# Patient Record
Sex: FEMALE | Race: BLACK OR AFRICAN AMERICAN | NOT HISPANIC OR LATINO | Employment: OTHER | URBAN - METROPOLITAN AREA
[De-identification: names, ages, dates, MRNs, and addresses within clinical notes are randomized per-mention and may not be internally consistent; named-entity substitution may affect disease eponyms.]

---

## 2023-01-01 ENCOUNTER — ANESTHESIA EVENT (OUTPATIENT)
Dept: SURGERY | Facility: OTHER | Age: 68
DRG: 239 | End: 2023-01-01
Payer: MEDICARE

## 2023-01-01 ENCOUNTER — HOSPITAL ENCOUNTER (INPATIENT)
Facility: OTHER | Age: 68
LOS: 32 days | DRG: 239 | End: 2023-10-19
Attending: EMERGENCY MEDICINE | Admitting: EMERGENCY MEDICINE
Payer: MEDICARE

## 2023-01-01 ENCOUNTER — ANESTHESIA (OUTPATIENT)
Dept: SURGERY | Facility: OTHER | Age: 68
DRG: 239 | End: 2023-01-01
Payer: MEDICARE

## 2023-01-01 ENCOUNTER — DOCUMENTATION ONLY (OUTPATIENT)
Dept: NEUROLOGY | Facility: CLINIC | Age: 68
End: 2023-01-01
Payer: MEDICARE

## 2023-01-01 VITALS
SYSTOLIC BLOOD PRESSURE: 74 MMHG | WEIGHT: 125.69 LBS | TEMPERATURE: 98 F | OXYGEN SATURATION: 98 % | HEART RATE: 64 BPM | BODY MASS INDEX: 20.94 KG/M2 | DIASTOLIC BLOOD PRESSURE: 45 MMHG | RESPIRATION RATE: 12 BRPM | HEIGHT: 65 IN

## 2023-01-01 DIAGNOSIS — I96 GANGRENE OF TOE: Primary | ICD-10-CM

## 2023-01-01 DIAGNOSIS — N18.6 ANEMIA DUE TO END STAGE RENAL DISEASE: Chronic | ICD-10-CM

## 2023-01-01 DIAGNOSIS — R10.9 ABDOMINAL PAIN: ICD-10-CM

## 2023-01-01 DIAGNOSIS — I70.262 ATHEROSCLEROSIS OF NATIVE ARTERY OF LEFT LOWER EXTREMITY WITH GANGRENE: ICD-10-CM

## 2023-01-01 DIAGNOSIS — L08.9 TOE INFECTION: ICD-10-CM

## 2023-01-01 DIAGNOSIS — Z99.2 ESRD (END STAGE RENAL DISEASE) ON DIALYSIS: ICD-10-CM

## 2023-01-01 DIAGNOSIS — H91.90 HEARING IMPAIRED PERSON, UNSPECIFIED LATERALITY: ICD-10-CM

## 2023-01-01 DIAGNOSIS — I96 GANGRENE OF TOE OF LEFT FOOT: ICD-10-CM

## 2023-01-01 DIAGNOSIS — R07.9 CHEST PAIN: ICD-10-CM

## 2023-01-01 DIAGNOSIS — D63.1 ANEMIA DUE TO END STAGE RENAL DISEASE: Chronic | ICD-10-CM

## 2023-01-01 DIAGNOSIS — N18.6 ESRD (END STAGE RENAL DISEASE) ON DIALYSIS: ICD-10-CM

## 2023-01-01 DIAGNOSIS — G93.40 ENCEPHALOPATHY: ICD-10-CM

## 2023-01-01 DIAGNOSIS — N18.6 ESRD (END STAGE RENAL DISEASE): Chronic | ICD-10-CM

## 2023-01-01 LAB
ABO + RH BLD: NORMAL
ABO + RH BLD: NORMAL
ABO GROUP BLD: NORMAL
ALBUMIN SERPL BCP-MCNC: 1.8 G/DL (ref 3.5–5.2)
ALBUMIN SERPL BCP-MCNC: 1.9 G/DL (ref 3.5–5.2)
ALBUMIN SERPL BCP-MCNC: 2 G/DL (ref 3.5–5.2)
ALBUMIN SERPL BCP-MCNC: 2.1 G/DL (ref 3.5–5.2)
ALBUMIN SERPL BCP-MCNC: 2.2 G/DL (ref 3.5–5.2)
ALBUMIN SERPL BCP-MCNC: 2.2 G/DL (ref 3.5–5.2)
ALBUMIN SERPL BCP-MCNC: 2.3 G/DL (ref 3.5–5.2)
ALBUMIN SERPL BCP-MCNC: 2.4 G/DL (ref 3.5–5.2)
ALBUMIN SERPL BCP-MCNC: 2.5 G/DL (ref 3.5–5.2)
ALBUMIN SERPL BCP-MCNC: 2.8 G/DL (ref 3.5–5.2)
ALBUMIN SERPL BCP-MCNC: 2.9 G/DL (ref 3.5–5.2)
ALBUMIN SERPL BCP-MCNC: 3.5 G/DL (ref 3.5–5.2)
ALP SERPL-CCNC: 84 U/L (ref 55–135)
ALT SERPL W/O P-5'-P-CCNC: 8 U/L (ref 10–44)
ANION GAP SERPL CALC-SCNC: 10 MMOL/L (ref 8–16)
ANION GAP SERPL CALC-SCNC: 11 MMOL/L (ref 8–16)
ANION GAP SERPL CALC-SCNC: 12 MMOL/L (ref 8–16)
ANION GAP SERPL CALC-SCNC: 12 MMOL/L (ref 8–16)
ANION GAP SERPL CALC-SCNC: 13 MMOL/L (ref 8–16)
ANION GAP SERPL CALC-SCNC: 13 MMOL/L (ref 8–16)
ANION GAP SERPL CALC-SCNC: 14 MMOL/L (ref 8–16)
ANION GAP SERPL CALC-SCNC: 15 MMOL/L (ref 8–16)
ANION GAP SERPL CALC-SCNC: 16 MMOL/L (ref 8–16)
ANION GAP SERPL CALC-SCNC: 17 MMOL/L (ref 8–16)
ANION GAP SERPL CALC-SCNC: 19 MMOL/L (ref 8–16)
ANION GAP SERPL CALC-SCNC: 20 MMOL/L (ref 8–16)
ANION GAP SERPL CALC-SCNC: 23 MMOL/L (ref 8–16)
ANION GAP SERPL CALC-SCNC: 31 MMOL/L (ref 8–16)
ANISOCYTOSIS BLD QL SMEAR: SLIGHT
AST SERPL-CCNC: 20 U/L (ref 10–40)
BACTERIA BLD CULT: NORMAL
BACTERIA BLD CULT: NORMAL
BASOPHILS # BLD AUTO: 0.06 K/UL (ref 0–0.2)
BASOPHILS # BLD AUTO: 0.06 K/UL (ref 0–0.2)
BASOPHILS # BLD AUTO: 0.07 K/UL (ref 0–0.2)
BASOPHILS # BLD AUTO: 0.08 K/UL (ref 0–0.2)
BASOPHILS # BLD AUTO: 0.09 K/UL (ref 0–0.2)
BASOPHILS # BLD AUTO: 0.1 K/UL (ref 0–0.2)
BASOPHILS # BLD AUTO: 0.11 K/UL (ref 0–0.2)
BASOPHILS # BLD AUTO: 0.12 K/UL (ref 0–0.2)
BASOPHILS # BLD AUTO: 0.12 K/UL (ref 0–0.2)
BASOPHILS # BLD AUTO: ABNORMAL K/UL (ref 0–0.2)
BASOPHILS NFR BLD: 0 % (ref 0–1.9)
BASOPHILS NFR BLD: 0 % (ref 0–1.9)
BASOPHILS NFR BLD: 0.3 % (ref 0–1.9)
BASOPHILS NFR BLD: 0.4 % (ref 0–1.9)
BASOPHILS NFR BLD: 0.5 % (ref 0–1.9)
BASOPHILS NFR BLD: 0.6 % (ref 0–1.9)
BASOPHILS NFR BLD: 0.7 % (ref 0–1.9)
BASOPHILS NFR BLD: 0.8 % (ref 0–1.9)
BASOPHILS NFR BLD: 0.9 % (ref 0–1.9)
BASOPHILS NFR BLD: 1 % (ref 0–1.9)
BASOPHILS NFR BLD: 1 % (ref 0–1.9)
BASOPHILS NFR BLD: 1.1 % (ref 0–1.9)
BASOPHILS NFR BLD: 2 % (ref 0–1.9)
BILIRUB SERPL-MCNC: 1 MG/DL (ref 0.1–1)
BLD GP AB SCN CELLS X3 SERPL QL: NORMAL
BLD GP AB SCN CELLS X3 SERPL QL: NORMAL
BLD PROD TYP BPU: NORMAL
BLOOD UNIT EXPIRATION DATE: NORMAL
BLOOD UNIT TYPE CODE: 6200
BLOOD UNIT TYPE: NORMAL
BUN SERPL-MCNC: 17 MG/DL (ref 8–23)
BUN SERPL-MCNC: 18 MG/DL (ref 8–23)
BUN SERPL-MCNC: 20 MG/DL (ref 8–23)
BUN SERPL-MCNC: 20 MG/DL (ref 8–23)
BUN SERPL-MCNC: 22 MG/DL (ref 8–23)
BUN SERPL-MCNC: 25 MG/DL (ref 8–23)
BUN SERPL-MCNC: 26 MG/DL (ref 8–23)
BUN SERPL-MCNC: 28 MG/DL (ref 8–23)
BUN SERPL-MCNC: 28 MG/DL (ref 8–23)
BUN SERPL-MCNC: 29 MG/DL (ref 8–23)
BUN SERPL-MCNC: 32 MG/DL (ref 8–23)
BUN SERPL-MCNC: 32 MG/DL (ref 8–23)
BUN SERPL-MCNC: 33 MG/DL (ref 8–23)
BUN SERPL-MCNC: 34 MG/DL (ref 8–23)
BUN SERPL-MCNC: 36 MG/DL (ref 8–23)
BUN SERPL-MCNC: 38 MG/DL (ref 8–23)
BUN SERPL-MCNC: 38 MG/DL (ref 8–23)
BUN SERPL-MCNC: 39 MG/DL (ref 8–23)
BUN SERPL-MCNC: 40 MG/DL (ref 8–23)
BUN SERPL-MCNC: 42 MG/DL (ref 8–23)
BUN SERPL-MCNC: 43 MG/DL (ref 8–23)
BUN SERPL-MCNC: 47 MG/DL (ref 8–23)
BUN SERPL-MCNC: 48 MG/DL (ref 8–23)
BUN SERPL-MCNC: 48 MG/DL (ref 8–23)
BUN SERPL-MCNC: 55 MG/DL (ref 8–23)
BUN SERPL-MCNC: 57 MG/DL (ref 8–23)
BUN SERPL-MCNC: 62 MG/DL (ref 8–23)
CALCIUM SERPL-MCNC: 10 MG/DL (ref 8.7–10.5)
CALCIUM SERPL-MCNC: 10 MG/DL (ref 8.7–10.5)
CALCIUM SERPL-MCNC: 10.3 MG/DL (ref 8.7–10.5)
CALCIUM SERPL-MCNC: 10.3 MG/DL (ref 8.7–10.5)
CALCIUM SERPL-MCNC: 10.4 MG/DL (ref 8.7–10.5)
CALCIUM SERPL-MCNC: 10.5 MG/DL (ref 8.7–10.5)
CALCIUM SERPL-MCNC: 10.7 MG/DL (ref 8.7–10.5)
CALCIUM SERPL-MCNC: 10.8 MG/DL (ref 8.7–10.5)
CALCIUM SERPL-MCNC: 10.9 MG/DL (ref 8.7–10.5)
CALCIUM SERPL-MCNC: 10.9 MG/DL (ref 8.7–10.5)
CALCIUM SERPL-MCNC: 11 MG/DL (ref 8.7–10.5)
CALCIUM SERPL-MCNC: 11.1 MG/DL (ref 8.7–10.5)
CALCIUM SERPL-MCNC: 11.1 MG/DL (ref 8.7–10.5)
CALCIUM SERPL-MCNC: 8 MG/DL (ref 8.7–10.5)
CALCIUM SERPL-MCNC: 8.2 MG/DL (ref 8.7–10.5)
CALCIUM SERPL-MCNC: 8.4 MG/DL (ref 8.7–10.5)
CALCIUM SERPL-MCNC: 8.5 MG/DL (ref 8.7–10.5)
CALCIUM SERPL-MCNC: 8.6 MG/DL (ref 8.7–10.5)
CALCIUM SERPL-MCNC: 8.7 MG/DL (ref 8.7–10.5)
CALCIUM SERPL-MCNC: 8.8 MG/DL (ref 8.7–10.5)
CALCIUM SERPL-MCNC: 9.1 MG/DL (ref 8.7–10.5)
CALCIUM SERPL-MCNC: 9.2 MG/DL (ref 8.7–10.5)
CALCIUM SERPL-MCNC: 9.3 MG/DL (ref 8.7–10.5)
CALCIUM SERPL-MCNC: 9.3 MG/DL (ref 8.7–10.5)
CALCIUM SERPL-MCNC: 9.4 MG/DL (ref 8.7–10.5)
CALCIUM SERPL-MCNC: 9.5 MG/DL (ref 8.7–10.5)
CALCIUM SERPL-MCNC: 9.6 MG/DL (ref 8.7–10.5)
CALCIUM SERPL-MCNC: 9.7 MG/DL (ref 8.7–10.5)
CALCIUM SERPL-MCNC: 9.8 MG/DL (ref 8.7–10.5)
CALCIUM SERPL-MCNC: 9.9 MG/DL (ref 8.7–10.5)
CHLORIDE SERPL-SCNC: 100 MMOL/L (ref 95–110)
CHLORIDE SERPL-SCNC: 100 MMOL/L (ref 95–110)
CHLORIDE SERPL-SCNC: 101 MMOL/L (ref 95–110)
CHLORIDE SERPL-SCNC: 102 MMOL/L (ref 95–110)
CHLORIDE SERPL-SCNC: 102 MMOL/L (ref 95–110)
CHLORIDE SERPL-SCNC: 107 MMOL/L (ref 95–110)
CHLORIDE SERPL-SCNC: 109 MMOL/L (ref 95–110)
CHLORIDE SERPL-SCNC: 90 MMOL/L (ref 95–110)
CHLORIDE SERPL-SCNC: 91 MMOL/L (ref 95–110)
CHLORIDE SERPL-SCNC: 91 MMOL/L (ref 95–110)
CHLORIDE SERPL-SCNC: 93 MMOL/L (ref 95–110)
CHLORIDE SERPL-SCNC: 94 MMOL/L (ref 95–110)
CHLORIDE SERPL-SCNC: 94 MMOL/L (ref 95–110)
CHLORIDE SERPL-SCNC: 95 MMOL/L (ref 95–110)
CHLORIDE SERPL-SCNC: 96 MMOL/L (ref 95–110)
CHLORIDE SERPL-SCNC: 97 MMOL/L (ref 95–110)
CHLORIDE SERPL-SCNC: 98 MMOL/L (ref 95–110)
CHLORIDE SERPL-SCNC: 98 MMOL/L (ref 95–110)
CO2 SERPL-SCNC: 16 MMOL/L (ref 23–29)
CO2 SERPL-SCNC: 21 MMOL/L (ref 23–29)
CO2 SERPL-SCNC: 21 MMOL/L (ref 23–29)
CO2 SERPL-SCNC: 22 MMOL/L (ref 23–29)
CO2 SERPL-SCNC: 23 MMOL/L (ref 23–29)
CO2 SERPL-SCNC: 24 MMOL/L (ref 23–29)
CO2 SERPL-SCNC: 25 MMOL/L (ref 23–29)
CO2 SERPL-SCNC: 26 MMOL/L (ref 23–29)
CO2 SERPL-SCNC: 27 MMOL/L (ref 23–29)
CO2 SERPL-SCNC: 27 MMOL/L (ref 23–29)
CO2 SERPL-SCNC: 28 MMOL/L (ref 23–29)
CO2 SERPL-SCNC: 28 MMOL/L (ref 23–29)
CO2 SERPL-SCNC: 30 MMOL/L (ref 23–29)
CO2 SERPL-SCNC: 30 MMOL/L (ref 23–29)
CODING SYSTEM: NORMAL
CORTIS SERPL-MCNC: 22 UG/DL
CORTIS SERPL-MCNC: 53 UG/DL
CREAT SERPL-MCNC: 10.9 MG/DL (ref 0.5–1.4)
CREAT SERPL-MCNC: 11.3 MG/DL (ref 0.5–1.4)
CREAT SERPL-MCNC: 3.2 MG/DL (ref 0.5–1.4)
CREAT SERPL-MCNC: 3.3 MG/DL (ref 0.5–1.4)
CREAT SERPL-MCNC: 3.3 MG/DL (ref 0.5–1.4)
CREAT SERPL-MCNC: 3.5 MG/DL (ref 0.5–1.4)
CREAT SERPL-MCNC: 3.5 MG/DL (ref 0.5–1.4)
CREAT SERPL-MCNC: 3.7 MG/DL (ref 0.5–1.4)
CREAT SERPL-MCNC: 4 MG/DL (ref 0.5–1.4)
CREAT SERPL-MCNC: 4.5 MG/DL (ref 0.5–1.4)
CREAT SERPL-MCNC: 4.6 MG/DL (ref 0.5–1.4)
CREAT SERPL-MCNC: 4.7 MG/DL (ref 0.5–1.4)
CREAT SERPL-MCNC: 5.1 MG/DL (ref 0.5–1.4)
CREAT SERPL-MCNC: 5.1 MG/DL (ref 0.5–1.4)
CREAT SERPL-MCNC: 5.2 MG/DL (ref 0.5–1.4)
CREAT SERPL-MCNC: 5.2 MG/DL (ref 0.5–1.4)
CREAT SERPL-MCNC: 5.4 MG/DL (ref 0.5–1.4)
CREAT SERPL-MCNC: 5.4 MG/DL (ref 0.5–1.4)
CREAT SERPL-MCNC: 5.5 MG/DL (ref 0.5–1.4)
CREAT SERPL-MCNC: 5.7 MG/DL (ref 0.5–1.4)
CREAT SERPL-MCNC: 5.8 MG/DL (ref 0.5–1.4)
CREAT SERPL-MCNC: 6.2 MG/DL (ref 0.5–1.4)
CREAT SERPL-MCNC: 6.2 MG/DL (ref 0.5–1.4)
CREAT SERPL-MCNC: 6.3 MG/DL (ref 0.5–1.4)
CREAT SERPL-MCNC: 6.3 MG/DL (ref 0.5–1.4)
CREAT SERPL-MCNC: 6.7 MG/DL (ref 0.5–1.4)
CREAT SERPL-MCNC: 6.8 MG/DL (ref 0.5–1.4)
CREAT SERPL-MCNC: 7 MG/DL (ref 0.5–1.4)
CREAT SERPL-MCNC: 7.2 MG/DL (ref 0.5–1.4)
CREAT SERPL-MCNC: 7.6 MG/DL (ref 0.5–1.4)
CREAT SERPL-MCNC: 8.1 MG/DL (ref 0.5–1.4)
CREAT SERPL-MCNC: 8.3 MG/DL (ref 0.5–1.4)
CREAT SERPL-MCNC: 8.6 MG/DL (ref 0.5–1.4)
CROSSMATCH INTERPRETATION: NORMAL
CRP SERPL-MCNC: 26.4 MG/L (ref 0–8.2)
DIFFERENTIAL METHOD: ABNORMAL
DISPENSE STATUS: NORMAL
EOSINOPHIL # BLD AUTO: 0 K/UL (ref 0–0.5)
EOSINOPHIL # BLD AUTO: 0.1 K/UL (ref 0–0.5)
EOSINOPHIL # BLD AUTO: 0.2 K/UL (ref 0–0.5)
EOSINOPHIL # BLD AUTO: 0.3 K/UL (ref 0–0.5)
EOSINOPHIL # BLD AUTO: 0.4 K/UL (ref 0–0.5)
EOSINOPHIL # BLD AUTO: 0.4 K/UL (ref 0–0.5)
EOSINOPHIL # BLD AUTO: 0.5 K/UL (ref 0–0.5)
EOSINOPHIL # BLD AUTO: ABNORMAL K/UL (ref 0–0.5)
EOSINOPHIL NFR BLD: 0 % (ref 0–8)
EOSINOPHIL NFR BLD: 0.1 % (ref 0–8)
EOSINOPHIL NFR BLD: 0.2 % (ref 0–8)
EOSINOPHIL NFR BLD: 0.4 % (ref 0–8)
EOSINOPHIL NFR BLD: 0.6 % (ref 0–8)
EOSINOPHIL NFR BLD: 0.7 % (ref 0–8)
EOSINOPHIL NFR BLD: 0.8 % (ref 0–8)
EOSINOPHIL NFR BLD: 0.9 % (ref 0–8)
EOSINOPHIL NFR BLD: 1 % (ref 0–8)
EOSINOPHIL NFR BLD: 1.1 % (ref 0–8)
EOSINOPHIL NFR BLD: 1.2 % (ref 0–8)
EOSINOPHIL NFR BLD: 1.3 % (ref 0–8)
EOSINOPHIL NFR BLD: 1.4 % (ref 0–8)
EOSINOPHIL NFR BLD: 1.4 % (ref 0–8)
EOSINOPHIL NFR BLD: 2 % (ref 0–8)
EOSINOPHIL NFR BLD: 2.5 % (ref 0–8)
EOSINOPHIL NFR BLD: 2.7 % (ref 0–8)
EOSINOPHIL NFR BLD: 3 % (ref 0–8)
EOSINOPHIL NFR BLD: 3.2 % (ref 0–8)
EOSINOPHIL NFR BLD: 3.5 % (ref 0–8)
EOSINOPHIL NFR BLD: 3.5 % (ref 0–8)
EOSINOPHIL NFR BLD: 4 % (ref 0–8)
EOSINOPHIL NFR BLD: 4.9 % (ref 0–8)
ERYTHROCYTE [DISTWIDTH] IN BLOOD BY AUTOMATED COUNT: 14.2 % (ref 11.5–14.5)
ERYTHROCYTE [DISTWIDTH] IN BLOOD BY AUTOMATED COUNT: 14.3 % (ref 11.5–14.5)
ERYTHROCYTE [DISTWIDTH] IN BLOOD BY AUTOMATED COUNT: 14.4 % (ref 11.5–14.5)
ERYTHROCYTE [DISTWIDTH] IN BLOOD BY AUTOMATED COUNT: 14.5 % (ref 11.5–14.5)
ERYTHROCYTE [DISTWIDTH] IN BLOOD BY AUTOMATED COUNT: 14.5 % (ref 11.5–14.5)
ERYTHROCYTE [DISTWIDTH] IN BLOOD BY AUTOMATED COUNT: 14.6 % (ref 11.5–14.5)
ERYTHROCYTE [DISTWIDTH] IN BLOOD BY AUTOMATED COUNT: 14.6 % (ref 11.5–14.5)
ERYTHROCYTE [DISTWIDTH] IN BLOOD BY AUTOMATED COUNT: 14.7 % (ref 11.5–14.5)
ERYTHROCYTE [DISTWIDTH] IN BLOOD BY AUTOMATED COUNT: 14.9 % (ref 11.5–14.5)
ERYTHROCYTE [DISTWIDTH] IN BLOOD BY AUTOMATED COUNT: 15.5 % (ref 11.5–14.5)
ERYTHROCYTE [DISTWIDTH] IN BLOOD BY AUTOMATED COUNT: 15.5 % (ref 11.5–14.5)
ERYTHROCYTE [DISTWIDTH] IN BLOOD BY AUTOMATED COUNT: 15.8 % (ref 11.5–14.5)
ERYTHROCYTE [DISTWIDTH] IN BLOOD BY AUTOMATED COUNT: 15.9 % (ref 11.5–14.5)
ERYTHROCYTE [DISTWIDTH] IN BLOOD BY AUTOMATED COUNT: 16 % (ref 11.5–14.5)
ERYTHROCYTE [DISTWIDTH] IN BLOOD BY AUTOMATED COUNT: 16 % (ref 11.5–14.5)
ERYTHROCYTE [DISTWIDTH] IN BLOOD BY AUTOMATED COUNT: 16.1 % (ref 11.5–14.5)
ERYTHROCYTE [DISTWIDTH] IN BLOOD BY AUTOMATED COUNT: 16.2 % (ref 11.5–14.5)
ERYTHROCYTE [DISTWIDTH] IN BLOOD BY AUTOMATED COUNT: 16.3 % (ref 11.5–14.5)
ERYTHROCYTE [DISTWIDTH] IN BLOOD BY AUTOMATED COUNT: 16.3 % (ref 11.5–14.5)
ERYTHROCYTE [DISTWIDTH] IN BLOOD BY AUTOMATED COUNT: 16.4 % (ref 11.5–14.5)
ERYTHROCYTE [DISTWIDTH] IN BLOOD BY AUTOMATED COUNT: 16.5 % (ref 11.5–14.5)
ERYTHROCYTE [DISTWIDTH] IN BLOOD BY AUTOMATED COUNT: 16.5 % (ref 11.5–14.5)
ERYTHROCYTE [DISTWIDTH] IN BLOOD BY AUTOMATED COUNT: 16.6 % (ref 11.5–14.5)
ERYTHROCYTE [DISTWIDTH] IN BLOOD BY AUTOMATED COUNT: 16.6 % (ref 11.5–14.5)
ERYTHROCYTE [DISTWIDTH] IN BLOOD BY AUTOMATED COUNT: 16.9 % (ref 11.5–14.5)
ERYTHROCYTE [SEDIMENTATION RATE] IN BLOOD: 60 MM/HR (ref 0–20)
EST. GFR  (NO RACE VARIABLE): 10 ML/MIN/1.73 M^2
EST. GFR  (NO RACE VARIABLE): 12 ML/MIN/1.73 M^2
EST. GFR  (NO RACE VARIABLE): 13 ML/MIN/1.73 M^2
EST. GFR  (NO RACE VARIABLE): 14 ML/MIN/1.73 M^2
EST. GFR  (NO RACE VARIABLE): 14 ML/MIN/1.73 M^2
EST. GFR  (NO RACE VARIABLE): 15 ML/MIN/1.73 M^2
EST. GFR  (NO RACE VARIABLE): 3 ML/MIN/1.73 M^2
EST. GFR  (NO RACE VARIABLE): 3 ML/MIN/1.73 M^2
EST. GFR  (NO RACE VARIABLE): 5 ML/MIN/1.73 M^2
EST. GFR  (NO RACE VARIABLE): 6 ML/MIN/1.73 M^2
EST. GFR  (NO RACE VARIABLE): 7 ML/MIN/1.73 M^2
EST. GFR  (NO RACE VARIABLE): 8 ML/MIN/1.73 M^2
EST. GFR  (NO RACE VARIABLE): 9 ML/MIN/1.73 M^2
EST. GFR  (NO RACE VARIABLE): 9 ML/MIN/1.73 M^2
ESTIMATED AVG GLUCOSE: 117 MG/DL (ref 68–131)
FERRITIN SERPL-MCNC: 2395 NG/ML (ref 20–300)
FINAL PATHOLOGIC DIAGNOSIS: NORMAL
GLUCOSE SERPL-MCNC: 101 MG/DL (ref 70–110)
GLUCOSE SERPL-MCNC: 102 MG/DL (ref 70–110)
GLUCOSE SERPL-MCNC: 104 MG/DL (ref 70–110)
GLUCOSE SERPL-MCNC: 106 MG/DL (ref 70–110)
GLUCOSE SERPL-MCNC: 109 MG/DL (ref 70–110)
GLUCOSE SERPL-MCNC: 113 MG/DL (ref 70–110)
GLUCOSE SERPL-MCNC: 115 MG/DL (ref 70–110)
GLUCOSE SERPL-MCNC: 116 MG/DL (ref 70–110)
GLUCOSE SERPL-MCNC: 116 MG/DL (ref 70–110)
GLUCOSE SERPL-MCNC: 122 MG/DL (ref 70–110)
GLUCOSE SERPL-MCNC: 123 MG/DL (ref 70–110)
GLUCOSE SERPL-MCNC: 125 MG/DL (ref 70–110)
GLUCOSE SERPL-MCNC: 129 MG/DL (ref 70–110)
GLUCOSE SERPL-MCNC: 135 MG/DL (ref 70–110)
GLUCOSE SERPL-MCNC: 158 MG/DL (ref 70–110)
GLUCOSE SERPL-MCNC: 203 MG/DL (ref 70–110)
GLUCOSE SERPL-MCNC: 72 MG/DL (ref 70–110)
GLUCOSE SERPL-MCNC: 73 MG/DL (ref 70–110)
GLUCOSE SERPL-MCNC: 73 MG/DL (ref 70–110)
GLUCOSE SERPL-MCNC: 76 MG/DL (ref 70–110)
GLUCOSE SERPL-MCNC: 77 MG/DL (ref 70–110)
GLUCOSE SERPL-MCNC: 78 MG/DL (ref 70–110)
GLUCOSE SERPL-MCNC: 86 MG/DL (ref 70–110)
GLUCOSE SERPL-MCNC: 86 MG/DL (ref 70–110)
GLUCOSE SERPL-MCNC: 87 MG/DL (ref 70–110)
GLUCOSE SERPL-MCNC: 89 MG/DL (ref 70–110)
GLUCOSE SERPL-MCNC: 91 MG/DL (ref 70–110)
GLUCOSE SERPL-MCNC: 91 MG/DL (ref 70–110)
GLUCOSE SERPL-MCNC: 92 MG/DL (ref 70–110)
GLUCOSE SERPL-MCNC: 92 MG/DL (ref 70–110)
GLUCOSE SERPL-MCNC: 93 MG/DL (ref 70–110)
GLUCOSE SERPL-MCNC: 95 MG/DL (ref 70–110)
GLUCOSE SERPL-MCNC: 95 MG/DL (ref 70–110)
GROSS: NORMAL
HBA1C MFR BLD: 5.7 % (ref 4–5.6)
HBV CORE AB SERPL QL IA: NORMAL
HBV SURFACE AB SER QL IA: NEGATIVE
HBV SURFACE AB SERPL IA-ACNC: <3 MIU/ML
HBV SURFACE AG SERPL QL IA: NORMAL
HBV SURFACE AG SERPL QL IA: NORMAL
HCT VFR BLD AUTO: 21.4 % (ref 37–48.5)
HCT VFR BLD AUTO: 24.1 % (ref 37–48.5)
HCT VFR BLD AUTO: 26.3 % (ref 37–48.5)
HCT VFR BLD AUTO: 26.4 % (ref 37–48.5)
HCT VFR BLD AUTO: 26.7 % (ref 37–48.5)
HCT VFR BLD AUTO: 27 % (ref 37–48.5)
HCT VFR BLD AUTO: 27.5 % (ref 37–48.5)
HCT VFR BLD AUTO: 27.5 % (ref 37–48.5)
HCT VFR BLD AUTO: 27.7 % (ref 37–48.5)
HCT VFR BLD AUTO: 28.1 % (ref 37–48.5)
HCT VFR BLD AUTO: 28.2 % (ref 37–48.5)
HCT VFR BLD AUTO: 28.4 % (ref 37–48.5)
HCT VFR BLD AUTO: 28.5 % (ref 37–48.5)
HCT VFR BLD AUTO: 28.9 % (ref 37–48.5)
HCT VFR BLD AUTO: 29.1 % (ref 37–48.5)
HCT VFR BLD AUTO: 29.3 % (ref 37–48.5)
HCT VFR BLD AUTO: 29.4 % (ref 37–48.5)
HCT VFR BLD AUTO: 29.4 % (ref 37–48.5)
HCT VFR BLD AUTO: 29.9 % (ref 37–48.5)
HCT VFR BLD AUTO: 30 % (ref 37–48.5)
HCT VFR BLD AUTO: 30.1 % (ref 37–48.5)
HCT VFR BLD AUTO: 31.6 % (ref 37–48.5)
HCT VFR BLD AUTO: 32 % (ref 37–48.5)
HCT VFR BLD AUTO: 32.4 % (ref 37–48.5)
HCT VFR BLD AUTO: 33.1 % (ref 37–48.5)
HCT VFR BLD AUTO: 34.5 % (ref 37–48.5)
HCT VFR BLD AUTO: 34.5 % (ref 37–48.5)
HCT VFR BLD AUTO: 36.5 % (ref 37–48.5)
HCT VFR BLD AUTO: 36.8 % (ref 37–48.5)
HCT VFR BLD AUTO: 38.9 % (ref 37–48.5)
HCT VFR BLD AUTO: 38.9 % (ref 37–48.5)
HCV AB SERPL QL IA: NEGATIVE
HGB BLD-MCNC: 10.1 G/DL (ref 12–16)
HGB BLD-MCNC: 10.1 G/DL (ref 12–16)
HGB BLD-MCNC: 10.2 G/DL (ref 12–16)
HGB BLD-MCNC: 10.7 G/DL (ref 12–16)
HGB BLD-MCNC: 11.3 G/DL (ref 12–16)
HGB BLD-MCNC: 11.4 G/DL (ref 12–16)
HGB BLD-MCNC: 11.7 G/DL (ref 12–16)
HGB BLD-MCNC: 12 G/DL (ref 12–16)
HGB BLD-MCNC: 6.7 G/DL (ref 12–16)
HGB BLD-MCNC: 7.6 G/DL (ref 12–16)
HGB BLD-MCNC: 8.1 G/DL (ref 12–16)
HGB BLD-MCNC: 8.2 G/DL (ref 12–16)
HGB BLD-MCNC: 8.2 G/DL (ref 12–16)
HGB BLD-MCNC: 8.3 G/DL (ref 12–16)
HGB BLD-MCNC: 8.5 G/DL (ref 12–16)
HGB BLD-MCNC: 8.6 G/DL (ref 12–16)
HGB BLD-MCNC: 8.6 G/DL (ref 12–16)
HGB BLD-MCNC: 8.7 G/DL (ref 12–16)
HGB BLD-MCNC: 8.7 G/DL (ref 12–16)
HGB BLD-MCNC: 8.8 G/DL (ref 12–16)
HGB BLD-MCNC: 8.9 G/DL (ref 12–16)
HGB BLD-MCNC: 9 G/DL (ref 12–16)
HGB BLD-MCNC: 9 G/DL (ref 12–16)
HGB BLD-MCNC: 9.1 G/DL (ref 12–16)
HGB BLD-MCNC: 9.1 G/DL (ref 12–16)
HGB BLD-MCNC: 9.2 G/DL (ref 12–16)
HGB BLD-MCNC: 9.3 G/DL (ref 12–16)
HGB BLD-MCNC: 9.4 G/DL (ref 12–16)
HGB BLD-MCNC: 9.4 G/DL (ref 12–16)
HGB BLD-MCNC: 9.5 G/DL (ref 12–16)
HGB BLD-MCNC: 9.6 G/DL (ref 12–16)
HGB BLD-MCNC: 9.7 G/DL (ref 12–16)
HGB BLD-MCNC: 9.8 G/DL (ref 12–16)
HIV 1+2 AB+HIV1 P24 AG SERPL QL IA: NEGATIVE
HYPOCHROMIA BLD QL SMEAR: ABNORMAL
IMM GRANULOCYTES # BLD AUTO: 0.03 K/UL (ref 0–0.04)
IMM GRANULOCYTES # BLD AUTO: 0.05 K/UL (ref 0–0.04)
IMM GRANULOCYTES # BLD AUTO: 0.05 K/UL (ref 0–0.04)
IMM GRANULOCYTES # BLD AUTO: 0.06 K/UL (ref 0–0.04)
IMM GRANULOCYTES # BLD AUTO: 0.06 K/UL (ref 0–0.04)
IMM GRANULOCYTES # BLD AUTO: 0.07 K/UL (ref 0–0.04)
IMM GRANULOCYTES # BLD AUTO: 0.12 K/UL (ref 0–0.04)
IMM GRANULOCYTES # BLD AUTO: 0.21 K/UL (ref 0–0.04)
IMM GRANULOCYTES # BLD AUTO: 0.23 K/UL (ref 0–0.04)
IMM GRANULOCYTES # BLD AUTO: 0.24 K/UL (ref 0–0.04)
IMM GRANULOCYTES # BLD AUTO: 0.28 K/UL (ref 0–0.04)
IMM GRANULOCYTES # BLD AUTO: 0.3 K/UL (ref 0–0.04)
IMM GRANULOCYTES # BLD AUTO: 0.36 K/UL (ref 0–0.04)
IMM GRANULOCYTES # BLD AUTO: 0.37 K/UL (ref 0–0.04)
IMM GRANULOCYTES # BLD AUTO: 0.38 K/UL (ref 0–0.04)
IMM GRANULOCYTES # BLD AUTO: 0.43 K/UL (ref 0–0.04)
IMM GRANULOCYTES # BLD AUTO: 0.45 K/UL (ref 0–0.04)
IMM GRANULOCYTES # BLD AUTO: 0.46 K/UL (ref 0–0.04)
IMM GRANULOCYTES # BLD AUTO: 0.48 K/UL (ref 0–0.04)
IMM GRANULOCYTES # BLD AUTO: 0.51 K/UL (ref 0–0.04)
IMM GRANULOCYTES # BLD AUTO: 0.53 K/UL (ref 0–0.04)
IMM GRANULOCYTES # BLD AUTO: 0.54 K/UL (ref 0–0.04)
IMM GRANULOCYTES # BLD AUTO: 0.59 K/UL (ref 0–0.04)
IMM GRANULOCYTES # BLD AUTO: 0.63 K/UL (ref 0–0.04)
IMM GRANULOCYTES # BLD AUTO: 0.64 K/UL (ref 0–0.04)
IMM GRANULOCYTES # BLD AUTO: 0.68 K/UL (ref 0–0.04)
IMM GRANULOCYTES # BLD AUTO: 0.99 K/UL (ref 0–0.04)
IMM GRANULOCYTES # BLD AUTO: 1.05 K/UL (ref 0–0.04)
IMM GRANULOCYTES # BLD AUTO: ABNORMAL K/UL (ref 0–0.04)
IMM GRANULOCYTES NFR BLD AUTO: 0.4 % (ref 0–0.5)
IMM GRANULOCYTES NFR BLD AUTO: 0.6 % (ref 0–0.5)
IMM GRANULOCYTES NFR BLD AUTO: 0.7 % (ref 0–0.5)
IMM GRANULOCYTES NFR BLD AUTO: 0.7 % (ref 0–0.5)
IMM GRANULOCYTES NFR BLD AUTO: 1 % (ref 0–0.5)
IMM GRANULOCYTES NFR BLD AUTO: 1.3 % (ref 0–0.5)
IMM GRANULOCYTES NFR BLD AUTO: 1.3 % (ref 0–0.5)
IMM GRANULOCYTES NFR BLD AUTO: 1.5 % (ref 0–0.5)
IMM GRANULOCYTES NFR BLD AUTO: 1.6 % (ref 0–0.5)
IMM GRANULOCYTES NFR BLD AUTO: 1.7 % (ref 0–0.5)
IMM GRANULOCYTES NFR BLD AUTO: 1.9 % (ref 0–0.5)
IMM GRANULOCYTES NFR BLD AUTO: 2.2 % (ref 0–0.5)
IMM GRANULOCYTES NFR BLD AUTO: 2.4 % (ref 0–0.5)
IMM GRANULOCYTES NFR BLD AUTO: 2.6 % (ref 0–0.5)
IMM GRANULOCYTES NFR BLD AUTO: 2.7 % (ref 0–0.5)
IMM GRANULOCYTES NFR BLD AUTO: 2.8 % (ref 0–0.5)
IMM GRANULOCYTES NFR BLD AUTO: 2.8 % (ref 0–0.5)
IMM GRANULOCYTES NFR BLD AUTO: 3.1 % (ref 0–0.5)
IMM GRANULOCYTES NFR BLD AUTO: 3.2 % (ref 0–0.5)
IMM GRANULOCYTES NFR BLD AUTO: 3.3 % (ref 0–0.5)
IMM GRANULOCYTES NFR BLD AUTO: 3.3 % (ref 0–0.5)
IMM GRANULOCYTES NFR BLD AUTO: 3.6 % (ref 0–0.5)
IMM GRANULOCYTES NFR BLD AUTO: 3.8 % (ref 0–0.5)
IMM GRANULOCYTES NFR BLD AUTO: 4.5 % (ref 0–0.5)
IMM GRANULOCYTES NFR BLD AUTO: ABNORMAL % (ref 0–0.5)
IRON SERPL-MCNC: 29 UG/DL (ref 30–160)
LACTATE SERPL-SCNC: 1.2 MMOL/L (ref 0.5–2.2)
LIPASE SERPL-CCNC: 22 U/L (ref 4–60)
LYMPHOCYTES # BLD AUTO: 0.9 K/UL (ref 1–4.8)
LYMPHOCYTES # BLD AUTO: 0.9 K/UL (ref 1–4.8)
LYMPHOCYTES # BLD AUTO: 1 K/UL (ref 1–4.8)
LYMPHOCYTES # BLD AUTO: 1.1 K/UL (ref 1–4.8)
LYMPHOCYTES # BLD AUTO: 1.1 K/UL (ref 1–4.8)
LYMPHOCYTES # BLD AUTO: 1.2 K/UL (ref 1–4.8)
LYMPHOCYTES # BLD AUTO: 1.3 K/UL (ref 1–4.8)
LYMPHOCYTES # BLD AUTO: 1.4 K/UL (ref 1–4.8)
LYMPHOCYTES # BLD AUTO: 1.5 K/UL (ref 1–4.8)
LYMPHOCYTES # BLD AUTO: 1.6 K/UL (ref 1–4.8)
LYMPHOCYTES # BLD AUTO: 1.6 K/UL (ref 1–4.8)
LYMPHOCYTES # BLD AUTO: 1.7 K/UL (ref 1–4.8)
LYMPHOCYTES # BLD AUTO: 1.7 K/UL (ref 1–4.8)
LYMPHOCYTES # BLD AUTO: 1.8 K/UL (ref 1–4.8)
LYMPHOCYTES # BLD AUTO: 1.9 K/UL (ref 1–4.8)
LYMPHOCYTES # BLD AUTO: 2 K/UL (ref 1–4.8)
LYMPHOCYTES # BLD AUTO: 2.3 K/UL (ref 1–4.8)
LYMPHOCYTES # BLD AUTO: ABNORMAL K/UL (ref 1–4.8)
LYMPHOCYTES NFR BLD: 10 % (ref 18–48)
LYMPHOCYTES NFR BLD: 10.2 % (ref 18–48)
LYMPHOCYTES NFR BLD: 10.3 % (ref 18–48)
LYMPHOCYTES NFR BLD: 10.7 % (ref 18–48)
LYMPHOCYTES NFR BLD: 11 % (ref 18–48)
LYMPHOCYTES NFR BLD: 11.1 % (ref 18–48)
LYMPHOCYTES NFR BLD: 12.2 % (ref 18–48)
LYMPHOCYTES NFR BLD: 12.6 % (ref 18–48)
LYMPHOCYTES NFR BLD: 12.9 % (ref 18–48)
LYMPHOCYTES NFR BLD: 13.9 % (ref 18–48)
LYMPHOCYTES NFR BLD: 14.1 % (ref 18–48)
LYMPHOCYTES NFR BLD: 14.4 % (ref 18–48)
LYMPHOCYTES NFR BLD: 6.2 % (ref 18–48)
LYMPHOCYTES NFR BLD: 6.7 % (ref 18–48)
LYMPHOCYTES NFR BLD: 6.8 % (ref 18–48)
LYMPHOCYTES NFR BLD: 6.9 % (ref 18–48)
LYMPHOCYTES NFR BLD: 7 % (ref 18–48)
LYMPHOCYTES NFR BLD: 7.3 % (ref 18–48)
LYMPHOCYTES NFR BLD: 7.3 % (ref 18–48)
LYMPHOCYTES NFR BLD: 7.4 % (ref 18–48)
LYMPHOCYTES NFR BLD: 7.5 % (ref 18–48)
LYMPHOCYTES NFR BLD: 7.6 % (ref 18–48)
LYMPHOCYTES NFR BLD: 7.8 % (ref 18–48)
LYMPHOCYTES NFR BLD: 8 % (ref 18–48)
LYMPHOCYTES NFR BLD: 8 % (ref 18–48)
LYMPHOCYTES NFR BLD: 8.2 % (ref 18–48)
LYMPHOCYTES NFR BLD: 8.4 % (ref 18–48)
LYMPHOCYTES NFR BLD: 8.5 % (ref 18–48)
LYMPHOCYTES NFR BLD: 8.8 % (ref 18–48)
LYMPHOCYTES NFR BLD: 9.4 % (ref 18–48)
LYMPHOCYTES NFR BLD: 9.4 % (ref 18–48)
Lab: NORMAL
MAGNESIUM SERPL-MCNC: 2 MG/DL (ref 1.6–2.6)
MAGNESIUM SERPL-MCNC: 2.1 MG/DL (ref 1.6–2.6)
MAGNESIUM SERPL-MCNC: 2.2 MG/DL (ref 1.6–2.6)
MAGNESIUM SERPL-MCNC: 2.3 MG/DL (ref 1.6–2.6)
MAGNESIUM SERPL-MCNC: 2.4 MG/DL (ref 1.6–2.6)
MAGNESIUM SERPL-MCNC: 2.5 MG/DL (ref 1.6–2.6)
MAGNESIUM SERPL-MCNC: 2.6 MG/DL (ref 1.6–2.6)
MAGNESIUM SERPL-MCNC: 2.7 MG/DL (ref 1.6–2.6)
MAGNESIUM SERPL-MCNC: 2.7 MG/DL (ref 1.6–2.6)
MCH RBC QN AUTO: 27.6 PG (ref 27–31)
MCH RBC QN AUTO: 27.7 PG (ref 27–31)
MCH RBC QN AUTO: 27.8 PG (ref 27–31)
MCH RBC QN AUTO: 27.8 PG (ref 27–31)
MCH RBC QN AUTO: 27.9 PG (ref 27–31)
MCH RBC QN AUTO: 28.1 PG (ref 27–31)
MCH RBC QN AUTO: 28.1 PG (ref 27–31)
MCH RBC QN AUTO: 28.2 PG (ref 27–31)
MCH RBC QN AUTO: 28.2 PG (ref 27–31)
MCH RBC QN AUTO: 28.3 PG (ref 27–31)
MCH RBC QN AUTO: 28.5 PG (ref 27–31)
MCH RBC QN AUTO: 28.6 PG (ref 27–31)
MCH RBC QN AUTO: 28.6 PG (ref 27–31)
MCH RBC QN AUTO: 28.7 PG (ref 27–31)
MCH RBC QN AUTO: 28.7 PG (ref 27–31)
MCH RBC QN AUTO: 28.8 PG (ref 27–31)
MCH RBC QN AUTO: 28.9 PG (ref 27–31)
MCH RBC QN AUTO: 28.9 PG (ref 27–31)
MCH RBC QN AUTO: 29 PG (ref 27–31)
MCH RBC QN AUTO: 29 PG (ref 27–31)
MCH RBC QN AUTO: 29.1 PG (ref 27–31)
MCH RBC QN AUTO: 29.2 PG (ref 27–31)
MCH RBC QN AUTO: 29.2 PG (ref 27–31)
MCHC RBC AUTO-ENTMCNC: 29.3 G/DL (ref 32–36)
MCHC RBC AUTO-ENTMCNC: 29.3 G/DL (ref 32–36)
MCHC RBC AUTO-ENTMCNC: 29.6 G/DL (ref 32–36)
MCHC RBC AUTO-ENTMCNC: 30 G/DL (ref 32–36)
MCHC RBC AUTO-ENTMCNC: 30.3 G/DL (ref 32–36)
MCHC RBC AUTO-ENTMCNC: 30.5 G/DL (ref 32–36)
MCHC RBC AUTO-ENTMCNC: 30.5 G/DL (ref 32–36)
MCHC RBC AUTO-ENTMCNC: 30.6 G/DL (ref 32–36)
MCHC RBC AUTO-ENTMCNC: 30.7 G/DL (ref 32–36)
MCHC RBC AUTO-ENTMCNC: 30.8 G/DL (ref 32–36)
MCHC RBC AUTO-ENTMCNC: 30.9 G/DL (ref 32–36)
MCHC RBC AUTO-ENTMCNC: 30.9 G/DL (ref 32–36)
MCHC RBC AUTO-ENTMCNC: 31 G/DL (ref 32–36)
MCHC RBC AUTO-ENTMCNC: 31.2 G/DL (ref 32–36)
MCHC RBC AUTO-ENTMCNC: 31.2 G/DL (ref 32–36)
MCHC RBC AUTO-ENTMCNC: 31.3 G/DL (ref 32–36)
MCHC RBC AUTO-ENTMCNC: 31.3 G/DL (ref 32–36)
MCHC RBC AUTO-ENTMCNC: 31.4 G/DL (ref 32–36)
MCHC RBC AUTO-ENTMCNC: 31.5 G/DL (ref 32–36)
MCHC RBC AUTO-ENTMCNC: 31.5 G/DL (ref 32–36)
MCHC RBC AUTO-ENTMCNC: 31.6 G/DL (ref 32–36)
MCHC RBC AUTO-ENTMCNC: 31.7 G/DL (ref 32–36)
MCHC RBC AUTO-ENTMCNC: 32 G/DL (ref 32–36)
MCHC RBC AUTO-ENTMCNC: 32 G/DL (ref 32–36)
MCHC RBC AUTO-ENTMCNC: 32.1 G/DL (ref 32–36)
MCHC RBC AUTO-ENTMCNC: 32.4 G/DL (ref 32–36)
MCV RBC AUTO: 88 FL (ref 82–98)
MCV RBC AUTO: 90 FL (ref 82–98)
MCV RBC AUTO: 91 FL (ref 82–98)
MCV RBC AUTO: 92 FL (ref 82–98)
MCV RBC AUTO: 93 FL (ref 82–98)
MCV RBC AUTO: 94 FL (ref 82–98)
MCV RBC AUTO: 95 FL (ref 82–98)
MCV RBC AUTO: 95 FL (ref 82–98)
METAMYELOCYTES NFR BLD MANUAL: 1 %
METAMYELOCYTES NFR BLD MANUAL: 2 %
MICROSCOPIC EXAM: NORMAL
MONOCYTES # BLD AUTO: 0.9 K/UL (ref 0.3–1)
MONOCYTES # BLD AUTO: 1 K/UL (ref 0.3–1)
MONOCYTES # BLD AUTO: 1.1 K/UL (ref 0.3–1)
MONOCYTES # BLD AUTO: 1.1 K/UL (ref 0.3–1)
MONOCYTES # BLD AUTO: 1.2 K/UL (ref 0.3–1)
MONOCYTES # BLD AUTO: 1.2 K/UL (ref 0.3–1)
MONOCYTES # BLD AUTO: 1.5 K/UL (ref 0.3–1)
MONOCYTES # BLD AUTO: 1.7 K/UL (ref 0.3–1)
MONOCYTES # BLD AUTO: 1.8 K/UL (ref 0.3–1)
MONOCYTES # BLD AUTO: 1.8 K/UL (ref 0.3–1)
MONOCYTES # BLD AUTO: 1.9 K/UL (ref 0.3–1)
MONOCYTES # BLD AUTO: 1.9 K/UL (ref 0.3–1)
MONOCYTES # BLD AUTO: 2 K/UL (ref 0.3–1)
MONOCYTES # BLD AUTO: 2.1 K/UL (ref 0.3–1)
MONOCYTES # BLD AUTO: 2.2 K/UL (ref 0.3–1)
MONOCYTES # BLD AUTO: 2.3 K/UL (ref 0.3–1)
MONOCYTES # BLD AUTO: 2.4 K/UL (ref 0.3–1)
MONOCYTES # BLD AUTO: 2.4 K/UL (ref 0.3–1)
MONOCYTES # BLD AUTO: 2.5 K/UL (ref 0.3–1)
MONOCYTES # BLD AUTO: 2.5 K/UL (ref 0.3–1)
MONOCYTES # BLD AUTO: 2.7 K/UL (ref 0.3–1)
MONOCYTES # BLD AUTO: ABNORMAL K/UL (ref 0.3–1)
MONOCYTES NFR BLD: 10 % (ref 4–15)
MONOCYTES NFR BLD: 10.2 % (ref 4–15)
MONOCYTES NFR BLD: 10.3 % (ref 4–15)
MONOCYTES NFR BLD: 10.6 % (ref 4–15)
MONOCYTES NFR BLD: 10.9 % (ref 4–15)
MONOCYTES NFR BLD: 11 % (ref 4–15)
MONOCYTES NFR BLD: 11 % (ref 4–15)
MONOCYTES NFR BLD: 11.1 % (ref 4–15)
MONOCYTES NFR BLD: 11.3 % (ref 4–15)
MONOCYTES NFR BLD: 11.6 % (ref 4–15)
MONOCYTES NFR BLD: 12 % (ref 4–15)
MONOCYTES NFR BLD: 12.1 % (ref 4–15)
MONOCYTES NFR BLD: 12.2 % (ref 4–15)
MONOCYTES NFR BLD: 12.3 % (ref 4–15)
MONOCYTES NFR BLD: 12.3 % (ref 4–15)
MONOCYTES NFR BLD: 12.5 % (ref 4–15)
MONOCYTES NFR BLD: 13 % (ref 4–15)
MONOCYTES NFR BLD: 13.2 % (ref 4–15)
MONOCYTES NFR BLD: 13.6 % (ref 4–15)
MONOCYTES NFR BLD: 14 % (ref 4–15)
MONOCYTES NFR BLD: 14.2 % (ref 4–15)
MONOCYTES NFR BLD: 14.8 % (ref 4–15)
MONOCYTES NFR BLD: 15 % (ref 4–15)
MONOCYTES NFR BLD: 15.3 % (ref 4–15)
MONOCYTES NFR BLD: 15.9 % (ref 4–15)
MONOCYTES NFR BLD: 16.1 % (ref 4–15)
MONOCYTES NFR BLD: 6.7 % (ref 4–15)
MONOCYTES NFR BLD: 8.8 % (ref 4–15)
MONOCYTES NFR BLD: 9 % (ref 4–15)
MONOCYTES NFR BLD: 9.3 % (ref 4–15)
MONOCYTES NFR BLD: 9.8 % (ref 4–15)
MYELOCYTES NFR BLD MANUAL: 1 %
NEUTROPHILS # BLD AUTO: 10.8 K/UL (ref 1.8–7.7)
NEUTROPHILS # BLD AUTO: 10.9 K/UL (ref 1.8–7.7)
NEUTROPHILS # BLD AUTO: 11.3 K/UL (ref 1.8–7.7)
NEUTROPHILS # BLD AUTO: 11.8 K/UL (ref 1.8–7.7)
NEUTROPHILS # BLD AUTO: 11.9 K/UL (ref 1.8–7.7)
NEUTROPHILS # BLD AUTO: 13.2 K/UL (ref 1.8–7.7)
NEUTROPHILS # BLD AUTO: 14.7 K/UL (ref 1.8–7.7)
NEUTROPHILS # BLD AUTO: 14.9 K/UL (ref 1.8–7.7)
NEUTROPHILS # BLD AUTO: 15 K/UL (ref 1.8–7.7)
NEUTROPHILS # BLD AUTO: 15.1 K/UL (ref 1.8–7.7)
NEUTROPHILS # BLD AUTO: 15.9 K/UL (ref 1.8–7.7)
NEUTROPHILS # BLD AUTO: 16.2 K/UL (ref 1.8–7.7)
NEUTROPHILS # BLD AUTO: 16.5 K/UL (ref 1.8–7.7)
NEUTROPHILS # BLD AUTO: 16.5 K/UL (ref 1.8–7.7)
NEUTROPHILS # BLD AUTO: 16.6 K/UL (ref 1.8–7.7)
NEUTROPHILS # BLD AUTO: 16.9 K/UL (ref 1.8–7.7)
NEUTROPHILS # BLD AUTO: 17.1 K/UL (ref 1.8–7.7)
NEUTROPHILS # BLD AUTO: 19 K/UL (ref 1.8–7.7)
NEUTROPHILS # BLD AUTO: 28.1 K/UL (ref 1.8–7.7)
NEUTROPHILS # BLD AUTO: 5.1 K/UL (ref 1.8–7.7)
NEUTROPHILS # BLD AUTO: 5.1 K/UL (ref 1.8–7.7)
NEUTROPHILS # BLD AUTO: 6.1 K/UL (ref 1.8–7.7)
NEUTROPHILS # BLD AUTO: 6.5 K/UL (ref 1.8–7.7)
NEUTROPHILS # BLD AUTO: 7 K/UL (ref 1.8–7.7)
NEUTROPHILS # BLD AUTO: 7.2 K/UL (ref 1.8–7.7)
NEUTROPHILS # BLD AUTO: 8 K/UL (ref 1.8–7.7)
NEUTROPHILS # BLD AUTO: 8.2 K/UL (ref 1.8–7.7)
NEUTROPHILS # BLD AUTO: 9.3 K/UL (ref 1.8–7.7)
NEUTROPHILS NFR BLD: 64.5 % (ref 38–73)
NEUTROPHILS NFR BLD: 67.2 % (ref 38–73)
NEUTROPHILS NFR BLD: 67.3 % (ref 38–73)
NEUTROPHILS NFR BLD: 68.5 % (ref 38–73)
NEUTROPHILS NFR BLD: 69.3 % (ref 38–73)
NEUTROPHILS NFR BLD: 69.5 % (ref 38–73)
NEUTROPHILS NFR BLD: 70 % (ref 38–73)
NEUTROPHILS NFR BLD: 70.3 % (ref 38–73)
NEUTROPHILS NFR BLD: 71.2 % (ref 38–73)
NEUTROPHILS NFR BLD: 72.4 % (ref 38–73)
NEUTROPHILS NFR BLD: 73 % (ref 38–73)
NEUTROPHILS NFR BLD: 74 % (ref 38–73)
NEUTROPHILS NFR BLD: 74 % (ref 38–73)
NEUTROPHILS NFR BLD: 74.4 % (ref 38–73)
NEUTROPHILS NFR BLD: 75 % (ref 38–73)
NEUTROPHILS NFR BLD: 75.1 % (ref 38–73)
NEUTROPHILS NFR BLD: 75.4 % (ref 38–73)
NEUTROPHILS NFR BLD: 75.6 % (ref 38–73)
NEUTROPHILS NFR BLD: 76.1 % (ref 38–73)
NEUTROPHILS NFR BLD: 76.8 % (ref 38–73)
NEUTROPHILS NFR BLD: 77 % (ref 38–73)
NEUTROPHILS NFR BLD: 77.1 % (ref 38–73)
NEUTROPHILS NFR BLD: 77.9 % (ref 38–73)
NEUTROPHILS NFR BLD: 78 % (ref 38–73)
NEUTROPHILS NFR BLD: 78.1 % (ref 38–73)
NEUTROPHILS NFR BLD: 79 % (ref 38–73)
NEUTROPHILS NFR BLD: 79.1 % (ref 38–73)
NEUTROPHILS NFR BLD: 79.3 % (ref 38–73)
NEUTROPHILS NFR BLD: 79.4 % (ref 38–73)
NEUTROPHILS NFR BLD: 79.4 % (ref 38–73)
NEUTROPHILS NFR BLD: 80 % (ref 38–73)
NEUTROPHILS NFR BLD: 81.5 % (ref 38–73)
NEUTROPHILS NFR BLD: 82.9 % (ref 38–73)
NEUTS BAND NFR BLD MANUAL: 1 %
NEUTS BAND NFR BLD MANUAL: 2 %
NRBC BLD-RTO: 0 /100 WBC
NRBC BLD-RTO: 1 /100 WBC
OVALOCYTES BLD QL SMEAR: ABNORMAL
PHOSPHATE SERPL-MCNC: 2.7 MG/DL (ref 2.7–4.5)
PHOSPHATE SERPL-MCNC: 3 MG/DL (ref 2.7–4.5)
PHOSPHATE SERPL-MCNC: 3.2 MG/DL (ref 2.7–4.5)
PHOSPHATE SERPL-MCNC: 3.3 MG/DL (ref 2.7–4.5)
PHOSPHATE SERPL-MCNC: 3.5 MG/DL (ref 2.7–4.5)
PHOSPHATE SERPL-MCNC: 3.5 MG/DL (ref 2.7–4.5)
PHOSPHATE SERPL-MCNC: 3.6 MG/DL (ref 2.7–4.5)
PHOSPHATE SERPL-MCNC: 3.8 MG/DL (ref 2.7–4.5)
PHOSPHATE SERPL-MCNC: 3.9 MG/DL (ref 2.7–4.5)
PHOSPHATE SERPL-MCNC: 4.1 MG/DL (ref 2.7–4.5)
PHOSPHATE SERPL-MCNC: 4.1 MG/DL (ref 2.7–4.5)
PHOSPHATE SERPL-MCNC: 4.2 MG/DL (ref 2.7–4.5)
PHOSPHATE SERPL-MCNC: 4.5 MG/DL (ref 2.7–4.5)
PHOSPHATE SERPL-MCNC: 4.5 MG/DL (ref 2.7–4.5)
PHOSPHATE SERPL-MCNC: 4.9 MG/DL (ref 2.7–4.5)
PHOSPHATE SERPL-MCNC: 5 MG/DL (ref 2.7–4.5)
PHOSPHATE SERPL-MCNC: 5.1 MG/DL (ref 2.7–4.5)
PHOSPHATE SERPL-MCNC: 5.1 MG/DL (ref 2.7–4.5)
PHOSPHATE SERPL-MCNC: 5.2 MG/DL (ref 2.7–4.5)
PHOSPHATE SERPL-MCNC: 5.3 MG/DL (ref 2.7–4.5)
PHOSPHATE SERPL-MCNC: 5.6 MG/DL (ref 2.7–4.5)
PHOSPHATE SERPL-MCNC: 5.6 MG/DL (ref 2.7–4.5)
PHOSPHATE SERPL-MCNC: 5.7 MG/DL (ref 2.7–4.5)
PHOSPHATE SERPL-MCNC: 5.7 MG/DL (ref 2.7–4.5)
PHOSPHATE SERPL-MCNC: 5.8 MG/DL (ref 2.7–4.5)
PHOSPHATE SERPL-MCNC: 6.6 MG/DL (ref 2.7–4.5)
PHOSPHATE SERPL-MCNC: 8.1 MG/DL (ref 2.7–4.5)
PLATELET # BLD AUTO: 200 K/UL (ref 150–450)
PLATELET # BLD AUTO: 210 K/UL (ref 150–450)
PLATELET # BLD AUTO: 213 K/UL (ref 150–450)
PLATELET # BLD AUTO: 214 K/UL (ref 150–450)
PLATELET # BLD AUTO: 214 K/UL (ref 150–450)
PLATELET # BLD AUTO: 222 K/UL (ref 150–450)
PLATELET # BLD AUTO: 230 K/UL (ref 150–450)
PLATELET # BLD AUTO: 235 K/UL (ref 150–450)
PLATELET # BLD AUTO: 235 K/UL (ref 150–450)
PLATELET # BLD AUTO: 236 K/UL (ref 150–450)
PLATELET # BLD AUTO: 251 K/UL (ref 150–450)
PLATELET # BLD AUTO: 252 K/UL (ref 150–450)
PLATELET # BLD AUTO: 253 K/UL (ref 150–450)
PLATELET # BLD AUTO: 262 K/UL (ref 150–450)
PLATELET # BLD AUTO: 264 K/UL (ref 150–450)
PLATELET # BLD AUTO: 276 K/UL (ref 150–450)
PLATELET # BLD AUTO: 278 K/UL (ref 150–450)
PLATELET # BLD AUTO: 282 K/UL (ref 150–450)
PLATELET # BLD AUTO: 291 K/UL (ref 150–450)
PLATELET # BLD AUTO: 311 K/UL (ref 150–450)
PLATELET # BLD AUTO: 317 K/UL (ref 150–450)
PLATELET # BLD AUTO: 362 K/UL (ref 150–450)
PLATELET # BLD AUTO: 366 K/UL (ref 150–450)
PLATELET # BLD AUTO: 372 K/UL (ref 150–450)
PLATELET # BLD AUTO: 374 K/UL (ref 150–450)
PLATELET # BLD AUTO: 376 K/UL (ref 150–450)
PLATELET # BLD AUTO: 387 K/UL (ref 150–450)
PLATELET # BLD AUTO: 388 K/UL (ref 150–450)
PLATELET # BLD AUTO: 392 K/UL (ref 150–450)
PLATELET # BLD AUTO: 413 K/UL (ref 150–450)
PLATELET # BLD AUTO: 455 K/UL (ref 150–450)
PLATELET # BLD AUTO: 466 K/UL (ref 150–450)
PLATELET # BLD AUTO: 494 K/UL (ref 150–450)
PLATELET BLD QL SMEAR: ABNORMAL
PMV BLD AUTO: 8.4 FL (ref 9.2–12.9)
PMV BLD AUTO: 8.6 FL (ref 9.2–12.9)
PMV BLD AUTO: 8.7 FL (ref 9.2–12.9)
PMV BLD AUTO: 8.8 FL (ref 9.2–12.9)
PMV BLD AUTO: 8.9 FL (ref 9.2–12.9)
PMV BLD AUTO: 9 FL (ref 9.2–12.9)
PMV BLD AUTO: 9.1 FL (ref 9.2–12.9)
PMV BLD AUTO: 9.2 FL (ref 9.2–12.9)
PMV BLD AUTO: 9.3 FL (ref 9.2–12.9)
PMV BLD AUTO: 9.3 FL (ref 9.2–12.9)
PMV BLD AUTO: 9.4 FL (ref 9.2–12.9)
PMV BLD AUTO: 9.4 FL (ref 9.2–12.9)
PMV BLD AUTO: 9.5 FL (ref 9.2–12.9)
POCT GLUCOSE: 100 MG/DL (ref 70–110)
POCT GLUCOSE: 101 MG/DL (ref 70–110)
POCT GLUCOSE: 102 MG/DL (ref 70–110)
POCT GLUCOSE: 102 MG/DL (ref 70–110)
POCT GLUCOSE: 103 MG/DL (ref 70–110)
POCT GLUCOSE: 104 MG/DL (ref 70–110)
POCT GLUCOSE: 107 MG/DL (ref 70–110)
POCT GLUCOSE: 108 MG/DL (ref 70–110)
POCT GLUCOSE: 108 MG/DL (ref 70–110)
POCT GLUCOSE: 109 MG/DL (ref 70–110)
POCT GLUCOSE: 110 MG/DL (ref 70–110)
POCT GLUCOSE: 111 MG/DL (ref 70–110)
POCT GLUCOSE: 112 MG/DL (ref 70–110)
POCT GLUCOSE: 113 MG/DL (ref 70–110)
POCT GLUCOSE: 114 MG/DL (ref 70–110)
POCT GLUCOSE: 115 MG/DL (ref 70–110)
POCT GLUCOSE: 116 MG/DL (ref 70–110)
POCT GLUCOSE: 120 MG/DL (ref 70–110)
POCT GLUCOSE: 120 MG/DL (ref 70–110)
POCT GLUCOSE: 121 MG/DL (ref 70–110)
POCT GLUCOSE: 122 MG/DL (ref 70–110)
POCT GLUCOSE: 123 MG/DL (ref 70–110)
POCT GLUCOSE: 124 MG/DL (ref 70–110)
POCT GLUCOSE: 124 MG/DL (ref 70–110)
POCT GLUCOSE: 125 MG/DL (ref 70–110)
POCT GLUCOSE: 126 MG/DL (ref 70–110)
POCT GLUCOSE: 126 MG/DL (ref 70–110)
POCT GLUCOSE: 129 MG/DL (ref 70–110)
POCT GLUCOSE: 129 MG/DL (ref 70–110)
POCT GLUCOSE: 130 MG/DL (ref 70–110)
POCT GLUCOSE: 134 MG/DL (ref 70–110)
POCT GLUCOSE: 136 MG/DL (ref 70–110)
POCT GLUCOSE: 137 MG/DL (ref 70–110)
POCT GLUCOSE: 139 MG/DL (ref 70–110)
POCT GLUCOSE: 140 MG/DL (ref 70–110)
POCT GLUCOSE: 142 MG/DL (ref 70–110)
POCT GLUCOSE: 146 MG/DL (ref 70–110)
POCT GLUCOSE: 149 MG/DL (ref 70–110)
POCT GLUCOSE: 152 MG/DL (ref 70–110)
POCT GLUCOSE: 157 MG/DL (ref 70–110)
POCT GLUCOSE: 157 MG/DL (ref 70–110)
POCT GLUCOSE: 164 MG/DL (ref 70–110)
POCT GLUCOSE: 168 MG/DL (ref 70–110)
POCT GLUCOSE: 169 MG/DL (ref 70–110)
POCT GLUCOSE: 171 MG/DL (ref 70–110)
POCT GLUCOSE: 186 MG/DL (ref 70–110)
POCT GLUCOSE: 213 MG/DL (ref 70–110)
POCT GLUCOSE: 227 MG/DL (ref 70–110)
POCT GLUCOSE: 240 MG/DL (ref 70–110)
POCT GLUCOSE: 48 MG/DL (ref 70–110)
POCT GLUCOSE: 57 MG/DL (ref 70–110)
POCT GLUCOSE: 67 MG/DL (ref 70–110)
POCT GLUCOSE: 71 MG/DL (ref 70–110)
POCT GLUCOSE: 71 MG/DL (ref 70–110)
POCT GLUCOSE: 72 MG/DL (ref 70–110)
POCT GLUCOSE: 73 MG/DL (ref 70–110)
POCT GLUCOSE: 74 MG/DL (ref 70–110)
POCT GLUCOSE: 74 MG/DL (ref 70–110)
POCT GLUCOSE: 75 MG/DL (ref 70–110)
POCT GLUCOSE: 76 MG/DL (ref 70–110)
POCT GLUCOSE: 76 MG/DL (ref 70–110)
POCT GLUCOSE: 78 MG/DL (ref 70–110)
POCT GLUCOSE: 79 MG/DL (ref 70–110)
POCT GLUCOSE: 79 MG/DL (ref 70–110)
POCT GLUCOSE: 80 MG/DL (ref 70–110)
POCT GLUCOSE: 81 MG/DL (ref 70–110)
POCT GLUCOSE: 82 MG/DL (ref 70–110)
POCT GLUCOSE: 83 MG/DL (ref 70–110)
POCT GLUCOSE: 84 MG/DL (ref 70–110)
POCT GLUCOSE: 85 MG/DL (ref 70–110)
POCT GLUCOSE: 85 MG/DL (ref 70–110)
POCT GLUCOSE: 86 MG/DL (ref 70–110)
POCT GLUCOSE: 86 MG/DL (ref 70–110)
POCT GLUCOSE: 89 MG/DL (ref 70–110)
POCT GLUCOSE: 91 MG/DL (ref 70–110)
POCT GLUCOSE: 91 MG/DL (ref 70–110)
POCT GLUCOSE: 92 MG/DL (ref 70–110)
POCT GLUCOSE: 93 MG/DL (ref 70–110)
POCT GLUCOSE: 94 MG/DL (ref 70–110)
POCT GLUCOSE: 97 MG/DL (ref 70–110)
POCT GLUCOSE: 98 MG/DL (ref 70–110)
POCT GLUCOSE: 99 MG/DL (ref 70–110)
POIKILOCYTOSIS BLD QL SMEAR: SLIGHT
POLYCHROMASIA BLD QL SMEAR: ABNORMAL
POTASSIUM SERPL-SCNC: 3.4 MMOL/L (ref 3.5–5.1)
POTASSIUM SERPL-SCNC: 3.4 MMOL/L (ref 3.5–5.1)
POTASSIUM SERPL-SCNC: 3.5 MMOL/L (ref 3.5–5.1)
POTASSIUM SERPL-SCNC: 3.6 MMOL/L (ref 3.5–5.1)
POTASSIUM SERPL-SCNC: 3.7 MMOL/L (ref 3.5–5.1)
POTASSIUM SERPL-SCNC: 3.7 MMOL/L (ref 3.5–5.1)
POTASSIUM SERPL-SCNC: 3.8 MMOL/L (ref 3.5–5.1)
POTASSIUM SERPL-SCNC: 3.9 MMOL/L (ref 3.5–5.1)
POTASSIUM SERPL-SCNC: 4 MMOL/L (ref 3.5–5.1)
POTASSIUM SERPL-SCNC: 4.1 MMOL/L (ref 3.5–5.1)
POTASSIUM SERPL-SCNC: 4.2 MMOL/L (ref 3.5–5.1)
POTASSIUM SERPL-SCNC: 4.2 MMOL/L (ref 3.5–5.1)
POTASSIUM SERPL-SCNC: 4.3 MMOL/L (ref 3.5–5.1)
POTASSIUM SERPL-SCNC: 4.3 MMOL/L (ref 3.5–5.1)
POTASSIUM SERPL-SCNC: 4.4 MMOL/L (ref 3.5–5.1)
POTASSIUM SERPL-SCNC: 4.5 MMOL/L (ref 3.5–5.1)
POTASSIUM SERPL-SCNC: 4.5 MMOL/L (ref 3.5–5.1)
POTASSIUM SERPL-SCNC: 4.6 MMOL/L (ref 3.5–5.1)
POTASSIUM SERPL-SCNC: 4.8 MMOL/L (ref 3.5–5.1)
POTASSIUM SERPL-SCNC: 4.9 MMOL/L (ref 3.5–5.1)
PROT SERPL-MCNC: 7.2 G/DL (ref 6–8.4)
PTH-INTACT SERPL-MCNC: 324.8 PG/ML (ref 9–77)
RBC # BLD AUTO: 2.42 M/UL (ref 4–5.4)
RBC # BLD AUTO: 2.74 M/UL (ref 4–5.4)
RBC # BLD AUTO: 2.83 M/UL (ref 4–5.4)
RBC # BLD AUTO: 2.85 M/UL (ref 4–5.4)
RBC # BLD AUTO: 2.86 M/UL (ref 4–5.4)
RBC # BLD AUTO: 2.89 M/UL (ref 4–5.4)
RBC # BLD AUTO: 2.95 M/UL (ref 4–5.4)
RBC # BLD AUTO: 2.98 M/UL (ref 4–5.4)
RBC # BLD AUTO: 3 M/UL (ref 4–5.4)
RBC # BLD AUTO: 3 M/UL (ref 4–5.4)
RBC # BLD AUTO: 3.07 M/UL (ref 4–5.4)
RBC # BLD AUTO: 3.09 M/UL (ref 4–5.4)
RBC # BLD AUTO: 3.09 M/UL (ref 4–5.4)
RBC # BLD AUTO: 3.13 M/UL (ref 4–5.4)
RBC # BLD AUTO: 3.19 M/UL (ref 4–5.4)
RBC # BLD AUTO: 3.2 M/UL (ref 4–5.4)
RBC # BLD AUTO: 3.22 M/UL (ref 4–5.4)
RBC # BLD AUTO: 3.23 M/UL (ref 4–5.4)
RBC # BLD AUTO: 3.26 M/UL (ref 4–5.4)
RBC # BLD AUTO: 3.26 M/UL (ref 4–5.4)
RBC # BLD AUTO: 3.27 M/UL (ref 4–5.4)
RBC # BLD AUTO: 3.29 M/UL (ref 4–5.4)
RBC # BLD AUTO: 3.33 M/UL (ref 4–5.4)
RBC # BLD AUTO: 3.46 M/UL (ref 4–5.4)
RBC # BLD AUTO: 3.51 M/UL (ref 4–5.4)
RBC # BLD AUTO: 3.58 M/UL (ref 4–5.4)
RBC # BLD AUTO: 3.59 M/UL (ref 4–5.4)
RBC # BLD AUTO: 3.68 M/UL (ref 4–5.4)
RBC # BLD AUTO: 3.85 M/UL (ref 4–5.4)
RBC # BLD AUTO: 4.09 M/UL (ref 4–5.4)
RBC # BLD AUTO: 4.13 M/UL (ref 4–5.4)
RBC # BLD AUTO: 4.13 M/UL (ref 4–5.4)
RBC # BLD AUTO: 4.34 M/UL (ref 4–5.4)
RH BLD: NORMAL
SATURATED IRON: 19 % (ref 20–50)
SODIUM SERPL-SCNC: 131 MMOL/L (ref 136–145)
SODIUM SERPL-SCNC: 132 MMOL/L (ref 136–145)
SODIUM SERPL-SCNC: 134 MMOL/L (ref 136–145)
SODIUM SERPL-SCNC: 135 MMOL/L (ref 136–145)
SODIUM SERPL-SCNC: 136 MMOL/L (ref 136–145)
SODIUM SERPL-SCNC: 137 MMOL/L (ref 136–145)
SODIUM SERPL-SCNC: 138 MMOL/L (ref 136–145)
SODIUM SERPL-SCNC: 140 MMOL/L (ref 136–145)
SODIUM SERPL-SCNC: 144 MMOL/L (ref 136–145)
SODIUM SERPL-SCNC: 144 MMOL/L (ref 136–145)
SODIUM SERPL-SCNC: 145 MMOL/L (ref 136–145)
SODIUM SERPL-SCNC: 145 MMOL/L (ref 136–145)
SODIUM SERPL-SCNC: 146 MMOL/L (ref 136–145)
SODIUM SERPL-SCNC: 147 MMOL/L (ref 136–145)
SODIUM SERPL-SCNC: 148 MMOL/L (ref 136–145)
SPECIMEN OUTDATE: NORMAL
SPECIMEN OUTDATE: NORMAL
TOTAL IRON BINDING CAPACITY: 152 UG/DL (ref 250–450)
TRANS ERYTHROCYTES VOL PATIENT: NORMAL ML
TRANSFERRIN SERPL-MCNC: 103 MG/DL (ref 200–375)
VANCOMYCIN SERPL-MCNC: 20.6 UG/ML
VANCOMYCIN SERPL-MCNC: 23.2 UG/ML
VANCOMYCIN SERPL-MCNC: 23.3 UG/ML
VANCOMYCIN SERPL-MCNC: 23.7 UG/ML
VANCOMYCIN TROUGH SERPL-MCNC: 21.7 UG/ML (ref 10–22)
WBC # BLD AUTO: 10.3 K/UL (ref 3.9–12.7)
WBC # BLD AUTO: 10.9 K/UL (ref 3.9–12.7)
WBC # BLD AUTO: 11.43 K/UL (ref 3.9–12.7)
WBC # BLD AUTO: 13.81 K/UL (ref 3.9–12.7)
WBC # BLD AUTO: 14.55 K/UL (ref 3.9–12.7)
WBC # BLD AUTO: 14.6 K/UL (ref 3.9–12.7)
WBC # BLD AUTO: 15.51 K/UL (ref 3.9–12.7)
WBC # BLD AUTO: 15.55 K/UL (ref 3.9–12.7)
WBC # BLD AUTO: 15.83 K/UL (ref 3.9–12.7)
WBC # BLD AUTO: 16.63 K/UL (ref 3.9–12.7)
WBC # BLD AUTO: 17.79 K/UL (ref 3.9–12.7)
WBC # BLD AUTO: 18.59 K/UL (ref 3.9–12.7)
WBC # BLD AUTO: 18.9 K/UL (ref 3.9–12.7)
WBC # BLD AUTO: 19.16 K/UL (ref 3.9–12.7)
WBC # BLD AUTO: 19.5 K/UL (ref 3.9–12.7)
WBC # BLD AUTO: 19.54 K/UL (ref 3.9–12.7)
WBC # BLD AUTO: 19.79 K/UL (ref 3.9–12.7)
WBC # BLD AUTO: 20.88 K/UL (ref 3.9–12.7)
WBC # BLD AUTO: 21.3 K/UL (ref 3.9–12.7)
WBC # BLD AUTO: 21.48 K/UL (ref 3.9–12.7)
WBC # BLD AUTO: 21.97 K/UL (ref 3.9–12.7)
WBC # BLD AUTO: 22.17 K/UL (ref 3.9–12.7)
WBC # BLD AUTO: 22.24 K/UL (ref 3.9–12.7)
WBC # BLD AUTO: 23.11 K/UL (ref 3.9–12.7)
WBC # BLD AUTO: 23.62 K/UL (ref 3.9–12.7)
WBC # BLD AUTO: 23.93 K/UL (ref 3.9–12.7)
WBC # BLD AUTO: 23.94 K/UL (ref 3.9–12.7)
WBC # BLD AUTO: 33.88 K/UL (ref 3.9–12.7)
WBC # BLD AUTO: 7.39 K/UL (ref 3.9–12.7)
WBC # BLD AUTO: 7.51 K/UL (ref 3.9–12.7)
WBC # BLD AUTO: 8.85 K/UL (ref 3.9–12.7)
WBC # BLD AUTO: 9.35 K/UL (ref 3.9–12.7)
WBC # BLD AUTO: 9.49 K/UL (ref 3.9–12.7)

## 2023-01-01 PROCEDURE — 25000003 PHARM REV CODE 250: Performed by: SPECIALIST

## 2023-01-01 PROCEDURE — 99152 PR MOD CONSCIOUS SEDATION, SAME PHYS, 5+ YRS, FIRST 15 MIN: ICD-10-PCS | Mod: ,,, | Performed by: INTERNAL MEDICINE

## 2023-01-01 PROCEDURE — 63600175 PHARM REV CODE 636 W HCPCS: Performed by: HOSPITALIST

## 2023-01-01 PROCEDURE — C1768 GRAFT, VASCULAR: HCPCS | Performed by: SPECIALIST

## 2023-01-01 PROCEDURE — 99233 SBSQ HOSP IP/OBS HIGH 50: CPT | Mod: ,,, | Performed by: INTERNAL MEDICINE

## 2023-01-01 PROCEDURE — 27201423 OPTIME MED/SURG SUP & DEVICES STERILE SUPPLY: Performed by: SPECIALIST

## 2023-01-01 PROCEDURE — 25000003 PHARM REV CODE 250: Performed by: INTERNAL MEDICINE

## 2023-01-01 PROCEDURE — 36415 COLL VENOUS BLD VENIPUNCTURE: CPT | Performed by: SPECIALIST

## 2023-01-01 PROCEDURE — 99233 SBSQ HOSP IP/OBS HIGH 50: CPT | Mod: ,,, | Performed by: PODIATRIST

## 2023-01-01 PROCEDURE — 99233 SBSQ HOSP IP/OBS HIGH 50: CPT | Mod: ,,, | Performed by: HOSPITALIST

## 2023-01-01 PROCEDURE — 36000709 HC OR TIME LEV III EA ADD 15 MIN: Performed by: SPECIALIST

## 2023-01-01 PROCEDURE — 85025 COMPLETE CBC W/AUTO DIFF WBC: CPT | Performed by: SPECIALIST

## 2023-01-01 PROCEDURE — 25000003 PHARM REV CODE 250: Performed by: NURSE PRACTITIONER

## 2023-01-01 PROCEDURE — 63600175 PHARM REV CODE 636 W HCPCS: Performed by: SPECIALIST

## 2023-01-01 PROCEDURE — 85007 BL SMEAR W/DIFF WBC COUNT: CPT | Performed by: SPECIALIST

## 2023-01-01 PROCEDURE — 94761 N-INVAS EAR/PLS OXIMETRY MLT: CPT

## 2023-01-01 PROCEDURE — D9220A PRA ANESTHESIA: Mod: ANES,,, | Performed by: ANESTHESIOLOGY

## 2023-01-01 PROCEDURE — 88307 PR  SURG PATH,LEVEL V: ICD-10-PCS | Mod: 26,,, | Performed by: STUDENT IN AN ORGANIZED HEALTH CARE EDUCATION/TRAINING PROGRAM

## 2023-01-01 PROCEDURE — 83735 ASSAY OF MAGNESIUM: CPT | Performed by: SPECIALIST

## 2023-01-01 PROCEDURE — 99233 PR SUBSEQUENT HOSPITAL CARE,LEVL III: ICD-10-PCS | Mod: ,,, | Performed by: HOSPITALIST

## 2023-01-01 PROCEDURE — 86580 TB INTRADERMAL TEST: CPT | Performed by: INTERNAL MEDICINE

## 2023-01-01 PROCEDURE — 80100016 HC MAINTENANCE HEMODIALYSIS

## 2023-01-01 PROCEDURE — 97112 NEUROMUSCULAR REEDUCATION: CPT

## 2023-01-01 PROCEDURE — 36415 COLL VENOUS BLD VENIPUNCTURE: CPT | Performed by: EMERGENCY MEDICINE

## 2023-01-01 PROCEDURE — 63600175 PHARM REV CODE 636 W HCPCS: Performed by: EMERGENCY MEDICINE

## 2023-01-01 PROCEDURE — 35556 ART BYP GRFT FEM-POPLITEAL: CPT | Mod: LT,,, | Performed by: SPECIALIST

## 2023-01-01 PROCEDURE — 36000710: Performed by: SPECIALIST

## 2023-01-01 PROCEDURE — 63600175 PHARM REV CODE 636 W HCPCS: Performed by: INTERNAL MEDICINE

## 2023-01-01 PROCEDURE — 25000003 PHARM REV CODE 250: Performed by: NURSE ANESTHETIST, CERTIFIED REGISTERED

## 2023-01-01 PROCEDURE — 37000008 HC ANESTHESIA 1ST 15 MINUTES: Performed by: SPECIALIST

## 2023-01-01 PROCEDURE — 27880 AMPUTATION OF LOWER LEG: CPT | Mod: LT,,, | Performed by: SPECIALIST

## 2023-01-01 PROCEDURE — C1887 CATHETER, GUIDING: HCPCS | Performed by: INTERNAL MEDICINE

## 2023-01-01 PROCEDURE — 63600175 PHARM REV CODE 636 W HCPCS: Performed by: NURSE PRACTITIONER

## 2023-01-01 PROCEDURE — 99222 PR INITIAL HOSPITAL CARE,LEVL II: ICD-10-PCS | Mod: ,,, | Performed by: PSYCHIATRY & NEUROLOGY

## 2023-01-01 PROCEDURE — 80069 RENAL FUNCTION PANEL: CPT | Performed by: SPECIALIST

## 2023-01-01 PROCEDURE — 11000001 HC ACUTE MED/SURG PRIVATE ROOM

## 2023-01-01 PROCEDURE — 35556 PR VEIN BYPASS GRAFT,FEM-POP: ICD-10-PCS | Mod: LT,,, | Performed by: SPECIALIST

## 2023-01-01 PROCEDURE — 99233 SBSQ HOSP IP/OBS HIGH 50: CPT | Mod: 25,,, | Performed by: INTERNAL MEDICINE

## 2023-01-01 PROCEDURE — 25000003 PHARM REV CODE 250: Performed by: HOSPITALIST

## 2023-01-01 PROCEDURE — C1894 INTRO/SHEATH, NON-LASER: HCPCS | Performed by: INTERNAL MEDICINE

## 2023-01-01 PROCEDURE — 63600175 PHARM REV CODE 636 W HCPCS: Performed by: ANESTHESIOLOGY

## 2023-01-01 PROCEDURE — 97530 THERAPEUTIC ACTIVITIES: CPT | Mod: CQ

## 2023-01-01 PROCEDURE — 99233 PR SUBSEQUENT HOSPITAL CARE,LEVL III: ICD-10-PCS | Mod: 25,,, | Performed by: INTERNAL MEDICINE

## 2023-01-01 PROCEDURE — 80069 RENAL FUNCTION PANEL: CPT | Performed by: INTERNAL MEDICINE

## 2023-01-01 PROCEDURE — 93010 EKG 12-LEAD: ICD-10-PCS | Mod: 76,,, | Performed by: INTERNAL MEDICINE

## 2023-01-01 PROCEDURE — D9220A PRA ANESTHESIA: ICD-10-PCS | Mod: ANES,,, | Performed by: ANESTHESIOLOGY

## 2023-01-01 PROCEDURE — 36415 COLL VENOUS BLD VENIPUNCTURE: CPT | Performed by: NURSE PRACTITIONER

## 2023-01-01 PROCEDURE — 75716 ARTERY X-RAYS ARMS/LEGS: CPT | Mod: 26,,, | Performed by: INTERNAL MEDICINE

## 2023-01-01 PROCEDURE — 36415 COLL VENOUS BLD VENIPUNCTURE: CPT | Performed by: HOSPITALIST

## 2023-01-01 PROCEDURE — 99233 PR SUBSEQUENT HOSPITAL CARE,LEVL III: ICD-10-PCS | Mod: ,,, | Performed by: INTERNAL MEDICINE

## 2023-01-01 PROCEDURE — 87040 BLOOD CULTURE FOR BACTERIA: CPT | Performed by: EMERGENCY MEDICINE

## 2023-01-01 PROCEDURE — 36415 COLL VENOUS BLD VENIPUNCTURE: CPT | Performed by: INTERNAL MEDICINE

## 2023-01-01 PROCEDURE — 95822 EEG COMA OR SLEEP ONLY: CPT

## 2023-01-01 PROCEDURE — 85651 RBC SED RATE NONAUTOMATED: CPT | Performed by: EMERGENCY MEDICINE

## 2023-01-01 PROCEDURE — 75625 PR  ANGIO AORTOGRAM ABD SERIAL: ICD-10-PCS | Mod: 26,,, | Performed by: INTERNAL MEDICINE

## 2023-01-01 PROCEDURE — 99900035 HC TECH TIME PER 15 MIN (STAT)

## 2023-01-01 PROCEDURE — 97535 SELF CARE MNGMENT TRAINING: CPT | Mod: CO

## 2023-01-01 PROCEDURE — 86140 C-REACTIVE PROTEIN: CPT | Performed by: EMERGENCY MEDICINE

## 2023-01-01 PROCEDURE — 36000708 HC OR TIME LEV III 1ST 15 MIN: Performed by: SPECIALIST

## 2023-01-01 PROCEDURE — 36415 COLL VENOUS BLD VENIPUNCTURE: CPT | Performed by: ANESTHESIOLOGY

## 2023-01-01 PROCEDURE — 99232 SBSQ HOSP IP/OBS MODERATE 35: CPT | Mod: ,,, | Performed by: INTERNAL MEDICINE

## 2023-01-01 PROCEDURE — 99231 PR SUBSEQUENT HOSPITAL CARE,LEVL I: ICD-10-PCS | Mod: ,,, | Performed by: SURGERY

## 2023-01-01 PROCEDURE — 86706 HEP B SURFACE ANTIBODY: CPT | Performed by: NURSE PRACTITIONER

## 2023-01-01 PROCEDURE — 85025 COMPLETE CBC W/AUTO DIFF WBC: CPT | Performed by: INTERNAL MEDICINE

## 2023-01-01 PROCEDURE — 82728 ASSAY OF FERRITIN: CPT | Performed by: INTERNAL MEDICINE

## 2023-01-01 PROCEDURE — D9220A PRA ANESTHESIA: Mod: CRNA,,, | Performed by: NURSE ANESTHETIST, CERTIFIED REGISTERED

## 2023-01-01 PROCEDURE — 97110 THERAPEUTIC EXERCISES: CPT

## 2023-01-01 PROCEDURE — 83735 ASSAY OF MAGNESIUM: CPT | Performed by: INTERNAL MEDICINE

## 2023-01-01 PROCEDURE — 25000003 PHARM REV CODE 250

## 2023-01-01 PROCEDURE — 87340 HEPATITIS B SURFACE AG IA: CPT | Performed by: HOSPITALIST

## 2023-01-01 PROCEDURE — 80202 ASSAY OF VANCOMYCIN: CPT | Performed by: HOSPITALIST

## 2023-01-01 PROCEDURE — A9585 GADOBUTROL INJECTION: HCPCS | Performed by: HOSPITALIST

## 2023-01-01 PROCEDURE — G0425 PR INPT TELEHEALTH CONSULT 30M: ICD-10-PCS | Mod: 95,,, | Performed by: PSYCHIATRY & NEUROLOGY

## 2023-01-01 PROCEDURE — 25000003 PHARM REV CODE 250: Performed by: EMERGENCY MEDICINE

## 2023-01-01 PROCEDURE — 80202 ASSAY OF VANCOMYCIN: CPT | Performed by: INTERNAL MEDICINE

## 2023-01-01 PROCEDURE — 25000003 PHARM REV CODE 250: Performed by: ANESTHESIOLOGY

## 2023-01-01 PROCEDURE — 97110 THERAPEUTIC EXERCISES: CPT | Mod: CQ

## 2023-01-01 PROCEDURE — 97530 THERAPEUTIC ACTIVITIES: CPT

## 2023-01-01 PROCEDURE — 37000009 HC ANESTHESIA EA ADD 15 MINS: Performed by: SPECIALIST

## 2023-01-01 PROCEDURE — 64447 NJX AA&/STRD FEMORAL NRV IMG: CPT | Mod: 59,LT,, | Performed by: ANESTHESIOLOGY

## 2023-01-01 PROCEDURE — 97116 GAIT TRAINING THERAPY: CPT | Mod: CQ

## 2023-01-01 PROCEDURE — 83036 HEMOGLOBIN GLYCOSYLATED A1C: CPT | Performed by: EMERGENCY MEDICINE

## 2023-01-01 PROCEDURE — 63600175 PHARM REV CODE 636 W HCPCS

## 2023-01-01 PROCEDURE — 86704 HEP B CORE ANTIBODY TOTAL: CPT | Performed by: HOSPITALIST

## 2023-01-01 PROCEDURE — 99232 PR SUBSEQUENT HOSPITAL CARE,LEVL II: ICD-10-PCS | Mod: ,,, | Performed by: INTERNAL MEDICINE

## 2023-01-01 PROCEDURE — 97535 SELF CARE MNGMENT TRAINING: CPT

## 2023-01-01 PROCEDURE — 36246 PR INS CATH ABD/L-EXT ART 2ND ORDER: ICD-10-PCS | Mod: 50,,, | Performed by: INTERNAL MEDICINE

## 2023-01-01 PROCEDURE — 97161 PT EVAL LOW COMPLEX 20 MIN: CPT

## 2023-01-01 PROCEDURE — 93005 ELECTROCARDIOGRAM TRACING: CPT

## 2023-01-01 PROCEDURE — 64445 LEFT POPLITEAL: ICD-10-PCS | Mod: 59,LT,, | Performed by: ANESTHESIOLOGY

## 2023-01-01 PROCEDURE — G0425 INPT/ED TELECONSULT30: HCPCS | Mod: 95,,, | Performed by: PSYCHIATRY & NEUROLOGY

## 2023-01-01 PROCEDURE — 82533 TOTAL CORTISOL: CPT | Performed by: SPECIALIST

## 2023-01-01 PROCEDURE — 83690 ASSAY OF LIPASE: CPT | Performed by: EMERGENCY MEDICINE

## 2023-01-01 PROCEDURE — 83605 ASSAY OF LACTIC ACID: CPT | Performed by: EMERGENCY MEDICINE

## 2023-01-01 PROCEDURE — 87040 BLOOD CULTURE FOR BACTERIA: CPT | Performed by: HOSPITALIST

## 2023-01-01 PROCEDURE — 99222 1ST HOSP IP/OBS MODERATE 55: CPT | Mod: ,,, | Performed by: PSYCHIATRY & NEUROLOGY

## 2023-01-01 PROCEDURE — D9220A PRA ANESTHESIA: ICD-10-PCS | Mod: CRNA,,, | Performed by: NURSE ANESTHETIST, CERTIFIED REGISTERED

## 2023-01-01 PROCEDURE — 86900 BLOOD TYPING SEROLOGIC ABO: CPT | Performed by: INTERNAL MEDICINE

## 2023-01-01 PROCEDURE — 86901 BLOOD TYPING SEROLOGIC RH(D): CPT | Performed by: INTERNAL MEDICINE

## 2023-01-01 PROCEDURE — 99223 PR INITIAL HOSPITAL CARE,LEVL III: ICD-10-PCS | Mod: ,,, | Performed by: INTERNAL MEDICINE

## 2023-01-01 PROCEDURE — D9220A PRA ANESTHESIA: ICD-10-PCS | Mod: CRNA,,,

## 2023-01-01 PROCEDURE — 95822 PR EEG,COMA/SLEEP RECORD ONLY: ICD-10-PCS | Mod: 26,,, | Performed by: PSYCHIATRY & NEUROLOGY

## 2023-01-01 PROCEDURE — 64447 LEFT ADDUCTOR CANAL: ICD-10-PCS | Mod: 59,LT,, | Performed by: ANESTHESIOLOGY

## 2023-01-01 PROCEDURE — 85027 COMPLETE CBC AUTOMATED: CPT | Performed by: SPECIALIST

## 2023-01-01 PROCEDURE — 83540 ASSAY OF IRON: CPT | Performed by: INTERNAL MEDICINE

## 2023-01-01 PROCEDURE — 64445 NJX AA&/STRD SCIATIC NRV IMG: CPT | Mod: 59,LT,, | Performed by: ANESTHESIOLOGY

## 2023-01-01 PROCEDURE — 85025 COMPLETE CBC W/AUTO DIFF WBC: CPT | Performed by: EMERGENCY MEDICINE

## 2023-01-01 PROCEDURE — 99231 SBSQ HOSP IP/OBS SF/LOW 25: CPT | Mod: ,,, | Performed by: SURGERY

## 2023-01-01 PROCEDURE — 99233 PR SUBSEQUENT HOSPITAL CARE,LEVL III: ICD-10-PCS | Mod: ,,, | Performed by: STUDENT IN AN ORGANIZED HEALTH CARE EDUCATION/TRAINING PROGRAM

## 2023-01-01 PROCEDURE — 99233 PR SUBSEQUENT HOSPITAL CARE,LEVL III: ICD-10-PCS | Mod: ,,, | Performed by: PODIATRIST

## 2023-01-01 PROCEDURE — 97168 OT RE-EVAL EST PLAN CARE: CPT

## 2023-01-01 PROCEDURE — C9290 INJ, BUPIVACAINE LIPOSOME: HCPCS | Performed by: ANESTHESIOLOGY

## 2023-01-01 PROCEDURE — 86803 HEPATITIS C AB TEST: CPT | Performed by: EMERGENCY MEDICINE

## 2023-01-01 PROCEDURE — P9021 RED BLOOD CELLS UNIT: HCPCS | Performed by: NURSE PRACTITIONER

## 2023-01-01 PROCEDURE — 80202 ASSAY OF VANCOMYCIN: CPT | Performed by: SPECIALIST

## 2023-01-01 PROCEDURE — 82533 TOTAL CORTISOL: CPT | Performed by: HOSPITALIST

## 2023-01-01 PROCEDURE — 93010 ELECTROCARDIOGRAM REPORT: CPT | Mod: 76,,, | Performed by: INTERNAL MEDICINE

## 2023-01-01 PROCEDURE — 80053 COMPREHEN METABOLIC PANEL: CPT | Performed by: EMERGENCY MEDICINE

## 2023-01-01 PROCEDURE — 75625 CONTRAST EXAM ABDOMINL AORTA: CPT | Mod: 26,,, | Performed by: INTERNAL MEDICINE

## 2023-01-01 PROCEDURE — 87340 HEPATITIS B SURFACE AG IA: CPT | Performed by: NURSE PRACTITIONER

## 2023-01-01 PROCEDURE — 36246 INS CATH ABD/L-EXT ART 2ND: CPT | Mod: 50,,, | Performed by: INTERNAL MEDICINE

## 2023-01-01 PROCEDURE — 97530 THERAPEUTIC ACTIVITIES: CPT | Mod: CO

## 2023-01-01 PROCEDURE — 75625 CONTRAST EXAM ABDOMINL AORTA: CPT | Performed by: INTERNAL MEDICINE

## 2023-01-01 PROCEDURE — 99223 1ST HOSP IP/OBS HIGH 75: CPT | Mod: ,,, | Performed by: INTERNAL MEDICINE

## 2023-01-01 PROCEDURE — 97166 OT EVAL MOD COMPLEX 45 MIN: CPT

## 2023-01-01 PROCEDURE — 84466 ASSAY OF TRANSFERRIN: CPT | Performed by: INTERNAL MEDICINE

## 2023-01-01 PROCEDURE — D9220A PRA ANESTHESIA: Mod: CRNA,,,

## 2023-01-01 PROCEDURE — 64445 NJX AA&/STRD SCIATIC NRV IMG: CPT | Performed by: ANESTHESIOLOGY

## 2023-01-01 PROCEDURE — 64447 NJX AA&/STRD FEMORAL NRV IMG: CPT | Performed by: ANESTHESIOLOGY

## 2023-01-01 PROCEDURE — 88307 TISSUE EXAM BY PATHOLOGIST: CPT | Mod: 26,,, | Performed by: STUDENT IN AN ORGANIZED HEALTH CARE EDUCATION/TRAINING PROGRAM

## 2023-01-01 PROCEDURE — 88307 TISSUE EXAM BY PATHOLOGIST: CPT | Performed by: STUDENT IN AN ORGANIZED HEALTH CARE EDUCATION/TRAINING PROGRAM

## 2023-01-01 PROCEDURE — 90935 HEMODIALYSIS ONE EVALUATION: CPT

## 2023-01-01 PROCEDURE — 99152 MOD SED SAME PHYS/QHP 5/>YRS: CPT | Performed by: INTERNAL MEDICINE

## 2023-01-01 PROCEDURE — 63600175 PHARM REV CODE 636 W HCPCS: Mod: JZ,JG | Performed by: NURSE ANESTHETIST, CERTIFIED REGISTERED

## 2023-01-01 PROCEDURE — 36000711: Performed by: SPECIALIST

## 2023-01-01 PROCEDURE — 83970 ASSAY OF PARATHORMONE: CPT | Performed by: SPECIALIST

## 2023-01-01 PROCEDURE — 30200315 PPD INTRADERMAL TEST REV CODE 302: Performed by: INTERNAL MEDICINE

## 2023-01-01 PROCEDURE — 95822 EEG COMA OR SLEEP ONLY: CPT | Mod: 26,,, | Performed by: PSYCHIATRY & NEUROLOGY

## 2023-01-01 PROCEDURE — 86850 RBC ANTIBODY SCREEN: CPT | Performed by: INTERNAL MEDICINE

## 2023-01-01 PROCEDURE — 27880 PR AMPUTATION LOW LEG THRU TIB/FIB: ICD-10-PCS | Mod: LT,,, | Performed by: SPECIALIST

## 2023-01-01 PROCEDURE — 86920 COMPATIBILITY TEST SPIN: CPT | Performed by: NURSE PRACTITIONER

## 2023-01-01 PROCEDURE — C1769 GUIDE WIRE: HCPCS | Performed by: INTERNAL MEDICINE

## 2023-01-01 PROCEDURE — 75716 PR  ANGIO EXTERMITY BILAT: ICD-10-PCS | Mod: 26,,, | Performed by: INTERNAL MEDICINE

## 2023-01-01 PROCEDURE — 75716 ARTERY X-RAYS ARMS/LEGS: CPT | Performed by: INTERNAL MEDICINE

## 2023-01-01 PROCEDURE — 36246 INS CATH ABD/L-EXT ART 2ND: CPT | Mod: 50 | Performed by: INTERNAL MEDICINE

## 2023-01-01 PROCEDURE — 99152 MOD SED SAME PHYS/QHP 5/>YRS: CPT | Mod: ,,, | Performed by: INTERNAL MEDICINE

## 2023-01-01 PROCEDURE — 96365 THER/PROPH/DIAG IV INF INIT: CPT

## 2023-01-01 PROCEDURE — 99233 SBSQ HOSP IP/OBS HIGH 50: CPT | Mod: ,,, | Performed by: STUDENT IN AN ORGANIZED HEALTH CARE EDUCATION/TRAINING PROGRAM

## 2023-01-01 PROCEDURE — 25500020 PHARM REV CODE 255: Performed by: HOSPITALIST

## 2023-01-01 PROCEDURE — P9045 ALBUMIN (HUMAN), 5%, 250 ML: HCPCS | Mod: JZ,JG | Performed by: NURSE ANESTHETIST, CERTIFIED REGISTERED

## 2023-01-01 PROCEDURE — 86900 BLOOD TYPING SEROLOGIC ABO: CPT | Performed by: ANESTHESIOLOGY

## 2023-01-01 PROCEDURE — 71000033 HC RECOVERY, INTIAL HOUR: Performed by: SPECIALIST

## 2023-01-01 PROCEDURE — 86706 HEP B SURFACE ANTIBODY: CPT | Performed by: HOSPITALIST

## 2023-01-01 PROCEDURE — 25000003 PHARM REV CODE 250: Performed by: PHYSICIAN ASSISTANT

## 2023-01-01 PROCEDURE — 20000000 HC ICU ROOM

## 2023-01-01 PROCEDURE — 99285 EMERGENCY DEPT VISIT HI MDM: CPT | Mod: 25

## 2023-01-01 PROCEDURE — 96375 TX/PRO/DX INJ NEW DRUG ADDON: CPT

## 2023-01-01 PROCEDURE — 87389 HIV-1 AG W/HIV-1&-2 AB AG IA: CPT | Performed by: EMERGENCY MEDICINE

## 2023-01-01 PROCEDURE — 71000039 HC RECOVERY, EACH ADD'L HOUR: Performed by: SPECIALIST

## 2023-01-01 RX ORDER — GABAPENTIN 100 MG/1
100 CAPSULE ORAL NIGHTLY
Status: DISCONTINUED | OUTPATIENT
Start: 2023-01-01 | End: 2023-01-01

## 2023-01-01 RX ORDER — PROPOFOL 10 MG/ML
VIAL (ML) INTRAVENOUS
Status: DISCONTINUED | OUTPATIENT
Start: 2023-01-01 | End: 2023-01-01

## 2023-01-01 RX ORDER — AMOXICILLIN 250 MG
2 CAPSULE ORAL 2 TIMES DAILY
Status: DISCONTINUED | OUTPATIENT
Start: 2023-01-01 | End: 2023-01-01 | Stop reason: HOSPADM

## 2023-01-01 RX ORDER — PHENYLEPHRINE HYDROCHLORIDE 10 MG/ML
INJECTION INTRAVENOUS CONTINUOUS PRN
Status: DISCONTINUED | OUTPATIENT
Start: 2023-01-01 | End: 2023-01-01

## 2023-01-01 RX ORDER — FENTANYL CITRATE 50 UG/ML
INJECTION, SOLUTION INTRAMUSCULAR; INTRAVENOUS
Status: DISCONTINUED | OUTPATIENT
Start: 2023-01-01 | End: 2023-01-01

## 2023-01-01 RX ORDER — DIPHENHYDRAMINE HCL 25 MG
25 CAPSULE ORAL
Status: DISCONTINUED | OUTPATIENT
Start: 2023-01-01 | End: 2023-01-01 | Stop reason: HOSPADM

## 2023-01-01 RX ORDER — MUPIROCIN 20 MG/G
OINTMENT TOPICAL 2 TIMES DAILY
Status: DISPENSED | OUTPATIENT
Start: 2023-01-01 | End: 2023-01-01

## 2023-01-01 RX ORDER — CIPROFLOXACIN 500 MG/1
500 TABLET ORAL EVERY 12 HOURS
Status: DISCONTINUED | OUTPATIENT
Start: 2023-01-01 | End: 2023-01-01

## 2023-01-01 RX ORDER — CALCIUM ACETATE 667 MG/1
2001 CAPSULE ORAL
Status: DISCONTINUED | OUTPATIENT
Start: 2023-01-01 | End: 2023-01-01

## 2023-01-01 RX ORDER — BRIMONIDINE TARTRATE 2 MG/ML
1 SOLUTION/ DROPS OPHTHALMIC 2 TIMES DAILY
COMMUNITY

## 2023-01-01 RX ORDER — GLUCAGON 1 MG
1 KIT INJECTION
Status: DISCONTINUED | OUTPATIENT
Start: 2023-01-01 | End: 2023-01-01 | Stop reason: HOSPADM

## 2023-01-01 RX ORDER — NALOXONE HCL 0.4 MG/ML
0.4 VIAL (ML) INJECTION
Status: DISCONTINUED | OUTPATIENT
Start: 2023-01-01 | End: 2023-01-01 | Stop reason: HOSPADM

## 2023-01-01 RX ORDER — MEPERIDINE HYDROCHLORIDE 25 MG/ML
12.5 INJECTION INTRAMUSCULAR; INTRAVENOUS; SUBCUTANEOUS ONCE AS NEEDED
Status: DISCONTINUED | OUTPATIENT
Start: 2023-01-01 | End: 2023-01-01

## 2023-01-01 RX ORDER — LIDOCAINE HYDROCHLORIDE 20 MG/ML
INJECTION INTRAVENOUS
Status: DISCONTINUED | OUTPATIENT
Start: 2023-01-01 | End: 2023-01-01

## 2023-01-01 RX ORDER — BISACODYL 5 MG
10 TABLET, DELAYED RELEASE (ENTERIC COATED) ORAL ONCE
Status: COMPLETED | OUTPATIENT
Start: 2023-01-01 | End: 2023-01-01

## 2023-01-01 RX ORDER — DIPHENHYDRAMINE HCL 25 MG
25 CAPSULE ORAL EVERY 6 HOURS PRN
Status: DISCONTINUED | OUTPATIENT
Start: 2023-01-01 | End: 2023-01-01

## 2023-01-01 RX ORDER — PHENYLEPHRINE HYDROCHLORIDE 10 MG/ML
INJECTION INTRAVENOUS
Status: DISCONTINUED | OUTPATIENT
Start: 2023-01-01 | End: 2023-01-01

## 2023-01-01 RX ORDER — CARVEDILOL 12.5 MG/1
25 TABLET ORAL 2 TIMES DAILY
Status: DISCONTINUED | OUTPATIENT
Start: 2023-01-01 | End: 2023-01-01

## 2023-01-01 RX ORDER — DEXAMETHASONE SODIUM PHOSPHATE 4 MG/ML
INJECTION, SOLUTION INTRA-ARTICULAR; INTRALESIONAL; INTRAMUSCULAR; INTRAVENOUS; SOFT TISSUE
Status: DISCONTINUED | OUTPATIENT
Start: 2023-01-01 | End: 2023-01-01

## 2023-01-01 RX ORDER — SODIUM CHLORIDE 9 MG/ML
INJECTION, SOLUTION INTRAVENOUS
Status: CANCELLED | OUTPATIENT
Start: 2023-01-01

## 2023-01-01 RX ORDER — SODIUM CHLORIDE 0.9 % (FLUSH) 0.9 %
3 SYRINGE (ML) INJECTION
Status: DISCONTINUED | OUTPATIENT
Start: 2023-01-01 | End: 2023-01-01

## 2023-01-01 RX ORDER — ONDANSETRON 2 MG/ML
4 INJECTION INTRAMUSCULAR; INTRAVENOUS EVERY 8 HOURS PRN
Status: CANCELLED | OUTPATIENT
Start: 2023-01-01

## 2023-01-01 RX ORDER — OXYCODONE HYDROCHLORIDE 5 MG/1
5 TABLET ORAL
Status: DISCONTINUED | OUTPATIENT
Start: 2023-01-01 | End: 2023-01-01

## 2023-01-01 RX ORDER — HEPARIN SODIUM 10000 [USP'U]/ML
INJECTION, SOLUTION INTRAVENOUS; SUBCUTANEOUS
Status: DISCONTINUED | OUTPATIENT
Start: 2023-01-01 | End: 2023-01-01 | Stop reason: HOSPADM

## 2023-01-01 RX ORDER — SODIUM CHLORIDE 9 MG/ML
INJECTION, SOLUTION INTRAVENOUS ONCE
Status: DISCONTINUED | OUTPATIENT
Start: 2023-01-01 | End: 2023-01-01 | Stop reason: HOSPADM

## 2023-01-01 RX ORDER — HYDROMORPHONE HYDROCHLORIDE 2 MG/1
4 TABLET ORAL EVERY 4 HOURS PRN
Status: DISCONTINUED | OUTPATIENT
Start: 2023-01-01 | End: 2023-01-01

## 2023-01-01 RX ORDER — EPHEDRINE SULFATE 50 MG/ML
INJECTION, SOLUTION INTRAVENOUS
Status: DISCONTINUED | OUTPATIENT
Start: 2023-01-01 | End: 2023-01-01

## 2023-01-01 RX ORDER — CALCIUM ACETATE 667 MG/1
2001 CAPSULE ORAL
COMMUNITY

## 2023-01-01 RX ORDER — ONDANSETRON 2 MG/ML
4 INJECTION INTRAMUSCULAR; INTRAVENOUS
Status: COMPLETED | OUTPATIENT
Start: 2023-01-01 | End: 2023-01-01

## 2023-01-01 RX ORDER — BUPIVACAINE HYDROCHLORIDE 5 MG/ML
INJECTION, SOLUTION EPIDURAL; INTRACAUDAL
Status: COMPLETED | OUTPATIENT
Start: 2023-01-01 | End: 2023-01-01

## 2023-01-01 RX ORDER — HYDROCODONE BITARTRATE AND ACETAMINOPHEN 5; 325 MG/1; MG/1
2 TABLET ORAL EVERY 6 HOURS PRN
Status: COMPLETED | OUTPATIENT
Start: 2023-01-01 | End: 2023-01-01

## 2023-01-01 RX ORDER — MIDAZOLAM HYDROCHLORIDE 1 MG/ML
INJECTION INTRAMUSCULAR; INTRAVENOUS
Status: DISCONTINUED | OUTPATIENT
Start: 2023-01-01 | End: 2023-01-01 | Stop reason: HOSPADM

## 2023-01-01 RX ORDER — GADOBUTROL 604.72 MG/ML
6 INJECTION INTRAVENOUS
Status: COMPLETED | OUTPATIENT
Start: 2023-01-01 | End: 2023-01-01

## 2023-01-01 RX ORDER — INSULIN ASPART 100 [IU]/ML
0-5 INJECTION, SOLUTION INTRAVENOUS; SUBCUTANEOUS
Status: DISCONTINUED | OUTPATIENT
Start: 2023-01-01 | End: 2023-01-01 | Stop reason: HOSPADM

## 2023-01-01 RX ORDER — SODIUM CHLORIDE 0.9 % (FLUSH) 0.9 %
10 SYRINGE (ML) INJECTION
Status: CANCELLED | OUTPATIENT
Start: 2023-01-01

## 2023-01-01 RX ORDER — OXYCODONE AND ACETAMINOPHEN 7.5; 325 MG/1; MG/1
1 TABLET ORAL EVERY 6 HOURS PRN
Status: DISCONTINUED | OUTPATIENT
Start: 2023-01-01 | End: 2023-01-01

## 2023-01-01 RX ORDER — SULFAMETHOXAZOLE AND TRIMETHOPRIM 800; 160 MG/1; MG/1
1 TABLET ORAL 2 TIMES DAILY
COMMUNITY

## 2023-01-01 RX ORDER — LABETALOL 100 MG/1
300 TABLET, FILM COATED ORAL 2 TIMES DAILY
Status: DISCONTINUED | OUTPATIENT
Start: 2023-01-01 | End: 2023-01-01

## 2023-01-01 RX ORDER — HEPARIN SOD,PORCINE/0.9 % NACL 1000/500ML
INTRAVENOUS SOLUTION INTRAVENOUS
Status: DISCONTINUED | OUTPATIENT
Start: 2023-01-01 | End: 2023-01-01 | Stop reason: HOSPADM

## 2023-01-01 RX ORDER — CIPROFLOXACIN 500 MG/1
500 TABLET ORAL EVERY 24 HOURS
Status: DISCONTINUED | OUTPATIENT
Start: 2023-01-01 | End: 2023-01-01

## 2023-01-01 RX ORDER — HYDROMORPHONE HYDROCHLORIDE 2 MG/ML
0.4 INJECTION, SOLUTION INTRAMUSCULAR; INTRAVENOUS; SUBCUTANEOUS EVERY 5 MIN PRN
Status: DISCONTINUED | OUTPATIENT
Start: 2023-01-01 | End: 2023-01-01

## 2023-01-01 RX ORDER — TALC
6 POWDER (GRAM) TOPICAL NIGHTLY PRN
Status: DISCONTINUED | OUTPATIENT
Start: 2023-01-01 | End: 2023-01-01

## 2023-01-01 RX ORDER — ACETAMINOPHEN 10 MG/ML
1000 INJECTION, SOLUTION INTRAVENOUS ONCE
Status: COMPLETED | OUTPATIENT
Start: 2023-01-01 | End: 2023-01-01

## 2023-01-01 RX ORDER — POLYETHYLENE GLYCOL 3350 17 G/17G
17 POWDER, FOR SOLUTION ORAL 2 TIMES DAILY
Status: DISCONTINUED | OUTPATIENT
Start: 2023-01-01 | End: 2023-01-01 | Stop reason: HOSPADM

## 2023-01-01 RX ORDER — LOPERAMIDE HYDROCHLORIDE 2 MG/1
2 CAPSULE ORAL DAILY PRN
Status: DISCONTINUED | OUTPATIENT
Start: 2023-01-01 | End: 2023-01-01

## 2023-01-01 RX ORDER — CARVEDILOL 3.12 MG/1
3.12 TABLET ORAL 2 TIMES DAILY
Status: DISCONTINUED | OUTPATIENT
Start: 2023-01-01 | End: 2023-01-01

## 2023-01-01 RX ORDER — HEPARIN SODIUM 5000 [USP'U]/ML
5000 INJECTION, SOLUTION INTRAVENOUS; SUBCUTANEOUS EVERY 8 HOURS
Status: COMPLETED | OUTPATIENT
Start: 2023-01-01 | End: 2023-01-01

## 2023-01-01 RX ORDER — DIPHENHYDRAMINE HYDROCHLORIDE 50 MG/ML
25 INJECTION INTRAMUSCULAR; INTRAVENOUS EVERY 6 HOURS PRN
Status: DISCONTINUED | OUTPATIENT
Start: 2023-01-01 | End: 2023-01-01

## 2023-01-01 RX ORDER — ROCURONIUM BROMIDE 10 MG/ML
INJECTION, SOLUTION INTRAVENOUS
Status: DISCONTINUED | OUTPATIENT
Start: 2023-01-01 | End: 2023-01-01

## 2023-01-01 RX ORDER — HYDRALAZINE HYDROCHLORIDE 25 MG/1
100 TABLET, FILM COATED ORAL EVERY 12 HOURS
Status: DISCONTINUED | OUTPATIENT
Start: 2023-01-01 | End: 2023-01-01

## 2023-01-01 RX ORDER — HEPARIN SODIUM 1000 [USP'U]/ML
INJECTION, SOLUTION INTRAVENOUS; SUBCUTANEOUS
Status: DISCONTINUED | OUTPATIENT
Start: 2023-01-01 | End: 2023-01-01 | Stop reason: HOSPADM

## 2023-01-01 RX ORDER — HYDROCODONE BITARTRATE AND ACETAMINOPHEN 500; 5 MG/1; MG/1
TABLET ORAL
Status: DISCONTINUED | OUTPATIENT
Start: 2023-01-01 | End: 2023-01-01

## 2023-01-01 RX ORDER — CLONIDINE HYDROCHLORIDE 0.1 MG/1
0.1 TABLET ORAL EVERY 6 HOURS PRN
Status: DISCONTINUED | OUTPATIENT
Start: 2023-01-01 | End: 2023-01-01 | Stop reason: HOSPADM

## 2023-01-01 RX ORDER — HYDRALAZINE HYDROCHLORIDE 20 MG/ML
10 INJECTION INTRAMUSCULAR; INTRAVENOUS EVERY 6 HOURS PRN
Status: DISCONTINUED | OUTPATIENT
Start: 2023-01-01 | End: 2023-01-01

## 2023-01-01 RX ORDER — ALLOPURINOL 300 MG/1
300 TABLET ORAL DAILY
Status: DISCONTINUED | OUTPATIENT
Start: 2023-01-01 | End: 2023-01-01

## 2023-01-01 RX ORDER — HYDROCODONE BITARTRATE AND ACETAMINOPHEN 5; 325 MG/1; MG/1
1 TABLET ORAL
COMMUNITY
Start: 2023-01-01

## 2023-01-01 RX ORDER — PROCHLORPERAZINE EDISYLATE 5 MG/ML
5 INJECTION INTRAMUSCULAR; INTRAVENOUS EVERY 30 MIN PRN
Status: DISCONTINUED | OUTPATIENT
Start: 2023-01-01 | End: 2023-01-01

## 2023-01-01 RX ORDER — DRONABINOL 2.5 MG/1
2.5 CAPSULE ORAL 2 TIMES DAILY
Status: DISCONTINUED | OUTPATIENT
Start: 2023-01-01 | End: 2023-01-01

## 2023-01-01 RX ORDER — NIFEDIPINE 30 MG/1
30 TABLET, FILM COATED, EXTENDED RELEASE ORAL DAILY
COMMUNITY

## 2023-01-01 RX ORDER — HYDROMORPHONE HYDROCHLORIDE 2 MG/1
2 TABLET ORAL EVERY 6 HOURS PRN
Status: DISCONTINUED | OUTPATIENT
Start: 2023-01-01 | End: 2023-01-01 | Stop reason: HOSPADM

## 2023-01-01 RX ORDER — SIMETHICONE 80 MG
1 TABLET,CHEWABLE ORAL 3 TIMES DAILY PRN
Status: DISCONTINUED | OUTPATIENT
Start: 2023-01-01 | End: 2023-01-01 | Stop reason: HOSPADM

## 2023-01-01 RX ORDER — PROMETHAZINE HYDROCHLORIDE 12.5 MG/1
25 TABLET ORAL EVERY 6 HOURS PRN
Status: DISCONTINUED | OUTPATIENT
Start: 2023-01-01 | End: 2023-01-01

## 2023-01-01 RX ORDER — NIFEDIPINE 30 MG/1
30 TABLET, EXTENDED RELEASE ORAL DAILY
Status: DISCONTINUED | OUTPATIENT
Start: 2023-01-01 | End: 2023-01-01

## 2023-01-01 RX ORDER — LOSARTAN POTASSIUM 50 MG/1
75 TABLET ORAL DAILY
COMMUNITY

## 2023-01-01 RX ORDER — ALLOPURINOL 300 MG/1
300 TABLET ORAL DAILY
COMMUNITY

## 2023-01-01 RX ORDER — MIRTAZAPINE 15 MG/1
15 TABLET, FILM COATED ORAL NIGHTLY
Status: DISCONTINUED | OUTPATIENT
Start: 2023-01-01 | End: 2023-01-01

## 2023-01-01 RX ORDER — SODIUM CHLORIDE 0.9 % (FLUSH) 0.9 %
10 SYRINGE (ML) INJECTION
Status: DISCONTINUED | OUTPATIENT
Start: 2023-01-01 | End: 2023-01-01 | Stop reason: HOSPADM

## 2023-01-01 RX ORDER — IBUPROFEN 200 MG
24 TABLET ORAL
Status: DISCONTINUED | OUTPATIENT
Start: 2023-01-01 | End: 2023-01-01 | Stop reason: HOSPADM

## 2023-01-01 RX ORDER — CALCITRIOL 0.25 UG/1
0.25 CAPSULE ORAL
Status: DISCONTINUED | OUTPATIENT
Start: 2023-01-01 | End: 2023-01-01

## 2023-01-01 RX ORDER — GABAPENTIN 100 MG/1
100 CAPSULE ORAL 2 TIMES DAILY
Status: DISCONTINUED | OUTPATIENT
Start: 2023-01-01 | End: 2023-01-01

## 2023-01-01 RX ORDER — HYDROCODONE BITARTRATE AND ACETAMINOPHEN 5; 325 MG/1; MG/1
1 TABLET ORAL EVERY 6 HOURS PRN
Status: DISCONTINUED | OUTPATIENT
Start: 2023-01-01 | End: 2023-01-01

## 2023-01-01 RX ORDER — LUBIPROSTONE 24 UG/1
24 CAPSULE ORAL ONCE
Status: DISCONTINUED | OUTPATIENT
Start: 2023-01-01 | End: 2023-01-01

## 2023-01-01 RX ORDER — LUBIPROSTONE 24 UG/1
24 CAPSULE ORAL
Status: DISCONTINUED | OUTPATIENT
Start: 2023-01-01 | End: 2023-01-01

## 2023-01-01 RX ORDER — HEPARIN SODIUM 1000 [USP'U]/ML
4200 INJECTION, SOLUTION INTRAVENOUS; SUBCUTANEOUS
Status: DISCONTINUED | OUTPATIENT
Start: 2023-01-01 | End: 2023-01-01

## 2023-01-01 RX ORDER — LUBIPROSTONE 24 UG/1
24 CAPSULE ORAL ONCE
Status: COMPLETED | OUTPATIENT
Start: 2023-01-01 | End: 2023-01-01

## 2023-01-01 RX ORDER — ALBUMIN HUMAN 50 G/1000ML
SOLUTION INTRAVENOUS CONTINUOUS PRN
Status: DISCONTINUED | OUTPATIENT
Start: 2023-01-01 | End: 2023-01-01

## 2023-01-01 RX ORDER — LIDOCAINE HYDROCHLORIDE 10 MG/ML
INJECTION, SOLUTION EPIDURAL; INFILTRATION; INTRACAUDAL; PERINEURAL
Status: DISCONTINUED | OUTPATIENT
Start: 2023-01-01 | End: 2023-01-01 | Stop reason: HOSPADM

## 2023-01-01 RX ORDER — VANCOMYCIN HYDROCHLORIDE 1 G/20ML
INJECTION, POWDER, LYOPHILIZED, FOR SOLUTION INTRAVENOUS
Status: DISCONTINUED | OUTPATIENT
Start: 2023-01-01 | End: 2023-01-01 | Stop reason: HOSPADM

## 2023-01-01 RX ORDER — NIFEDIPINE 30 MG/1
60 TABLET, EXTENDED RELEASE ORAL DAILY
Status: DISCONTINUED | OUTPATIENT
Start: 2023-01-01 | End: 2023-01-01

## 2023-01-01 RX ORDER — ONDANSETRON 2 MG/ML
INJECTION INTRAMUSCULAR; INTRAVENOUS
Status: DISCONTINUED | OUTPATIENT
Start: 2023-01-01 | End: 2023-01-01

## 2023-01-01 RX ORDER — HYDROMORPHONE HYDROCHLORIDE 1 MG/ML
0.5 INJECTION, SOLUTION INTRAMUSCULAR; INTRAVENOUS; SUBCUTANEOUS EVERY 6 HOURS PRN
Status: DISCONTINUED | OUTPATIENT
Start: 2023-01-01 | End: 2023-01-01

## 2023-01-01 RX ORDER — HYDROMORPHONE HYDROCHLORIDE 2 MG/1
2 TABLET ORAL EVERY 4 HOURS PRN
Status: DISCONTINUED | OUTPATIENT
Start: 2023-01-01 | End: 2023-01-01

## 2023-01-01 RX ORDER — CALCIUM CARBONATE 200(500)MG
500 TABLET,CHEWABLE ORAL 2 TIMES DAILY
Status: DISCONTINUED | OUTPATIENT
Start: 2023-01-01 | End: 2023-01-01

## 2023-01-01 RX ORDER — METOCLOPRAMIDE HYDROCHLORIDE 5 MG/ML
10 INJECTION INTRAMUSCULAR; INTRAVENOUS ONCE
Status: DISCONTINUED | OUTPATIENT
Start: 2023-01-01 | End: 2023-01-01

## 2023-01-01 RX ORDER — ASCORBIC ACID 500 MG
500 TABLET ORAL DAILY
Status: DISCONTINUED | OUTPATIENT
Start: 2023-01-01 | End: 2023-01-01 | Stop reason: HOSPADM

## 2023-01-01 RX ORDER — ONDANSETRON 4 MG/1
4 TABLET, ORALLY DISINTEGRATING ORAL EVERY 6 HOURS PRN
Status: DISCONTINUED | OUTPATIENT
Start: 2023-01-01 | End: 2023-01-01

## 2023-01-01 RX ORDER — ONDANSETRON 2 MG/ML
4 INJECTION INTRAMUSCULAR; INTRAVENOUS EVERY 6 HOURS PRN
Status: DISCONTINUED | OUTPATIENT
Start: 2023-01-01 | End: 2023-01-01

## 2023-01-01 RX ORDER — ASPIRIN 81 MG/1
81 TABLET ORAL DAILY
Status: DISCONTINUED | OUTPATIENT
Start: 2023-01-01 | End: 2023-01-01 | Stop reason: HOSPADM

## 2023-01-01 RX ORDER — SODIUM CHLORIDE 9 MG/ML
INJECTION, SOLUTION INTRAVENOUS ONCE
Status: CANCELLED | OUTPATIENT
Start: 2023-01-01 | End: 2023-01-01

## 2023-01-01 RX ORDER — MIRTAZAPINE 7.5 MG/1
7.5 TABLET, FILM COATED ORAL NIGHTLY
Status: DISCONTINUED | OUTPATIENT
Start: 2023-01-01 | End: 2023-01-01

## 2023-01-01 RX ORDER — LABETALOL 300 MG/1
300 TABLET, FILM COATED ORAL 2 TIMES DAILY
COMMUNITY

## 2023-01-01 RX ORDER — DOXYCYCLINE HYCLATE 100 MG
100 TABLET ORAL EVERY 12 HOURS
Status: DISCONTINUED | OUTPATIENT
Start: 2023-01-01 | End: 2023-01-01

## 2023-01-01 RX ORDER — LOSARTAN POTASSIUM 50 MG/1
50 TABLET ORAL DAILY
Status: DISCONTINUED | OUTPATIENT
Start: 2023-01-01 | End: 2023-01-01

## 2023-01-01 RX ORDER — HYDROMORPHONE HYDROCHLORIDE 1 MG/ML
0.2 INJECTION, SOLUTION INTRAMUSCULAR; INTRAVENOUS; SUBCUTANEOUS EVERY 6 HOURS PRN
Status: DISCONTINUED | OUTPATIENT
Start: 2023-01-01 | End: 2023-01-01

## 2023-01-01 RX ORDER — ACETAMINOPHEN 325 MG/1
650 TABLET ORAL EVERY 6 HOURS PRN
Status: DISCONTINUED | OUTPATIENT
Start: 2023-01-01 | End: 2023-01-01 | Stop reason: HOSPADM

## 2023-01-01 RX ORDER — CLOPIDOGREL BISULFATE 75 MG/1
75 TABLET ORAL DAILY
Status: DISCONTINUED | OUTPATIENT
Start: 2023-01-01 | End: 2023-01-01 | Stop reason: HOSPADM

## 2023-01-01 RX ORDER — IBUPROFEN 200 MG
16 TABLET ORAL
Status: DISCONTINUED | OUTPATIENT
Start: 2023-01-01 | End: 2023-01-01 | Stop reason: HOSPADM

## 2023-01-01 RX ORDER — KETOROLAC TROMETHAMINE 30 MG/ML
15 INJECTION, SOLUTION INTRAMUSCULAR; INTRAVENOUS ONCE
Status: COMPLETED | OUTPATIENT
Start: 2023-01-01 | End: 2023-01-01

## 2023-01-01 RX ORDER — SEVELAMER CARBONATE 800 MG/1
800 TABLET, FILM COATED ORAL
Status: DISCONTINUED | OUTPATIENT
Start: 2023-01-01 | End: 2023-01-01 | Stop reason: HOSPADM

## 2023-01-01 RX ORDER — CEFAZOLIN SODIUM 1 G/3ML
INJECTION, POWDER, FOR SOLUTION INTRAMUSCULAR; INTRAVENOUS
Status: DISCONTINUED | OUTPATIENT
Start: 2023-01-01 | End: 2023-01-01

## 2023-01-01 RX ORDER — CARVEDILOL 12.5 MG/1
12.5 TABLET ORAL 2 TIMES DAILY
Status: DISCONTINUED | OUTPATIENT
Start: 2023-01-01 | End: 2023-01-01

## 2023-01-01 RX ORDER — CINACALCET 30 MG/1
30 TABLET, FILM COATED ORAL
Status: DISCONTINUED | OUTPATIENT
Start: 2023-01-01 | End: 2023-01-01 | Stop reason: HOSPADM

## 2023-01-01 RX ORDER — FENTANYL CITRATE 50 UG/ML
INJECTION, SOLUTION INTRAMUSCULAR; INTRAVENOUS
Status: DISCONTINUED | OUTPATIENT
Start: 2023-01-01 | End: 2023-01-01 | Stop reason: HOSPADM

## 2023-01-01 RX ORDER — SEVELAMER CARBONATE 800 MG/1
1600 TABLET, FILM COATED ORAL
Status: DISCONTINUED | OUTPATIENT
Start: 2023-01-01 | End: 2023-01-01

## 2023-01-01 RX ORDER — LIDOCAINE 50 MG/G
1 PATCH TOPICAL
Status: DISCONTINUED | OUTPATIENT
Start: 2023-01-01 | End: 2023-01-01 | Stop reason: HOSPADM

## 2023-01-01 RX ORDER — TALC
6 POWDER (GRAM) TOPICAL NIGHTLY PRN
Status: CANCELLED | OUTPATIENT
Start: 2023-01-01

## 2023-01-01 RX ORDER — ONDANSETRON 2 MG/ML
4 INJECTION INTRAMUSCULAR; INTRAVENOUS EVERY 8 HOURS PRN
Status: DISCONTINUED | OUTPATIENT
Start: 2023-01-01 | End: 2023-01-01 | Stop reason: HOSPADM

## 2023-01-01 RX ORDER — LOSARTAN POTASSIUM 50 MG/1
100 TABLET ORAL DAILY
Status: DISCONTINUED | OUTPATIENT
Start: 2023-01-01 | End: 2023-01-01

## 2023-01-01 RX ORDER — ATORVASTATIN CALCIUM 20 MG/1
40 TABLET, FILM COATED ORAL DAILY
Status: DISCONTINUED | OUTPATIENT
Start: 2023-01-01 | End: 2023-01-01 | Stop reason: HOSPADM

## 2023-01-01 RX ORDER — ACETAMINOPHEN 325 MG/1
325 TABLET ORAL ONCE
Status: DISCONTINUED | OUTPATIENT
Start: 2023-01-01 | End: 2023-01-01 | Stop reason: HOSPADM

## 2023-01-01 RX ORDER — MEPERIDINE HYDROCHLORIDE 25 MG/ML
12.5 INJECTION INTRAMUSCULAR; INTRAVENOUS; SUBCUTANEOUS ONCE AS NEEDED
Status: ACTIVE | OUTPATIENT
Start: 2023-01-01 | End: 2023-01-01

## 2023-01-01 RX ORDER — NIFEDIPINE 30 MG/1
60 TABLET, EXTENDED RELEASE ORAL 2 TIMES DAILY
Status: DISCONTINUED | OUTPATIENT
Start: 2023-01-01 | End: 2023-01-01

## 2023-01-01 RX ADMIN — SODIUM CHLORIDE: 0.9 INJECTION, SOLUTION INTRAVENOUS at 12:09

## 2023-01-01 RX ADMIN — EPOETIN ALFA-EPBX 6100 UNITS: 10000 INJECTION, SOLUTION INTRAVENOUS; SUBCUTANEOUS at 12:10

## 2023-01-01 RX ADMIN — MIRTAZAPINE 7.5 MG: 7.5 TABLET ORAL at 09:10

## 2023-01-01 RX ADMIN — NIFEDIPINE 60 MG: 30 TABLET, FILM COATED, EXTENDED RELEASE ORAL at 08:10

## 2023-01-01 RX ADMIN — NEPHROCAP 1 CAPSULE: 1 CAP ORAL at 08:09

## 2023-01-01 RX ADMIN — CARVEDILOL 3.12 MG: 3.12 TABLET, FILM COATED ORAL at 08:09

## 2023-01-01 RX ADMIN — HYDROCODONE BITARTRATE AND ACETAMINOPHEN 1 TABLET: 5; 325 TABLET ORAL at 03:09

## 2023-01-01 RX ADMIN — CALCITRIOL CAPSULES 0.25 MCG 0.25 MCG: 0.25 CAPSULE ORAL at 10:09

## 2023-01-01 RX ADMIN — HYDROMORPHONE HYDROCHLORIDE 0.5 MG: 0.5 INJECTION, SOLUTION INTRAMUSCULAR; INTRAVENOUS; SUBCUTANEOUS at 11:09

## 2023-01-01 RX ADMIN — NIFEDIPINE 60 MG: 30 TABLET, FILM COATED, EXTENDED RELEASE ORAL at 09:10

## 2023-01-01 RX ADMIN — GLYCOPYRROLATE 0.2 MG: 0.2 INJECTION, SOLUTION INTRAMUSCULAR; INTRAVITREAL at 10:09

## 2023-01-01 RX ADMIN — SEVELAMER CARBONATE 1600 MG: 800 TABLET, FILM COATED ORAL at 04:10

## 2023-01-01 RX ADMIN — NEPHROCAP 1 CAPSULE: 1 CAP ORAL at 08:10

## 2023-01-01 RX ADMIN — HYDROMORPHONE HYDROCHLORIDE 4 MG: 2 TABLET ORAL at 08:10

## 2023-01-01 RX ADMIN — BUPIVACAINE HYDROCHLORIDE 15 ML: 5 INJECTION, SOLUTION EPIDURAL; INTRACAUDAL; PERINEURAL at 11:09

## 2023-01-01 RX ADMIN — LIDOCAINE 1 PATCH: 50 PATCH CUTANEOUS at 10:10

## 2023-01-01 RX ADMIN — HYDROMORPHONE HYDROCHLORIDE 2 MG: 2 TABLET ORAL at 08:10

## 2023-01-01 RX ADMIN — HEPARIN SODIUM 5000 UNITS: 5000 INJECTION INTRAVENOUS; SUBCUTANEOUS at 09:09

## 2023-01-01 RX ADMIN — ASPIRIN 81 MG: 81 TABLET, COATED ORAL at 10:10

## 2023-01-01 RX ADMIN — PHENYLEPHRINE HYDROCHLORIDE 100 MCG: 10 INJECTION INTRAVENOUS at 12:09

## 2023-01-01 RX ADMIN — NIFEDIPINE 60 MG: 30 TABLET, FILM COATED, EXTENDED RELEASE ORAL at 08:09

## 2023-01-01 RX ADMIN — ASPIRIN 81 MG: 81 TABLET, COATED ORAL at 08:10

## 2023-01-01 RX ADMIN — DIPHENHYDRAMINE HYDROCHLORIDE 25 MG: 25 CAPSULE ORAL at 08:09

## 2023-01-01 RX ADMIN — LABETALOL HYDROCHLORIDE 300 MG: 100 TABLET, FILM COATED ORAL at 09:09

## 2023-01-01 RX ADMIN — ALLOPURINOL 300 MG: 300 TABLET ORAL at 08:09

## 2023-01-01 RX ADMIN — ASPIRIN 81 MG: 81 TABLET, COATED ORAL at 08:09

## 2023-01-01 RX ADMIN — SEVELAMER CARBONATE 1600 MG: 800 TABLET, FILM COATED ORAL at 05:10

## 2023-01-01 RX ADMIN — ASPIRIN 81 MG: 81 TABLET, COATED ORAL at 10:09

## 2023-01-01 RX ADMIN — LOSARTAN POTASSIUM 100 MG: 50 TABLET, FILM COATED ORAL at 10:10

## 2023-01-01 RX ADMIN — BUPIVACAINE HYDROCHLORIDE 15 ML: 5 INJECTION, SOLUTION EPIDURAL; INTRACAUDAL at 11:09

## 2023-01-01 RX ADMIN — PIPERACILLIN AND TAZOBACTAM 4.5 G: 4; .5 INJECTION, POWDER, LYOPHILIZED, FOR SOLUTION INTRAVENOUS; PARENTERAL at 01:09

## 2023-01-01 RX ADMIN — DRONABINOL 2.5 MG: 2.5 CAPSULE ORAL at 11:10

## 2023-01-01 RX ADMIN — CALCIUM CARBONATE 500 MG: 500 TABLET, CHEWABLE ORAL at 09:09

## 2023-01-01 RX ADMIN — CALCIUM CARBONATE 500 MG: 500 TABLET, CHEWABLE ORAL at 08:09

## 2023-01-01 RX ADMIN — CLOPIDOGREL BISULFATE 75 MG: 75 TABLET ORAL at 09:09

## 2023-01-01 RX ADMIN — PROPOFOL 50 MG: 10 INJECTION, EMULSION INTRAVENOUS at 01:09

## 2023-01-01 RX ADMIN — CLOPIDOGREL BISULFATE 75 MG: 75 TABLET ORAL at 05:10

## 2023-01-01 RX ADMIN — ATORVASTATIN CALCIUM 40 MG: 20 TABLET, FILM COATED ORAL at 08:10

## 2023-01-01 RX ADMIN — CALCITRIOL CAPSULES 0.25 MCG 0.25 MCG: 0.25 CAPSULE ORAL at 09:10

## 2023-01-01 RX ADMIN — CALCIUM ACETATE 2001 MG: 667 CAPSULE ORAL at 05:09

## 2023-01-01 RX ADMIN — CALCIUM CARBONATE 500 MG: 500 TABLET, CHEWABLE ORAL at 11:09

## 2023-01-01 RX ADMIN — HYDROCODONE BITARTRATE AND ACETAMINOPHEN 2 TABLET: 5; 325 TABLET ORAL at 08:09

## 2023-01-01 RX ADMIN — PIPERACILLIN AND TAZOBACTAM 4.5 G: 4; .5 INJECTION, POWDER, LYOPHILIZED, FOR SOLUTION INTRAVENOUS; PARENTERAL at 03:09

## 2023-01-01 RX ADMIN — PIPERACILLIN AND TAZOBACTAM 4.5 G: 4; .5 INJECTION, POWDER, LYOPHILIZED, FOR SOLUTION INTRAVENOUS; PARENTERAL at 02:09

## 2023-01-01 RX ADMIN — SEVELAMER CARBONATE 1600 MG: 800 TABLET, FILM COATED ORAL at 09:10

## 2023-01-01 RX ADMIN — HYDROCODONE BITARTRATE AND ACETAMINOPHEN 1 TABLET: 5; 325 TABLET ORAL at 06:09

## 2023-01-01 RX ADMIN — FENTANYL CITRATE 100 MCG: 50 INJECTION, SOLUTION INTRAMUSCULAR; INTRAVENOUS at 10:09

## 2023-01-01 RX ADMIN — EPHEDRINE SULFATE 25 MG: 50 INJECTION INTRAVENOUS at 12:09

## 2023-01-01 RX ADMIN — DEXAMETHASONE SODIUM PHOSPHATE 4 MG: 4 INJECTION, SOLUTION INTRAMUSCULAR; INTRAVENOUS at 10:09

## 2023-01-01 RX ADMIN — ASPIRIN 81 MG: 81 TABLET, COATED ORAL at 02:09

## 2023-01-01 RX ADMIN — CALCITRIOL CAPSULES 0.25 MCG 0.25 MCG: 0.25 CAPSULE ORAL at 02:09

## 2023-01-01 RX ADMIN — SEVELAMER CARBONATE 1600 MG: 800 TABLET, FILM COATED ORAL at 05:09

## 2023-01-01 RX ADMIN — HYDROCODONE BITARTRATE AND ACETAMINOPHEN 2 TABLET: 5; 325 TABLET ORAL at 01:09

## 2023-01-01 RX ADMIN — ATORVASTATIN CALCIUM 40 MG: 20 TABLET, FILM COATED ORAL at 09:10

## 2023-01-01 RX ADMIN — PROPOFOL 150 MG: 10 INJECTION, EMULSION INTRAVENOUS at 10:09

## 2023-01-01 RX ADMIN — BISACODYL 10 MG: 5 TABLET, COATED ORAL at 03:10

## 2023-01-01 RX ADMIN — NIFEDIPINE 30 MG: 30 TABLET, FILM COATED, EXTENDED RELEASE ORAL at 03:09

## 2023-01-01 RX ADMIN — CITROMA MAGNESIUM CITRATE: 1.75 LIQUID ORAL at 10:10

## 2023-01-01 RX ADMIN — NEPHROCAP 1 CAPSULE: 1 CAP ORAL at 12:10

## 2023-01-01 RX ADMIN — ROCURONIUM BROMIDE 50 MG: 10 INJECTION INTRAVENOUS at 10:09

## 2023-01-01 RX ADMIN — SODIUM CHLORIDE 2 G: 9 INJECTION, SOLUTION INTRAVENOUS at 12:09

## 2023-01-01 RX ADMIN — Medication 6 MG: at 09:09

## 2023-01-01 RX ADMIN — HYDROMORPHONE HYDROCHLORIDE 0.5 MG: 0.5 INJECTION, SOLUTION INTRAMUSCULAR; INTRAVENOUS; SUBCUTANEOUS at 08:09

## 2023-01-01 RX ADMIN — NIFEDIPINE 60 MG: 30 TABLET, FILM COATED, EXTENDED RELEASE ORAL at 09:09

## 2023-01-01 RX ADMIN — LIDOCAINE 1 PATCH: 50 PATCH CUTANEOUS at 09:10

## 2023-01-01 RX ADMIN — CIPROFLOXACIN 500 MG: 500 TABLET, FILM COATED ORAL at 08:09

## 2023-01-01 RX ADMIN — CARVEDILOL 25 MG: 12.5 TABLET, FILM COATED ORAL at 09:10

## 2023-01-01 RX ADMIN — LIDOCAINE 1 PATCH: 50 PATCH CUTANEOUS at 05:10

## 2023-01-01 RX ADMIN — BUPIVACAINE 10 ML: 13.3 INJECTION, SUSPENSION, LIPOSOMAL INFILTRATION at 11:09

## 2023-01-01 RX ADMIN — HYDROMORPHONE HYDROCHLORIDE 0.2 MG: 0.5 INJECTION, SOLUTION INTRAMUSCULAR; INTRAVENOUS; SUBCUTANEOUS at 02:09

## 2023-01-01 RX ADMIN — SEVELAMER CARBONATE 1600 MG: 800 TABLET, FILM COATED ORAL at 01:09

## 2023-01-01 RX ADMIN — NEPHROCAP 1 CAPSULE: 1 CAP ORAL at 09:10

## 2023-01-01 RX ADMIN — CALCITRIOL CAPSULES 0.25 MCG 0.25 MCG: 0.25 CAPSULE ORAL at 10:10

## 2023-01-01 RX ADMIN — SENNOSIDES AND DOCUSATE SODIUM 2 TABLET: 50; 8.6 TABLET ORAL at 09:10

## 2023-01-01 RX ADMIN — CARVEDILOL 12.5 MG: 12.5 TABLET, FILM COATED ORAL at 09:09

## 2023-01-01 RX ADMIN — EPOETIN ALFA-EPBX 6100 UNITS: 10000 INJECTION, SOLUTION INTRAVENOUS; SUBCUTANEOUS at 05:10

## 2023-01-01 RX ADMIN — CLOPIDOGREL BISULFATE 75 MG: 75 TABLET ORAL at 08:09

## 2023-01-01 RX ADMIN — CARVEDILOL 12.5 MG: 12.5 TABLET, FILM COATED ORAL at 08:10

## 2023-01-01 RX ADMIN — CARVEDILOL 12.5 MG: 12.5 TABLET, FILM COATED ORAL at 10:10

## 2023-01-01 RX ADMIN — CARVEDILOL 12.5 MG: 12.5 TABLET, FILM COATED ORAL at 08:09

## 2023-01-01 RX ADMIN — CALCITRIOL CAPSULES 0.25 MCG 0.25 MCG: 0.25 CAPSULE ORAL at 08:10

## 2023-01-01 RX ADMIN — ASPIRIN 81 MG: 81 TABLET, COATED ORAL at 12:10

## 2023-01-01 RX ADMIN — LIDOCAINE 1 PATCH: 50 PATCH CUTANEOUS at 08:10

## 2023-01-01 RX ADMIN — HYDROCODONE BITARTRATE AND ACETAMINOPHEN 1 TABLET: 5; 325 TABLET ORAL at 07:09

## 2023-01-01 RX ADMIN — OXYCODONE AND ACETAMINOPHEN 1 TABLET: 7.5; 325 TABLET ORAL at 09:09

## 2023-01-01 RX ADMIN — NIFEDIPINE 60 MG: 30 TABLET, FILM COATED, EXTENDED RELEASE ORAL at 06:09

## 2023-01-01 RX ADMIN — CARVEDILOL 12.5 MG: 12.5 TABLET, FILM COATED ORAL at 05:10

## 2023-01-01 RX ADMIN — HYDROCODONE BITARTRATE AND ACETAMINOPHEN 1 TABLET: 5; 325 TABLET ORAL at 12:09

## 2023-01-01 RX ADMIN — Medication 6 MG: at 01:09

## 2023-01-01 RX ADMIN — TUBERCULIN PURIFIED PROTEIN DERIVATIVE 5 UNITS: 5 INJECTION, SOLUTION INTRADERMAL at 03:09

## 2023-01-01 RX ADMIN — HYDROMORPHONE HYDROCHLORIDE 0.5 MG: 0.5 INJECTION, SOLUTION INTRAMUSCULAR; INTRAVENOUS; SUBCUTANEOUS at 03:09

## 2023-01-01 RX ADMIN — OXYCODONE HYDROCHLORIDE AND ACETAMINOPHEN 500 MG: 500 TABLET ORAL at 08:10

## 2023-01-01 RX ADMIN — FENTANYL CITRATE 25 MCG: 50 INJECTION, SOLUTION INTRAMUSCULAR; INTRAVENOUS at 01:09

## 2023-01-01 RX ADMIN — GABAPENTIN 100 MG: 100 CAPSULE ORAL at 08:10

## 2023-01-01 RX ADMIN — DEXAMETHASONE SODIUM PHOSPHATE 4 MG: 4 INJECTION, SOLUTION INTRAMUSCULAR; INTRAVENOUS at 12:09

## 2023-01-01 RX ADMIN — HYDRALAZINE HYDROCHLORIDE 100 MG: 25 TABLET, FILM COATED ORAL at 08:09

## 2023-01-01 RX ADMIN — GADOBUTROL 6 ML: 604.72 INJECTION INTRAVENOUS at 08:09

## 2023-01-01 RX ADMIN — HYDROMORPHONE HYDROCHLORIDE 0.2 MG: 0.5 INJECTION, SOLUTION INTRAMUSCULAR; INTRAVENOUS; SUBCUTANEOUS at 07:09

## 2023-01-01 RX ADMIN — MIRTAZAPINE 15 MG: 15 TABLET, FILM COATED ORAL at 08:10

## 2023-01-01 RX ADMIN — HYDRALAZINE HYDROCHLORIDE 100 MG: 25 TABLET, FILM COATED ORAL at 09:09

## 2023-01-01 RX ADMIN — EPHEDRINE SULFATE 10 MG: 50 INJECTION INTRAVENOUS at 12:09

## 2023-01-01 RX ADMIN — SODIUM CHLORIDE 250 ML: 9 INJECTION, SOLUTION INTRAVENOUS at 09:10

## 2023-01-01 RX ADMIN — HYDROMORPHONE HYDROCHLORIDE 0.5 MG: 0.5 INJECTION, SOLUTION INTRAMUSCULAR; INTRAVENOUS; SUBCUTANEOUS at 04:09

## 2023-01-01 RX ADMIN — MUPIROCIN: 20 OINTMENT TOPICAL at 11:09

## 2023-01-01 RX ADMIN — ROCURONIUM BROMIDE 20 MG: 10 SOLUTION INTRAVENOUS at 12:09

## 2023-01-01 RX ADMIN — OXYCODONE AND ACETAMINOPHEN 1 TABLET: 7.5; 325 TABLET ORAL at 06:09

## 2023-01-01 RX ADMIN — KETOROLAC TROMETHAMINE 15 MG: 30 INJECTION, SOLUTION INTRAMUSCULAR; INTRAVENOUS at 04:10

## 2023-01-01 RX ADMIN — SEVELAMER CARBONATE 1600 MG: 800 TABLET, FILM COATED ORAL at 10:10

## 2023-01-01 RX ADMIN — DOXYCYCLINE HYCLATE 100 MG: 100 TABLET, COATED ORAL at 09:09

## 2023-01-01 RX ADMIN — CARBOXYMETHYLCELLULOSE SODIUM 2 DROP: 2.5 SOLUTION/ DROPS OPHTHALMIC at 10:09

## 2023-01-01 RX ADMIN — HYDROCODONE BITARTRATE AND ACETAMINOPHEN 1 TABLET: 5; 325 TABLET ORAL at 11:09

## 2023-01-01 RX ADMIN — VANCOMYCIN HYDROCHLORIDE 500 MG: 500 INJECTION, POWDER, LYOPHILIZED, FOR SOLUTION INTRAVENOUS at 08:09

## 2023-01-01 RX ADMIN — NEPHROCAP 1 CAPSULE: 1 CAP ORAL at 09:09

## 2023-01-01 RX ADMIN — LOSARTAN POTASSIUM 100 MG: 50 TABLET, FILM COATED ORAL at 12:10

## 2023-01-01 RX ADMIN — NEPHROCAP 1 CAPSULE: 1 CAP ORAL at 10:10

## 2023-01-01 RX ADMIN — LABETALOL HYDROCHLORIDE 300 MG: 100 TABLET, FILM COATED ORAL at 08:09

## 2023-01-01 RX ADMIN — DIPHENHYDRAMINE HYDROCHLORIDE 25 MG: 25 CAPSULE ORAL at 03:09

## 2023-01-01 RX ADMIN — HYDROCODONE BITARTRATE AND ACETAMINOPHEN 1 TABLET: 5; 325 TABLET ORAL at 06:10

## 2023-01-01 RX ADMIN — LOSARTAN POTASSIUM 100 MG: 50 TABLET, FILM COATED ORAL at 08:10

## 2023-01-01 RX ADMIN — HYDROMORPHONE HYDROCHLORIDE 0.2 MG: 0.5 INJECTION, SOLUTION INTRAMUSCULAR; INTRAVENOUS; SUBCUTANEOUS at 05:10

## 2023-01-01 RX ADMIN — SEVELAMER CARBONATE 1600 MG: 800 TABLET, FILM COATED ORAL at 11:09

## 2023-01-01 RX ADMIN — HYDROCODONE BITARTRATE AND ACETAMINOPHEN 1 TABLET: 5; 325 TABLET ORAL at 02:09

## 2023-01-01 RX ADMIN — COLLAGENASE SANTYL: 250 OINTMENT TOPICAL at 09:10

## 2023-01-01 RX ADMIN — DRONABINOL 2.5 MG: 2.5 CAPSULE ORAL at 09:10

## 2023-01-01 RX ADMIN — ROCURONIUM BROMIDE 15 MG: 10 INJECTION INTRAVENOUS at 12:09

## 2023-01-01 RX ADMIN — Medication 6 MG: at 08:09

## 2023-01-01 RX ADMIN — NIFEDIPINE 60 MG: 30 TABLET, FILM COATED, EXTENDED RELEASE ORAL at 12:10

## 2023-01-01 RX ADMIN — HEPARIN SODIUM 5000 UNITS: 5000 INJECTION INTRAVENOUS; SUBCUTANEOUS at 03:09

## 2023-01-01 RX ADMIN — HYDROMORPHONE HYDROCHLORIDE 4 MG: 2 TABLET ORAL at 05:10

## 2023-01-01 RX ADMIN — PROPOFOL 100 MG: 10 INJECTION, EMULSION INTRAVENOUS at 12:09

## 2023-01-01 RX ADMIN — SEVELAMER CARBONATE 1600 MG: 800 TABLET, FILM COATED ORAL at 08:10

## 2023-01-01 RX ADMIN — Medication 6 MG: at 10:10

## 2023-01-01 RX ADMIN — INSULIN ASPART 2 UNITS: 100 INJECTION, SOLUTION INTRAVENOUS; SUBCUTANEOUS at 12:10

## 2023-01-01 RX ADMIN — LOSARTAN POTASSIUM 100 MG: 50 TABLET, FILM COATED ORAL at 08:09

## 2023-01-01 RX ADMIN — SEVELAMER CARBONATE 1600 MG: 800 TABLET, FILM COATED ORAL at 01:10

## 2023-01-01 RX ADMIN — CARVEDILOL 12.5 MG: 12.5 TABLET, FILM COATED ORAL at 12:10

## 2023-01-01 RX ADMIN — ACETAMINOPHEN 1000 MG: 10 INJECTION INTRAVENOUS at 03:09

## 2023-01-01 RX ADMIN — NIFEDIPINE 60 MG: 30 TABLET, FILM COATED, EXTENDED RELEASE ORAL at 10:10

## 2023-01-01 RX ADMIN — ROCURONIUM BROMIDE 30 MG: 10 SOLUTION INTRAVENOUS at 12:09

## 2023-01-01 RX ADMIN — LUBIPROSTONE 24 MCG: 24 CAPSULE, GELATIN COATED ORAL at 08:10

## 2023-01-01 RX ADMIN — HYDROCODONE BITARTRATE AND ACETAMINOPHEN 1 TABLET: 5; 325 TABLET ORAL at 09:09

## 2023-01-01 RX ADMIN — LOSARTAN POTASSIUM 50 MG: 50 TABLET, FILM COATED ORAL at 09:09

## 2023-01-01 RX ADMIN — Medication 6 MG: at 09:10

## 2023-01-01 RX ADMIN — CARVEDILOL 25 MG: 12.5 TABLET, FILM COATED ORAL at 08:10

## 2023-01-01 RX ADMIN — SIMETHICONE 80 MG: 80 TABLET, CHEWABLE ORAL at 05:09

## 2023-01-01 RX ADMIN — SUGAMMADEX 200 MG: 100 INJECTION, SOLUTION INTRAVENOUS at 01:09

## 2023-01-01 RX ADMIN — ATORVASTATIN CALCIUM 40 MG: 20 TABLET, FILM COATED ORAL at 04:10

## 2023-01-01 RX ADMIN — DOXYCYCLINE HYCLATE 100 MG: 100 TABLET, COATED ORAL at 08:09

## 2023-01-01 RX ADMIN — NIFEDIPINE 60 MG: 30 TABLET, FILM COATED, EXTENDED RELEASE ORAL at 05:10

## 2023-01-01 RX ADMIN — PHENYLEPHRINE HYDROCHLORIDE 80 MCG: 10 INJECTION INTRAVENOUS at 11:09

## 2023-01-01 RX ADMIN — ALBUMIN (HUMAN): 2.5 SOLUTION INTRAVENOUS at 12:09

## 2023-01-01 RX ADMIN — HYDROCODONE BITARTRATE AND ACETAMINOPHEN 1 TABLET: 5; 325 TABLET ORAL at 10:10

## 2023-01-01 RX ADMIN — SUGAMMADEX 200 MG: 100 INJECTION, SOLUTION INTRAVENOUS at 02:09

## 2023-01-01 RX ADMIN — SENNOSIDES AND DOCUSATE SODIUM 2 TABLET: 50; 8.6 TABLET ORAL at 03:10

## 2023-01-01 RX ADMIN — MUPIROCIN: 20 OINTMENT TOPICAL at 08:09

## 2023-01-01 RX ADMIN — CLOPIDOGREL BISULFATE 75 MG: 75 TABLET ORAL at 09:10

## 2023-01-01 RX ADMIN — CINACALCET HYDROCHLORIDE 30 MG: 30 TABLET, COATED ORAL at 08:10

## 2023-01-01 RX ADMIN — ASPIRIN 81 MG: 81 TABLET, COATED ORAL at 09:10

## 2023-01-01 RX ADMIN — HYDRALAZINE HYDROCHLORIDE 10 MG: 20 INJECTION INTRAMUSCULAR; INTRAVENOUS at 12:09

## 2023-01-01 RX ADMIN — LOSARTAN POTASSIUM 100 MG: 50 TABLET, FILM COATED ORAL at 09:10

## 2023-01-01 RX ADMIN — CALCITRIOL CAPSULES 0.25 MCG 0.25 MCG: 0.25 CAPSULE ORAL at 11:09

## 2023-01-01 RX ADMIN — SODIUM CHLORIDE: 0.9 INJECTION, SOLUTION INTRAVENOUS at 11:09

## 2023-01-01 RX ADMIN — VANCOMYCIN HYDROCHLORIDE 500 MG: 500 INJECTION, POWDER, LYOPHILIZED, FOR SOLUTION INTRAVENOUS at 06:09

## 2023-01-01 RX ADMIN — POLYETHYLENE GLYCOL 3350 17 G: 17 POWDER, FOR SOLUTION ORAL at 03:10

## 2023-01-01 RX ADMIN — SEVELAMER CARBONATE 1600 MG: 800 TABLET, FILM COATED ORAL at 12:10

## 2023-01-01 RX ADMIN — HEPARIN SODIUM 5000 UNITS: 5000 INJECTION INTRAVENOUS; SUBCUTANEOUS at 05:09

## 2023-01-01 RX ADMIN — SEVELAMER CARBONATE 1600 MG: 800 TABLET, FILM COATED ORAL at 02:09

## 2023-01-01 RX ADMIN — HYDROMORPHONE HYDROCHLORIDE 2 MG: 2 TABLET ORAL at 09:10

## 2023-01-01 RX ADMIN — HYDROCODONE BITARTRATE AND ACETAMINOPHEN 1 TABLET: 5; 325 TABLET ORAL at 08:09

## 2023-01-01 RX ADMIN — OXYCODONE AND ACETAMINOPHEN 1 TABLET: 7.5; 325 TABLET ORAL at 05:10

## 2023-01-01 RX ADMIN — NEPHROCAP 1 CAPSULE: 1 CAP ORAL at 03:09

## 2023-01-01 RX ADMIN — CIPROFLOXACIN 500 MG: 500 TABLET, FILM COATED ORAL at 09:09

## 2023-01-01 RX ADMIN — SODIUM CHLORIDE: 0.9 INJECTION, SOLUTION INTRAVENOUS at 10:09

## 2023-01-01 RX ADMIN — NEPHROCAP 1 CAPSULE: 1 CAP ORAL at 05:10

## 2023-01-01 RX ADMIN — FENTANYL CITRATE 50 MCG: 50 INJECTION, SOLUTION INTRAMUSCULAR; INTRAVENOUS at 11:09

## 2023-01-01 RX ADMIN — HYDROCODONE BITARTRATE AND ACETAMINOPHEN 1 TABLET: 5; 325 TABLET ORAL at 05:09

## 2023-01-01 RX ADMIN — LOSARTAN POTASSIUM 100 MG: 50 TABLET, FILM COATED ORAL at 09:09

## 2023-01-01 RX ADMIN — LIDOCAINE 1 PATCH: 50 PATCH CUTANEOUS at 12:10

## 2023-01-01 RX ADMIN — CLOPIDOGREL BISULFATE 75 MG: 75 TABLET ORAL at 08:10

## 2023-01-01 RX ADMIN — NEPHROCAP 1 CAPSULE: 1 CAP ORAL at 02:09

## 2023-01-01 RX ADMIN — ACETAMINOPHEN 650 MG: 325 TABLET, FILM COATED ORAL at 06:10

## 2023-01-01 RX ADMIN — OXYCODONE HYDROCHLORIDE AND ACETAMINOPHEN 500 MG: 500 TABLET ORAL at 05:10

## 2023-01-01 RX ADMIN — SEVELAMER CARBONATE 1600 MG: 800 TABLET, FILM COATED ORAL at 06:09

## 2023-01-01 RX ADMIN — ROCURONIUM BROMIDE 20 MG: 10 INJECTION INTRAVENOUS at 11:09

## 2023-01-01 RX ADMIN — LOSARTAN POTASSIUM 100 MG: 50 TABLET, FILM COATED ORAL at 06:09

## 2023-01-01 RX ADMIN — SENNOSIDES AND DOCUSATE SODIUM 2 TABLET: 50; 8.6 TABLET ORAL at 08:10

## 2023-01-01 RX ADMIN — LOSARTAN POTASSIUM 50 MG: 50 TABLET, FILM COATED ORAL at 11:09

## 2023-01-01 RX ADMIN — LOSARTAN POTASSIUM 100 MG: 50 TABLET, FILM COATED ORAL at 02:09

## 2023-01-01 RX ADMIN — LIDOCAINE HYDROCHLORIDE 75 MG: 20 INJECTION, SOLUTION INTRAVENOUS at 10:09

## 2023-01-01 RX ADMIN — LIDOCAINE 1 PATCH: 50 PATCH CUTANEOUS at 09:09

## 2023-01-01 RX ADMIN — Medication 6 MG: at 07:09

## 2023-01-01 RX ADMIN — CARVEDILOL 25 MG: 12.5 TABLET, FILM COATED ORAL at 10:10

## 2023-01-01 RX ADMIN — HYDROMORPHONE HYDROCHLORIDE 2 MG: 2 TABLET ORAL at 10:10

## 2023-01-01 RX ADMIN — ATORVASTATIN CALCIUM 40 MG: 20 TABLET, FILM COATED ORAL at 10:10

## 2023-01-01 RX ADMIN — HYDROMORPHONE HYDROCHLORIDE 2 MG: 2 TABLET ORAL at 11:10

## 2023-01-01 RX ADMIN — CALCITRIOL CAPSULES 0.25 MCG 0.25 MCG: 0.25 CAPSULE ORAL at 05:10

## 2023-01-01 RX ADMIN — EPOETIN ALFA-EPBX 6100 UNITS: 10000 INJECTION, SOLUTION INTRAVENOUS; SUBCUTANEOUS at 02:10

## 2023-01-01 RX ADMIN — HYDROCODONE BITARTRATE AND ACETAMINOPHEN 1 TABLET: 5; 325 TABLET ORAL at 10:09

## 2023-01-01 RX ADMIN — CALCITRIOL CAPSULES 0.25 MCG 0.25 MCG: 0.25 CAPSULE ORAL at 12:10

## 2023-01-01 RX ADMIN — MUPIROCIN: 20 OINTMENT TOPICAL at 09:09

## 2023-01-01 RX ADMIN — FENTANYL CITRATE 50 MCG: 50 INJECTION, SOLUTION INTRAMUSCULAR; INTRAVENOUS at 12:09

## 2023-01-01 RX ADMIN — HYDROMORPHONE HYDROCHLORIDE 4 MG: 2 TABLET ORAL at 12:10

## 2023-01-01 RX ADMIN — POLYETHYLENE GLYCOL 3350 17 G: 17 POWDER, FOR SOLUTION ORAL at 08:10

## 2023-01-01 RX ADMIN — CARVEDILOL 12.5 MG: 12.5 TABLET, FILM COATED ORAL at 09:10

## 2023-01-01 RX ADMIN — SEVELAMER CARBONATE 1600 MG: 800 TABLET, FILM COATED ORAL at 10:09

## 2023-01-01 RX ADMIN — HYDROMORPHONE HYDROCHLORIDE 2 MG: 2 TABLET ORAL at 01:10

## 2023-01-01 RX ADMIN — HYDROMORPHONE HYDROCHLORIDE 2 MG: 2 TABLET ORAL at 02:10

## 2023-01-01 RX ADMIN — CALCITRIOL CAPSULES 0.25 MCG 0.25 MCG: 0.25 CAPSULE ORAL at 06:09

## 2023-01-01 RX ADMIN — LOPERAMIDE HYDROCHLORIDE 2 MG: 2 CAPSULE ORAL at 05:09

## 2023-01-01 RX ADMIN — OXYCODONE HYDROCHLORIDE AND ACETAMINOPHEN 500 MG: 500 TABLET ORAL at 09:10

## 2023-01-01 RX ADMIN — SEVELAMER CARBONATE 800 MG: 800 TABLET, FILM COATED ORAL at 12:09

## 2023-01-01 RX ADMIN — ASPIRIN 81 MG: 81 TABLET, COATED ORAL at 06:09

## 2023-01-01 RX ADMIN — Medication 24 G: at 02:09

## 2023-01-01 RX ADMIN — NIFEDIPINE 30 MG: 30 TABLET, FILM COATED, EXTENDED RELEASE ORAL at 09:09

## 2023-01-01 RX ADMIN — ONDANSETRON 4 MG: 2 INJECTION INTRAMUSCULAR; INTRAVENOUS at 05:09

## 2023-01-01 RX ADMIN — SEVELAMER CARBONATE 1600 MG: 800 TABLET, FILM COATED ORAL at 08:09

## 2023-01-01 RX ADMIN — OXYCODONE HYDROCHLORIDE 5 MG: 5 TABLET ORAL at 08:09

## 2023-01-01 RX ADMIN — NIFEDIPINE 30 MG: 30 TABLET, FILM COATED, EXTENDED RELEASE ORAL at 08:09

## 2023-01-01 RX ADMIN — HEPARIN SODIUM 5000 UNITS: 5000 INJECTION INTRAVENOUS; SUBCUTANEOUS at 01:09

## 2023-01-01 RX ADMIN — PHENYLEPHRINE HYDROCHLORIDE 0.1 MCG/KG/MIN: 10 INJECTION INTRAVENOUS at 10:09

## 2023-01-01 RX ADMIN — CLOPIDOGREL BISULFATE 75 MG: 75 TABLET ORAL at 06:09

## 2023-01-01 RX ADMIN — CLOPIDOGREL BISULFATE 75 MG: 75 TABLET ORAL at 02:09

## 2023-01-01 RX ADMIN — CLOPIDOGREL BISULFATE 75 MG: 75 TABLET ORAL at 10:10

## 2023-01-01 RX ADMIN — SIMETHICONE 80 MG: 80 TABLET, CHEWABLE ORAL at 06:09

## 2023-01-01 RX ADMIN — OXYCODONE AND ACETAMINOPHEN 1 TABLET: 7.5; 325 TABLET ORAL at 01:09

## 2023-01-01 RX ADMIN — MUPIROCIN: 20 OINTMENT TOPICAL at 10:09

## 2023-01-01 RX ADMIN — ACETAMINOPHEN 650 MG: 325 TABLET, FILM COATED ORAL at 04:10

## 2023-01-01 RX ADMIN — PHENYLEPHRINE HYDROCHLORIDE 0.5 MCG/KG/MIN: 10 INJECTION INTRAVENOUS at 12:09

## 2023-01-01 RX ADMIN — OXYCODONE AND ACETAMINOPHEN 1 TABLET: 7.5; 325 TABLET ORAL at 09:10

## 2023-01-01 RX ADMIN — VANCOMYCIN HYDROCHLORIDE 1500 MG: 1.5 INJECTION, POWDER, LYOPHILIZED, FOR SOLUTION INTRAVENOUS at 05:09

## 2023-01-01 RX ADMIN — LOSARTAN POTASSIUM 50 MG: 50 TABLET, FILM COATED ORAL at 03:09

## 2023-01-01 RX ADMIN — OXYCODONE HYDROCHLORIDE AND ACETAMINOPHEN 500 MG: 500 TABLET ORAL at 10:10

## 2023-01-01 RX ADMIN — ONDANSETRON 4 MG: 2 INJECTION INTRAMUSCULAR; INTRAVENOUS at 10:09

## 2023-01-01 RX ADMIN — HYDROMORPHONE HYDROCHLORIDE 0.5 MG: 0.5 INJECTION, SOLUTION INTRAMUSCULAR; INTRAVENOUS; SUBCUTANEOUS at 01:09

## 2023-01-01 RX ADMIN — HYDROMORPHONE HYDROCHLORIDE 0.5 MG: 0.5 INJECTION, SOLUTION INTRAMUSCULAR; INTRAVENOUS; SUBCUTANEOUS at 12:09

## 2023-01-01 RX ADMIN — LOSARTAN POTASSIUM 100 MG: 50 TABLET, FILM COATED ORAL at 05:10

## 2023-01-01 RX ADMIN — ASPIRIN 81 MG: 81 TABLET, COATED ORAL at 09:09

## 2023-01-01 RX ADMIN — NIFEDIPINE 60 MG: 30 TABLET, FILM COATED, EXTENDED RELEASE ORAL at 02:09

## 2023-01-01 RX ADMIN — DIPHENHYDRAMINE HYDROCHLORIDE 25 MG: 25 CAPSULE ORAL at 04:09

## 2023-01-01 RX ADMIN — PROPOFOL 50 MG: 10 INJECTION, EMULSION INTRAVENOUS at 12:09

## 2023-01-01 RX ADMIN — LIDOCAINE HYDROCHLORIDE 60 MG: 20 INJECTION, SOLUTION INTRAVENOUS at 12:09

## 2023-01-01 RX ADMIN — HYDROMORPHONE HYDROCHLORIDE 0.5 MG: 0.5 INJECTION, SOLUTION INTRAMUSCULAR; INTRAVENOUS; SUBCUTANEOUS at 09:09

## 2023-01-01 RX ADMIN — ONDANSETRON HYDROCHLORIDE 4 MG: 2 INJECTION INTRAMUSCULAR; INTRAVENOUS at 01:09

## 2023-01-01 RX ADMIN — CALCIUM ACETATE 2001 MG: 667 CAPSULE ORAL at 08:09

## 2023-01-01 RX ADMIN — EPHEDRINE SULFATE 10 MG: 50 INJECTION INTRAVENOUS at 01:09

## 2023-01-01 RX ADMIN — FENTANYL CITRATE 25 MCG: 50 INJECTION, SOLUTION INTRAMUSCULAR; INTRAVENOUS at 02:09

## 2023-01-01 RX ADMIN — HYDROMORPHONE HYDROCHLORIDE 0.5 MG: 0.5 INJECTION, SOLUTION INTRAMUSCULAR; INTRAVENOUS; SUBCUTANEOUS at 05:09

## 2023-01-01 RX ADMIN — CLOPIDOGREL BISULFATE 75 MG: 75 TABLET ORAL at 12:10

## 2023-01-01 RX ADMIN — EPOETIN ALFA-EPBX 6100 UNITS: 10000 INJECTION, SOLUTION INTRAVENOUS; SUBCUTANEOUS at 11:10

## 2023-01-01 RX ADMIN — CALCIUM CARBONATE 500 MG: 500 TABLET, CHEWABLE ORAL at 10:09

## 2023-01-01 RX ADMIN — NEPHROCAP 1 CAPSULE: 1 CAP ORAL at 10:09

## 2023-01-01 RX ADMIN — OXYCODONE AND ACETAMINOPHEN 1 TABLET: 7.5; 325 TABLET ORAL at 02:09

## 2023-01-01 RX ADMIN — CARVEDILOL 3.12 MG: 3.12 TABLET, FILM COATED ORAL at 09:09

## 2023-01-01 RX ADMIN — SEVELAMER CARBONATE 1600 MG: 800 TABLET, FILM COATED ORAL at 09:09

## 2023-01-01 RX ADMIN — ONDANSETRON 4 MG: 2 INJECTION INTRAMUSCULAR; INTRAVENOUS at 01:10

## 2023-01-01 RX ADMIN — ASPIRIN 81 MG: 81 TABLET, COATED ORAL at 05:10

## 2023-01-01 RX ADMIN — Medication 16 G: at 08:09

## 2023-01-01 RX ADMIN — LIDOCAINE 1 PATCH: 50 PATCH CUTANEOUS at 02:09

## 2023-01-01 RX ADMIN — ONDANSETRON HYDROCHLORIDE 4 MG: 2 INJECTION INTRAMUSCULAR; INTRAVENOUS at 12:09

## 2023-01-01 RX ADMIN — PROMETHAZINE HYDROCHLORIDE 25 MG: 25 INJECTION INTRAMUSCULAR; INTRAVENOUS at 11:09

## 2023-01-01 RX ADMIN — NIFEDIPINE 30 MG: 30 TABLET, FILM COATED, EXTENDED RELEASE ORAL at 02:09

## 2023-01-01 RX ADMIN — LUBIPROSTONE 24 MCG: 24 CAPSULE, GELATIN COATED ORAL at 05:10

## 2023-01-01 RX ADMIN — HYDRALAZINE HYDROCHLORIDE 10 MG: 20 INJECTION INTRAMUSCULAR; INTRAVENOUS at 04:09

## 2023-01-01 RX ADMIN — NEPHROCAP 1 CAPSULE: 1 CAP ORAL at 06:09

## 2023-01-01 RX ADMIN — PHENYLEPHRINE HYDROCHLORIDE 80 MCG: 10 INJECTION INTRAVENOUS at 01:09

## 2023-01-01 RX ADMIN — LIDOCAINE 1 PATCH: 50 PATCH CUTANEOUS at 06:09

## 2023-01-01 RX ADMIN — HYDROMORPHONE HYDROCHLORIDE 0.5 MG: 0.5 INJECTION, SOLUTION INTRAMUSCULAR; INTRAVENOUS; SUBCUTANEOUS at 02:09

## 2023-01-01 RX ADMIN — FENTANYL CITRATE 100 MCG: 50 INJECTION, SOLUTION INTRAMUSCULAR; INTRAVENOUS at 12:09

## 2023-09-17 PROBLEM — G62.9 NEUROPATHY: Chronic | Status: ACTIVE | Noted: 2020-05-07

## 2023-09-17 PROBLEM — I70.262 ATHEROSCLEROSIS OF NATIVE ARTERY OF LEFT LOWER EXTREMITY WITH GANGRENE: Status: ACTIVE | Noted: 2023-01-01

## 2023-09-17 PROBLEM — I25.10 CORONARY ARTERY DISEASE INVOLVING NATIVE CORONARY ARTERY OF NATIVE HEART WITHOUT ANGINA PECTORIS: Chronic | Status: ACTIVE | Noted: 2019-01-30

## 2023-09-17 PROBLEM — E78.5 HLD (HYPERLIPIDEMIA): Status: ACTIVE | Noted: 2019-01-30

## 2023-09-17 PROBLEM — I10 ESSENTIAL (PRIMARY) HYPERTENSION: Status: ACTIVE | Noted: 2023-01-01

## 2023-09-17 PROBLEM — I73.9 PERIPHERAL VASCULAR DISEASE: Chronic | Status: ACTIVE | Noted: 2023-01-01

## 2023-09-17 PROBLEM — I25.10 CORONARY ARTERY DISEASE INVOLVING NATIVE CORONARY ARTERY OF NATIVE HEART WITHOUT ANGINA PECTORIS: Status: ACTIVE | Noted: 2019-01-30

## 2023-09-17 PROBLEM — E11.9 TYPE 2 DIABETES MELLITUS: Status: ACTIVE | Noted: 2023-01-01

## 2023-09-17 PROBLEM — Z99.2 TYPE 2 DIABETES MELLITUS WITH CHRONIC KIDNEY DISEASE ON CHRONIC DIALYSIS: Chronic | Status: ACTIVE | Noted: 2023-01-01

## 2023-09-17 PROBLEM — N18.6 ANEMIA DUE TO END STAGE RENAL DISEASE: Status: ACTIVE | Noted: 2019-01-30

## 2023-09-17 PROBLEM — N25.81 SECONDARY HYPERPARATHYROIDISM OF RENAL ORIGIN: Status: ACTIVE | Noted: 2019-01-30

## 2023-09-17 PROBLEM — I70.262 ATHEROSCLEROSIS OF NATIVE ARTERY OF LEFT LOWER EXTREMITY WITH GANGRENE: Chronic | Status: ACTIVE | Noted: 2023-01-01

## 2023-09-17 PROBLEM — G62.9 NEUROPATHY: Status: ACTIVE | Noted: 2020-05-07

## 2023-09-17 PROBLEM — N18.6 TYPE 2 DIABETES MELLITUS WITH CHRONIC KIDNEY DISEASE ON CHRONIC DIALYSIS: Chronic | Status: ACTIVE | Noted: 2023-01-01

## 2023-09-17 PROBLEM — E11.22 TYPE 2 DIABETES MELLITUS WITH CHRONIC KIDNEY DISEASE ON CHRONIC DIALYSIS: Chronic | Status: ACTIVE | Noted: 2023-01-01

## 2023-09-17 PROBLEM — D63.1 ANEMIA DUE TO END STAGE RENAL DISEASE: Status: ACTIVE | Noted: 2019-01-30

## 2023-09-17 PROBLEM — N18.6 ESRD (END STAGE RENAL DISEASE): Chronic | Status: ACTIVE | Noted: 2019-01-30

## 2023-09-17 PROBLEM — I73.9 PERIPHERAL VASCULAR DISEASE: Status: ACTIVE | Noted: 2023-01-01

## 2023-09-17 PROBLEM — N18.6 ESRD (END STAGE RENAL DISEASE): Status: ACTIVE | Noted: 2019-01-30

## 2023-09-17 PROBLEM — N25.81 SECONDARY HYPERPARATHYROIDISM OF RENAL ORIGIN: Chronic | Status: ACTIVE | Noted: 2019-01-30

## 2023-09-17 PROBLEM — I96 GANGRENE OF TOE OF LEFT FOOT: Status: ACTIVE | Noted: 2023-01-01

## 2023-09-17 PROBLEM — E78.5 HLD (HYPERLIPIDEMIA): Chronic | Status: ACTIVE | Noted: 2019-01-30

## 2023-09-17 PROBLEM — I10 ESSENTIAL HYPERTENSION: Chronic | Status: ACTIVE | Noted: 2023-01-01

## 2023-09-17 PROBLEM — D63.1 ANEMIA DUE TO END STAGE RENAL DISEASE: Chronic | Status: ACTIVE | Noted: 2019-01-30

## 2023-09-17 PROBLEM — N18.6 ANEMIA DUE TO END STAGE RENAL DISEASE: Chronic | Status: ACTIVE | Noted: 2019-01-30

## 2023-09-17 NOTE — PLAN OF CARE
Visiting [place] from [place] - previous plans were to drive home today - lives with daughter & her family - they assist as needed with ADLs - HD MWF    [facility] - Emergency Dept  Initial Discharge Assessment       Primary Care Provider: Keegan Carr (unable to find in [facility])    Admission Diagnosis: Gangrene of toe [I96]    Admission Date: 9/17/2023  Expected Discharge Date:     Transition of Care Barriers: None    Payor: GENERIC MEDICARE ADVANTAGE / Plan: MEDICARE ADVANTAGE HMO GENERIC / Product Type: Medicare Advantage /     Extended Emergency Contact Information  Primary Emergency Contact: Stephanie Gilmore  Mobile Phone: [phone]  Relation: Daughter  Preferred language: English   needed? No  Secondary Emergency Contact: Gabbie Arguelles  Mobile Phone: [phone]  Relation: Sister  Preferred language: English   needed? No    Discharge Plan A: Home with family  Discharge Plan B: Home Health      [id number] - [place], [place] - [address] [facility]  [address] [place] [address]  Phone: [phone] Fax: [phone]      Initial Assessment (most recent)       Adult Discharge Assessment - 09/17/23 1431          Discharge Assessment    Assessment Type Discharge Planning Assessment     Confirmed/corrected address, phone number and insurance Yes     Confirmed Demographics Correct on Facesheet     Source of Information family     People in Home child(jamal), adult;grandchild(jamal)     Do you expect to return to your current living situation? Yes     Do you have help at home or someone to help you manage your care at home? Yes     Prior to hospitilization cognitive status: Alert/Oriented     Current cognitive status: Alert/Oriented     Walking or Climbing Stairs ambulation difficulty, requires equipment     Dressing/Bathing bathing difficulty, requires equipment     Equipment Currently Used at Home cane, straight;rollator;shower chair     Readmission within 30 days? No      Patient currently being followed by outpatient case management? No     Do you currently have service(s) that help you manage your care at home? No     Do you take prescription medications? Yes     Do you have prescription coverage? Yes     Do you have any problems affording any of your prescribed medications? No     Is the patient taking medications as prescribed? yes     Who is going to help you get home at discharge? daughter     How do you get to doctors appointments? family or friend will provide;car, drives self     Are you on dialysis? Yes     Dialysis Name and Scheduled days [facility]     DME Needed Upon Discharge  --   TBD    Discharge Plan discussed with: Adult children     Transition of Care Barriers None     Discharge Plan A Home with family     Discharge Plan B Home Health        Physical Activity    On average, how many days per week do you engage in moderate to strenuous exercise (like a brisk walk)? 0 days     On average, how many minutes do you engage in exercise at this level? 0 min        Financial Resource Strain    How hard is it for you to pay for the very basics like food, housing, medical care, and heating? Not hard at all        Housing Stability    In the last 12 months, was there a time when you were not able to pay the mortgage or rent on time? No     In the last 12 months, how many places have you lived? 1     In the last 12 months, was there a time when you did not have a steady place to sleep or slept in a shelter (including now)? No        Transportation Needs    In the past 12 months, has lack of transportation kept you from medical appointments or from getting medications? No     In the past 12 months, has lack of transportation kept you from meetings, work, or from getting things needed for daily living? No        Food Insecurity    Within the past 12 months, you worried that your food would run out before you got the money to buy more. Never true     Within the past  12 months, the food you bought just didn't last and you didn't have money to get more. Never true        Stress    Do you feel stress - tense, restless, nervous, or anxious, or unable to sleep at night because your mind is troubled all the time - these days? Very much        Social Connections    In a typical week, how many times do you talk on the phone with family, friends, or neighbors? Twice a week     How often do you get together with friends or relatives? Never     How often do you attend Amish or Congregation services? 1 to 4 times per year     Do you belong to any clubs or organizations such as Amish groups, unions, fraternal or athletic groups, or school groups? No     How often do you attend meetings of the clubs or organizations you belong to? Never     Are you , , , , never , or living with a partner?         Alcohol Use    Q1: How often do you have a drink containing alcohol? Never     Q2: How many drinks containing alcohol do you have on a typical day when you are drinking? Patient does not drink     Q3: How often do you have six or more drinks on one occasion? Never

## 2023-09-17 NOTE — PROGRESS NOTES
"Pharmacokinetic Initial Assessment: IV Vancomycin    Assessment/Plan:    Initiate intravenous vancomycin with loading dose of 1500 mg once with subsequent doses when random concentrations are less than 20 mcg/mL  Desired empiric serum trough concentration is 10 to 20 mcg/mL  Draw vancomycin random level on 9/18 at 0430.  Pharmacy will continue to follow and monitor vancomycin.      Please contact pharmacy at extension [phone] with any questions regarding this assessment.     Thank you for the consult,   Ric Garner       Patient brief summary:  Shannan Figueredo is a 67 y.o. female initiated on antimicrobial therapy with IV Vancomycin for treatment of suspected bone/joint infection    Drug Allergies:   Review of patient's allergies indicates:  No Known Allergies    Actual Body Weight:   61.2 kg    Renal Function:   CrCl cannot be calculated (Unknown ideal weight.).,     Dialysis Method (if applicable):  intermittent HD - No known schedule yet. Nephro consulted     CBC (last 72 hours):  Recent Labs   Lab Result Units 09/17/23  0916   WBC K/uL 10.30   Hemoglobin g/dL 11.7*   Hematocrit % 36.5*   Platelets K/uL 253   Gran % % 79.1*   Lymph % % 8.4*   Mono % % 9.8   Eosinophil % % 1.3   Basophil % % 0.7   Differential Method  Automated       Metabolic Panel (last 72 hours):  Recent Labs   Lab Result Units 09/17/23  0916   Sodium mmol/L 138   Potassium mmol/L 4.0   Chloride mmol/L 97   CO2 mmol/L 24   Glucose mg/dL 135*   BUN mg/dL 40*   Creatinine mg/dL 10.9*   Albumin g/dL 3.5   Total Bilirubin mg/dL 1.0   Alkaline Phosphatase U/L 84   AST U/L 20   ALT U/L 8*       Drug levels (last 3 results):  No results for input(s): "VANCOMYCINRA", "VANCORANDOM", "VANCOMYCINPE", "VANCOPEAK", "VANCOMYCINTR", "VANCOTROUGH" in the last 72 hours.    Microbiologic Results:  Microbiology Results (last 7 days)       Procedure Component Value Units Date/Time    Blood Culture #2 **CANNOT BE ORDERED STAT** [[id number]] Collected: 09/17/23 " 1338    Order Status: Sent Specimen: Blood from Antecubital, Left Arm Updated: 09/17/23 1338    Blood Culture #1 **CANNOT BE ORDERED STAT** [[id number]] Collected: 09/17/23 1337    Order Status: Sent Specimen: Blood Updated: 09/17/23 1338

## 2023-09-17 NOTE — SUBJECTIVE & OBJECTIVE
Past Medical History:   Diagnosis Date    Coronary artery disease     Diabetes mellitus, type II     End stage renal disease     Hyperlipidemia     Hypertension     Peripheral vascular disease        Past Surgical History:   Procedure Laterality Date    CAROTID ENDARTERECTOMY Left     HYSTERECTOMY         Review of patient's allergies indicates:  No Known Allergies    No current facility-administered medications on file prior to encounter.     Current Outpatient Medications on File Prior to Encounter   Medication Sig    allopurinoL (ZYLOPRIM) 300 MG tablet Take 300 mg by mouth once daily.    calcium acetate,phosphat bind, (PHOSLO) 667 mg capsule Take 2,001 mg by mouth 3 (three) times daily with meals.    HYDROcodone-acetaminophen (NORCO) 5-325 mg per tablet Take 1 tablet by mouth every 6 to 8 hours as needed for Pain.    labetaloL (NORMODYNE) 300 MG tablet Take 300 mg by mouth 2 (two) times daily.    losartan (COZAAR) 50 MG tablet Take 75 mg by mouth once daily.    NIFEdipine (ADALAT CC) 30 MG TbSR Take 30 mg by mouth once daily.    sulfamethoxazole-trimethoprim 800-160mg (BACTRIM DS) 800-160 mg Tab Take 1 tablet by mouth 2 (two) times daily.    [DISCONTINUED] HYDROCODONE-ACETAMINOPHEN ORAL Take by mouth as needed (Pain).    brimonidine 0.2% (ALPHAGAN) 0.2 % Drop Place 1 drop into both eyes 2 (two) times a day.    timoloL (BETIMOL) 0.5 % ophthalmic solution Place 1 drop into both eyes 2 (two) times daily.     Family History       Problem Relation (Age of Onset)    Diabetes Mother    Kidney disease Mother          Tobacco Use    Smoking status: Never    Smokeless tobacco: Never   Substance and Sexual Activity    Alcohol use: Never    Drug use: Never    Sexual activity: Not on file     Review of Systems   Constitutional:  Positive for activity change, appetite change and fatigue. Negative for chills and fever.   HENT:  Negative for sore throat and trouble swallowing.    Eyes:  Negative for pain and visual disturbance.    Respiratory:  Positive for cough. Negative for shortness of breath.    Cardiovascular:  Negative for chest pain and palpitations.   Gastrointestinal:  Positive for nausea and vomiting. Negative for abdominal pain.   Genitourinary:         Reports no longer makes urine with ESRD.   Musculoskeletal:  Negative for arthralgias and myalgias.   Skin:  Positive for color change and wound. Negative for rash.   Neurological:  Negative for weakness and numbness.     Objective:     Vital Signs (Most Recent):  Temp: 98.1 °F (36.7 °C) (09/17/23 1348)  Pulse: 68 (09/17/23 1212)  Resp: 18 (09/17/23 1212)  BP: (!) 194/84 (09/17/23 1206)  SpO2: 97 % (09/17/23 1212) Vital Signs (24h Range):  Temp:  [98.1 °F (36.7 °C)] 98.1 °F (36.7 °C)  Pulse:  [60-69] 68  Resp:  [16-19] 18  SpO2:  [94 %-98 %] 97 %  BP: (162-194)/(74-84) 194/84     Weight: 61.2 kg (135 lb)  There is no height or weight on file to calculate BMI.     Physical Exam  Vitals and nursing note reviewed.   Constitutional:       General: She is not in acute distress.     Appearance: She is well-developed.   HENT:      Head: Normocephalic and atraumatic.   Eyes:      General:         Right eye: No discharge.         Left eye: No discharge.      Conjunctiva/sclera: Conjunctivae normal.   Cardiovascular:      Rate and Rhythm: Normal rate.      Comments: Diminished BLE pulses.  Pulmonary:      Effort: Pulmonary effort is normal. No respiratory distress.      Comments: Mildly diminished at bases.  Abdominal:      Palpations: Abdomen is soft.      Tenderness: There is no abdominal tenderness.   Musculoskeletal:         General: Normal range of motion.      Right lower leg: No edema.      Left lower leg: No edema.      Comments: RUE fistula with thrill.   Skin:     Comments: LLE cool to touch and dry. L first toe with gangrenous changes with surrounding erythema, no noted purulence or drainage. L 2nd toe with absent nail.   Neurological:      Mental Status: She is oriented to  person, place, and time and easily aroused. She is lethargic.       Significant Labs:   CBC:  Recent Labs   Lab 09/17/23  0916   WBC 10.30   HGB 11.7*   HCT 36.5*      GRAN 79.1*  8.2*   LYMPH 8.4*  0.9*   MONO 9.8  1.0   EOS 0.1   BASO 0.07   CMP:  Recent Labs   Lab 09/17/23  0916      K 4.0   CL 97   CO2 24   BUN 40*   CREATININE 10.9*   *   CALCIUM 9.4   ALKPHOS 84   AST 20   ALT 8*   BILITOT 1.0   PROT 7.2   ALBUMIN 3.5   ANIONGAP 17*     Recent Labs   Lab 09/17/23  0916 09/17/23  0931   LACTATE  --  1.2   SEDRATE 60*  --    CRP 26.4*  --      Significant Imaging: I have reviewed and interpreted all pertinent imaging results/findings within the past 24 hours.  XR FOOT COMPLETE 3 VIEW LEFT     CLINICAL HISTORY:  Local infection of the skin and subcutaneous tissue, unspecified     TECHNIQUE:  AP, lateral and oblique views of the left foot were performed.     COMPARISON:  None     FINDINGS:  Diffuse osteopenia.  This could diminish sensitivity of radiography for detection of osseous abnormalities.     Marked joint space narrowing at the 1st MTP joint with metatarsal head subchondral cyst formation.  These changes are favored to be related to chronic longstanding arthritis.  Acute septic arthritis is considered less likely.     Mild soft tissue swelling in the forefoot, and possibly in the left great toe.  No acute fracture deformity or dislocation is apparent radiographically.     Some soft tissue swelling is also suggested about the calcaneus.  An external dressing is questioned in this region.  No underlying radiopaque foreign body or soft tissue emphysema is apparent radiographically.  No definite osseous destruction of the calcaneus is apparent radiographically     Extensive small vessel calcifications in the left lower leg and left foot.     On the lateral view, a nodular calcification projecting plantar to the calcaneus is likely related to a tortuous calcified blood vessel.      Impression:     Soft tissue swelling about the calcaneus, and in the forefoot.     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     Otherwise, and allowing for the diffuse osteopenia, there is no acute fracture deformity or direct radiographic evidence of osteomyelitis at this time.     If there remains strong clinical suspicion for osteomyelitis, follow-up imaging would be recommended.        Electronically signed by: Vinny Ramos  Date:                                            09/17/2023  Time:                                           10:53    XR CHEST AP PORTABLE     CLINICAL HISTORY:  End stage renal disease     TECHNIQUE:  Single frontal view of the chest was performed.     COMPARISON:  None     FINDINGS:  The cardiomediastinal silhouette is prominent.  There is obscuration of the right costophrenic angle suggesting effusion.  The trachea is midline.  The lungs are symmetrically expanded bilaterally with prominent interstitial attenuation bilaterally.  There is increased parenchymal attenuation projected over the right lower lung zone..  There is no pneumothorax.  The osseous structures are remarkable for degenerative changes.  Vascular stent noted within the right arm..     Impression:     1. Pulmonary findings suggest edema noting small right pleural effusion in the setting of volume overload.  The process is more focal projected over the medial aspect of the right lower lung zone, developing consolidation not excluded.        Electronically signed by: Wenceslao Anderson MD  Date:                                            09/17/2023  Time:                                           12:57

## 2023-09-17 NOTE — ASSESSMENT & PLAN NOTE
HTN, PVD  - L 1st toe gangrene predominantly dry with some surrounding erythema/swelling; with concern for worsening infection started piperacillin-tazobactam and vancomycin IV. Continue. No evidence of sepsis at this time.  - Check U/S arterial BLE to evaluate extent of / for critical stenosis that may require more urgent intervention.  - Podiatry, surgery consults. Dependent on U/S results anticipate she may require CTA / interventional angiography vs. surgical intervention.

## 2023-09-17 NOTE — NURSING TRANSFER
Nursing Transfer Note      9/17/2023   3:56 PM    Nurse giving handoff:CHESTER Riggs  Nurse receiving handoff:CHESTER Patel    Reason patient is being transferred: further eval    Transfer From: ED    Transfer via stretcher    Transfer with neither    Transported by transport    Order for Tele Monitor? No    Additional Lines: neither    4eyes on Skin: yes    Medicines sent: premixed vanc, nurse will not use    Any special needs or follow-up needed: no    Patient belongings transferred with patient: Yes    Chart send with patient: Yes    Notified: family at bedside    Patient reassessed at: 9/17/2023    Upon arrival to floor: patient oriented to room, call bell in reach, and bed in lowest position        Patient arrived to unit, stool up patient back, pericare completed, new linen/gown applied. Vitals completed, premixed vanc sent with patient that was due at 1245, will not use. Charge nurse aware. Will continue to follow up

## 2023-09-17 NOTE — ED NOTES
Pt resting quietly on stretcher with eyes closed; opens eyes to verbal stimuli.  Pt remains on continuous cardiac and pulse ox monitoring with non-invasive blood pressure to cycle every 30 minutes. Pt remains hypertensive; VS otherwise stable; NSR noted. Pt reporting significant relief in nausea since last dose of medication; denies pain at this time; no acute distress or discomfort reported or observed.  Pt denies restroom needs at this time; is able to reposition self on stretcher. Bed locked in lowest position; side rails up and locked x 2; call light, bedside table, and personal belongings within reach. Room assessed for safety measures and cleanliness; no action needed at this time. Pt updated on plan for admission; awaiting bed assignment. Pt instructed to alert nurse for assistance and before attempting to get out of bed; verbalizes understanding. Pt denies needs or complaints at this time; will continue to monitor.

## 2023-09-17 NOTE — H&P
"[facility] Medicine  History & Physical    Patient Name: Shannan Figueredo  MRN: [id number]  Patient Class: IP- Inpatient  Admission Date: 9/17/2023  Attending Physician: TANESHA Cristina MD  Primary Care Provider: Roxi, Primary Doctor         Patient information was obtained from patient, past medical records, ER records and daughter.     Subjective:     Principal Problem:Gangrene of toe of left foot    Chief Complaint:   Chief Complaint   Patient presents with    Vomiting     Vomiting and fatigue x 1 day.         HPI: Ms. Figueredo is a 67/F with PMH HTN, DMII (diet-controlled), PVD, ESRD on HD (M/W/F), anemia who presented to [facility] 09/17 with a two day progressive history of worsening L foot pain, swelling, fatigue, nausea and vomiting. History obtained from patient and daughter Stephanie ([phone]); they are from the [place], [place] area. Patient reports several weeks ago she "stubbed her toe," noting initially some pain, redness and swelling. Gradually symptoms progressed with duskiness to her toe and she informed her daughter of her pain for which she was brought to ER in [place] on 08/25 for further evaluation. With duskiness to 1st toe and surrounding erythema she was treated for cellulitis with a course of ciprofloxacin and metronidazole, but failed to improve. She followed up with her podiatrist 09/12 where she was prescribed hydrocodone-acetaminophen and a ten day course of TMP-SMX which she has been taking. She was referred to vascular surgery with plan for angiography and potential intervention  She and her family came to [place] for the weekend but her pain worsened; had been attempting to minimize use of hydrocodone-acetaminophen but developed nausea, vomiting and progressive fatigue. She subsequently presented to ED for further evaluation. ED workup was notable for L 1st toe dry-appearing gangrene with surrounding erythema and swelling, removed nail of second toe, XR L " foot demonstrating diffuse osteopenia, L calcaneal and forefoot soft tissue swelling without definitive evidence of osteomyelitis in setting of osteopenia, no leukocytosis, elevated sed rate and CRP, and elevated BUN/Cr in setting of ESRD. She notes she went without dialysis on 09/15 due to her trip. Blood cultures were ordered and she received piperacillin-tazobactam and vancomycin. Hospital medicine was subsequently contacted for admission.      Past Medical History:   Diagnosis Date    Coronary artery disease     Diabetes mellitus, type II     End stage renal disease     Hyperlipidemia     Hypertension     Peripheral vascular disease        Past Surgical History:   Procedure Laterality Date    CAROTID ENDARTERECTOMY Left     HYSTERECTOMY         Review of patient's allergies indicates:  No Known Allergies    No current facility-administered medications on file prior to encounter.     Current Outpatient Medications on File Prior to Encounter   Medication Sig    allopurinoL (ZYLOPRIM) 300 MG tablet Take 300 mg by mouth once daily.    calcium acetate,phosphat bind, (PHOSLO) 667 mg capsule Take 2,001 mg by mouth 3 (three) times daily with meals.    HYDROcodone-acetaminophen (NORCO) 5-325 mg per tablet Take 1 tablet by mouth every 6 to 8 hours as needed for Pain.    labetaloL (NORMODYNE) 300 MG tablet Take 300 mg by mouth 2 (two) times daily.    losartan (COZAAR) 50 MG tablet Take 75 mg by mouth once daily.    NIFEdipine (ADALAT CC) 30 MG TbSR Take 30 mg by mouth once daily.    sulfamethoxazole-trimethoprim 800-160mg (BACTRIM DS) 800-160 mg Tab Take 1 tablet by mouth 2 (two) times daily.    [DISCONTINUED] HYDROCODONE-ACETAMINOPHEN ORAL Take by mouth as needed (Pain).    brimonidine 0.2% (ALPHAGAN) 0.2 % Drop Place 1 drop into both eyes 2 (two) times a day.    timoloL (BETIMOL) 0.5 % ophthalmic solution Place 1 drop into both eyes 2 (two) times daily.     Family History       Problem Relation (Age of  Onset)    Diabetes Mother    Kidney disease Mother          Tobacco Use    Smoking status: Never    Smokeless tobacco: Never   Substance and Sexual Activity    Alcohol use: Never    Drug use: Never    Sexual activity: Not on file     Review of Systems   Constitutional:  Positive for activity change, appetite change and fatigue. Negative for chills and fever.   HENT:  Negative for sore throat and trouble swallowing.    Eyes:  Negative for pain and visual disturbance.   Respiratory:  Positive for cough. Negative for shortness of breath.    Cardiovascular:  Negative for chest pain and palpitations.   Gastrointestinal:  Positive for nausea and vomiting. Negative for abdominal pain.   Genitourinary:         Reports no longer makes urine with ESRD.   Musculoskeletal:  Negative for arthralgias and myalgias.   Skin:  Positive for color change and wound. Negative for rash.   Neurological:  Negative for weakness and numbness.     Objective:     Vital Signs (Most Recent):  Temp: 98.1 °F (36.7 °C) (09/17/23 1348)  Pulse: 68 (09/17/23 1212)  Resp: 18 (09/17/23 1212)  BP: (!) 194/84 (09/17/23 1206)  SpO2: 97 % (09/17/23 1212) Vital Signs (24h Range):  Temp:  [98.1 °F (36.7 °C)] 98.1 °F (36.7 °C)  Pulse:  [60-69] 68  Resp:  [16-19] 18  SpO2:  [94 %-98 %] 97 %  BP: (162-194)/(74-84) 194/84     Weight: 61.2 kg (135 lb)  There is no height or weight on file to calculate BMI.     Physical Exam  Vitals and nursing note reviewed.   Constitutional:       General: She is not in acute distress.     Appearance: She is well-developed.   HENT:      Head: Normocephalic and atraumatic.   Eyes:      General:         Right eye: No discharge.         Left eye: No discharge.      Conjunctiva/sclera: Conjunctivae normal.   Cardiovascular:      Rate and Rhythm: Normal rate.      Comments: Diminished BLE pulses.  Pulmonary:      Effort: Pulmonary effort is normal. No respiratory distress.      Comments: Mildly diminished at bases.  Abdominal:       Palpations: Abdomen is soft.      Tenderness: There is no abdominal tenderness.   Musculoskeletal:         General: Normal range of motion.      Right lower leg: No edema.      Left lower leg: No edema.      Comments: RUE fistula with thrill.   Skin:     Comments: LLE cool to touch and dry. L first toe with gangrenous changes with surrounding erythema, no noted purulence or drainage. L 2nd toe with absent nail.   Neurological:      Mental Status: She is oriented to person, place, and time and easily aroused. She is lethargic.       Significant Labs:   CBC:  Recent Labs   Lab 09/17/23  0916   WBC 10.30   HGB 11.7*   HCT 36.5*      GRAN 79.1*  8.2*   LYMPH 8.4*  0.9*   MONO 9.8  1.0   EOS 0.1   BASO 0.07   CMP:  Recent Labs   Lab 09/17/23  0916      K 4.0   CL 97   CO2 24   BUN 40*   CREATININE 10.9*   *   CALCIUM 9.4   ALKPHOS 84   AST 20   ALT 8*   BILITOT 1.0   PROT 7.2   ALBUMIN 3.5   ANIONGAP 17*     Recent Labs   Lab 09/17/23  0916 09/17/23  0931   LACTATE  --  1.2   SEDRATE 60*  --    CRP 26.4*  --      Significant Imaging: I have reviewed and interpreted all pertinent imaging results/findings within the past 24 hours.  XR FOOT COMPLETE 3 VIEW LEFT     CLINICAL HISTORY:  Local infection of the skin and subcutaneous tissue, unspecified     TECHNIQUE:  AP, lateral and oblique views of the left foot were performed.     COMPARISON:  None     FINDINGS:  Diffuse osteopenia.  This could diminish sensitivity of radiography for detection of osseous abnormalities.     Marked joint space narrowing at the 1st MTP joint with metatarsal head subchondral cyst formation.  These changes are favored to be related to chronic longstanding arthritis.  Acute septic arthritis is considered less likely.     Mild soft tissue swelling in the forefoot, and possibly in the left great toe.  No acute fracture deformity or dislocation is apparent radiographically.     Some soft tissue swelling is also suggested about  the calcaneus.  An external dressing is questioned in this region.  No underlying radiopaque foreign body or soft tissue emphysema is apparent radiographically.  No definite osseous destruction of the calcaneus is apparent radiographically     Extensive small vessel calcifications in the left lower leg and left foot.     On the lateral view, a nodular calcification projecting plantar to the calcaneus is likely related to a tortuous calcified blood vessel.     Impression:     Soft tissue swelling about the calcaneus, and in the forefoot.     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     Otherwise, and allowing for the diffuse osteopenia, there is no acute fracture deformity or direct radiographic evidence of osteomyelitis at this time.     If there remains strong clinical suspicion for osteomyelitis, follow-up imaging would be recommended.        Electronically signed by: Vinny Ramos  Date:                                            09/17/2023  Time:                                           10:53    XR CHEST AP PORTABLE     CLINICAL HISTORY:  End stage renal disease     TECHNIQUE:  Single frontal view of the chest was performed.     COMPARISON:  None     FINDINGS:  The cardiomediastinal silhouette is prominent.  There is obscuration of the right costophrenic angle suggesting effusion.  The trachea is midline.  The lungs are symmetrically expanded bilaterally with prominent interstitial attenuation bilaterally.  There is increased parenchymal attenuation projected over the right lower lung zone..  There is no pneumothorax.  The osseous structures are remarkable for degenerative changes.  Vascular stent noted within the right arm..     Impression:     1. Pulmonary findings suggest edema noting small right pleural effusion in the setting of volume overload.  The process is more focal projected over the medial aspect of the right lower lung zone, developing consolidation not excluded.        Electronically  signed by: Wenceslao Anderson MD  Date:                                            09/17/2023  Time:                                           12:57    Assessment/Plan:     * Gangrene of toe of left foot  HTN, PVD  - L 1st toe gangrene predominantly dry with some surrounding erythema/swelling; with concern for worsening infection started piperacillin-tazobactam and vancomycin IV. Continue. No evidence of sepsis at this time.  - Check U/S arterial BLE to evaluate extent of / for critical stenosis that may require more urgent intervention.  - Podiatry, surgery consults. Dependent on U/S results anticipate she may require CTA / interventional angiography vs. surgical intervention.    Type 2 diabetes mellitus with chronic kidney disease on chronic dialysis  - Reports diet-controlled; no current medications.  - Check HgbA1c; start low-dose sliding scale insulin aspart 0-5U subQ TIDWM PRN, monitor requirements.    ESRD (end stage renal disease)  Anemia, hyperparathyroidism  - ESRD on HD, reports missed session Friday prior to presentation due to travel.  - Mild anemia without evidence of bleeding.  - Continue allopurinol 300mg PO daily, calcium acetate 2001mg PO TIDWM.  - Nephrology consult for HD assistance.      VTE Risk Mitigation (From admission, onward)         Ordered     heparin (porcine) injection 5,000 Units  Every 8 hours         09/17/23 1351     IP VTE HIGH RISK PATIENT  Once         09/17/23 1351              TANESHA Cristina MD  Department of Hospital Medicine  [facility] - Emergency Dept

## 2023-09-17 NOTE — ASSESSMENT & PLAN NOTE
Anemia, hyperparathyroidism  - ESRD on HD, reports missed session Friday prior to presentation due to travel.  - Mild anemia without evidence of bleeding.  - Continue allopurinol 300mg PO daily, calcium acetate 2001mg PO TIDWM.  - Nephrology consult for HD assistance.

## 2023-09-17 NOTE — NURSING
Report received from ED nurse Keely. Patient BP currently elevated 180/79. Will continue plan of care and assume care on arrival to unit

## 2023-09-17 NOTE — HPI
"Ms. Figueredo is a 67/F with PMH HTN, DMII (diet-controlled), PVD, ESRD on HD (M/W/F), anemia who presented to [facility] 09/17 with a two day progressive history of worsening L foot pain, swelling, fatigue, nausea and vomiting. History obtained from patient and daughter Stephanie ([phone]); they are from the [place], [place] area. Patient reports several weeks ago she "stubbed her toe," noting initially some pain, redness and swelling. Gradually symptoms progressed with duskiness to her toe and she informed her daughter of her pain for which she was brought to ER in [place] on 08/25 for further evaluation. With duskiness to 1st toe and surrounding erythema she was treated for cellulitis with a course of ciprofloxacin and metronidazole, but failed to improve. She followed up with her podiatrist 09/12 where she was prescribed hydrocodone-acetaminophen and a ten day course of TMP-SMX which she has been taking. She was referred to vascular surgery with plan for angiography and potential intervention  She and her family came to [place] for the weekend but her pain worsened; had been attempting to minimize use of hydrocodone-acetaminophen but developed nausea, vomiting and progressive fatigue. She subsequently presented to ED for further evaluation. ED workup was notable for L 1st toe dry-appearing gangrene with surrounding erythema and swelling, removed nail of second toe, XR L foot demonstrating diffuse osteopenia, L calcaneal and forefoot soft tissue swelling without definitive evidence of osteomyelitis in setting of osteopenia, no leukocytosis, elevated sed rate and CRP, and elevated BUN/Cr in setting of ESRD. She notes she went without dialysis on 09/15 due to her trip. Blood cultures were ordered and she received piperacillin-tazobactam and vancomycin. Hospital medicine was subsequently contacted for admission.  "

## 2023-09-17 NOTE — ED PROVIDER NOTES
Encounter Date: 9/17/2023    SCRIBE #1 NOTE: I, Mary Nava, am scribing for, and in the presence of,  BOBO Alonzo I have scribed the following portions of the note - the EKG reading.       History     Chief Complaint   Patient presents with    Vomiting     Vomiting and fatigue x 1 day.      67-year-old female with IDDM, ESRD on dialysis M/W/F presents with multiple complaints.  She is visiting [place] from her home in [place] over the last 2 days.  She missed dialysis on Friday in order to come on the trip.  At home she is being treated for an infection in her toe by her podiatrist, currently taking antibiotics.  She was on a different antibiotic which was recently changed to Bactrim.  She states over the last 2 days she has had increasing pain and redness in the toe.  She had 2 episodes of vomiting this morning, with several episodes overnight of diarrhea, and family wanted her to be evaluated.        Review of patient's allergies indicates:  No Known Allergies  Past Medical History:   Diagnosis Date    Coronary artery disease     Diabetes mellitus, type II     End stage renal disease     Hyperlipidemia     Hypertension     Peripheral vascular disease      Past Surgical History:   Procedure Laterality Date    CAROTID ENDARTERECTOMY Left     HYSTERECTOMY       Family History   Problem Relation Age of Onset    Diabetes Mother     Kidney disease Mother      Social History     Tobacco Use    Smoking status: Never    Smokeless tobacco: Never   Substance Use Topics    Alcohol use: Never    Drug use: Never     Review of Systems   Constitutional:  Negative for chills and fever.   HENT:  Negative for congestion and sore throat.    Eyes:  Negative for visual disturbance.   Respiratory:  Negative for cough and shortness of breath.    Cardiovascular:  Negative for chest pain and palpitations.   Gastrointestinal:  Positive for diarrhea, nausea and vomiting. Negative for abdominal pain.   Genitourinary:  Negative for  decreased urine volume, dysuria and vaginal discharge.   Musculoskeletal:  Positive for arthralgias (Left great toe). Negative for joint swelling, neck pain and neck stiffness.   Skin:  Positive for wound (Left great toe). Negative for rash.   Neurological:  Negative for weakness, numbness and headaches.   Psychiatric/Behavioral:  Negative for confusion.        Physical Exam     Initial Vitals [09/17/23 0747]   BP Pulse Resp Temp SpO2   (!) 185/76 63 16 98.1 °F (36.7 °C) (!) 94 %      MAP       --         Physical Exam    Nursing note and vitals reviewed.  Constitutional: She appears well-developed and well-nourished. She has a sickly appearance. She appears distressed.   HENT:   Head: Normocephalic and atraumatic.   Mouth/Throat: Oropharynx is clear and moist. No oropharyngeal exudate.   Eyes: Conjunctivae and EOM are normal. Pupils are equal, round, and reactive to light.   Neck: Neck supple.   Cardiovascular:  Normal rate and normal heart sounds.           No murmur heard.  Pulmonary/Chest: Breath sounds normal. No respiratory distress. She has no wheezes. She has no rhonchi. She has no rales.   Abdominal: Abdomen is soft. There is no abdominal tenderness. There is no rebound and no guarding.   Musculoskeletal:         General: No tenderness or edema.      Cervical back: Neck supple.      Comments: Bilateral lower extremities are smooth and hairless, cool to the touch with dopplerable DP/PT pulses.  Left great toe with gangrenous area and erythema extending up the MTP.  Left 2nd toe with mild swelling and absent toenail.  See images below.     Neurological: She is alert and oriented to person, place, and time. She has normal strength. GCS score is 15. GCS eye subscore is 4. GCS verbal subscore is 5. GCS motor subscore is 6.   Skin: Skin is warm and dry. No rash noted.   Psychiatric: She has a normal mood and affect. Thought content normal.               ED Course   Procedures  Labs Reviewed   CBC W/ AUTO  DIFFERENTIAL - Abnormal; Notable for the following components:       Result Value    Hemoglobin 11.7 (*)     Hematocrit 36.5 (*)     RDW 14.7 (*)     Immature Granulocytes 0.7 (*)     Gran # (ANC) 8.2 (*)     Immature Grans (Abs) 0.07 (*)     Lymph # 0.9 (*)     Gran % 79.1 (*)     Lymph % 8.4 (*)     All other components within normal limits    Narrative:     For upper or mid abdominal pain.  Release to patient->Immediate   COMPREHENSIVE METABOLIC PANEL - Abnormal; Notable for the following components:    Glucose 135 (*)     BUN 40 (*)     Creatinine 10.9 (*)     ALT 8 (*)     eGFR 3 (*)     Anion Gap 17 (*)     All other components within normal limits    Narrative:     For upper or mid abdominal pain.  Release to patient->Immediate   C-REACTIVE PROTEIN - Abnormal; Notable for the following components:    CRP 26.4 (*)     All other components within normal limits    Narrative:     For upper or mid abdominal pain.  Release to patient->Immediate   SEDIMENTATION RATE - Abnormal; Notable for the following components:    Sed Rate 60 (*)     All other components within normal limits    Narrative:     For upper or mid abdominal pain.  Release to patient->Immediate   CULTURE, BLOOD   CULTURE, BLOOD   LIPASE    Narrative:     For upper or mid abdominal pain.  Release to patient->Immediate   HIV 1 / 2 ANTIBODY    Narrative:     For upper or mid abdominal pain.  Release to patient->Immediate   HEPATITIS C ANTIBODY    Narrative:     For upper or mid abdominal pain.  Release to patient->Immediate   LACTIC ACID, PLASMA   C-REACTIVE PROTEIN   SEDIMENTATION RATE   HEMOGLOBIN A1C   HEMOGLOBIN A1C   POCT GLUCOSE MONITORING CONTINUOUS     EKG Readings: (Independently Interpreted)   Initial Reading: No STEMI. Rhythm: Normal Sinus Rhythm. Heart Rate: 61. Other Findings: Prolonged QT Interval (497 milimeters).       Imaging Results              US Lower Extremity Arteries Bilateral (Final result)  Result time 09/17/23 14:43:57       Final result by Moses Merrill MD (09/17/23 14:43:57)                   Impression:      Significant lower extremity peripheral vascular disease with velocity parameters as above.  Majority monophasic waveforms as can be seen with vessel hardening related to atherosclerosis.    Left distal SFA occlusion with reconstituted monophasic flow at the popliteal artery.    Velocity doubling at the right anterior tibial artery as can be seen with hemodynamically significant stenosis.      Electronically signed by: Moses Merrill  Date:    09/17/2023  Time:    14:43               Narrative:    EXAMINATION:  US LOWER EXTREMITY ARTERIES BILATERAL    CLINICAL HISTORY:  PVD;    TECHNIQUE:  Bilateral lower extremity arterial duplex ultrasound examination performed. Multiple gray scale and color doppler images were obtained in addition to waveform analysis.    COMPARISON:  None    FINDINGS:  The peak systolic velocities on the right are as follows, in centimeters/second:    Common femoral artery: 126, biphasic    Deep profundus: 182, biphasic    Superficial femoral artery, proximal: 107, monophasic    Superficial femoral artery, mid portion: 105, monophasic    Superficial femoral artery, distal: 64, monophasic    Popliteal artery: 53, monophasic    Distal popliteal artery: 69, monophasic    Anterior tibial artery: 156, monophasic    Posterior tibial artery: 25, monophasic    Peroneal artery: 89, monophasic    Dorsalis pedis: Patent    The peak systolic velocities on the left are as follows, in centimeters/second:    Common femoral artery: 82    Deep profundus: 104, biphasic    Superficial femoral artery, proximal: 93, biphasic    Superficial femoral artery, mid portion: 38, monophasic    Superficial femoral artery, distal: Nonvisualized flow, likely occluded    Popliteal artery: 65, monophasic    Distal popliteal artery: 45, monophasic    Anterior tibial artery: 15, monophasic    Posterior tibial artery: 10,  monophasic    Peroneal artery: 33, monophasic    Dorsalis pedis: Patent                                       X-Ray Chest AP Portable (Final result)  Result time 09/17/23 12:57:17      Final result by Wenceslao Anderson MD (09/17/23 12:57:17)                   Impression:      1. Pulmonary findings suggest edema noting small right pleural effusion in the setting of volume overload.  The process is more focal projected over the medial aspect of the right lower lung zone, developing consolidation not excluded.      Electronically signed by: Wenceslao Anderson MD  Date:    09/17/2023  Time:    12:57               Narrative:    EXAMINATION:  XR CHEST AP PORTABLE    CLINICAL HISTORY:  End stage renal disease    TECHNIQUE:  Single frontal view of the chest was performed.    COMPARISON:  None    FINDINGS:  The cardiomediastinal silhouette is prominent.  There is obscuration of the right costophrenic angle suggesting effusion.  The trachea is midline.  The lungs are symmetrically expanded bilaterally with prominent interstitial attenuation bilaterally.  There is increased parenchymal attenuation projected over the right lower lung zone..  There is no pneumothorax.  The osseous structures are remarkable for degenerative changes.  Vascular stent noted within the right arm..                                       X-Ray Foot Complete Left (Final result)  Result time 09/17/23 10:53:46      Final result by Vinny Ramos MD (09/17/23 10:53:46)                   Impression:      Soft tissue swelling about the calcaneus, and in the forefoot.    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    Otherwise, and allowing for the diffuse osteopenia, there is no acute fracture deformity or direct radiographic evidence of osteomyelitis at this time.    If there remains strong clinical suspicion for osteomyelitis, follow-up imaging would be recommended.      Electronically signed by: Vinny Ramos  Date:    09/17/2023  Time:    10:53                Narrative:    EXAMINATION:  XR FOOT COMPLETE 3 VIEW LEFT    CLINICAL HISTORY:  .  Local infection of the skin and subcutaneous tissue, unspecified    TECHNIQUE:  AP, lateral and oblique views of the left foot were performed.    COMPARISON:  None    FINDINGS:  Diffuse osteopenia.  This could diminish sensitivity of radiography for detection of osseous abnormalities.    Marked joint space narrowing at the 1st MTP joint with metatarsal head subchondral cyst formation.  These changes are favored to be related to chronic longstanding arthritis.  Acute septic arthritis is considered less likely.    Mild soft tissue swelling in the forefoot, and possibly in the left great toe.  No acute fracture deformity or dislocation is apparent radiographically.    Some soft tissue swelling is also suggested about the calcaneus.  An external dressing is questioned in this region.  No underlying radiopaque foreign body or soft tissue emphysema is apparent radiographically.  No definite osseous destruction of the calcaneus is apparent radiographically    Extensive small vessel calcifications in the left lower leg and left foot.    On the lateral view, a nodular calcification projecting plantar to the calcaneus is likely related to a tortuous calcified blood vessel.                                    X-Rays:   Independently Interpreted Readings:   Other Readings:  Left foot x-ray: Abnormal appearance of 1st MTP joint, no foreign body or subcutaneous gas.  Will defer to official radiology reading.    Medications   vancomycin 1,500 mg in dextrose 5 % (D5W) 250 mL IVPB (Vial-Mate) (has no administration in time range)   hydrALAZINE injection 10 mg (has no administration in time range)   labetaloL tablet 300 mg (has no administration in time range)   HYDROcodone-acetaminophen 5-325 mg per tablet 1 tablet (has no administration in time range)   NIFEdipine 24 hr tablet 30 mg (30 mg Oral Given 9/17/23 9568)   calcium acetate(phosphat  bind) capsule 2,001 mg (has no administration in time range)   allopurinoL tablet 300 mg (has no administration in time range)   HYDROmorphone injection 0.5 mg (has no administration in time range)   acetaminophen tablet 650 mg (has no administration in time range)   ondansetron injection 4 mg (has no administration in time range)   ondansetron disintegrating tablet 4 mg (has no administration in time range)   sodium chloride 0.9% flush 10 mL (has no administration in time range)   melatonin tablet 6 mg (has no administration in time range)   heparin (porcine) injection 5,000 Units (has no administration in time range)   glucose chewable tablet 16 g (has no administration in time range)   glucose chewable tablet 24 g (has no administration in time range)   glucagon (human recombinant) injection 1 mg (has no administration in time range)   insulin aspart U-100 pen 0-5 Units (has no administration in time range)   dextrose 10% bolus 125 mL 125 mL (has no administration in time range)   dextrose 10% bolus 250 mL 250 mL (has no administration in time range)   piperacillin-tazobactam (ZOSYN) 4.5 g in dextrose 5 % in water (D5W) 100 mL IVPB (MB+) (has no administration in time range)   vancomycin - pharmacy to dose ( Intravenous Random Level Due 9/18/23 0430)   ondansetron injection 4 mg (4 mg Intravenous Given 9/17/23 1010)   promethazine (PHENERGAN) 25 mg in dextrose 5 % (D5W) 50 mL IVPB (0 mg Intravenous Stopped 9/17/23 1150)   piperacillin-tazobactam (ZOSYN) 4.5 g in dextrose 5 % in water (D5W) 100 mL IVPB (MB+) (0 g Intravenous Stopped 9/17/23 1417)     Medical Decision Making  Emergent evaluation of 67-year-old female who presents with complaint of nausea and vomiting since this morning, diarrhea last night, worsening of chronic toe wound despite taking outpatient antibiotics.  Patient also admits to missing last dialysis treatment.  Vital signs reveal hypertension, afebrile.  She did require 2 L by nasal cannula as  oxygen saturation diminished to low 90s when sleeping.  On exam I am concerned for a limb-threatening cellulitis and dry gangrene of the left great toe.  She is treated with IV antibiotics.  There is no associated leukocytosis, lactic acidosis, and no definite findings of osteomyelitis on x-ray with caveat of osteopenia limiting sensitivity.  Patient will need MRI.  ESR and CRP are elevated, consistent with osteo.  Patient is also scheduled at home for arterial angiogram/stenting.  Will need to optimize vascular status both for healing of infection and/or potential surgical wound.  I do not suspect acute vascular compromise, but admitting team has ordered arterial ultrasound for further evaluation.  On exam patient require supplemental oxygen while sleeping.  She does not appear fluid overloaded, but chest x-ray does show evidence of this.  She has normal level of potassium.  She will likely need urgent dialysis tomorrow.  Will need nephrology consultation.  On exam patient has no abdominal tenderness.  I do not suspect a surgical process such as bowel obstruction or appendicitis.  Symptoms may be related to a viral gastroenteritis versus food poisoning.  She is treated with several doses of antiemetics.  Initially, patient stated she wished to drive home, but then agreed to stay here in the hospital until more stable.  She is admitted to the hospitalist service in serious condition for further care.    Amount and/or Complexity of Data Reviewed  Independent Historian:      Details: I discussed recent events with patient's daughter.  She stated patient had had diarrhea in the bed overnight.  Labs: ordered.  Radiology: ordered.  Discussion of management or test interpretation with external provider(s): I discussed the case with hospitalist on-call Dr. Sanchez - she will admit the patient.    Risk  Prescription drug management.  Decision regarding hospitalization.                               Clinical Impression:    Final diagnoses:  [R10.9] Abdominal pain  [L08.9] Toe infection  [R07.9] Chest pain  [N18.6, Z99.2] ESRD (end stage renal disease) on dialysis  [I96] Gangrene of toe (Primary)        ED Disposition Condition    Admit Stable                Rosanne Alonzo MD  09/17/23 2046

## 2023-09-17 NOTE — NURSING
Nurses Note -- 4 Eyes      9/17/2023   4:00 PM      Skin assessed during: Admit      [] No Altered Skin Integrity Present    []Prevention Measures Documented      [] Yes- Altered Skin Integrity Present or Discovered   [x] LDA Added if Not in Epic (Describe Wound)   [] New Altered Skin Integrity was Present on Admit and Documented in LDA   [] Wound Image Taken    Wound Care Consulted? Yes    Attending Nurse:  Amanda BRIGGS    Second RN/Staff Member:   April, PCT    Patient has gangrene to left great toe, altered skin integrity to second toe on left foot, maroon/redness to left heel

## 2023-09-18 NOTE — PLAN OF CARE
AAOX4. VSS. Tolerated medication well. 2.5 L off from dialysis. Poor appetite. Started IV antibiotics and rescheduled due to dialysis. Wound care provided by wound care nurse Petra. Instructed to leave ROGER with Iodine. Given pain medication once. Call light in reach, bed alarm set, and bed locked in lowest position. Family at bedside. Pain with toe and not relived with PO pain medicine. No falls or injuries on shift. Safety maintained throughout shift.        Problem: Infection  Goal: Absence of Infection Signs and Symptoms  Outcome: Ongoing, Progressing     Problem: Adult Inpatient Plan of Care  Goal: Plan of Care Review  Outcome: Ongoing, Progressing  Goal: Patient-Specific Goal (Individualized)  Outcome: Ongoing, Progressing  Goal: Absence of Hospital-Acquired Illness or Injury  Outcome: Ongoing, Progressing  Goal: Optimal Comfort and Wellbeing  Outcome: Ongoing, Progressing  Goal: Readiness for Transition of Care  Outcome: Ongoing, Progressing     Problem: Diabetes Comorbidity  Goal: Blood Glucose Level Within Targeted Range  Outcome: Ongoing, Progressing     Problem: Impaired Wound Healing  Goal: Optimal Wound Healing  Outcome: Ongoing, Progressing     Problem: Skin Injury Risk Increased  Goal: Skin Health and Integrity  Outcome: Ongoing, Progressing     Problem: Device-Related Complication Risk (Hemodialysis)  Goal: Safe, Effective Therapy Delivery  Outcome: Ongoing, Progressing     Problem: Hemodynamic Instability (Hemodialysis)  Goal: Effective Tissue Perfusion  Outcome: Ongoing, Progressing     Problem: Infection (Hemodialysis)  Goal: Absence of Infection Signs and Symptoms  Outcome: Ongoing, Progressing

## 2023-09-18 NOTE — CONSULTS
[facility], operated by [facility] - Med Surg ([address])  Wound Care    Patient Name:  Shannan Figueredo   MRN:  [id number]  Date: 9/18/2023  Diagnosis: Gangrene of toe of left foot    History:     Past Medical History:   Diagnosis Date    Coronary artery disease     Diabetes mellitus, type II     End stage renal disease     Hyperlipidemia     Hypertension     Peripheral vascular disease        Social History     Socioeconomic History    Marital status:    Tobacco Use    Smoking status: Never    Smokeless tobacco: Never   Substance and Sexual Activity    Alcohol use: Never    Drug use: Never     Social Determinants of Health     Financial Resource Strain: Low Risk  (9/17/2023)    Overall Financial Resource Strain (CARDIA)     Difficulty of Paying Living Expenses: Not hard at all   Food Insecurity: No Food Insecurity (9/17/2023)    Hunger Vital Sign     Worried About Running Out of Food in the Last Year: Never true     Ran Out of Food in the Last Year: Never true   Transportation Needs: No Transportation Needs (9/17/2023)    PRAPARE - Transportation     Lack of Transportation (Medical): No     Lack of Transportation (Non-Medical): No   Physical Activity: Inactive (9/17/2023)    Exercise Vital Sign     Days of Exercise per Week: 0 days     Minutes of Exercise per Session: 0 min   Stress: Stress Concern Present (9/17/2023)    [employer] - Occupational Stress Questionnaire     Feeling of Stress : Very much   Social Connections: Socially Isolated (9/17/2023)    Social Connection and Isolation Panel [NHANES]     Frequency of Communication with Friends and Family: Twice a week     Frequency of Social Gatherings with Friends and Family: Never     Attends [employer] Services: 1 to 4 times per year     Active Member of Clubs or Organizations: No     Attends Club or Organization Meetings: Never     Marital Status:    Housing Stability: Low Risk  (9/17/2023)    Housing Stability Vital Sign     Unable to  Pay for Housing in the Last Year: No     Number of Places Lived in the Last Year: 1     Unstable Housing in the Last Year: No           WO Assessment Details:        09/18/23 0940        Altered Skin Integrity 09/17/23 1605 Left anterior Toe, first #1 Other (comment) Purple or maroon localized area of discolored intact skin or non-intact skin or a blood-filled blister.   Date First Assessed/Time First Assessed: 09/17/23 1605   Altered Skin Integrity Present on Admission - Did Patient arrive to the hospital with altered skin?: yes  Side: Left  Orientation: anterior  Location: Toe, first  Wound Number: #1  Is this injur...   Wound Image    Dressing Appearance Open to air   Drainage Amount None   Drainage Characteristics/Odor No odor   Appearance Necrotic   Tissue loss description Not applicable   Black (%), Wound Tissue Color 100 %   Red (%), Wound Tissue Color 0 %   Yellow (%), Wound Tissue Color 0 %   Periwound Area Dry   Wound Edges Defined   Care Applied:;Povidone iodine   Dressing Other (comment)  (leave ROGER)   Dressing Change Due 09/19/23        Altered Skin Integrity 09/17/23 1606 Left anterior Toe, second #2 Other (comment) Partial thickness tissue loss. Shallow open ulcer with a red or pink wound bed, without slough. Intact or Open/Ruptured Serum-filled blister.   Date First Assessed/Time First Assessed: 09/17/23 1606   Altered Skin Integrity Present on Admission - Did Patient arrive to the hospital with altered skin?: yes  Side: Left  Orientation: anterior  Location: Toe, second  Wound Number: #2  Primary Woun...   Dressing Appearance Open to air   Drainage Amount None   Drainage Characteristics/Odor No odor   Appearance Dry;Necrotic   Black (%), Wound Tissue Color 100 %   Red (%), Wound Tissue Color 0 %   Yellow (%), Wound Tissue Color 0 %   Periwound Area Dry   Wound Edges Defined   Care Applied:;Povidone iodine   Dressing Other (comment)  (leave ROGER)   Dressing Change Due 09/19/23     Consultation  completed with Dr Cobb, Gen Surg, at bedside.  Recommendations made and orders to paint Left great toe and left 2nd toe every shift with Iodine, keep open to air.  Maintain protective silicone dressing and remove all sources of pressure to the LLE.  Initiation of PIP bundle imperative.    09/18/2023

## 2023-09-18 NOTE — CONSULTS
Consult Note  Nephrology    Consult Requested By: JOSE Cristina MD  Reason for Consult: ESRD    SUBJECTIVE:     History of Present Illness:  Patient is a 67 y.o. female presents with left foot gangrene, vomiting, feeling ill.  Ext med hx as outlined below including ESRD on HD MWF in [place].  Has been dealing with necrotic left toes for few months and followed by vascular in [place].  Was here visiting daughter and felt bad.  Admitted for eval/treatment.  Consulted for HD needs.  Pt seen and examined on HD.  Consents signed.  Tired from being up all night with pain.  Updated team and daughter at bedside.  W/up noted.      Epic reviewed.     Past Medical History:   Diagnosis Date    Coronary artery disease     Diabetes mellitus, type II     End stage renal disease     Hyperlipidemia     Hypertension     Peripheral vascular disease      Past Surgical History:   Procedure Laterality Date    CAROTID ENDARTERECTOMY Left     HYSTERECTOMY       Family History   Problem Relation Age of Onset    Diabetes Mother     Kidney disease Mother      Social History     Tobacco Use    Smoking status: Never    Smokeless tobacco: Never   Substance Use Topics    Alcohol use: Never    Drug use: Never       Review of patient's allergies indicates:  No Known Allergies     Review of Systems:  Constitutional: No fever or chills  Respiratory: No cough or shortness of breath  Cardiovascular: No chest pain or palpitations  Gastrointestinal: No nausea or vomiting  Neurological: No confusion or weakness    OBJECTIVE:     Vital Signs (Most Recent)  Temp: 98.7 °F (37.1 °C) (09/18/23 0741)  Pulse: (!) 54 (09/18/23 0741)  Resp: 16 (09/18/23 0741)  BP: (!) 156/71 (09/18/23 0741)  SpO2: 97 % (09/18/23 0741)    Vital Signs Range (Last 24H):  Temp:  [96.3 °F (35.7 °C)-98.7 °F (37.1 °C)]   Pulse:  [54-69]   Resp:  [16-19]   BP: (149-194)/(68-84)   SpO2:  [91 %-100 %]       Intake/Output Summary (Last 24 hours) at 9/18/2023 1026  Last data filed at  "9/17/2023 2311  Gross per 24 hour   Intake 388.12 ml   Output --   Net 388.12 ml       Physical Exam:  General appearance: chronically ill appearing.    Eyes:  Conjunctivae/corneas clear. PERRL.  Lungs: Normal respiratory effort,   clear to auscultation bilaterally   Heart: Regular rate and rhythm, S1, S2 normal, no murmur, rub or nelly.  Abdomen: Soft, non-tender non-distended; bowel sounds normal; no masses,  no organomegaly  Extremities: No cyanosis or clubbing. No edema.    Skin: Skin color, texture, turgor normal. No rashes or lesions  Neurologic: Normal strength and tone. No focal numbness or weakness   Right arm AVF+  Left big/second toes necrotic.     Laboratory:  Recent Labs   Lab 09/18/23 0418   WBC 9.49   RBC 3.59*   HGB 10.1*   HCT 31.6*      MCV 88   MCH 28.1   MCHC 32.0     BMP:   Recent Labs   Lab 09/18/23 0418   *      K 4.1   CL 95   CO2 21*   BUN 43*   CREATININE 11.3*   CALCIUM 8.0*   MG 2.2   PHOS 8.1*     Lab Results   Component Value Date    CALCIUM 8.0 (L) 09/18/2023    PHOS 8.1 (H) 09/18/2023     BNP  No results for input(s): "BNP", "BNPTRIAGEBLO" in the last 168 hours.No results found for: "URICACID"No results found for: "IRON", "TIBC", "FERRITIN", "SATURATEDIRO"  Lab Results   Component Value Date    CALCIUM 8.0 (L) 09/18/2023    PHOS 8.1 (H) 09/18/2023       Diagnostic Results:  US Lower Extremity Arteries Bilateral   Final Result      Significant lower extremity peripheral vascular disease with velocity parameters as above.  Majority monophasic waveforms as can be seen with vessel hardening related to atherosclerosis.      Left distal SFA occlusion with reconstituted monophasic flow at the popliteal artery.      Velocity doubling at the right anterior tibial artery as can be seen with hemodynamically significant stenosis.         Electronically signed by: Moses Merrill   Date:    09/17/2023   Time:    14:43      X-Ray Chest AP Portable   Final Result      1. " Pulmonary findings suggest edema noting small right pleural effusion in the setting of volume overload.  The process is more focal projected over the medial aspect of the right lower lung zone, developing consolidation not excluded.         Electronically signed by: Wenceslao Anderson MD   Date:    09/17/2023   Time:    12:57      X-Ray Foot Complete Left   Final Result      Soft tissue swelling about the calcaneus, and in the forefoot.      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      Otherwise, and allowing for the diffuse osteopenia, there is no acute fracture deformity or direct radiographic evidence of osteomyelitis at this time.      If there remains strong clinical suspicion for osteomyelitis, follow-up imaging would be recommended.         Electronically signed by: Vinny Ramos   Date:    09/17/2023   Time:    10:53      MRI Foot (Forefoot) Left W W/O Contrast    (Results Pending)       ASSESSMENT/PLAN:     ESRD on HD MWF in [place]:  -consulted for HD needs.  -consents signed  -continue HD MWF while here and if she stays after hospitalization will need temp outpt unit.   -labs noted and bath adjusted.  -R arm AVF+  -Renally dose meds, avoid nephrotoxins, and monitor I/O's closely.  See on dialysis    Necrotic Left toes:  -defer to pod/vascular/cards.  -anticipate amputation.     HTN:  -stable on current regimen    MBD:  -binders/nephrocaps.   -will use calcitriol on HD days.    Anemia of CKD:  -at goal currently.         Thanks for consult  See above  Will follow along.

## 2023-09-18 NOTE — PROGRESS NOTES
"[facility] ([address] Medicine  Progress Note    Patient Name: Shannan Figueredo  MRN: [id number]  Patient Class: IP- Inpatient   Admission Date: 9/17/2023  Length of Stay: 1 days  Attending Physician: JOSE Cristina MD  Primary Care Provider: Roxi, Primary Doctor        Subjective:     Principal Problem:Gangrene of toe of left foot        HPI:  Ms. Figueredo is a 67/F with PMH HTN, DMII (diet-controlled), PVD, ESRD on HD (M/W/F), anemia who presented to [facility] 09/17 with a two day progressive history of worsening L foot pain, swelling, fatigue, nausea and vomiting. History obtained from patient and daughter Stephanie ([phone]); they are from the [place], [place] area. Patient reports several weeks ago she "stubbed her toe," noting initially some pain, redness and swelling. Gradually symptoms progressed with duskiness to her toe and she informed her daughter of her pain for which she was brought to ER in [place] on 08/25 for further evaluation. With duskiness to 1st toe and surrounding erythema she was treated for cellulitis with a course of ciprofloxacin and metronidazole, but failed to improve. She followed up with her podiatrist 09/12 where she was prescribed hydrocodone-acetaminophen and a ten day course of TMP-SMX which she has been taking. She was referred to vascular surgery with plan for angiography and potential intervention  She and her family came to [place] for the weekend but her pain worsened; had been attempting to minimize use of hydrocodone-acetaminophen but developed nausea, vomiting and progressive fatigue. She subsequently presented to ED for further evaluation. ED workup was notable for L 1st toe dry-appearing gangrene with surrounding erythema and swelling, removed nail of second toe, XR L foot demonstrating diffuse osteopenia, L calcaneal and forefoot soft tissue swelling without definitive evidence of osteomyelitis in setting of osteopenia, no leukocytosis, elevated " sed rate and CRP, and elevated BUN/Cr in setting of ESRD. She notes she went without dialysis on 09/15 due to her trip. Blood cultures were ordered and she received piperacillin-tazobactam and vancomycin. Hospital medicine was subsequently contacted for admission.      Overview/Hospital Course:  Admitted with gangrene of L 1st toe. Surgery, nephrology, podiatry consulted; U/S arterial demonstrated L SFA occlusion with distal reconstitution. Cardiology consulted for angiography/potential angioplasty.      Interval History: No acute events overnight, but had pain through the evening finally relieved by hydromorphone around 3AM. Tired since. Discussed plan of care with patient / daughters. No other concerns at this time.    Review of Systems   Constitutional:  Positive for fatigue. Negative for chills and fever.   Respiratory:  Negative for cough and shortness of breath.    Cardiovascular:  Negative for chest pain and palpitations.   Gastrointestinal:  Negative for abdominal pain, nausea and vomiting.     Objective:     Vital Signs (Most Recent):  Temp: 97.6 °F (36.4 °C) (09/18/23 1400)  Pulse: 60 (09/18/23 1400)  Resp: 16 (09/18/23 1030)  BP: (!) 152/76 (09/18/23 1400)  SpO2: (!) 60 % (09/18/23 1300) Vital Signs (24h Range):  Temp:  [96.3 °F (35.7 °C)-98.7 °F (37.1 °C)] 97.6 °F (36.4 °C)  Pulse:  [50-68] 60  Resp:  [16-18] 16  SpO2:  [54 %-100 %] 60 %  BP: (133-180)/(68-88) 152/76     Weight: 63.5 kg (140 lb)  Body mass index is 23.3 kg/m².    Intake/Output Summary (Last 24 hours) at 9/18/2023 1509  Last data filed at 9/18/2023 1400  Gross per 24 hour   Intake 740 ml   Output 3000 ml   Net -2260 ml         Physical Exam  Vitals and nursing note reviewed.   Constitutional:       General: She is not in acute distress.     Appearance: She is well-developed.   HENT:      Head: Normocephalic and atraumatic.   Eyes:      General:         Right eye: No discharge.         Left eye: No discharge.      Conjunctiva/sclera:  Conjunctivae normal.   Cardiovascular:      Rate and Rhythm: Normal rate.      Comments: Diminished BLE pulses.  Pulmonary:      Effort: Pulmonary effort is normal. No respiratory distress.      Comments: Mildly diminished at bases.  Abdominal:      Palpations: Abdomen is soft.      Tenderness: There is no abdominal tenderness.   Musculoskeletal:         General: Normal range of motion.      Right lower leg: No edema.      Left lower leg: No edema.      Comments: RUE fistula with thrill.   Skin:     Comments: LLE cool to touch and dry. L first toe with gangrenous changes with surrounding erythema, no noted purulence or drainage. L 2nd toe with absent nail.   Neurological:      Mental Status: She is oriented to person, place, and time and easily aroused. She is lethargic.           Significant Labs:   CBC:  Recent Labs   Lab 09/17/23  0916 09/18/23  0418   WBC 10.30 9.49   HGB 11.7* 10.1*   HCT 36.5* 31.6*    213   GRAN 79.1*  8.2* 75.6*  7.2   LYMPH 8.4*  0.9* 10.2*  1.0   MONO 9.8  1.0 11.6  1.1*   EOS 0.1 0.1   BASO 0.07 0.06   CMP:  Recent Labs   Lab 09/17/23  0916 09/18/23  0418    136   K 4.0 4.1   CL 97 95   CO2 24 21*   BUN 40* 43*   CREATININE 10.9* 11.3*   * 158*   CALCIUM 9.4 8.0*   MG  --  2.2   PHOS  --  8.1*   ALKPHOS 84  --    AST 20  --    ALT 8*  --    BILITOT 1.0  --    PROT 7.2  --    ALBUMIN 3.5 2.8*   ANIONGAP 17* 20*      Significant Imaging: I have reviewed and interpreted all pertinent imaging results/findings within the past 24 hours.      Assessment/Plan:      * Gangrene of toe of left foot  HTN, PVD  - L 1st toe gangrene predominantly dry with some surrounding erythema/swelling; with concern for worsening infection started pipeacillin-tazobactam and vancomycin IV. 2nd toe with some involvement as well. Continue broad spectrum antibiotic therapy for now. No evidence of sepsis at this time.  - U/S arterial BLE showed L distal SFA occlusion with distal reconstitution.  Discussed with vascular surgery; cardiology consult for consideration of angiography / potential angioplasty.  - Podiatry consulted, appreciate assistance. MRI L foot pending.  - Continue hydrocodone-acetaminophen 5-325mg PO q6hr PRN, hydromorphone 0.5mg IV q6hr PRN.  - Continue labetalol 300mg PO BID, losartan 50mg PO daily, nifedipine 30mg PO daily.    Type 2 diabetes mellitus with chronic kidney disease on chronic dialysis  - Reports diet-controlled; no current medications.  - HgbA1c 5.7.  - Continue low-dose sliding scale insulin aspart 0-5U subQ TIDWM PRN, monitor requirements.    ESRD (end stage renal disease)  Anemia, hyperparathyroidism  - ESRD on HD, reports missed session Friday prior to presentation due to travel.  - Mild anemia without evidence of bleeding.  - Continue allopurinol 300mg PO daily, calcium acetate 2001mg PO TIDWM.  - Nephrology consulted for HD assistance.    VTE Risk Mitigation (From admission, onward)         Ordered     heparin (porcine) injection 5,000 Units  Every 8 hours         09/17/23 1351     IP VTE HIGH RISK PATIENT  Once         09/17/23 1351                Discharge Planning   LUBNA:      Code Status: Full Code   Is the patient medically ready for discharge?:     Reason for patient still in hospital (select all that apply): Treatment  Discharge Plan A: Home with family                  D Angus Cristina MD  Department of Hospital Medicine   [facility] ([address])

## 2023-09-18 NOTE — ASSESSMENT & PLAN NOTE
HTN, PVD  - L 1st toe gangrene predominantly dry with some surrounding erythema/swelling; with concern for worsening infection started pipeacillin-tazobactam and vancomycin IV. 2nd toe with some involvement as well. Continue broad spectrum antibiotic therapy for now. No evidence of sepsis at this time.  - U/S arterial BLE showed L distal SFA occlusion with distal reconstitution. Discussed with vascular surgery; cardiology consult for consideration of angiography / potential angioplasty.  - Podiatry consulted, appreciate assistance. MRI L foot pending.  - Continue hydrocodone-acetaminophen 5-325mg PO q6hr PRN, hydromorphone 0.5mg IV q6hr PRN.  - Continue labetalol 300mg PO BID, losartan 50mg PO daily, nifedipine 30mg PO daily.

## 2023-09-18 NOTE — PROGRESS NOTES
09/18/23 1400   Post-Hemodialysis Assessment   Rinseback Volume (mL) 250 mL   Blood Volume Processed (Liters) 84.2 L   Dialyzer Clearance Lightly streaked   Duration of Treatment 210 minutes   Additional Fluid Intake (mL) 500 mL   Total UF (mL) 3000 mL   Net Fluid Removal 2500   Patient Response to Treatment tolerated   Arterial bleeding stop time (min) 10 min   Venous bleeding stop time (min) 5 min   Post-Hemodialysis Comments tx completed per P&P

## 2023-09-18 NOTE — ASSESSMENT & PLAN NOTE
- Reports diet-controlled; no current medications.  - HgbA1c 5.7.  - Continue low-dose sliding scale insulin aspart 0-5U subQ TIDWM PRN, monitor requirements.

## 2023-09-18 NOTE — CONSULTS
"    Assessment/Plan:  Left Hallux Dry Gangrene    Initiate intravenous vancomycin with loading dose of 1500 mg once with subsequent doses when random concentrations are less than 20 mcg/mL    MRI/Labs ordered     Patient brief summary:  Shannan Figueredo is a 67 y.o. female initiated on antimicrobial therapy with IV Vancomycin for treatment of suspected bone/joint infection    Drug Allergies:   Review of patient's allergies indicates:  No Known Allergies    Actual Body Weight:   61.2 kg    Renal Function:   Estimated Creatinine Clearance: 4.4 mL/min (A) (based on SCr of 10.9 mg/dL (H)).,     Dialysis Method (if applicable):  intermittent HD - No known schedule yet. Nephro consulted     CBC (last 72 hours):  Recent Labs   Lab Result Units 09/17/23  0916 09/17/23  0931   WBC K/uL 10.30  --    Hemoglobin g/dL 11.7*  --    Hemoglobin A1C %  --  5.7*   Hematocrit % 36.5*  --    Platelets K/uL 253  --    Gran % % 79.1*  --    Lymph % % 8.4*  --    Mono % % 9.8  --    Eosinophil % % 1.3  --    Basophil % % 0.7  --    Differential Method  Automated  --        Metabolic Panel (last 72 hours):  Recent Labs   Lab Result Units 09/17/23  0916   Sodium mmol/L 138   Potassium mmol/L 4.0   Chloride mmol/L 97   CO2 mmol/L 24   Glucose mg/dL 135*   BUN mg/dL 40*   Creatinine mg/dL 10.9*   Albumin g/dL 3.5   Total Bilirubin mg/dL 1.0   Alkaline Phosphatase U/L 84   AST U/L 20   ALT U/L 8*       Drug levels (last 3 results):  No results for input(s): "VANCOMYCINRA", "VANCORANDOM", "VANCOMYCINPE", "VANCOPEAK", "VANCOMYCINTR", "VANCOTROUGH" in the last 72 hours.    Microbiologic Results:  Microbiology Results (last 7 days)       Procedure Component Value Units Date/Time    Blood Culture #2 **CANNOT BE ORDERED STAT** [[id number]] Collected: 09/17/23 1338    Order Status: Sent Specimen: Blood from Antecubital, Left Arm Updated: 09/17/23 1922    Blood Culture #1 **CANNOT BE ORDERED STAT** [[id number]] Collected: 09/17/23 1337    Order " Status: Sent Specimen: Blood Updated: 09/17/23 1922          Imaging, arterial studies, and labs pending.    Amputation vs. Bone Biopsy/IV antibiotics.      Thank you for the consult,   Italo Andrea

## 2023-09-18 NOTE — ASSESSMENT & PLAN NOTE
Anemia, hyperparathyroidism  - ESRD on HD, reports missed session Friday prior to presentation due to travel.  - Mild anemia without evidence of bleeding.  - Continue allopurinol 300mg PO daily, calcium acetate 2001mg PO TIDWM.  - Nephrology consulted for HD assistance.

## 2023-09-18 NOTE — CONSULTS
Chart reviewed.  Patient has dry gangrene of her left foot.  Ultrasound has shown occlusion of the left superficial femoral artery.  She is being evaluated currently by Podiatry for a possible procedure tomorrow.  Best to wait for angiography until that is completed and after she has received a couple days of intravenous antibiotics.  Will reassess with full consult to follow tomorrow.

## 2023-09-18 NOTE — HOSPITAL COURSE
"Admitted with gangrene of L 1st toe.  Empirically treated with Zosyn and vancomycin.  Vascular Surgery, nephrology, podiatry consulted; U/S arterial demonstrated L SFA occlusion with distal reconstitution. Cardiology consulted, angiography with no vessels amenable for angioplasty on 9/20.  Underwent exploration of the left lower extremity vessels, femoral and popliteal bypass unable to be performed due to no adequate available vessels for bypass on 9/22. Discussed potential intervention here vs [place], patient and family opted for surgery in [place]. Underwent L BKA 09/29. PT/OT recommended SNF. Patient was hard of hearing at baseline, but reported she could not hear at all one day and she underwent further workup with head CT then followed by MRI of brain concerning for a "very small focus of diffusion signal abnormality deep white matter of the right frontal lobe, a small area of acute infarction cannot be excluded" on 10/7. Also noted on MRI of brain was severe periventricular white matter changes and central involutional changes that were greater than anticipated for her age. She was already on ASA, plavix, and statin. Therapy was already following. Teleneurology had nothing further to add. She was noted to have mental status changes and pain medications adjusted with some improvement. Appetite was poor and concern for depressive symptoms due to general medical condition. Telepsychiatry consulted and started mirtazapine, but she was more somnolent and subsequently it was discontinued. Surgery noted blistering to surgical site and recommended continued observation of wound, but blister was stable and WBC trended down and she remained AF. Her mental status waxed and waned, and repeat MRI of brain done with no acute findings on 10/18 (findings were consistent from previous MRI on 10/7). Given her failure to thrive and continued clinical decline, updated family on status and overall poor prognosis for meaningful " recovery and with recommendation for DNR status and continued goals of care. All family members were inclined for DNR but wanted input from each other but were not able to discuss this issue amongst themselves. Patient continued to decline the next day with drop in blood pressure and family updated, again they wanted DNR separately but did not talk together. Called by nurse with clinical status change and patient was not responsive. Patient was found unresponsive, apneic, asystolic with pupils fixed and dilated. Time of death 9:58am CST on 10/19/2023. Family (daughter) was notified at the bedside and the other daughter in [place] was notified via phone. Condolences were given.

## 2023-09-18 NOTE — PLAN OF CARE
Problem: Infection  Goal: Absence of Infection Signs and Symptoms  Outcome: Ongoing, Progressing  Intervention: Prevent or Manage Infection  Flowsheets (Taken 9/18/2023 0224)  Fever Reduction/Comfort Measures:   lightweight bedding   lightweight clothing  Infection Management: aseptic technique maintained     Problem: Adult Inpatient Plan of Care  Goal: Plan of Care Review  Outcome: Ongoing, Progressing     Problem: Diabetes Comorbidity  Goal: Blood Glucose Level Within Targeted Range  Outcome: Ongoing, Progressing  Intervention: Monitor and Manage Glycemia  Flowsheets (Taken 9/18/2023 0224)  Glycemic Management: blood glucose monitored     POC reviewed with patient and daughter who remained at bedside this shift.  A/O x4.  Respirations unlabored.  At beginning of shift O2 sat b/t 88-90% on RA.  O2 applied at 1L/NC with effect.  Area to toes/foot continue with no active bleeding/drainage noted.  Pt reports increased pain to foot PRN Norco and Dilaudid rotated for relief.  Tolerates meds whole with water without difficulty.  CBG monitoring in progress with no s/s of hypo/hyperglycemia noted.  See flowsheet for full assessment.  Able to verbaize wants/needs.  No s/s of distress.  Fall/safety precautions maintained.

## 2023-09-18 NOTE — PROGRESS NOTES
Pharmacokinetic Assessment Follow Up: IV Vancomycin    Vancomycin serum concentration assessment(s):    The random level was drawn correctly and can be used to guide therapy at this time. The measurement is above the desired definitive target range of 10 to 20 mcg/mL.    Vancomycin Regimen Plan:    Give vancomycin 500 x 1 after HD  Re-dose when the random level is less than 25 mcg/mL, next level to be drawn at 9/20/2023 on 04:30    Drug levels (last 3 results):  Recent Labs   Lab Result Units 09/18/23 0418   Vancomycin, Random ug/mL 20.6       Pharmacy will continue to follow and monitor vancomycin.    Please contact pharmacy at extension [phone] for questions regarding this assessment.    Thank you for the consult,   Simin Mauro       Patient brief summary:  Shannan Figueredo is a 67 y.o. female initiated on antimicrobial therapy with IV Vancomycin for treatment of bone/joint infection    The patient's current regimen is pulse dosing    Drug Allergies:   Review of patient's allergies indicates:  No Known Allergies    Actual Body Weight:   63.5 kg    Renal Function:   Estimated Creatinine Clearance: 4.3 mL/min (A) (based on SCr of 11.3 mg/dL (H)).,     Dialysis Method (if applicable):  intermittent HD - MWF    CBC (last 72 hours):  Recent Labs   Lab Result Units 09/17/23  0916 09/17/23  0931 09/18/23 0418   WBC K/uL 10.30  --  9.49   Hemoglobin g/dL 11.7*  --  10.1*   Hemoglobin A1C %  --  5.7*  --    Hematocrit % 36.5*  --  31.6*   Platelets K/uL 253  --  213   Gran % % 79.1*  --  75.6*   Lymph % % 8.4*  --  10.2*   Mono % % 9.8  --  11.6   Eosinophil % % 1.3  --  1.4   Basophil % % 0.7  --  0.6   Differential Method  Automated  --  Automated       Metabolic Panel (last 72 hours):  Recent Labs   Lab Result Units 09/17/23  0916 09/18/23 0418   Sodium mmol/L 138 136   Potassium mmol/L 4.0 4.1   Chloride mmol/L 97 95   CO2 mmol/L 24 21*   Glucose mg/dL 135* 158*   BUN mg/dL 40* 43*   Creatinine mg/dL 10.9* 11.3*    Albumin g/dL 3.5 2.8*   Total Bilirubin mg/dL 1.0  --    Alkaline Phosphatase U/L 84  --    AST U/L 20  --    ALT U/L 8*  --    Magnesium mg/dL  --  2.2   Phosphorus mg/dL  --  8.1*       Vancomycin Administrations:  vancomycin given in the last 96 hours                     vancomycin 1,500 mg in dextrose 5 % (D5W) 250 mL IVPB (Vial-Mate) (mg) 1,500 mg New Bag 09/17/23 3223                    Microbiologic Results:  Microbiology Results (last 7 days)       Procedure Component Value Units Date/Time    Blood Culture #2 **CANNOT BE ORDERED STAT** [[id number]] Collected: 09/17/23 1338    Order Status: Completed Specimen: Blood from Antecubital, Left Arm Updated: 09/18/23 0315     Blood Culture, Routine No Growth to date    Blood Culture #1 **CANNOT BE ORDERED STAT** [[id number]] Collected: 09/17/23 1337    Order Status: Completed Specimen: Blood Updated: 09/18/23 0315     Blood Culture, Routine No Growth to date

## 2023-09-18 NOTE — PROGRESS NOTES
[facility] - Med Surg ([address])  Wound Care    Patient Name:  Shannan Figueredo   MRN:  [id number]  Date: 9/18/2023  Diagnosis: Gangrene of toe of left foot    History:     Past Medical History:   Diagnosis Date    Coronary artery disease     Diabetes mellitus, type II     End stage renal disease     Hyperlipidemia     Hypertension     Peripheral vascular disease        Social History     Socioeconomic History    Marital status:    Tobacco Use    Smoking status: Never    Smokeless tobacco: Never   Substance and Sexual Activity    Alcohol use: Never    Drug use: Never     Social Determinants of Health     Financial Resource Strain: Low Risk  (9/17/2023)    Overall Financial Resource Strain (CARDIA)     Difficulty of Paying Living Expenses: Not hard at all   Food Insecurity: No Food Insecurity (9/17/2023)    Hunger Vital Sign     Worried About Running Out of Food in the Last Year: Never true     Ran Out of Food in the Last Year: Never true   Transportation Needs: No Transportation Needs (9/17/2023)    PRAPARE - Transportation     Lack of Transportation (Medical): No     Lack of Transportation (Non-Medical): No   Physical Activity: Inactive (9/17/2023)    Exercise Vital Sign     Days of Exercise per Week: 0 days     Minutes of Exercise per Session: 0 min   Stress: Stress Concern Present (9/17/2023)    [employer] [employer] - Occupational Stress Questionnaire     Feeling of Stress : Very much   Social Connections: Socially Isolated (9/17/2023)    Social Connection and Isolation Panel [NHANES]     Frequency of Communication with Friends and Family: Twice a week     Frequency of Social Gatherings with Friends and Family: Never     Attends Taoism Services: 1 to 4 times per year     Active Member of Clubs or Organizations: No     Attends Club or Organization Meetings: Never     Marital Status:    Housing Stability: Low Risk  (9/17/2023)    Housing Stability Vital Sign     Unable to  Pay for Housing in the Last Year: No     Number of Places Lived in the Last Year: 1     Unstable Housing in the Last Year: No           [facility] Assessment Details:      09/17/23 1609   Yuan Risk Assessment   Sensory Perception 3-->slightly limited   Moisture 3-->occasionally moist   Activity 2-->chairfast   Mobility 3-->slightly limited   Nutrition 3-->adequate   Friction and Shear 3-->no apparent problem   Yuan Score 17     Pressure Injury Prevention bundle, Support surface, and Registered dietician consultation ordered for a Yuan scale score of 17.    09/18/2023

## 2023-09-18 NOTE — PROGRESS NOTES
67-year-old female visiting from [place] who recently stubbed her left toe resulting in a blister and dry gangrene of the tip of the toe.  Patient had been under the care of a podiatrist who referred her to a vascular surgeon in [place].  Patient was scheduled for a angiogram on Thursday of this week back in [place].    P.m. X  NIDDM  Hypertension  End-stage renal disease on dialysis  Coronary artery disease  Hyperlipidemia    Social history  No history of tobacco use  Patient ambulates with cane    Allergies   No known drug allergies    Review of systems  No amaurosis, TIAs, lateralizing signs  No abdominal pain  No rest pain, no claudication    PE  Neck-no bruit  Heart-regular rate and rhythm  Chest-clear  Abdomen-soft, no pulsatile masses  Extremities-dry gangrene of tip of left great toe, feet warm, no palpable pedal pulses    Imaging   Arterial Dopplers suggest diffuse calcification with occlusion of left superficial femoral artery  X-rays left foot-can not rule out osteomyelitis however, no direct evidence of ostial    Impression/plan    Spoke with patient and family wished to initiate treatment in [place].  Patient has good family support here.  Cardiology consulted for aortogram with runoff/possible percutaneous intervention

## 2023-09-19 NOTE — PT/OT/SLP EVAL
Physical Therapy Evaluation    Patient Name:  Shannan Figueredo   MRN:  [id number]    Recommendations:     Discharge Recommendations: home health PT   Discharge Equipment Recommendations: walker, rolling (transport wheelchair)   Barriers to discharge: Inaccessible home and functional mobility impairments    Assessment:     Shannan Figueredo is a 67 y.o. female admitted with a medical diagnosis of Gangrene of toe of left foot. She has a past medical history that includes but is not limited to HTN, PVD, ESRD, and T2DM. She presents with the following impairments/functional limitations: weakness, impaired endurance, impaired sensation, impaired self care skills, gait instability, impaired balance, impaired functional mobility, decreased lower extremity function, pain, decreased ROM, impaired skin, orthopedic precautions.    PT orders received. PT evaluation completed and goals/POC established. Pt tolerated evaluation well with no adverse reactions. Pt performed bedside transfers with RW and assistance. Pt unable to follow NWB precautions of LLE without maximal cues. PT will continue to follow and progress goals as tolerated. Recommend discharge to home with HHPT and 24 hour assistance.     Rehab Prognosis: Good; patient would benefit from acute skilled PT services to address these deficits and reach maximum level of function.    Recent Surgery: * No surgery found *      Plan:     During this hospitalization, patient to be seen 5 x/week to address the identified rehab impairments via gait training, therapeutic activities, therapeutic exercises, neuromuscular re-education and progress toward the following goals:    Plan of Care Expires:  10/19/23    Subjective     Chief Complaint: left toe pain  Patient/Family Comments/goals: Pt agreeable to therapy session  Pain/Comfort:  Pain Rating 1: 8/10  Location - Side 1: Left  Location - Orientation 1: generalized  Location 1: first toe  Pain Addressed 1: Pre-medicate for activity,  Cessation of Activity, Distraction, Reposition    Patients cultural, spiritual, Baptist conflicts given the current situation: no    Living Environment:  Pt lives with daughter in a multi-level home with no steps to enter, in [place], [place]. Her bed and bathroom are on the second level of the home. She has access to a tub-shower combination in the home. She ambulates with a rollator for community distances. Equipment used at home: rollator, bath bench, cane, straight.  DME owned (not currently used): none.  Upon discharge, patient will have assistance from daughter.    Objective:     Communicated with CHESTER Corley prior to session.  Patient found supine with peripheral IV, oxygen  upon PT entry to room.    General Precautions: Standard, fall  Orthopedic Precautions: (treating LLE NWB)   Braces:  (surgical shoe)  Respiratory Status: Nasal cannula, flow 1 L/min    Exams:  Cognition:   Patient is oriented to self place, time and situation.  Pt follows approximately 75% of simple commands.    Mood: Pleasant and cooperative.   Safety Awareness: intact  Musculoskeletal:  Posture:  rounded shoulders, forward head  LE ROM/Strength:   R ROM: WFL  L ROM: WFL  R Strength:   WFL  L Strength:   WFL  Neuromuscular:  Sensation: Impaired to light touch bilateral LEs.   Tone/Reflexes: No impairments identified with functional mobility. No formal testing performed.  Balance:   Static sitting: SBA  Dynamic sitting: SBA  Static standing: CGA  Dynamic standing: Min A  Visual-vestibular: No impairments identified with functional mobility. No formal testing performed.  Integument:  necrosis of left big toe  Cardiopulmonary:  Edema: noted to left foot    Functional Mobility:  Bed Mobility:     Supine to Sit: modified independence  Transfers:     Sit to Stand:  contact guard assistance with rolling walker  2 trials   Pt required maximal verbal and tactile cues to maintain NWB precautions of LLE.   Bed to Chair: contact guard assistance with   rolling walker  using  Stand Pivot  Gait: x5 side steps with contact guard assistance with rolling walker. Pt with decreased speed, daniel and bilateral step length. Pt unable to follow NWB precautions of LLE at this time despite maximal verbal cues. Pt with forward-flexed posture and decreased stability, with one LOB requiring her to return to sitting.       AM-PAC 6 CLICK MOBILITY  Total Score:17     Treatment & Education:  Bed mobility, transfer, and gait training as described above.  Pt sat EOB for ~15 minutes with SBA and intermittent upper extremity support.     PT educated patient  re:   PT plan of care/role of PT  Use of RW  NWB precautions of LLE  Fall and safety precautions  Gait deviations  Use of call light (don't get up without assistance)  Pt verbalized understanding, however, needs reinforcement.     The patient is safe to transfer with RN assist   Patient encouraged to sit up in chair throughout the day to prevent deconditioning.     Patient left up in chair with all lines intact, call button in reach, and daughter present.    GOALS:   Multidisciplinary Problems       Physical Therapy Goals          Problem: Physical Therapy    Goal Priority Disciplines Outcome Goal Variances Interventions   Physical Therapy Goal     PT, PT/OT Ongoing, Progressing     Description: Goals to be met by: 10/19/23    Patient will perform the following to increase strength, improve mobility, and return to prior level of function:    1. Supine <> sit with independence.  2. Sit<>stand with SBA with least restrictive assistive device.  3. Gait x 50 feet with SBA with least restrictive assistive device.  4. Ascend/descend 12 step(s) with bilateral handrails and CGA.                            History:     Past Medical History:   Diagnosis Date    Coronary artery disease     Diabetes mellitus, type II     End stage renal disease     Hyperlipidemia     Hypertension     Peripheral vascular disease        Past Surgical History:    Procedure Laterality Date    CAROTID ENDARTERECTOMY Left     HYSTERECTOMY         Time Tracking:     PT Received On: 09/19/23  PT Start Time: 1139     PT Stop Time: 1204  PT Total Time (min): 25 min     Billable Minutes: Evaluation 15 and Therapeutic Activity 10      09/19/2023

## 2023-09-19 NOTE — PLAN OF CARE
Problem: Infection  Goal: Absence of Infection Signs and Symptoms  Outcome: Ongoing, Progressing     Problem: Adult Inpatient Plan of Care  Goal: Plan of Care Review  Outcome: Ongoing, Progressing  Goal: Patient-Specific Goal (Individualized)  Outcome: Ongoing, Progressing  Goal: Optimal Comfort and Wellbeing  Outcome: Ongoing, Progressing  POC reviewed with pt and family at bedside. A&Ox4. Room air. No acute changes. Wound care complete to left foot. BG monitored. Safety checks performed. Bed in lowest position. Wheels locked. Call light in reach. WCTM.

## 2023-09-19 NOTE — PT/OT/SLP PROGRESS
Physical Therapy      Patient Name:  Shannan Figueredo   MRN:  [id number]    Patient not seen at this time secondary to Testing/imaging (xray/CT/MRI).   Also waiting for podiatry's plan for pt (amputation vs. Bone biopsy) as well as weight bearing clarification of LLE.     Will follow-up as schedule allows and as pt is available / appropriate for therapy services.

## 2023-09-19 NOTE — ASSESSMENT & PLAN NOTE
HTN, PVD  - L 1st toe gangrene predominantly dry with some surrounding erythema/swelling; with concern for worsening infection started pipeacillin-tazobactam and vancomycin IV. 2nd toe with some involvement as well. Continue broad spectrum antibiotic therapy for now. No evidence of sepsis at this time.  - U/S arterial BLE showed L distal SFA occlusion with distal reconstitution. Discussed with vascular surgery; cardiology consult for consideration of angiography / potential angioplasty.  - Podiatry consulted, appreciate assistance. MRI L foot pending.  - Continue hydrocodone-acetaminophen 5-325mg PO q6hr PRN, hydromorphone 0.5mg IV q6hr PRN.  - Continue labetalol 300mg PO BID, losartan 50mg PO daily, nifedipine 30mg PO daily.  - Blood culture NGTD  - Case discussed with Cardiology, plan for angiogram tomorrow

## 2023-09-19 NOTE — SUBJECTIVE & OBJECTIVE
Interval History: No acute events overnight, no new complaints. Discussed plan of care with patient / daughters (in person and via phone).     Review of Systems   Constitutional:  Positive for fatigue. Negative for chills and fever.   Respiratory:  Negative for cough and shortness of breath.    Cardiovascular:  Negative for chest pain and palpitations.   Gastrointestinal:  Negative for abdominal pain, nausea and vomiting.     Objective:     Vital Signs (Most Recent):  Temp: 98.5 °F (36.9 °C) (09/19/23 1112)  Pulse: (!) 58 (09/19/23 1112)  Resp: 18 (09/19/23 1112)  BP: 137/63 (09/19/23 1112)  SpO2: (!) 92 % (09/19/23 1112) Vital Signs (24h Range):  Temp:  [96.7 °F (35.9 °C)-98.7 °F (37.1 °C)] 98.5 °F (36.9 °C)  Pulse:  [55-60] 58  Resp:  [16-18] 18  SpO2:  [91 %-95 %] 92 %  BP: (137-152)/(63-76) 137/63     Weight: 63.5 kg (139 lb 15.9 oz)  Body mass index is 23.3 kg/m².    Intake/Output Summary (Last 24 hours) at 9/19/2023 1347  Last data filed at 9/19/2023 0553  Gross per 24 hour   Intake 1037.91 ml   Output 3000 ml   Net -1962.09 ml           Physical Exam  Vitals and nursing note reviewed.   Constitutional:       General: She is not in acute distress.     Appearance: She is well-developed.   HENT:      Head: Normocephalic and atraumatic.   Eyes:      General:         Right eye: No discharge.         Left eye: No discharge.      Conjunctiva/sclera: Conjunctivae normal.   Cardiovascular:      Rate and Rhythm: Normal rate.      Comments: Diminished BLE pulses.  Pulmonary:      Effort: Pulmonary effort is normal. No respiratory distress.      Comments: Mildly diminished at bases.  Abdominal:      Palpations: Abdomen is soft.      Tenderness: There is no abdominal tenderness.   Musculoskeletal:         General: Normal range of motion.      Right lower leg: No edema.      Left lower leg: No edema.      Comments: RUE fistula with thrill.   Skin:     Comments: LLE cool to touch and dry. L first toe with gangrenous changes  with surrounding erythema, no noted purulence or drainage. L 2nd toe with absent nail.   Neurological:      Mental Status: She is alert, oriented to person, place, and time and easily aroused.           Significant Labs:   CBC:  Recent Labs   Lab 09/17/23  0916 09/18/23 0418 09/19/23  0342   WBC 10.30 9.49 7.39   HGB 11.7* 10.1* 10.1*   HCT 36.5* 31.6* 32.4*    213 214   GRAN 79.1*  8.2* 75.6*  7.2 68.5  5.1   LYMPH 8.4*  0.9* 10.2*  1.0 12.6*  0.9*   MONO 9.8  1.0 11.6  1.1* 14.2  1.1*   EOS 0.1 0.1 0.2   BASO 0.07 0.06 0.08     CMP:  Recent Labs   Lab 09/17/23 0916 09/18/23 0418 09/19/23  0341    136 136   K 4.0 4.1 3.8   CL 97 95 95   CO2 24 21* 25   BUN 40* 43* 17   CREATININE 10.9* 11.3* 6.3*   * 158* 129*   CALCIUM 9.4 8.0* 8.4*   MG  --  2.2 2.0   PHOS  --  8.1* 5.1*   ALKPHOS 84  --   --    AST 20  --   --    ALT 8*  --   --    BILITOT 1.0  --   --    PROT 7.2  --   --    ALBUMIN 3.5 2.8* 2.8*   ANIONGAP 17* 20* 16        Significant Imaging: I have reviewed and interpreted all pertinent imaging results/findings within the past 24 hours.

## 2023-09-19 NOTE — PLAN OF CARE
Problem: Occupational Therapy  Goal: Occupational Therapy Goal  Description: Goals to be met by: 9/30/23     Patient will increase functional independence with ADLs by performing:    Toileting at SBA.   BSC transfer at SBA.  Donning/doffing left Podiatry shoe at SBA.   Sponge bathing at SBA.       Outcome: Ongoing, Progressing  Discharge to home with assist of family and Home Health OT and PT.

## 2023-09-19 NOTE — PROGRESS NOTES
Patient reviewed, renal function stable, cultures reviewed, no new levels, continue current therapy; Next levels due: 9/20/23 at 04:30    Please contact inpatient pharmacy at [phone] with any questions.     Thank you,  Simin Mauro  09/19/2023

## 2023-09-19 NOTE — PLAN OF CARE
Problem: Physical Therapy  Goal: Physical Therapy Goal  Description: Goals to be met by: 10/19/23    Patient will perform the following to increase strength, improve mobility, and return to prior level of function:    1. Supine <> sit with independence.  2. Sit<>stand with SBA with least restrictive assistive device.  3. Gait x 50 feet with SBA with least restrictive assistive device.  4. Ascend/descend 12 step(s) with bilateral handrails and CGA.       Outcome: Ongoing, Progressing       PT orders received. PT evaluation completed and goals/POC established. Pt tolerated evaluation well with no adverse reactions. Pt performed bedside transfers with RW and assistance. Pt unable to follow NWB precautions of LLE without maximal cues. PT will continue to follow and progress goals as tolerated. Recommend discharge to home with HHPT and 24 hour assistance.

## 2023-09-19 NOTE — PLAN OF CARE
Problem: Infection  Goal: Absence of Infection Signs and Symptoms  Outcome: Ongoing, Progressing     Problem: Adult Inpatient Plan of Care  Goal: Plan of Care Review  Outcome: Ongoing, Progressing  Goal: Patient-Specific Goal (Individualized)  Outcome: Ongoing, Progressing  Goal: Absence of Hospital-Acquired Illness or Injury  Outcome: Ongoing, Progressing  Goal: Optimal Comfort and Wellbeing  Outcome: Ongoing, Progressing     Problem: Diabetes Comorbidity  Goal: Blood Glucose Level Within Targeted Range  Outcome: Ongoing, Progressing     Problem: Impaired Wound Healing  Goal: Optimal Wound Healing  Outcome: Ongoing, Progressing     Problem: Skin Injury Risk Increased  Goal: Skin Health and Integrity  Outcome: Ongoing, Progressing     POC reviewed with patient. All questions and concerns addressed. Fall/safety precautions implemented and maintained. IV abx administered. Blood glucose monitored. Wound care performed. Pain management provided. No acute events noted this shift. Please see flowsheet for full assessment and vitals. Bed locked in lowest position. Side rails up x2. Call bell within reach. Will continue to monitor.

## 2023-09-19 NOTE — PT/OT/SLP EVAL
Occupational Therapy   Evaluation and Treatment    Name: Shannan Figueredo  MRN: [id number]  Admitting Diagnosis: Gangrene of toe of left foot  Recent Surgery: Procedure(s) (LRB):  Angiogram Extremity Unilateral (Left)      Recommendations:     Discharge Recommendations: home health OT, home health PT (24/7 assist from family)  Discharge Equipment Recommendations:  bedside commode, walker, rolling (Transport wheelchair)  Barriers to discharge:   (Current functional level)    Assessment:     Shannan Figueredo is a 67 y.o. female with a medical diagnosis of Gangrene of toe of left foot.  She presents with daughter in room.  Pt reporting left foot pain but did not c/o foot pain during ADL mobility.  Pt needing Max verbal and physical assist for safety awareness and had one major LOB with pt needing Max A from OT to regain balance.  Pt continued to attempt to walk out and around RW during ADL mobility.  Pt not able to follow any type of limited weight bearing through left LE.  Performance deficits affecting function: weakness, impaired endurance, impaired self care skills, impaired functional mobility, impaired sensation, decreased upper extremity function, gait instability, impaired balance, decreased lower extremity function, decreased safety awareness, pain, impaired skin, edema, decreased coordination, impaired cognition, decreased ROM, impaired cardiopulmonary response to activity, impaired joint extensibility, orthopedic precautions.      Rehab Prognosis: Fair; patient would benefit from acute skilled OT services to address these deficits and reach maximum level of function.       Plan:     Patient to be seen 4 x/week to address the above listed problems via self-care/home management, therapeutic activities, therapeutic exercises  Plan of Care Expires: 10/19/23  Plan of Care Reviewed with: patient, daughter    Subjective     Chief Complaint: Left foot pain  Patient/Family Comments/goals: Pt stating she is often  incontinent of bowel.    Occupational Profile:  Living Environment: Lives with daughter in [place], [place], multi-level home, bedroom and bathroom on 2nd floor, tub/shower  Previous level of function: Mod I<>Min A level, rollator for community mobility  Equipment Used at Home: bath bench, cane, straight, rollator  Assistance upon Discharge: Daughter in [place] but not present during evaluation    Pain/Comfort:  Pain Rating 1: 6/10  Location - Side 1: Left  Location 1: foot  Pain Addressed 1: Pre-medicate for activity, Reposition, Distraction, Cessation of Activity, Nurse notified  Pain Rating Post-Intervention 1:  (Less pain once lying in bed but not rating pain.)    Patients cultural, spiritual, Mandaen conflicts given the current situation: no    Objective:     Communicated with: nurse prior to session.  Patient found up in chair with peripheral IV, oxygen upon OT entry to room.  Pt's daughter present.    General Precautions: Standard, fall  Orthopedic Precautions:  (No WB precautions; Attempting to have pt reduce weight bearing on left foot but pt not able to adhere to decreased weight bearing for conservative measures.)  Braces:  (Podiatry surgical shoe - left)  Respiratory Status: Room air; Pt's O2 sat 98%; Education on pt taking deep breaths occasionally and sitting with upright posture in chair and when in bed for cardiopulmonary function    Occupational Performance:    Bed Mobility:    Sit to supine: Min A    Functional Mobility/Transfers: Gait belt  Sit to stand: Min A with RW, cues for safety and hand placement  Transfers: Mod A with RW mostly due to pt's poor safety awareness, decreased command following for safety and impulsivity  Functional Mobility: MAJOR LOB in bathroom during ADL mobility with RW, pt suddenly stepping outside of RW and pushing it aside and reaching for grab bar by toilet that was several feet away and OT had to provide Max A for pt to recover balance;  When walking to sink with RW, pt  attempting to greatly increase speed and step outside of RW spontaneously with OT and pt's daughter having to physically stop pt while raising voices to have pt stop attempts to walk around RW and putting left hand on countertop for balance instead of RW.     Activities of Daily Living:  Toileting: Max A; pt incontinent of bowel; Assist for clothing management and hygiene.  Pt attempting to perform toilet hygiene but smearing feces on toilet seat, her buttocks/posterior thigh and her hand.  At end of tx session, BSC set up in pt's room for pt to use with assist of nursing staff next to pt's bed only.  Due to impulsivity, OT placed BSC across the room.  LB Dressing: Total A for doffing left Podiatry shoe and SBA for doffing/donning  sock  UB Dressing: Min A for changing hospital gowns  Sponge bathing: Mod A     Cognitive/Visual Perceptual:  Cognitive/Psychosocial Skills:     -       Oriented to: Person and general situation and place   -       Follows Commands/attention:50-75% of commands but needing physical assist to follow commands for safety during ADL mobilty  -       Communication: clear/fluent  -       Memory: Needs further assessment  -       Safety awareness/insight to disability: Very poor   -       Mood/Affect/Coping skills/emotional control: Generally cooperative    Physical Exam:  UE Function: Grossly WFL for self care tasks  Left Hand dominant  Left UE Strength: 5/5  Right UE Strength: Shoulder 3+/5; Elbow flexion/extension 4/5;  3+/5  Sensation: Impaired left foot    AMPAC 6 Click ADL:  AMPAC Total Score: 16    Treatment & Education:  Role of OT, POC, ADL and ADL mobilty training, safety awareness, elevation of left LE, increasing time sitting upright in bed/chair for cardiopulmonary health/PNA prevention, assist from staff for OOB and use of BSC with staff assist    Patient left with bed in chair position with all lines intact, call button in reach, bed alarm on, nurse notified, and daughter  present    GOALS:   Multidisciplinary Problems       Occupational Therapy Goals          Problem: Occupational Therapy    Goal Priority Disciplines Outcome Interventions   Occupational Therapy Goal     OT, PT/OT Ongoing, Progressing    Description: Goals to be met by: 9/30/23     Patient will increase functional independence with ADLs by performing:    Toileting at SBA.   BSC transfer at SBA.  Donning/doffing left Podiatry shoe at SBA.   Sponge bathing at SBA.                            History:     Past Medical History:   Diagnosis Date    Coronary artery disease     Diabetes mellitus, type II     End stage renal disease     Hyperlipidemia     Hypertension     Peripheral vascular disease          Past Surgical History:   Procedure Laterality Date    CAROTID ENDARTERECTOMY Left     HYSTERECTOMY         Time Tracking:     OT Date of Treatment: 09/19/23  OT Start Time: 1433  OT Stop Time: 1541  OT Total Time (min): 68 min    Billable Minutes:Evaluation 18  Self Care/Home Management 50    9/19/2023

## 2023-09-19 NOTE — CONSULTS
[facility]    Date of admission:  9/17/2023     Reason for Consult:    Peripheral arterial disease    HPI:    This 67-year-old female came to the emergency department for problems with her left 1st toe.  She was admitted for gangrene.  She resides in [place] and was visiting in [place].  She had an angiogram scheduled in [place].  We have been asked to evaluate her for angiography and possible intervention.    Medications  Current Facility-Administered Medications   Medication Dose Route Frequency Provider Last Rate Last Admin    0.9%  NaCl infusion   Intravenous Once Nirmal Betts NP        acetaminophen tablet 650 mg  650 mg Oral Q6H PRN JOSE Cristina MD        calcitRIOL capsule 0.25 mcg  0.25 mcg Oral Every Mon, Wed, Fri Nirmal Betts NP   0.25 mcg at 09/18/23 1145    calcium carbonate 200 mg calcium (500 mg) chewable tablet 500 mg  500 mg Oral BID Nirmal Betts NP   500 mg at 09/19/23 1109    carvediloL tablet 3.125 mg  3.125 mg Oral BID Nirmal Betts NP   3.125 mg at 09/19/23 0935    dextrose 10% bolus 125 mL 125 mL  12.5 g Intravenous PRN JOSE Cristina MD        dextrose 10% bolus 250 mL 250 mL  25 g Intravenous PRN JOSE Cristina MD        glucagon (human recombinant) injection 1 mg  1 mg Intramuscular PRN JOSE Cristina MD        glucose chewable tablet 16 g  16 g Oral PRN JOSE Cristina MD        glucose chewable tablet 24 g  24 g Oral PRN JOSE Cristina MD        heparin (porcine) injection 5,000 Units  5,000 Units Subcutaneous Q8H JOSE Cristina MD   5,000 Units at 09/19/23 0505    hydrALAZINE injection 10 mg  10 mg Intravenous Q6H PRN JOSE Cristina MD        HYDROcodone-acetaminophen 5-325 mg per tablet 1 tablet  1 tablet Oral Q6H PRJOSE Schroeder MD   1 tablet at 09/19/23 0512    HYDROmorphone injection 0.5 mg  0.5 mg Intravenous Q6H PRN JOSE Cristina MD   0.5 mg at 09/18/23 2028    insulin aspart U-100 pen 0-5 Units  0-5 Units  Subcutaneous QID (AC + HS) PRN JOSE Cristina MD        losartan tablet 50 mg  50 mg Oral Daily Nirmal Betts NP   50 mg at 09/19/23 0935    melatonin tablet 6 mg  6 mg Oral Nightly PRN JOSE Cristina MD   6 mg at 09/18/23 0149    mupirocin 2 % ointment   Nasal BID Edgar Moore MD   Given at 09/19/23 1109    NIFEdipine 24 hr tablet 30 mg  30 mg Oral Daily JOSE Cristina MD   30 mg at 09/19/23 0935    ondansetron disintegrating tablet 4 mg  4 mg Oral Q6H PRN JOSE Cristina MD        ondansetron injection 4 mg  4 mg Intravenous Q6H PRN JOSE Cristina MD        piperacillin-tazobactam (ZOSYN) 4.5 g in dextrose 5 % in water (D5W) 100 mL IVPB (MB+)  4.5 g Intravenous Q12H JOSE Cristina MD   Stopped at 09/19/23 0553    sevelamer carbonate tablet 1,600 mg  1,600 mg Oral TID WM Nirmal Betts NP   1,600 mg at 09/19/23 1109    sodium chloride 0.9% bolus 250 mL 250 mL  250 mL Intravenous PRN Nirmal Betts NP        sodium chloride 0.9% flush 10 mL  10 mL Intravenous PRN JOSE Cristina MD        vancomycin - pharmacy to dose   Intravenous pharmacy to manage frequency JSOE Cristina MD        vitamin renal formula (B-complex-vitamin c-folic acid) 1 mg per capsule 1 capsule  1 capsule Oral Daily Nirmal Betts NP   1 capsule at 09/19/23 0935      Prior to Admission medications    Medication Sig Start Date End Date Taking? Authorizing Provider   allopurinoL (ZYLOPRIM) 300 MG tablet Take 300 mg by mouth once daily.   Yes Provider, Historical   calcium acetate,phosphat bind, (PHOSLO) 667 mg capsule Take 2,001 mg by mouth 3 (three) times daily with meals.   Yes Provider, Historical   HYDROcodone-acetaminophen (NORCO) 5-325 mg per tablet Take 1 tablet by mouth every 6 to 8 hours as needed for Pain. 9/14/23  Yes Provider, Historical   labetaloL (NORMODYNE) 300 MG tablet Take 300 mg by mouth 2 (two) times daily.   Yes Provider, Historical   losartan (COZAAR) 50 MG tablet Take 75 mg by mouth  once daily.   Yes Provider, Historical   NIFEdipine (ADALAT CC) 30 MG TbSR Take 30 mg by mouth once daily.   Yes Provider, Historical   sulfamethoxazole-trimethoprim 800-160mg (BACTRIM DS) 800-160 mg Tab Take 1 tablet by mouth 2 (two) times daily.   Yes Provider, Historical   brimonidine 0.2% (ALPHAGAN) 0.2 % Drop Place 1 drop into both eyes 2 (two) times a day.    Provider, Historical   timoloL (BETIMOL) 0.5 % ophthalmic solution Place 1 drop into both eyes 2 (two) times daily.    Provider, Historical       History  Past Medical History:   Diagnosis Date    Coronary artery disease     Diabetes mellitus, type II     End stage renal disease     Hyperlipidemia     Hypertension     Peripheral vascular disease      Past Surgical History:   Procedure Laterality Date    CAROTID ENDARTERECTOMY Left     HYSTERECTOMY       Social History     Socioeconomic History    Marital status:    Tobacco Use    Smoking status: Never    Smokeless tobacco: Never   Substance and Sexual Activity    Alcohol use: Never    Drug use: Never     Social Determinants of Health     Financial Resource Strain: Low Risk  (9/17/2023)    Overall Financial Resource Strain (CARDIA)     Difficulty of Paying Living Expenses: Not hard at all   Food Insecurity: No Food Insecurity (9/17/2023)    Hunger Vital Sign     Worried About Running Out of Food in the Last Year: Never true     Ran Out of Food in the Last Year: Never true   Transportation Needs: No Transportation Needs (9/17/2023)    PRAPARE - Transportation     Lack of Transportation (Medical): No     Lack of Transportation (Non-Medical): No   Physical Activity: Inactive (9/17/2023)    Exercise Vital Sign     Days of Exercise per Week: 0 days     Minutes of Exercise per Session: 0 min   Stress: Stress Concern Present (9/17/2023)    [employer] [place] [employer] - Occupational Stress Questionnaire     Feeling of Stress : Very much   Social Connections: Socially Isolated (9/17/2023)     Social Connection and Isolation Panel [NHANES]     Frequency of Communication with Friends and Family: Twice a week     Frequency of Social Gatherings with Friends and Family: Never     Attends [employer] Services: 1 to 4 times per year     Active Member of Clubs or Organizations: No     Attends Club or Organization Meetings: Never     Marital Status:    Housing Stability: Low Risk  (9/17/2023)    Housing Stability Vital Sign     Unable to Pay for Housing in the Last Year: No     Number of Places Lived in the Last Year: 1     Unstable Housing in the Last Year: No     Family History   Problem Relation Age of Onset    Diabetes Mother     Kidney disease Mother         Allergies  Review of patient's allergies indicates:  No Known Allergies    Review of Systems   Review of Systems   Constitutional: Negative for malaise/fatigue.   Cardiovascular:  Negative for chest pain, dyspnea on exertion, leg swelling, orthopnea and paroxysmal nocturnal dyspnea.   Respiratory:  Negative for cough, shortness of breath and wheezing.    Hematologic/Lymphatic: Negative for bleeding problem.   Gastrointestinal:  Negative for constipation, nausea and vomiting.   Neurological:  Negative for dizziness.       Physical Exam    Temp:  [96.7 °F (35.9 °C)-98.5 °F (36.9 °C)]   Pulse:  [55-58]   Resp:  [16-18]   BP: (137-151)/(63-68)   SpO2:  [91 %-94 %]    Wt Readings from Last 1 Encounters:   09/18/23 63.5 kg (139 lb 15.9 oz)     Physical Exam  Constitutional:       General: She is not in acute distress.  Neck:      Vascular: No hepatojugular reflux or JVD.   Cardiovascular:      Rate and Rhythm: Normal rate and regular rhythm.      Pulses:           Femoral pulses are 2+ on the right side and 2+ on the left side.       Dorsalis pedis pulses are 0 on the left side.        Posterior tibial pulses are 0 on the left side.      Heart sounds: S1 normal and S2 normal. Murmur heard.      Systolic murmur is present with a grade of 3/6.      Gallop  present. S4 sounds present. No S3 sounds.   Pulmonary:      Effort: Pulmonary effort is normal.      Breath sounds: Normal breath sounds and air entry.   Abdominal:      General: Bowel sounds are normal.      Palpations: Abdomen is soft. There is no hepatomegaly.      Tenderness: There is no abdominal tenderness.   Musculoskeletal:      Right lower leg: No edema.      Left lower leg: No edema.   Skin:     Coloration: Skin is not pale.   Neurological:      Mental Status: She is alert.   Psychiatric:         Behavior: Behavior is cooperative.         EKG  Sinus rhythm with nonspecific T-wave abnormality    Labs  Recent Results (from the past 72 hour(s))   CBC auto differential    Collection Time: 09/17/23  9:16 AM   Result Value Ref Range    WBC 10.30 3.90 - 12.70 K/uL    RBC 4.13 4.00 - 5.40 M/uL    Hemoglobin 11.7 (L) 12.0 - 16.0 g/dL    Hematocrit 36.5 (L) 37.0 - 48.5 %    MCV 88 82 - 98 fL    MCH 28.3 27.0 - 31.0 pg    MCHC 32.1 32.0 - 36.0 g/dL    RDW 14.7 (H) 11.5 - 14.5 %    Platelets 253 150 - 450 K/uL    MPV 9.4 9.2 - 12.9 fL    Immature Granulocytes 0.7 (H) 0.0 - 0.5 %    Gran # (ANC) 8.2 (H) 1.8 - 7.7 K/uL    Immature Grans (Abs) 0.07 (H) 0.00 - 0.04 K/uL    Lymph # 0.9 (L) 1.0 - 4.8 K/uL    Mono # 1.0 0.3 - 1.0 K/uL    Eos # 0.1 0.0 - 0.5 K/uL    Baso # 0.07 0.00 - 0.20 K/uL    nRBC 0 0 /100 WBC    Gran % 79.1 (H) 38.0 - 73.0 %    Lymph % 8.4 (L) 18.0 - 48.0 %    Mono % 9.8 4.0 - 15.0 %    Eosinophil % 1.3 0.0 - 8.0 %    Basophil % 0.7 0.0 - 1.9 %    Differential Method Automated    Comprehensive metabolic panel    Collection Time: 09/17/23  9:16 AM   Result Value Ref Range    Sodium 138 136 - 145 mmol/L    Potassium 4.0 3.5 - 5.1 mmol/L    Chloride 97 95 - 110 mmol/L    CO2 24 23 - 29 mmol/L    Glucose 135 (H) 70 - 110 mg/dL    BUN 40 (H) 8 - 23 mg/dL    Creatinine 10.9 (H) 0.5 - 1.4 mg/dL    Calcium 9.4 8.7 - 10.5 mg/dL    Total Protein 7.2 6.0 - 8.4 g/dL    Albumin 3.5 3.5 - 5.2 g/dL    Total Bilirubin  1.0 0.1 - 1.0 mg/dL    Alkaline Phosphatase 84 55 - 135 U/L    AST 20 10 - 40 U/L    ALT 8 (L) 10 - 44 U/L    eGFR 3 (A) >60 mL/min/1.73 m^2    Anion Gap 17 (H) 8 - 16 mmol/L   Lipase    Collection Time: 09/17/23  9:16 AM   Result Value Ref Range    Lipase 22 4 - 60 U/L   HIV 1/2 Ag/Ab (4th Gen)    Collection Time: 09/17/23  9:16 AM   Result Value Ref Range    HIV 1/2 Ag/Ab Negative Negative   Hepatitis C Antibody    Collection Time: 09/17/23  9:16 AM   Result Value Ref Range    Hepatitis C Ab Negative Negative   C-Reactive Protein    Collection Time: 09/17/23  9:16 AM   Result Value Ref Range    CRP 26.4 (H) 0.0 - 8.2 mg/L   Sedimentation rate    Collection Time: 09/17/23  9:16 AM   Result Value Ref Range    Sed Rate 60 (H) 0 - 20 mm/Hr   Lactic acid, plasma    Collection Time: 09/17/23  9:31 AM   Result Value Ref Range    Lactate (Lactic Acid) 1.2 0.5 - 2.2 mmol/L   Hemoglobin A1C    Collection Time: 09/17/23  9:31 AM   Result Value Ref Range    Hemoglobin A1C 5.7 (H) 4.0 - 5.6 %    Estimated Avg Glucose 117 68 - 131 mg/dL   Blood Culture #1 **CANNOT BE ORDERED STAT**    Collection Time: 09/17/23  1:37 PM    Specimen: Blood   Result Value Ref Range    Blood Culture, Routine No Growth to date     Blood Culture, Routine No Growth to date    Blood Culture #2 **CANNOT BE ORDERED STAT**    Collection Time: 09/17/23  1:38 PM    Specimen: Antecubital, Left Arm; Blood   Result Value Ref Range    Blood Culture, Routine No Growth to date     Blood Culture, Routine No Growth to date    POCT glucose    Collection Time: 09/17/23  4:59 PM   Result Value Ref Range    POCT Glucose 122 (H) 70 - 110 mg/dL   POCT glucose    Collection Time: 09/17/23  9:28 PM   Result Value Ref Range    POCT Glucose 112 (H) 70 - 110 mg/dL   Magnesium    Collection Time: 09/18/23  4:18 AM   Result Value Ref Range    Magnesium 2.2 1.6 - 2.6 mg/dL   CBC with Automated Differential    Collection Time: 09/18/23  4:18 AM   Result Value Ref Range    WBC  9.49 3.90 - 12.70 K/uL    RBC 3.59 (L) 4.00 - 5.40 M/uL    Hemoglobin 10.1 (L) 12.0 - 16.0 g/dL    Hematocrit 31.6 (L) 37.0 - 48.5 %    MCV 88 82 - 98 fL    MCH 28.1 27.0 - 31.0 pg    MCHC 32.0 32.0 - 36.0 g/dL    RDW 14.6 (H) 11.5 - 14.5 %    Platelets 213 150 - 450 K/uL    MPV 9.3 9.2 - 12.9 fL    Immature Granulocytes 0.6 (H) 0.0 - 0.5 %    Gran # (ANC) 7.2 1.8 - 7.7 K/uL    Immature Grans (Abs) 0.06 (H) 0.00 - 0.04 K/uL    Lymph # 1.0 1.0 - 4.8 K/uL    Mono # 1.1 (H) 0.3 - 1.0 K/uL    Eos # 0.1 0.0 - 0.5 K/uL    Baso # 0.06 0.00 - 0.20 K/uL    nRBC 0 0 /100 WBC    Gran % 75.6 (H) 38.0 - 73.0 %    Lymph % 10.2 (L) 18.0 - 48.0 %    Mono % 11.6 4.0 - 15.0 %    Eosinophil % 1.4 0.0 - 8.0 %    Basophil % 0.6 0.0 - 1.9 %    Differential Method Automated    Renal Function Panel    Collection Time: 09/18/23  4:18 AM   Result Value Ref Range    Glucose 158 (H) 70 - 110 mg/dL    Sodium 136 136 - 145 mmol/L    Potassium 4.1 3.5 - 5.1 mmol/L    Chloride 95 95 - 110 mmol/L    CO2 21 (L) 23 - 29 mmol/L    BUN 43 (H) 8 - 23 mg/dL    Calcium 8.0 (L) 8.7 - 10.5 mg/dL    Creatinine 11.3 (H) 0.5 - 1.4 mg/dL    Albumin 2.8 (L) 3.5 - 5.2 g/dL    Phosphorus 8.1 (H) 2.7 - 4.5 mg/dL    eGFR 3 (A) >60 mL/min/1.73 m^2    Anion Gap 20 (H) 8 - 16 mmol/L   Vancomycin, random    Collection Time: 09/18/23  4:18 AM   Result Value Ref Range    Vancomycin, Random 20.6 Not established ug/mL   POCT glucose    Collection Time: 09/18/23  7:55 AM   Result Value Ref Range    POCT Glucose 80 70 - 110 mg/dL   POCT glucose    Collection Time: 09/18/23 11:50 AM   Result Value Ref Range    POCT Glucose 84 70 - 110 mg/dL   POCT glucose    Collection Time: 09/18/23  4:34 PM   Result Value Ref Range    POCT Glucose 129 (H) 70 - 110 mg/dL   POCT glucose    Collection Time: 09/18/23  9:18 PM   Result Value Ref Range    POCT Glucose 101 70 - 110 mg/dL   Magnesium    Collection Time: 09/19/23  3:41 AM   Result Value Ref Range    Magnesium 2.0 1.6 - 2.6 mg/dL    Renal Function Panel    Collection Time: 09/19/23  3:41 AM   Result Value Ref Range    Glucose 129 (H) 70 - 110 mg/dL    Sodium 136 136 - 145 mmol/L    Potassium 3.8 3.5 - 5.1 mmol/L    Chloride 95 95 - 110 mmol/L    CO2 25 23 - 29 mmol/L    BUN 17 8 - 23 mg/dL    Calcium 8.4 (L) 8.7 - 10.5 mg/dL    Creatinine 6.3 (H) 0.5 - 1.4 mg/dL    Albumin 2.8 (L) 3.5 - 5.2 g/dL    Phosphorus 5.1 (H) 2.7 - 4.5 mg/dL    eGFR 7 (A) >60 mL/min/1.73 m^2    Anion Gap 16 8 - 16 mmol/L   CBC with Automated Differential    Collection Time: 09/19/23  3:42 AM   Result Value Ref Range    WBC 7.39 3.90 - 12.70 K/uL    RBC 3.58 (L) 4.00 - 5.40 M/uL    Hemoglobin 10.1 (L) 12.0 - 16.0 g/dL    Hematocrit 32.4 (L) 37.0 - 48.5 %    MCV 91 82 - 98 fL    MCH 28.2 27.0 - 31.0 pg    MCHC 31.2 (L) 32.0 - 36.0 g/dL    RDW 14.4 11.5 - 14.5 %    Platelets 214 150 - 450 K/uL    MPV 9.4 9.2 - 12.9 fL    Immature Granulocytes 0.4 0.0 - 0.5 %    Gran # (ANC) 5.1 1.8 - 7.7 K/uL    Immature Grans (Abs) 0.03 0.00 - 0.04 K/uL    Lymph # 0.9 (L) 1.0 - 4.8 K/uL    Mono # 1.1 (H) 0.3 - 1.0 K/uL    Eos # 0.2 0.0 - 0.5 K/uL    Baso # 0.08 0.00 - 0.20 K/uL    nRBC 0 0 /100 WBC    Gran % 68.5 38.0 - 73.0 %    Lymph % 12.6 (L) 18.0 - 48.0 %    Mono % 14.2 4.0 - 15.0 %    Eosinophil % 3.2 0.0 - 8.0 %    Basophil % 1.1 0.0 - 1.9 %    Differential Method Automated    POCT glucose    Collection Time: 09/19/23  9:29 AM   Result Value Ref Range    POCT Glucose 75 70 - 110 mg/dL   POCT glucose    Collection Time: 09/19/23 11:13 AM   Result Value Ref Range    POCT Glucose 101 70 - 110 mg/dL        Imaging  MRI Foot (Forefoot) Left W W/O Contrast    Result Date: 9/19/2023  EXAMINATION: MRI FOOT (FOREFOOT) LEFT W W/O CONTRAST CLINICAL HISTORY: Foot swelling, diabetic, osteomyelitis suspected, xray done;  Gangrene, not elsewhere classified TECHNIQUE: Routine MRI evaluation of the left forefoot performed with and without the use of 6 cc of Gadavist IV contrast. COMPARISON:  Radiograph 09/17/2023. FINDINGS: Examination markedly degraded by patient motion artifact. LIGAMENTS: Not well evaluated secondary to motion artifact. TENDONS: No tendinosis or tenosynovitis. BONES: Questionable marrow edema within the 1st distal phalanx with preserved T1 medullary signal.  No fractures.  No avascular necrosis. JOINTS/CARTILAGE: 2nd and 3rd TMT, 1st MTP and scattered IP osteoarthritis.  No dislocation. MUSCLES: Mild fatty degeneration of the visualized musculature. MISCELLANEOUS: Visualized plantar aponeurosis appears within normal limits.     1. Examination markedly degraded by patient motion artifact. 2. Questionable marrow edema within the 1st distal phalanx, not well evaluated given aforementioned limitation but possibly related to osteomyelitis given reported history of gangrene. Electronically signed by: Trev Sunshine MD Date:    09/19/2023 Time:    11:02    US Lower Extremity Arteries Bilateral    Result Date: 9/17/2023  EXAMINATION: US LOWER EXTREMITY ARTERIES BILATERAL CLINICAL HISTORY: PVD; TECHNIQUE: Bilateral lower extremity arterial duplex ultrasound examination performed. Multiple gray scale and color doppler images were obtained in addition to waveform analysis. COMPARISON: None FINDINGS: The peak systolic velocities on the right are as follows, in centimeters/second: Common femoral artery: 126, biphasic Deep profundus: 182, biphasic Superficial femoral artery, proximal: 107, monophasic Superficial femoral artery, mid portion: 105, monophasic Superficial femoral artery, distal: 64, monophasic Popliteal artery: 53, monophasic Distal popliteal artery: 69, monophasic Anterior tibial artery: 156, monophasic Posterior tibial artery: 25, monophasic Peroneal artery: 89, monophasic Dorsalis pedis: Patent The peak systolic velocities on the left are as follows, in centimeters/second: Common femoral artery: 82 Deep profundus: 104, biphasic Superficial femoral artery, proximal: 93, biphasic  Superficial femoral artery, mid portion: 38, monophasic Superficial femoral artery, distal: Nonvisualized flow, likely occluded Popliteal artery: 65, monophasic Distal popliteal artery: 45, monophasic Anterior tibial artery: 15, monophasic Posterior tibial artery: 10, monophasic Peroneal artery: 33, monophasic Dorsalis pedis: Patent     Significant lower extremity peripheral vascular disease with velocity parameters as above.  Majority monophasic waveforms as can be seen with vessel hardening related to atherosclerosis. Left distal SFA occlusion with reconstituted monophasic flow at the popliteal artery. Velocity doubling at the right anterior tibial artery as can be seen with hemodynamically significant stenosis. Electronically signed by: Moses Merrill Date:    09/17/2023 Time:    14:43    X-Ray Chest AP Portable    Result Date: 9/17/2023  EXAMINATION: XR CHEST AP PORTABLE CLINICAL HISTORY: End stage renal disease TECHNIQUE: Single frontal view of the chest was performed. COMPARISON: None FINDINGS: The cardiomediastinal silhouette is prominent.  There is obscuration of the right costophrenic angle suggesting effusion.  The trachea is midline.  The lungs are symmetrically expanded bilaterally with prominent interstitial attenuation bilaterally.  There is increased parenchymal attenuation projected over the right lower lung zone..  There is no pneumothorax.  The osseous structures are remarkable for degenerative changes.  Vascular stent noted within the right arm..     1. Pulmonary findings suggest edema noting small right pleural effusion in the setting of volume overload.  The process is more focal projected over the medial aspect of the right lower lung zone, developing consolidation not excluded. Electronically signed by: Wenceslao Anderson MD Date:    09/17/2023 Time:    12:57    X-Ray Foot Complete Left    Result Date: 9/17/2023  EXAMINATION: XR FOOT COMPLETE 3 VIEW LEFT CLINICAL HISTORY: .  Local infection of  the skin and subcutaneous tissue, unspecified TECHNIQUE: AP, lateral and oblique views of the left foot were performed. COMPARISON: None FINDINGS: Diffuse osteopenia.  This could diminish sensitivity of radiography for detection of osseous abnormalities. Marked joint space narrowing at the 1st MTP joint with metatarsal head subchondral cyst formation.  These changes are favored to be related to chronic longstanding arthritis.  Acute septic arthritis is considered less likely. Mild soft tissue swelling in the forefoot, and possibly in the left great toe.  No acute fracture deformity or dislocation is apparent radiographically. Some soft tissue swelling is also suggested about the calcaneus.  An external dressing is questioned in this region.  No underlying radiopaque foreign body or soft tissue emphysema is apparent radiographically.  No definite osseous destruction of the calcaneus is apparent radiographically Extensive small vessel calcifications in the left lower leg and left foot. On the lateral view, a nodular calcification projecting plantar to the calcaneus is likely related to a tortuous calcified blood vessel.     Soft tissue swelling about the calcaneus, and in the forefoot. 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 Otherwise, and allowing for the diffuse osteopenia, there is no acute fracture deformity or direct radiographic evidence of osteomyelitis at this time. If there remains strong clinical suspicion for osteomyelitis, follow-up imaging would be recommended. Electronically signed by: Vinny Ramos Date:    09/17/2023 Time:    10:53      Assessment    Atherosclerotic peripheral vascular disease with gangrene  Noninvasive imaging has shown occlusion of the left superficial femoral artery    Plan and Discussion    Patient and family wished to proceed with angiography while she is here.  Will plan for tomorrow.  Transcatheter revascularization if possible. The procedure including its risks,  benefits and alternatives were discussed in detail.  All questions were answered.  After shared decision making, appropriate consents were signed.        Rao Sosa MD

## 2023-09-19 NOTE — PROGRESS NOTES
"[facility] Surg ([address] Medicine  Progress Note    Patient Name: Shannan Figueredo  MRN: [id number]  Patient Class: IP- Inpatient   Admission Date: 9/17/2023  Length of Stay: 2 days  Attending Physician: Brook Colon MD  Primary Care Provider: Roxi, Primary Doctor        Subjective:     Principal Problem:Gangrene of toe of left foot        HPI:  Ms. Figueredo is a 67/F with PMH HTN, DMII (diet-controlled), PVD, ESRD on HD (M/W/F), anemia who presented to [facility] 09/17 with a two day progressive history of worsening L foot pain, swelling, fatigue, nausea and vomiting. History obtained from patient and daughter Stephanie ([phone]); they are from the [place], [place] area. Patient reports several weeks ago she "stubbed her toe," noting initially some pain, redness and swelling. Gradually symptoms progressed with duskiness to her toe and she informed her daughter of her pain for which she was brought to ER in [place] on 08/25 for further evaluation. With duskiness to 1st toe and surrounding erythema she was treated for cellulitis with a course of ciprofloxacin and metronidazole, but failed to improve. She followed up with her podiatrist 09/12 where she was prescribed hydrocodone-acetaminophen and a ten day course of TMP-SMX which she has been taking. She was referred to vascular surgery with plan for angiography and potential intervention  She and her family came to [place] for the weekend but her pain worsened; had been attempting to minimize use of hydrocodone-acetaminophen but developed nausea, vomiting and progressive fatigue. She subsequently presented to ED for further evaluation. ED workup was notable for L 1st toe dry-appearing gangrene with surrounding erythema and swelling, removed nail of second toe, XR L foot demonstrating diffuse osteopenia, L calcaneal and forefoot soft tissue swelling without definitive evidence of osteomyelitis in setting of osteopenia, no leukocytosis, elevated sed " rate and CRP, and elevated BUN/Cr in setting of ESRD. She notes she went without dialysis on 09/15 due to her trip. Blood cultures were ordered and she received piperacillin-tazobactam and vancomycin. Hospital medicine was subsequently contacted for admission.      Overview/Hospital Course:  Admitted with gangrene of L 1st toe. Surgery, nephrology, podiatry consulted; U/S arterial demonstrated L SFA occlusion with distal reconstitution. Cardiology consulted for angiography/potential angioplasty.      Interval History: No acute events overnight, no new complaints. Discussed plan of care with patient / daughters (in person and via phone).     Review of Systems   Constitutional:  Positive for fatigue. Negative for chills and fever.   Respiratory:  Negative for cough and shortness of breath.    Cardiovascular:  Negative for chest pain and palpitations.   Gastrointestinal:  Negative for abdominal pain, nausea and vomiting.     Objective:     Vital Signs (Most Recent):  Temp: 98.5 °F (36.9 °C) (09/19/23 1112)  Pulse: (!) 58 (09/19/23 1112)  Resp: 18 (09/19/23 1112)  BP: 137/63 (09/19/23 1112)  SpO2: (!) 92 % (09/19/23 1112) Vital Signs (24h Range):  Temp:  [96.7 °F (35.9 °C)-98.7 °F (37.1 °C)] 98.5 °F (36.9 °C)  Pulse:  [55-60] 58  Resp:  [16-18] 18  SpO2:  [91 %-95 %] 92 %  BP: (137-152)/(63-76) 137/63     Weight: 63.5 kg (139 lb 15.9 oz)  Body mass index is 23.3 kg/m².    Intake/Output Summary (Last 24 hours) at 9/19/2023 1347  Last data filed at 9/19/2023 0553  Gross per 24 hour   Intake 1037.91 ml   Output 3000 ml   Net -1962.09 ml           Physical Exam  Vitals and nursing note reviewed.   Constitutional:       General: She is not in acute distress.     Appearance: She is well-developed.   HENT:      Head: Normocephalic and atraumatic.   Eyes:      General:         Right eye: No discharge.         Left eye: No discharge.      Conjunctiva/sclera: Conjunctivae normal.   Cardiovascular:      Rate and Rhythm: Normal  rate.      Comments: Diminished BLE pulses.  Pulmonary:      Effort: Pulmonary effort is normal. No respiratory distress.      Comments: Mildly diminished at bases.  Abdominal:      Palpations: Abdomen is soft.      Tenderness: There is no abdominal tenderness.   Musculoskeletal:         General: Normal range of motion.      Right lower leg: No edema.      Left lower leg: No edema.      Comments: RUE fistula with thrill.   Skin:     Comments: LLE cool to touch and dry. L first toe with gangrenous changes with surrounding erythema, no noted purulence or drainage. L 2nd toe with absent nail.   Neurological:      Mental Status: She is alert, oriented to person, place, and time and easily aroused.           Significant Labs:   CBC:  Recent Labs   Lab 09/17/23  0916 09/18/23 0418 09/19/23  0342   WBC 10.30 9.49 7.39   HGB 11.7* 10.1* 10.1*   HCT 36.5* 31.6* 32.4*    213 214   GRAN 79.1*  8.2* 75.6*  7.2 68.5  5.1   LYMPH 8.4*  0.9* 10.2*  1.0 12.6*  0.9*   MONO 9.8  1.0 11.6  1.1* 14.2  1.1*   EOS 0.1 0.1 0.2   BASO 0.07 0.06 0.08     CMP:  Recent Labs   Lab 09/17/23  0916 09/18/23 0418 09/19/23  0341    136 136   K 4.0 4.1 3.8   CL 97 95 95   CO2 24 21* 25   BUN 40* 43* 17   CREATININE 10.9* 11.3* 6.3*   * 158* 129*   CALCIUM 9.4 8.0* 8.4*   MG  --  2.2 2.0   PHOS  --  8.1* 5.1*   ALKPHOS 84  --   --    AST 20  --   --    ALT 8*  --   --    BILITOT 1.0  --   --    PROT 7.2  --   --    ALBUMIN 3.5 2.8* 2.8*   ANIONGAP 17* 20* 16        Significant Imaging: I have reviewed and interpreted all pertinent imaging results/findings within the past 24 hours.      Assessment/Plan:      * Gangrene of toe of left foot  HTN, PVD  - L 1st toe gangrene predominantly dry with some surrounding erythema/swelling; with concern for worsening infection started pipeacillin-tazobactam and vancomycin IV. 2nd toe with some involvement as well. Continue broad spectrum antibiotic therapy for now. No evidence of  sepsis at this time.  - U/S arterial BLE showed L distal SFA occlusion with distal reconstitution. Discussed with vascular surgery; cardiology consult for consideration of angiography / potential angioplasty.  - Podiatry consulted, appreciate assistance. MRI L foot pending.  - Continue hydrocodone-acetaminophen 5-325mg PO q6hr PRN, hydromorphone 0.5mg IV q6hr PRN.  - Continue labetalol 300mg PO BID, losartan 50mg PO daily, nifedipine 30mg PO daily.  - Blood culture NGTD  - Case discussed with Cardiology, plan for angiogram tomorrow    Type 2 diabetes mellitus with chronic kidney disease on chronic dialysis  - Reports diet-controlled; no current medications.  - HgbA1c 5.7.  - Continue low-dose sliding scale insulin aspart 0-5U subQ TIDWM PRN, monitor requirements.    ESRD (end stage renal disease)  Anemia, hyperparathyroidism  - ESRD on HD, reports missed session Friday prior to presentation due to travel.  - Mild anemia without evidence of bleeding.  - Continue allopurinol 300mg PO daily, calcium acetate 2001mg PO TIDWM.  - Nephrology consulted for HD assistance.      VTE Risk Mitigation (From admission, onward)         Ordered     heparin (porcine) injection 5,000 Units  Every 8 hours         09/17/23 1351     IP VTE HIGH RISK PATIENT  Once         09/17/23 1351                      Brook Colon MD  Department of Hospital Medicine   [facility] Med Surg ([address])

## 2023-09-19 NOTE — PROGRESS NOTES
[facility] Surg ([address])  Podiatry  Progress Note    Patient Name: Shannan Figueredo  MRN: [id number]  Admission Date: 9/17/2023  Hospital Length of Stay: 2 days  Attending Physician: Brook Colon MD  Primary Care Provider: Roxi, Primary Doctor     Subjective:     Interval History:  Patient seen at bedside.  Family present.  Patient reports toe has been about the same for a few weeks.  Was to receive antibiotics with dialysis last Friday, but missed it.     Otherwise, stable, pending vascular/cardiology evaluation.   Arterial u/s done.  Foot MRI pending.   Wound care with betadine to toe.        Scheduled Meds:   sodium chloride 0.9%   Intravenous Once    calcitRIOL  0.25 mcg Oral Every Mon, Wed, Fri    calcium acetate(phosphat bind)  2,001 mg Oral TID WM    carvediloL  3.125 mg Oral BID    heparin (porcine)  5,000 Units Subcutaneous Q8H    losartan  50 mg Oral Daily    mupirocin   Nasal BID    NIFEdipine  30 mg Oral Daily    piperacillin-tazobactam (Zosyn) IV (PEDS and ADULTS) (extended infusion is not appropriate)  4.5 g Intravenous Q12H    vitamin renal formula (B-complex-vitamin c-folic acid)  1 capsule Oral Daily       Continuous Infusions:  PRN Meds:acetaminophen, dextrose 10%, dextrose 10%, glucagon (human recombinant), glucose, glucose, hydrALAZINE, HYDROcodone-acetaminophen, HYDROmorphone, insulin aspart U-100, melatonin, ondansetron, ondansetron, sodium chloride 0.9%, sodium chloride 0.9%, Pharmacy to dose Vancomycin consult **AND** vancomycin - pharmacy to dose      Review of Systems      Objective:     Vital Signs (Most Recent):  Temp: 98.1 °F (36.7 °C) (09/19/23 0733)  Pulse: (!) 56 (09/19/23 0733)  Resp: 16 (09/19/23 0733)  BP: (!) 146/64 (09/19/23 0733)  SpO2: (!) 93 % (09/19/23 0733) Vital Signs (24h Range):  Temp:  [96.7 °F (35.9 °C)-98.7 °F (37.1 °C)] 98.1 °F (36.7 °C)  Pulse:  [50-60] 56  Resp:  [16-18] 16  SpO2:  [54 %-95 %] 93 %  BP: (133-157)/(64-88) 146/64     Weight: 63.5 kg (139 lb  15.9 oz)  Body mass index is 23.3 kg/m².    Foot Exam    Laboratory:    CRP   Date Value Ref Range Status   09/17/2023 26.4 (H) 0.0 - 8.2 mg/L Final       Sed Rate   Date Value Ref Range Status   09/17/2023 60 (H) 0 - 20 mm/Hr Final       WBC   Date Value Ref Range Status   09/19/2023 7.39 3.90 - 12.70 K/uL Final       Hemoglobin A1C   Date Value Ref Range Status   09/17/2023 5.7 (H) 4.0 - 5.6 % Final     Comment:     ADA Screening Guidelines:  5.7-6.4%  Consistent with prediabetes  >or=6.5%  Consistent with diabetes    High levels of fetal hemoglobin interfere with the HbA1C  assay. Heterozygous hemoglobin variants (HbS, HgC, etc)do  not significantly interfere with this assay.   However, presence of multiple variants may affect accuracy.             Diagnostic Results:  Pending FOOT MRI.       Clinical Findings:  Stable dry gangrene left hallux,  ulcerated nail bed left 2nd toe with localized redness.  No active drainage.  No exposed bone.    Toes are cool and painful to touch.   Pedal pulses non palpable. Range of motion limited.                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   -- Pending MRI.  -- Continue antibiotics and  Vasc evaluation.    -- Continue wound care with betadine.   WBAT  -- Patient lives in [place] and will likely follow up there for toe amputation where she is established with podiatry and vascular.  Spoke with daughter who is in [place], Stephanie, on the phone and all questions answered.  If toe worsens quickly, will need amputation sooner.          Carmen Omalley DPM  Podiatry  [facility] - Med Surg ([address])

## 2023-09-19 NOTE — PROGRESS NOTES
"Nephrology  Progress Note    Admit Date: 9/17/2023   LOS: 2 days     SUBJECTIVE:     Follow-up For:  Gangrene of toe of left foot    History of Present Illness:  Patient is a 67 y.o. female presents with left foot gangrene, vomiting, feeling ill.  Ext med hx as outlined below including ESRD on HD MWF in [place].  Has been dealing with necrotic left toes for few months and followed by vascular in [place].  Was here visiting daughter and felt bad.  Admitted for eval/treatment.  Consulted for HD needs.  Pt seen and examined on HD.  Consents signed.  Tired from being up all night with pain.  Updated team and daughter at bedside.  W/up noted.     Interval History:     Uneventful night.  Discussed with Dr. Colon/family/(telephone-family members) this am.  Awaiting timing of angio.  Labs stable.  No c/o today.     Review of Systems:  Constitutional: No fever or chills  Respiratory: No cough or shortness of breath  Cardiovascular: No chest pain or palpitations  Gastrointestinal: No nausea or vomiting  Neurological: No confusion or weakness    OBJECTIVE:     Vital Signs Range (Last 24H):  BP (!) 146/64 (Patient Position: Lying)   Pulse (!) 56   Temp 98.1 °F (36.7 °C) (Oral)   Resp 16   Ht 5' 5" (1.651 m)   Wt 63.5 kg (139 lb 15.9 oz)   SpO2 (!) 93%   BMI 23.30 kg/m²     Temp:  [96.7 °F (35.9 °C)-98.7 °F (37.1 °C)]   Pulse:  [50-60]   Resp:  [16-18]   BP: (133-157)/(64-88)   SpO2:  [54 %-95 %]     I & O (Last 24H):  Intake/Output Summary (Last 24 hours) at 9/19/2023 0932  Last data filed at 9/19/2023 0553  Gross per 24 hour   Intake 1157.91 ml   Output 3000 ml   Net -1842.09 ml       Physical Exam:  General appearance: chronically ill appearing but NAD today.    Eyes:  Conjunctivae/corneas clear. PERRL.  Lungs: Normal respiratory effort,   clear to auscultation bilaterally   Heart: Regular rate and rhythm, S1, S2 normal, no murmur, rub or nelly.  Abdomen: Soft, non-tender non-distended; bowel sounds normal; no masses,  no " "organomegaly  Extremities: No cyanosis or clubbing. No edema.    Skin: Skin color, texture, turgor normal. No rashes or lesions  Neurologic: Normal strength and tone. No focal numbness or weakness   Right arm AVF+  Left big/second toes necrotic.     Laboratory Data:  Recent Labs   Lab 09/19/23  0342   WBC 7.39   RBC 3.58*   HGB 10.1*   HCT 32.4*      MCV 91   MCH 28.2   MCHC 31.2*       BMP:   Recent Labs   Lab 09/19/23  0341   *      K 3.8   CL 95   CO2 25   BUN 17   CREATININE 6.3*   CALCIUM 8.4*   MG 2.0   PHOS 5.1*     Lab Results   Component Value Date    CALCIUM 8.4 (L) 09/19/2023    PHOS 5.1 (H) 09/19/2023       Lab Results   Component Value Date    CALCIUM 8.4 (L) 09/19/2023    PHOS 5.1 (H) 09/19/2023       No results found for: "URICACID"    BNP  No results for input(s): "BNP", "BNPTRIAGEBLO" in the last 168 hours.    Medications:  Medication list was reviewed and changes noted under Assessment/Plan.    Diagnostic Results:    reviewed    ASSESSMENT/PLAN:       ESRD on HD MWF in [place]:  -consulted for HD needs.  -consents signed  -continue HD MWF while here and if she stays after hospitalization will need temp outpt unit.   -labs noted and bath adjusted.  -R arm AVF+  -9/19: HD yesterday w/o issue.  No need today.  -Renally dose meds, avoid nephrotoxins, and monitor I/O's closely.     Necrotic Left toes:  -defer to pod/vascular/cards.  -anticipate amputation.      HTN:  -stable on current regimen     MBD:  -binders/nephrocaps.   Change to renvela.  Pt states that she also takes tums at home  -will use calcitriol on HD days.     Anemia of CKD:  -at goal currently.   -trend      As above  Will follow for renal needs               "

## 2023-09-19 NOTE — PLAN OF CARE
Recommendations  1) Recommend John BID to promote wound healing    2) Continue diabetic, renal diet as tolerated    3) Encourage intake of meals    4) RD to follow to monitor nutrition status    Goals:   Pt will maintain 50-75% intake of meals by RD follow up  Nutrition Goal Status: new  Communication of RD Recs:  (POC)

## 2023-09-19 NOTE — CONSULTS
[facility] - Med Surg ([address])  Adult Nutrition  Consult Note    SUMMARY     Recommendations  1) Recommend John BID to promote wound healing    2) Continue diabetic, renal diet as tolerated    3) Encourage intake of meals    4) RD to follow to monitor nutrition status    Goals:   Pt will maintain 50-75% intake of meals by RD follow up  Nutrition Goal Status: new  Communication of RD Recs:  (POC)    Assessment and Plan  Nutrition Problem  Increased nutrient needs    Related to (etiology):   Diagnosis r/symptoms    Signs and Symptoms (as evidenced by):   ESRD on HD; poor wound healing 2/2 gangrene infection of L toe; 0% intake of breakfast/lunch 9/18     Interventions (treatment strategy):  CHO modified diet  Mineral modified diet  Collaboration with other providers    Nutrition Diagnosis Status:   New      Malnutrition Assessment     Hair/Scalp (Micronutrient): none               Worship Region (Muscle Loss): mild depletion  Clavicle Bone Region (Muscle Loss): mild depletion                 Reason for Assessment    Reason For Assessment: consult, low Yuan (wound)  Diagnosis:  (Gangrene of toe of left foot)  Relevant Medical History:   Patient Active Problem List   Diagnosis    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 poor appetite. Pt reports eating fruit for breakfast prior to HD, a couple bites of jello at time of assessment. Pt denied commercial beverage.  lb per pt. No GI intolerance, pt c/o toe pain. ESRD on HD. PIV. Yuan 17- L toe, heel.  Nutrition  "Discharge Planning: dc on ADA, renal diet as tolerated    Nutrition Risk Screen    Nutrition Risk Screen: large or nonhealing wound, burn or pressure injury, reduced oral intake over the last month    Nutrition/Diet History    Patient Reported Diet/Restrictions/Preferences: diabetic diet  Factors Affecting Nutritional Intake: pain, decreased appetite    Anthropometrics    Temp: 98.1 °F (36.7 °C)  Height: 5' 5" (165.1 cm)  Height (inches): 65 in  Weight Method: Bed Scale  Weight: 63.5 kg (139 lb 15.9 oz)  Weight (lb): 139.99 lb  Ideal Body Weight (IBW), Female: 125 lb  % Ideal Body Weight, Female (lb): 111.99 %  BMI (Calculated): 23.3  BMI Grade: 18.5-24.9 - normal       Lab/Procedures/Meds    Pertinent Labs Reviewed: reviewed  CBC:  Recent Labs   Lab 09/18/23  0418   WBC 9.49   HGB 10.1*   HCT 31.6*        CMP:  Recent Labs   Lab 09/18/23 0418   CALCIUM 8.0*   ALBUMIN 2.8*      K 4.1   CO2 21*   CL 95   BUN 43*   CREATININE 11.3*     Pertinent Medications Reviewed: reviewed  Scheduled Meds:   sodium chloride 0.9%   Intravenous Once    calcitRIOL  0.25 mcg Oral Every Mon, Wed, Fri    calcium acetate(phosphat bind)  2,001 mg Oral TID WM    heparin (porcine)  5,000 Units Subcutaneous Q8H    labetaloL  300 mg Oral BID    losartan  50 mg Oral Daily    mupirocin   Nasal BID    NIFEdipine  30 mg Oral Daily    piperacillin-tazobactam (Zosyn) IV (PEDS and ADULTS) (extended infusion is not appropriate)  4.5 g Intravenous Q12H    vancomycin (VANCOCIN) IV (PEDS and ADULTS)  500 mg Intravenous Once    vitamin renal formula (B-complex-vitamin c-folic acid)  1 capsule Oral Daily     Continuous Infusions:  PRN Meds:.acetaminophen, dextrose 10%, dextrose 10%, glucagon (human recombinant), glucose, glucose, hydrALAZINE, HYDROcodone-acetaminophen, HYDROmorphone, insulin aspart U-100, melatonin, ondansetron, ondansetron, sodium chloride 0.9%, sodium chloride 0.9%, Pharmacy to dose Vancomycin consult **AND** vancomycin - " pharmacy to dose    Estimated/Assessed Needs    Weight Used For Calorie Calculations: 63.5 kg (139 lb 15.9 oz)  Energy Calorie Requirements (kcal): 0181-0931  Energy Need Method: Kcal/kg (30-35)  Protein Requirements: 76 g (1.2 g/kg)  Weight Used For Protein Calculations: 63.5 kg (139 lb 15.9 oz)  Fluid Requirements (mL): 1 mL/kcal  Estimated Fluid Requirement Method:  (or per MD)  RDA Method (mL): 1905  CHO Requirement: 238g= 50%dv      Nutrition Prescription Ordered    Current Diet Order: diabetic - renal - 2000 kcal    Evaluation of Received Nutrient/Fluid Intake    I/O: -1.9 L since admit  Energy Calories Required: not meeting needs  Protein Required: not meeting needs  Fluid Required: not meeting needs  Comments: LBM: 9/17/23  Tolerance: not tolerating  % Intake of Estimated Energy Needs: 0 - 25 %  % Meal Intake: 0 - 25 %  Intake/Output - Last 3 Shifts         09/17 0700 09/18 0659 09/18 0700 09/19 0659    P.O. 240 240    Other  500    IV Piggyback 148.1     Total Intake(mL/kg) 388.1 (6.1) 740 (11.7)    Urine (mL/kg/hr)  0 (0)    Other  3000    Stool  0    Total Output  3000    Net +388.1 -2260          Urine Occurrence  1 x    Stool Occurrence 0 x 1 x          Nutrition Risk    Level of Risk/Frequency of Follow-up:  (1-2 x/week)       Monitor and Evaluation    Food and Nutrient Intake: energy intake, food and beverage intake  Food and Nutrient Adminstration: diet order  Knowledge/Beliefs/Attitudes: beliefs and attitudes  Physical Activity and Function: nutrition-related ADLs and IADLs  Anthropometric Measurements: weight, weight change  Biochemical Data, Medical Tests and Procedures: electrolyte and renal panel, gastrointestinal profile, glucose/endocrine profile, inflammatory profile, lipid profile  Nutrition-Focused Physical Findings: overall appearance       Nutrition Follow-Up    RD Follow-up?: Yes    Tonie Lucero RDN, [place]

## 2023-09-20 NOTE — PROGRESS NOTES
"[facility] ([address] Medicine  Progress Note    Patient Name: Shannan Figueredo  MRN: [id number]  Patient Class: IP- Inpatient   Admission Date: 9/17/2023  Length of Stay: 3 days  Attending Physician: Brook Colon MD  Primary Care Provider: Roxi, Primary Doctor        Subjective:     Principal Problem:Gangrene of toe of left foot        HPI:  Ms. Figueredo is a 67/F with PMH HTN, DMII (diet-controlled), PVD, ESRD on HD (M/W/F), anemia who presented to [facility] 09/17 with a two day progressive history of worsening L foot pain, swelling, fatigue, nausea and vomiting. History obtained from patient and daughter Stephanie ([phone]); they are from the [place], [place] area. Patient reports several weeks ago she "stubbed her toe," noting initially some pain, redness and swelling. Gradually symptoms progressed with duskiness to her toe and she informed her daughter of her pain for which she was brought to ER in [place] on 08/25 for further evaluation. With duskiness to 1st toe and surrounding erythema she was treated for cellulitis with a course of ciprofloxacin and metronidazole, but failed to improve. She followed up with her podiatrist 09/12 where she was prescribed hydrocodone-acetaminophen and a ten day course of TMP-SMX which she has been taking. She was referred to vascular surgery with plan for angiography and potential intervention  She and her family came to [place] for the weekend but her pain worsened; had been attempting to minimize use of hydrocodone-acetaminophen but developed nausea, vomiting and progressive fatigue. She subsequently presented to ED for further evaluation. ED workup was notable for L 1st toe dry-appearing gangrene with surrounding erythema and swelling, removed nail of second toe, XR L foot demonstrating diffuse osteopenia, L calcaneal and forefoot soft tissue swelling without definitive evidence of osteomyelitis in setting of osteopenia, no leukocytosis, elevated sed " rate and CRP, and elevated BUN/Cr in setting of ESRD. She notes she went without dialysis on 09/15 due to her trip. Blood cultures were ordered and she received piperacillin-tazobactam and vancomycin. Hospital medicine was subsequently contacted for admission.      Overview/Hospital Course:  Admitted with gangrene of L 1st toe. Surgery, nephrology, podiatry consulted; U/S arterial demonstrated L SFA occlusion with distal reconstitution. Cardiology consulted for angiography/potential angioplasty.      Interval History: No acute events overnight, no new complaints. Patient tired from the procedure today and getting dialysis. Discussed plan of care with patient / daughters (in person and via phone).     Review of Systems   Constitutional:  Positive for fatigue. Negative for chills and fever.   Respiratory:  Negative for cough and shortness of breath.    Cardiovascular:  Negative for chest pain and palpitations.   Gastrointestinal:  Negative for abdominal pain, nausea and vomiting.     Objective:     Vital Signs (Most Recent):  Temp: 98.7 °F (37.1 °C) (09/20/23 1439)  Pulse: 66 (09/20/23 1439)  Resp: 16 (09/20/23 1439)  BP: (!) 182/83 (09/20/23 1504)  SpO2: 95 % (09/20/23 1439) Vital Signs (24h Range):  Temp:  [96.4 °F (35.8 °C)-98.7 °F (37.1 °C)] 98.7 °F (37.1 °C)  Pulse:  [55-74] 66  Resp:  [16-18] 16  SpO2:  [92 %-95 %] 95 %  BP: (127-182)/() 182/83     Weight: 63.5 kg (139 lb 15.9 oz)  Body mass index is 23.3 kg/m².    Intake/Output Summary (Last 24 hours) at 9/20/2023 1523  Last data filed at 9/20/2023 1355  Gross per 24 hour   Intake 818.64 ml   Output 3300 ml   Net -2481.36 ml           Physical Exam  Vitals and nursing note reviewed.   Constitutional:       General: She is not in acute distress.     Appearance: She is well-developed.   HENT:      Head: Normocephalic and atraumatic.   Eyes:      General:         Right eye: No discharge.         Left eye: No discharge.      Conjunctiva/sclera: Conjunctivae  normal.   Cardiovascular:      Rate and Rhythm: Normal rate.      Comments: Diminished BLE pulses.  Pulmonary:      Effort: Pulmonary effort is normal. No respiratory distress.      Comments: Mildly diminished at bases.  Abdominal:      Palpations: Abdomen is soft.      Tenderness: There is no abdominal tenderness.   Musculoskeletal:         General: Normal range of motion.      Right lower leg: No edema.      Left lower leg: No edema.      Comments: RUE fistula with thrill.   Skin:     Comments: LLE cool to touch and dry. L first toe with gangrenous changes with surrounding erythema, no noted purulence or drainage. L 2nd toe with absent nail.   Neurological:      Mental Status: She is alert, oriented to person, place, and time and easily aroused.           Significant Labs:   CBC:  Recent Labs   Lab 09/18/23 0418 09/19/23  0342 09/20/23  0546   WBC 9.49 7.39 7.51   HGB 10.1* 10.1* 11.3*   HCT 31.6* 32.4* 36.8*    214 210   GRAN 75.6*  7.2 68.5  5.1 67.3  5.1   LYMPH 10.2*  1.0 12.6*  0.9* 14.1*  1.1   MONO 11.6  1.1* 14.2  1.1* 12.1  0.9   EOS 0.1 0.2 0.4   BASO 0.06 0.08 0.07     CMP:  Recent Labs   Lab 09/18/23 0418 09/19/23  0341 09/20/23  0546    136 138   K 4.1 3.8 3.7   CL 95 95 95   CO2 21* 25 26   BUN 43* 17 28*   CREATININE 11.3* 6.3* 8.1*   * 129* 72   CALCIUM 8.0* 8.4* 9.1   MG 2.2 2.0 2.1   PHOS 8.1* 5.1* 5.3*   ALBUMIN 2.8* 2.8* 2.9*   ANIONGAP 20* 16 17*        Significant Imaging: I have reviewed and interpreted all pertinent imaging results/findings within the past 24 hours.      Assessment/Plan:      * Gangrene of toe of left foot  HTN, PVD  - L 1st toe gangrene predominantly dry with some surrounding erythema/swelling; with concern for worsening infection started pipeacillin-tazobactam and vancomycin IV. 2nd toe with some involvement as well. Continue broad spectrum antibiotic therapy for now. No evidence of sepsis at this time.  - U/S arterial BLE showed L distal  "SFA occlusion with distal reconstitution. Discussed with vascular surgery; cardiology consult for consideration of angiography / potential angioplasty.  - Podiatry consulted, appreciate assistance. MRI L foot with "examination markedly degraded by patient motion artifact. Questionable marrow edema within the 1st distal phalanx, not well evaluated given aforementioned limitation but possibly related to osteomyelitis given reported history of gangrene."  - Continue hydrocodone-acetaminophen 5-325mg PO q6hr PRN, hydromorphone 0.5mg IV q6hr PRN.  - Continue labetalol 300mg PO BID, losartan 50mg PO daily, nifedipine 30mg PO daily.  - Blood culture NGTD  - s/p angiogram today remarkable for severe PAD with no areas amenable to angioplasty  - Vascular surgery, Dr. Cobb spoke with patient and family, and they are undecided on what to do concerning possible intervention here or in [place]. They asked for more time and this was discussed with Surgery to revisit this issue again tomorrow    Type 2 diabetes mellitus with chronic kidney disease on chronic dialysis  - Reports diet-controlled; no current medications.  - HgbA1c 5.7.  - Continue low-dose sliding scale insulin aspart 0-5U subQ TIDWM PRN, monitor requirements.    ESRD (end stage renal disease)  Anemia, hyperparathyroidism  - ESRD on HD, reports missed session Friday prior to presentation due to travel.  - Mild anemia without evidence of bleeding.  - Continue allopurinol 300mg PO daily, calcium acetate 2001mg PO TIDWM.  - Nephrology consulted for HD assistance.    VTE Risk Mitigation (From admission, onward)         Ordered     IP VTE HIGH RISK PATIENT  Once         09/17/23 5918                    Brook Colon MD  Department of Hospital Medicine   [facility] ([address])  "

## 2023-09-20 NOTE — ASSESSMENT & PLAN NOTE
"HTN, PVD  - L 1st toe gangrene predominantly dry with some surrounding erythema/swelling; with concern for worsening infection started pipeacillin-tazobactam and vancomycin IV. 2nd toe with some involvement as well. Continue broad spectrum antibiotic therapy for now. No evidence of sepsis at this time.  - U/S arterial BLE showed L distal SFA occlusion with distal reconstitution. Discussed with vascular surgery; cardiology consult for consideration of angiography / potential angioplasty.  - Podiatry consulted, appreciate assistance. MRI L foot with "examination markedly degraded by patient motion artifact. Questionable marrow edema within the 1st distal phalanx, not well evaluated given aforementioned limitation but possibly related to osteomyelitis given reported history of gangrene."  - Continue hydrocodone-acetaminophen 5-325mg PO q6hr PRN, hydromorphone 0.5mg IV q6hr PRN.  - Continue labetalol 300mg PO BID, losartan 50mg PO daily, nifedipine 30mg PO daily.  - Blood culture NGTD  - s/p angiogram today remarkable for severe PAD with no areas amenable to angioplasty  - Vascular surgery, Dr. Cobb spoke with patient and family, and they are undecided on what to do concerning possible intervention here or in [place]. They asked for more time and this was discussed with Surgery to revisit this issue again tomorrow  "

## 2023-09-20 NOTE — PROGRESS NOTES
Pharmacokinetic Assessment Follow Up: IV Vancomycin    Vancomycin serum concentration assessment(s):    The random level was drawn correctly and can be used to guide therapy at this time. The measurement is within the desired definitive target range of 15 to 25 mcg/mL.     Vancomycin Regimen Plan:    Give vancomycin 500 mg x 1 after dialysis.   Re-dose when the random level is less than 25 mcg/mL, next level to be drawn at 04:30 on 9/22/2023    Drug levels (last 3 results):  Recent Labs   Lab Result Units 09/18/23  0418 09/20/23  0546   Vancomycin, Random ug/mL 20.6 23.3       Pharmacy will continue to follow and monitor vancomycin.    Please contact pharmacy at extension [phone] for questions regarding this assessment.    Thank you for the consult,   Simin Mauro       Patient brief summary:  Shannan Figueredo is a 67 y.o. female initiated on antimicrobial therapy with IV Vancomycin for treatment of bone/joint infection    The patient's current regimen is pulse dosing.    Drug Allergies:   Review of patient's allergies indicates:  No Known Allergies    Actual Body Weight:   63.5 kg    Renal Function:   Estimated Creatinine Clearance: 6 mL/min (A) (based on SCr of 8.1 mg/dL (H)).,     Dialysis Method (if applicable):  intermittent HD - MWF    CBC (last 72 hours):  Recent Labs   Lab Result Units 09/17/23  0916 09/17/23  0931 09/18/23  0418 09/19/23  0342 09/20/23  0546   WBC K/uL 10.30  --  9.49 7.39 7.51   Hemoglobin g/dL 11.7*  --  10.1* 10.1* 11.3*   Hemoglobin A1C %  --  5.7*  --   --   --    Hematocrit % 36.5*  --  31.6* 32.4* 36.8*   Platelets K/uL 253  --  213 214 210   Gran % % 79.1*  --  75.6* 68.5 67.3   Lymph % % 8.4*  --  10.2* 12.6* 14.1*   Mono % % 9.8  --  11.6 14.2 12.1   Eosinophil % % 1.3  --  1.4 3.2 4.9   Basophil % % 0.7  --  0.6 1.1 0.9   Differential Method  Automated  --  Automated Automated Automated       Metabolic Panel (last 72 hours):  Recent Labs   Lab Result Units 09/17/23  0916  09/18/23  0418 09/19/23  0341 09/20/23  0546   Sodium mmol/L 138 136 136 138   Potassium mmol/L 4.0 4.1 3.8 3.7   Chloride mmol/L 97 95 95 95   CO2 mmol/L 24 21* 25 26   Glucose mg/dL 135* 158* 129* 72   BUN mg/dL 40* 43* 17 28*   Creatinine mg/dL 10.9* 11.3* 6.3* 8.1*   Albumin g/dL 3.5 2.8* 2.8* 2.9*   Total Bilirubin mg/dL 1.0  --   --   --    Alkaline Phosphatase U/L 84  --   --   --    AST U/L 20  --   --   --    ALT U/L 8*  --   --   --    Magnesium mg/dL  --  2.2 2.0 2.1   Phosphorus mg/dL  --  8.1* 5.1* 5.3*       Vancomycin Administrations:  vancomycin given in the last 96 hours                     vancomycin (VANCOCIN) 500 mg in dextrose 5 % in water (D5W) 100 mL IVPB (MB+) (mg) 500 mg New Bag 09/18/23 2037    vancomycin 1,500 mg in dextrose 5 % (D5W) 250 mL IVPB (Vial-Mate) (mg) 1,500 mg New Bag 09/17/23 1719                    Microbiologic Results:  Microbiology Results (last 7 days)       Procedure Component Value Units Date/Time    Blood Culture #2 **CANNOT BE ORDERED STAT** [[id number]] Collected: 09/17/23 1338    Order Status: Completed Specimen: Blood from Antecubital, Left Arm Updated: 09/19/23 2012     Blood Culture, Routine No Growth to date      No Growth to date      No Growth to date    Blood Culture #1 **CANNOT BE ORDERED STAT** [[id number]] Collected: 09/17/23 1337    Order Status: Completed Specimen: Blood Updated: 09/19/23 2012     Blood Culture, Routine No Growth to date      No Growth to date      No Growth to date

## 2023-09-20 NOTE — PT/OT/SLP PROGRESS
Physical Therapy  Not Seen    Patient Name:  Shannan Figueredo   MRN:  [id number]    Patient not seen today secondary to Off the floor for procedure/surgery (cath lab then bed rest x6 hrs). Will follow-up as schedule allows.

## 2023-09-20 NOTE — PT/OT/SLP PROGRESS
Occupational Therapy      Patient Name:  Shannan Figueredo   MRN:  [id number]    Patient not seen today secondary to Off the floor for procedure/surgery (Pt in Cath Lab then on bedrest for 6 hours.). Will follow-up 9/21/23.    9/20/2023

## 2023-09-20 NOTE — PLAN OF CARE
Problem: Infection  Goal: Absence of Infection Signs and Symptoms  Outcome: Ongoing, Progressing     Problem: Adult Inpatient Plan of Care  Goal: Plan of Care Review  Outcome: Ongoing, Progressing  Goal: Patient-Specific Goal (Individualized)  Outcome: Ongoing, Progressing  Goal: Absence of Hospital-Acquired Illness or Injury  Outcome: Ongoing, Progressing  Goal: Optimal Comfort and Wellbeing  Outcome: Ongoing, Progressing     Problem: Diabetes Comorbidity  Goal: Blood Glucose Level Within Targeted Range  Outcome: Ongoing, Progressing     POC reviewed with patient and daughter. All questions and concerns addressed. Fall/safety precautions implemented and maintained. Pain management provided. IV abx administered. Blood glucose monitored. Wound care performed. NPO after MN d/t angiogram in am. No acute events noted this shift. Please see flowsheet for full assessment and vitals. Bed locked in lowest position. Side rails up x2. Call bell within reach. Will continue to monitor.

## 2023-09-20 NOTE — PROGRESS NOTES
[facility]    Admission date:  9/17/2023     Assessment    Atherosclerotic peripheral vascular disease with gangrene  Discussed yesterday with the patient and 2 daughters.  Angiography today.    Plan and Discussion    Planning for revascularization once anatomy as delineated.  Anything more than angioplasty, they want to have it done in [place].    Subjective    No problems overnight    Medications  Current Facility-Administered Medications   Medication Dose Route Frequency Provider Last Rate Last Admin    0.9%  NaCl infusion   Intravenous Once Nirmal Betts NP        acetaminophen tablet 650 mg  650 mg Oral Q6H PRN JOSE Cristina MD        calcitRIOL capsule 0.25 mcg  0.25 mcg Oral Every Mon, Wed, Fri Nirmal Betts NP   0.25 mcg at 09/18/23 1145    calcium carbonate 200 mg calcium (500 mg) chewable tablet 500 mg  500 mg Oral BID Nirmal Betts NP   500 mg at 09/19/23 2042    carvediloL tablet 3.125 mg  3.125 mg Oral BID Nirmal Betts NP   3.125 mg at 09/19/23 2040    dextrose 10% bolus 125 mL 125 mL  12.5 g Intravenous PRN JOSE Cristina MD        dextrose 10% bolus 250 mL 250 mL  25 g Intravenous PRN JOSE Cristina MD        diphenhydrAMINE capsule 25 mg  25 mg Oral On Call Procedure Rao Sosa MD   25 mg at 09/19/23 2040    glucagon (human recombinant) injection 1 mg  1 mg Intramuscular PRN JOSE Cristina MD        glucose chewable tablet 16 g  16 g Oral PRN JOSE Cristina MD        glucose chewable tablet 24 g  24 g Oral PRN JOSE Cristina MD        heparin infusion 1,000 units/500 ml in 0.9% NaCl (on sterile field)    PRN Rao Sosa MD   1,000 mL at 09/20/23 0817    hydrALAZINE injection 10 mg  10 mg Intravenous Q6H PRN JOSE Cristina MD        HYDROcodone-acetaminophen 5-325 mg per tablet 1 tablet  1 tablet Oral Q6H PRN JOSE Cristina MD   1 tablet at 09/19/23 1454    HYDROmorphone injection 0.5 mg  0.5 mg Intravenous Q6H PRN JOSE Cristina  MD   0.5 mg at 09/20/23 0027    insulin aspart U-100 pen 0-5 Units  0-5 Units Subcutaneous QID (AC + HS) PRN JOSE Cristina MD        LIDOcaine (PF) 10 mg/ml (1%) injection    PRN Rao Sosa MD   10 mL at 09/20/23 0817    loperamide capsule 2 mg  2 mg Oral Daily PRN Brook Colon MD   2 mg at 09/19/23 1749    losartan tablet 50 mg  50 mg Oral Daily Nirmal Betts NP   50 mg at 09/19/23 0935    melatonin tablet 6 mg  6 mg Oral Nightly PRN JOSE Cristina MD   6 mg at 09/19/23 2040    mupirocin 2 % ointment   Nasal BID Edgar Moore MD   Given at 09/19/23 2043    NIFEdipine 24 hr tablet 30 mg  30 mg Oral Daily JOSE Cristina MD   30 mg at 09/19/23 0935    ondansetron disintegrating tablet 4 mg  4 mg Oral Q6H PRN JOSE Cristina MD        ondansetron injection 4 mg  4 mg Intravenous Q6H PRN JOSE Cristina MD        piperacillin-tazobactam (ZOSYN) 4.5 g in dextrose 5 % in water (D5W) 100 mL IVPB (MB+)  4.5 g Intravenous Q12H JOSE Cristina MD   Stopped at 09/20/23 0514    sevelamer carbonate tablet 1,600 mg  1,600 mg Oral TID WM Nirmal Betts NP   1,600 mg at 09/19/23 1720    sodium chloride 0.9% bolus 250 mL 250 mL  250 mL Intravenous PRN Nirmal Betts NP        sodium chloride 0.9% flush 10 mL  10 mL Intravenous PRN JOSE Cristina MD        vancomycin - pharmacy to dose   Intravenous pharmacy to manage frequency JOSE Cristina MD        vitamin renal formula (B-complex-vitamin c-folic acid) 1 mg per capsule 1 capsule  1 capsule Oral Daily Nirmal Betts NP   1 capsule at 09/19/23 0935        Physical Exam    Temp:  [97.6 °F (36.4 °C)-98.5 °F (36.9 °C)]   Pulse:  [58-61]   Resp:  [16-18]   BP: (134-154)/(63-70)   SpO2:  [92 %-95 %]    Wt Readings from Last 3 Encounters:   09/18/23 63.5 kg (139 lb 15.9 oz)     Physical Exam  Constitutional:       General: She is not in acute distress.  Neck:      Vascular: No hepatojugular reflux or JVD.   Cardiovascular:      Rate and  Rhythm: Normal rate and regular rhythm.      Heart sounds: S1 normal and S2 normal.      Gallop present. S4 sounds present. No S3 sounds.   Pulmonary:      Effort: Pulmonary effort is normal.      Breath sounds: Normal breath sounds and air entry.   Abdominal:      General: Bowel sounds are normal.      Palpations: Abdomen is soft. There is no hepatomegaly.      Tenderness: There is no abdominal tenderness.   Musculoskeletal:      Right lower leg: No edema.      Left lower leg: No edema.   Skin:     Coloration: Skin is not pale.   Neurological:      Mental Status: She is alert.   Psychiatric:         Behavior: Behavior is cooperative.         Labs  Recent Results (from the past 24 hour(s))   POCT glucose    Collection Time: 09/19/23  9:29 AM   Result Value Ref Range    POCT Glucose 75 70 - 110 mg/dL   POCT glucose    Collection Time: 09/19/23 11:13 AM   Result Value Ref Range    POCT Glucose 101 70 - 110 mg/dL   POCT glucose    Collection Time: 09/19/23  4:02 PM   Result Value Ref Range    POCT Glucose 97 70 - 110 mg/dL   POCT glucose    Collection Time: 09/19/23  8:34 PM   Result Value Ref Range    POCT Glucose 93 70 - 110 mg/dL   Magnesium    Collection Time: 09/20/23  5:46 AM   Result Value Ref Range    Magnesium 2.1 1.6 - 2.6 mg/dL   CBC with Automated Differential    Collection Time: 09/20/23  5:46 AM   Result Value Ref Range    WBC 7.51 3.90 - 12.70 K/uL    RBC 4.09 4.00 - 5.40 M/uL    Hemoglobin 11.3 (L) 12.0 - 16.0 g/dL    Hematocrit 36.8 (L) 37.0 - 48.5 %    MCV 90 82 - 98 fL    MCH 27.6 27.0 - 31.0 pg    MCHC 30.7 (L) 32.0 - 36.0 g/dL    RDW 14.5 11.5 - 14.5 %    Platelets 210 150 - 450 K/uL    MPV 9.0 (L) 9.2 - 12.9 fL    Immature Granulocytes 0.7 (H) 0.0 - 0.5 %    Gran # (ANC) 5.1 1.8 - 7.7 K/uL    Immature Grans (Abs) 0.05 (H) 0.00 - 0.04 K/uL    Lymph # 1.1 1.0 - 4.8 K/uL    Mono # 0.9 0.3 - 1.0 K/uL    Eos # 0.4 0.0 - 0.5 K/uL    Baso # 0.07 0.00 - 0.20 K/uL    nRBC 0 0 /100 WBC    Gran % 67.3 38.0 -  73.0 %    Lymph % 14.1 (L) 18.0 - 48.0 %    Mono % 12.1 4.0 - 15.0 %    Eosinophil % 4.9 0.0 - 8.0 %    Basophil % 0.9 0.0 - 1.9 %    Differential Method Automated    Renal Function Panel    Collection Time: 09/20/23  5:46 AM   Result Value Ref Range    Glucose 72 70 - 110 mg/dL    Sodium 138 136 - 145 mmol/L    Potassium 3.7 3.5 - 5.1 mmol/L    Chloride 95 95 - 110 mmol/L    CO2 26 23 - 29 mmol/L    BUN 28 (H) 8 - 23 mg/dL    Calcium 9.1 8.7 - 10.5 mg/dL    Creatinine 8.1 (H) 0.5 - 1.4 mg/dL    Albumin 2.9 (L) 3.5 - 5.2 g/dL    Phosphorus 5.3 (H) 2.7 - 4.5 mg/dL    eGFR 5 (A) >60 mL/min/1.73 m^2    Anion Gap 17 (H) 8 - 16 mmol/L   Vancomycin, random    Collection Time: 09/20/23  5:46 AM   Result Value Ref Range    Vancomycin, Random 23.3 Not established ug/mL             Rao Sosa MD

## 2023-09-20 NOTE — PLAN OF CARE
Patient will return to [place] as she is a resident.   09/20/23 1509   Discharge Reassessment   Assessment Type Discharge Planning Reassessment   Did the patient's condition or plan change since previous assessment? No   Discharge Plan discussed with: Patient;Adult children   Communicated LUBNA with patient/caregiver Date not available/Unable to determine   Discharge Plan A Home Health   Discharge Plan B Home with family   DME Needed Upon Discharge  none   Transition of Care Barriers None   Why the patient remains in the hospital Requires continued medical care   Post-Acute Status   Discharge Delays None known at this time

## 2023-09-20 NOTE — PROGRESS NOTES
"Nephrology  Progress Note    Admit Date: 9/17/2023   LOS: 3 days     SUBJECTIVE:     Follow-up For:  Gangrene of toe of left foot    History of Present Illness:  Patient is a 67 y.o. female presents with left foot gangrene, vomiting, feeling ill.  Ext med hx as outlined below including ESRD on HD MWF in [place].  Has been dealing with necrotic left toes for few months and followed by vascular in [place].  Was here visiting daughter and felt bad.  Admitted for eval/treatment.  Consulted for HD needs.  Pt seen and examined on HD.  Consents signed.  Tired from being up all night with pain.  Updated team and daughter at bedside.  W/up noted.     Interval History:    seen in cathlab.  Discussed findings with Dr. Sosa/Isaiah.  HD today and then DC to [place] for f/up.  No CP/SOB.        Review of Systems:  Constitutional: No fever or chills  Respiratory: No cough or shortness of breath  Cardiovascular: No chest pain or palpitations  Gastrointestinal: No nausea or vomiting  Neurological: No confusion or weakness    OBJECTIVE:     Vital Signs Range (Last 24H):  BP (!) 153/70 (BP Location: Left arm, Patient Position: Lying)   Pulse 60   Temp 97.9 °F (36.6 °C) (Oral)   Resp 16   Ht 5' 5" (1.651 m)   Wt 63.5 kg (139 lb 15.9 oz)   SpO2 95%   BMI 23.30 kg/m²     Temp:  [97.6 °F (36.4 °C)-98.5 °F (36.9 °C)]   Pulse:  [58-61]   Resp:  [16-18]   BP: (134-154)/(63-70)   SpO2:  [92 %-95 %]     I & O (Last 24H):  Intake/Output Summary (Last 24 hours) at 9/20/2023 0911  Last data filed at 9/20/2023 0514  Gross per 24 hour   Intake 318.64 ml   Output 300 ml   Net 18.64 ml         Physical Exam:  General appearance: chronically ill appearing but NAD today.    Eyes:  Conjunctivae/corneas clear. PERRL.  Lungs: Normal respiratory effort,   clear to auscultation bilaterally   Heart: Regular rate and rhythm, S1, S2 normal, no murmur, rub or nelly.  Abdomen: Soft, non-tender non-distended; bowel sounds normal; no masses,  no " "organomegaly  Extremities: No cyanosis or clubbing. No edema.    Skin: Skin color, texture, turgor normal. No rashes or lesions  Neurologic: Normal strength and tone. No focal numbness or weakness   Right arm AVF+  Left big/second toes necrotic.     Laboratory Data:  Recent Labs   Lab 09/20/23  0546   WBC 7.51   RBC 4.09   HGB 11.3*   HCT 36.8*      MCV 90   MCH 27.6   MCHC 30.7*         BMP:   Recent Labs   Lab 09/20/23  0546   GLU 72      K 3.7   CL 95   CO2 26   BUN 28*   CREATININE 8.1*   CALCIUM 9.1   MG 2.1   PHOS 5.3*       Lab Results   Component Value Date    CALCIUM 9.1 09/20/2023    PHOS 5.3 (H) 09/20/2023       Lab Results   Component Value Date    CALCIUM 9.1 09/20/2023    PHOS 5.3 (H) 09/20/2023       No results found for: "URICACID"    BNP  No results for input(s): "BNP", "BNPTRIAGEBLO" in the last 168 hours.    Medications:  Medication list was reviewed and changes noted under Assessment/Plan.    Diagnostic Results:    reviewed    ASSESSMENT/PLAN:       ESRD on HD MWF in [place]:  -consulted for HD needs.  -consents signed  -continue HD MWF while here and if she stays after hospitalization will need temp outpt unit.   -labs noted and bath adjusted.  -R arm AVF+  -9/19: HD yesterday w/o issue.  No need today.  -9/20:  HD today after angio  -Renally dose meds, avoid nephrotoxins, and monitor I/O's closely.  Pt was seen during hemodialysis today, dialysis flowsheet, labs, vitals were reviewed.  On HD for removal of uremic toxins and volume.  Labs have been reviewed and the dialysate bath has been adjusted if needed. (See orders).  There are no symptoms of chest pain, dyspnea, nausea or vomiting.     Necrotic Left toes:  -defer to pod/vascular/cards.  -angio noted with severe PAD  -anticipate AKA amputation.      HTN:  -stable on current regimen     MBD:  -binders/nephrocaps.   Changed to renvela.  Pt states that she also takes tums at home  -will use calcitriol on HD days.     Anemia of " CKD:  -at goal currently.   -trend      As above  Will follow for renal needs   Ok to DC after HD  F/up with [place] team.

## 2023-09-20 NOTE — SUBJECTIVE & OBJECTIVE
Interval History: No acute events overnight, no new complaints. Patient tired from the procedure today and getting dialysis. Discussed plan of care with patient / daughters (in person and via phone).     Review of Systems   Constitutional:  Positive for fatigue. Negative for chills and fever.   Respiratory:  Negative for cough and shortness of breath.    Cardiovascular:  Negative for chest pain and palpitations.   Gastrointestinal:  Negative for abdominal pain, nausea and vomiting.     Objective:     Vital Signs (Most Recent):  Temp: 98.7 °F (37.1 °C) (09/20/23 1439)  Pulse: 66 (09/20/23 1439)  Resp: 16 (09/20/23 1439)  BP: (!) 182/83 (09/20/23 1504)  SpO2: 95 % (09/20/23 1439) Vital Signs (24h Range):  Temp:  [96.4 °F (35.8 °C)-98.7 °F (37.1 °C)] 98.7 °F (37.1 °C)  Pulse:  [55-74] 66  Resp:  [16-18] 16  SpO2:  [92 %-95 %] 95 %  BP: (127-182)/() 182/83     Weight: 63.5 kg (139 lb 15.9 oz)  Body mass index is 23.3 kg/m².    Intake/Output Summary (Last 24 hours) at 9/20/2023 1523  Last data filed at 9/20/2023 1355  Gross per 24 hour   Intake 818.64 ml   Output 3300 ml   Net -2481.36 ml           Physical Exam  Vitals and nursing note reviewed.   Constitutional:       General: She is not in acute distress.     Appearance: She is well-developed.   HENT:      Head: Normocephalic and atraumatic.   Eyes:      General:         Right eye: No discharge.         Left eye: No discharge.      Conjunctiva/sclera: Conjunctivae normal.   Cardiovascular:      Rate and Rhythm: Normal rate.      Comments: Diminished BLE pulses.  Pulmonary:      Effort: Pulmonary effort is normal. No respiratory distress.      Comments: Mildly diminished at bases.  Abdominal:      Palpations: Abdomen is soft.      Tenderness: There is no abdominal tenderness.   Musculoskeletal:         General: Normal range of motion.      Right lower leg: No edema.      Left lower leg: No edema.      Comments: RUE fistula with thrill.   Skin:     Comments: LLE  cool to touch and dry. L first toe with gangrenous changes with surrounding erythema, no noted purulence or drainage. L 2nd toe with absent nail.   Neurological:      Mental Status: She is alert, oriented to person, place, and time and easily aroused.           Significant Labs:   CBC:  Recent Labs   Lab 09/18/23 0418 09/19/23  0342 09/20/23  0546   WBC 9.49 7.39 7.51   HGB 10.1* 10.1* 11.3*   HCT 31.6* 32.4* 36.8*    214 210   GRAN 75.6*  7.2 68.5  5.1 67.3  5.1   LYMPH 10.2*  1.0 12.6*  0.9* 14.1*  1.1   MONO 11.6  1.1* 14.2  1.1* 12.1  0.9   EOS 0.1 0.2 0.4   BASO 0.06 0.08 0.07     CMP:  Recent Labs   Lab 09/18/23 0418 09/19/23  0341 09/20/23  0546    136 138   K 4.1 3.8 3.7   CL 95 95 95   CO2 21* 25 26   BUN 43* 17 28*   CREATININE 11.3* 6.3* 8.1*   * 129* 72   CALCIUM 8.0* 8.4* 9.1   MG 2.2 2.0 2.1   PHOS 8.1* 5.1* 5.3*   ALBUMIN 2.8* 2.8* 2.9*   ANIONGAP 20* 16 17*        Significant Imaging: I have reviewed and interpreted all pertinent imaging results/findings within the past 24 hours.

## 2023-09-21 NOTE — PT/OT/SLP PROGRESS
"Occupational Therapy   Treatment    Name: Shannan Figueredo  MRN: [id number]  Admitting Diagnosis:  Gangrene of toe of left foot  1 Day Post-Op    Recommendations:     Discharge Recommendations: home health OT, home health PT (24/7 assist from family)  Discharge Equipment Recommendations:  bedside commode, walker, rolling (transport wheelchair)  Barriers to discharge:   (current functional level)    Assessment:     Shannan Figueredo is a 67 y.o. female with a medical diagnosis of Gangrene of toe of left foot.  She presents with pain on left foot. Performance deficits affecting function are weakness, impaired endurance, impaired self care skills, impaired functional mobility, gait instability, impaired balance, decreased upper extremity function, decreased lower extremity function, impaired coordination, decreased ROM, decreased safety awareness, pain, impaired skin, impaired cardiopulmonary response to activity, orthopedic precautions.     Patient pleasant and agreeable to therapy. Tolerated sitting EOB ~15 min with Min A/CGA while talking to Doctors. Total A for fco hygiene while side lying. Mod A to don/doff gown laying in bed. Overall, patient tolerated session fairly though activity tolerance limited d/t drowsiness and dizziness.     Rehab Prognosis:  Good; patient would benefit from acute skilled OT services to address these deficits and reach maximum level of function.       Plan:     Patient to be seen 4 x/week to address the above listed problems via self-care/home management, therapeutic activities, therapeutic exercises  Plan of Care Expires: 10/19/23  Plan of Care Reviewed with: patient, family    Subjective     Chief Complaint: "I can't sit up, I will fall"  Patient/Family Comments/goals: agreeable to participate in therapy for OT intervention    Pain/Comfort:  Pain Rating 1:  (unrated)  Location - Side 1: Left  Location - Orientation 1: generalized  Location 1: foot  Pain Addressed 1: Pre-medicate for " activity, Reposition, Distraction, Cessation of Activity  Pain Rating Post-Intervention 1: 0/10 (at rest)    Objective:     Communicated with: RN prior to session.  Patient found supine with peripheral IV, oxygen upon OT entry to room.    General Precautions: Standard, fall, diabetic    Orthopedic Precautions: (Treating as NWB though patient hard time following through)  Braces:  (Podiatry surgical shoe - left)  Respiratory Status: Room air     Occupational Performance:     Bed Mobility:    Supine > Sit: Mod A for trunk management   Sit > Supine: Min A with LLE management   Left <> Right Rolling: Min A   Tolerated sitting ~15 mins with Min A/CGA while talking to doctors  Patient with left lateral leaning     Functional Mobility:  Sit <> Stand x 2: Mod A, RW from EOB     Activities of Daily Living:  Toileting: Total A for fco hygiene side lying   UB Dressing: Mod A to don/doff gown with HOB elevated   LB Dressing: Total A to don left sx shoe seated EOB       AMPAC 6 Click ADL: 16    Treatment & Education:  OT role, plan of care, progression of goals, importance of continued OOB activity, ADL/functional transfer and mobility retraining, discharge recommendation, call don't fall, safety precautions, fall prevention.      Patient left HOB elevated with all lines intact, call button in reach, bed alarm on, RN notified, and daughter present    GOALS:   Multidisciplinary Problems       Occupational Therapy Goals          Problem: Occupational Therapy    Goal Priority Disciplines Outcome Interventions   Occupational Therapy Goal     OT, PT/OT Ongoing, Progressing    Description: Goals to be met by: 9/30/23     Patient will increase functional independence with ADLs by performing:    Toileting at SBA.   BSC transfer at A.  Donning/doffing left Podiatry shoe at SBA.   Sponge bathing at SBA.                            Time Tracking:     OT Date of Treatment: 09/21/23  OT Start Time: 0907  OT Stop Time: 0937  OT Total Time  (min): 30 min    Billable Minutes:Self Care/Home Management 30 min    OT/ROGER: ROGER     Number of ROGER visits since last OT visit: 1    9/21/2023

## 2023-09-21 NOTE — PT/OT/SLP PROGRESS
Physical Therapy Treatment    Patient Name:  Shannan Figueredo   MRN:  [id number]    Recommendations:     Discharge Recommendations: home health PT  Discharge Equipment Recommendations: walker, rolling (transport wheelchair)  Barriers to discharge: Inaccessible home and functional mobility impairments    Assessment:     Shannan Figueredo is a 67 y.o. female admitted with a medical diagnosis of Gangrene of toe of left foot.  She presents with the following impairments/functional limitations: weakness, gait instability, pain, orthopedic precautions, impaired balance, impaired cardiopulmonary response to activity, impaired coordination, impaired endurance, impaired functional mobility, impaired self care skills, impaired skin, decreased lower extremity function, decreased ROM, decreased safety awareness, decreased upper extremity function.    Supine>sit with mod(I)  Sit>stand from EOB, BSC with RW and Yen for balance while maintaining L LE NWB (manual assist req'd, otherwise pt will WB on L heel, possibly an acceptable solution for her)  Bed>BSC with RW and Yen, stand/pivot with heel WB on L LE  Amb 5' with RW and Yen, L heel WB (pt cued to keep WB minimal on L heel)  Pt progressing functional mobility with early practice in WB precautions with RW; pt unable to hop on R foot and will need to heel WB on L unless confined to WC  Rec HHPT      Rehab Prognosis: Good; patient would benefit from acute skilled PT services to address these deficits and reach maximum level of function.    Recent Surgery: Procedure(s) (LRB):  Angiogram Extremity Unilateral (Left) 1 Day Post-Op    Plan:     During this hospitalization, patient to be seen 5 x/week to address the identified rehab impairments via gait training, therapeutic activities, therapeutic exercises, neuromuscular re-education and progress toward the following goals:    Plan of Care Expires:  10/19/23    Subjective     Chief Complaint: I can't hop on one foot  Patient/Family  Comments/goals: do you know where exactly he's going to cut my leg?  Pain/Comfort:  Pain Rating 1: 0/10  Pain Rating Post-Intervention 1: 0/10      Objective:     Communicated with nurse Knutson prior to session.  Patient found HOB elevated with peripheral IV upon PT entry to room.     General Precautions: Standard, fall  Orthopedic Precautions:  (treating LLE NWB)  Braces:  (surgical shoe)  Respiratory Status: Room air     Functional Mobility:  Bed Mobility:     Supine to Sit: modified independence  Transfers:     Sit to Stand:  minimum assistance with rolling walker  Toilet Transfer: minimum assistance with  rolling walker  using  Stand Pivot  Gait: 5' with RW and Yen, L heel WB (pt cued to keep WB minimal on L heel)      AM-PAC 6 CLICK MOBILITY  Turning over in bed (including adjusting bedclothes, sheets and blankets)?: 4  Sitting down on and standing up from a chair with arms (e.g., wheelchair, bedside commode, etc.): 3  Moving from lying on back to sitting on the side of the bed?: 3  Moving to and from a bed to a chair (including a wheelchair)?: 3  Need to walk in hospital room?: 2  Climbing 3-5 steps with a railing?: 2  Basic Mobility Total Score: 17       Treatment & Education:  Reclined therex B LE: AP, hip abd/add, SLR x 10  Gait and transfer training with RW as noted    Patient left up in chair with all lines intact, call button in reach, nurse Knutson notified, and daughter present..    GOALS:   Multidisciplinary Problems       Physical Therapy Goals          Problem: Physical Therapy    Goal Priority Disciplines Outcome Goal Variances Interventions   Physical Therapy Goal     PT, PT/OT Ongoing, Progressing     Description: Goals to be met by: 10/19/23    Patient will perform the following to increase strength, improve mobility, and return to prior level of function:    1. Supine <> sit with independence.  2. Sit<>stand with SBA with least restrictive assistive device.  3. Gait x 50 feet with SBA with least  restrictive assistive device.  4. Ascend/descend 12 step(s) with bilateral handrails and CGA.                            Time Tracking:     PT Received On: 09/21/23  PT Start Time: 1405     PT Stop Time: 1438  PT Total Time (min): 33 min     Billable Minutes: Gait Training 20 and Therapeutic Exercise 13    Treatment Type: Treatment  PT/PTA: PTA     Number of PTA visits since last PT visit: 1     09/21/2023

## 2023-09-21 NOTE — ASSESSMENT & PLAN NOTE
"HTN, PVD  - L 1st toe gangrene predominantly dry with some surrounding erythema/swelling; with concern for worsening infection started pipeacillin-tazobactam and vancomycin IV. 2nd toe with some involvement as well. Continue broad spectrum antibiotic therapy for now. No evidence of sepsis at this time.  - U/S arterial BLE showed L distal SFA occlusion with distal reconstitution. Discussed with vascular surgery; cardiology consult for consideration of angiography / potential angioplasty.  - Podiatry consulted, appreciate assistance. MRI L foot with "examination markedly degraded by patient motion artifact. Questionable marrow edema within the 1st distal phalanx, not well evaluated given aforementioned limitation but possibly related to osteomyelitis given reported history of gangrene."  - Continue hydrocodone-acetaminophen 5-325mg PO q6hr PRN, hydromorphone 0.5mg IV q6hr PRN.  - Continue labetalol 300mg PO BID, losartan 50mg PO daily, nifedipine 30mg PO daily.  - Blood culture NGTD  - s/p angiogram today remarkable for severe PAD with no areas amenable to angioplasty  - Vascular surgery, Dr. Cobb spoke with patient and family yesterday, and they were undecided on what to do concerning possible intervention here or in [place]. They asked for more time and this was discussed with Surgery, and plan for in depth discussion today  "

## 2023-09-21 NOTE — NURSING
2016-Patient complaints of itching all over, and is requesting more benadryl or some cream for itching. JONNA Rosales PA-C and PAGE Mcdonough NP notified. No new orders given. PAGE Mcdonough NP informed me to given PRN benadryl po.

## 2023-09-21 NOTE — SUBJECTIVE & OBJECTIVE
Interval History: No acute events overnight, no new complaints. Daughter reported confusion episode last night but none today. Discussed plan of care with patient / daughters (in person and via phone).     Review of Systems   Constitutional:  Positive for fatigue. Negative for chills and fever.   Respiratory:  Negative for cough and shortness of breath.    Cardiovascular:  Negative for chest pain and palpitations.   Gastrointestinal:  Negative for abdominal pain, nausea and vomiting.     Objective:     Vital Signs (Most Recent):  Temp: 98.9 °F (37.2 °C) (09/21/23 1214)  Pulse: 69 (09/21/23 1214)  Resp: 16 (09/21/23 1214)  BP: (!) 176/80 (09/21/23 1214)  SpO2: (!) 92 % (09/21/23 1214) Vital Signs (24h Range):  Temp:  [97.8 °F (36.6 °C)-99.4 °F (37.4 °C)] 98.9 °F (37.2 °C)  Pulse:  [65-73] 69  Resp:  [15-20] 16  SpO2:  [92 %-97 %] 92 %  BP: (158-197)/(55-97) 176/80     Weight: 63.5 kg (139 lb 15.9 oz)  Body mass index is 23.3 kg/m².    Intake/Output Summary (Last 24 hours) at 9/21/2023 1338  Last data filed at 9/21/2023 0621  Gross per 24 hour   Intake 877.68 ml   Output 3000 ml   Net -2122.32 ml           Physical Exam  Vitals and nursing note reviewed.   Constitutional:       General: She is not in acute distress.     Appearance: She is well-developed.   HENT:      Head: Normocephalic and atraumatic.   Eyes:      General:         Right eye: No discharge.         Left eye: No discharge.      Conjunctiva/sclera: Conjunctivae normal.   Cardiovascular:      Rate and Rhythm: Normal rate.      Comments: Diminished BLE pulses.  Pulmonary:      Effort: Pulmonary effort is normal. No respiratory distress.      Comments: Mildly diminished at bases.  Abdominal:      Palpations: Abdomen is soft.      Tenderness: There is no abdominal tenderness.   Musculoskeletal:         General: Normal range of motion.      Right lower leg: No edema.      Left lower leg: No edema.      Comments: RUE fistula with thrill.   Skin:     Comments:  LLE cool to touch and dry. L first toe with gangrenous changes with surrounding erythema, no noted purulence or drainage. L 2nd toe with absent nail.   Neurological:      Mental Status: She is alert, oriented to person, place, and time and easily aroused.           Significant Labs:   CBC:  Recent Labs   Lab 09/19/23 0342 09/20/23  0546 09/21/23  0417   WBC 7.39 7.51 8.85   HGB 10.1* 11.3* 10.7*   HCT 32.4* 36.8* 34.5*    210 200   GRAN 68.5  5.1 67.3  5.1 69.3  6.1   LYMPH 12.6*  0.9* 14.1*  1.1 12.2*  1.1   MONO 14.2  1.1* 12.1  0.9 13.6  1.2*   EOS 0.2 0.4 0.3   BASO 0.08 0.07 0.07     CMP:  Recent Labs   Lab 09/19/23  0341 09/20/23  0546 09/21/23 0417    138 136   K 3.8 3.7 3.4*   CL 95 95 96   CO2 25 26 27   BUN 17 28* 18   CREATININE 6.3* 8.1* 5.5*   * 72 93   CALCIUM 8.4* 9.1 8.8   MG 2.0 2.1 2.0   PHOS 5.1* 5.3* 3.2   ALBUMIN 2.8* 2.9* 2.5*   ANIONGAP 16 17* 13        Significant Imaging: I have reviewed and interpreted all pertinent imaging results/findings within the past 24 hours.

## 2023-09-21 NOTE — PROGRESS NOTES
[facility] - [facility] ([address])  Podiatry  Progress Note    Patient Name: Shannan Figueredo  MRN: [id number]  Admission Date: 9/17/2023  Hospital Length of Stay: 4 days  Attending Physician: Brook Colon MD  Primary Care Provider: Roxi, Primary Doctor     Subjective:     Interval History:  Stable dry gangrene toes 1 2 left.  Swelling Betadine daily.  Open tear.  Denies pain.  States that she is scheduled for revascularization tomorrow with Dr. Cobb.    Follow-up For: Procedure(s) (LRB):  Angiogram Extremity Unilateral (Left)    Post-Operative Day: 1 Day Post-Op    Scheduled Meds:   sodium chloride 0.9%   Intravenous Once    aspirin  81 mg Oral Daily    calcitRIOL  0.25 mcg Oral Every Mon, Wed, Fri    calcium carbonate  500 mg Oral BID    carvediloL  3.125 mg Oral BID    clopidogreL  75 mg Oral Daily    losartan  100 mg Oral Daily    mupirocin   Nasal BID    NIFEdipine  60 mg Oral Daily    piperacillin-tazobactam (Zosyn) IV (PEDS and ADULTS) (extended infusion is not appropriate)  4.5 g Intravenous Q12H    sevelamer carbonate  1,600 mg Oral TID WM    vitamin renal formula (B-complex-vitamin c-folic acid)  1 capsule Oral Daily     Continuous Infusions:  PRN Meds:acetaminophen, dextrose 10%, dextrose 10%, diphenhydrAMINE, glucagon (human recombinant), glucose, glucose, hydrALAZINE, HYDROcodone-acetaminophen, HYDROmorphone, insulin aspart U-100, loperamide, melatonin, ondansetron, ondansetron, sodium chloride 0.9%, sodium chloride 0.9%, Pharmacy to dose Vancomycin consult **AND** vancomycin - pharmacy to dose    Review of Systems  Objective:     Vital Signs (Most Recent):  Temp: 98.9 °F (37.2 °C) (09/21/23 1214)  Pulse: 69 (09/21/23 1214)  Resp: 16 (09/21/23 1214)  BP: (!) 176/80 (09/21/23 1214)  SpO2: (!) 92 % (09/21/23 1214) Vital Signs (24h Range):  Temp:  [98 °F (36.7 °C)-99.4 °F (37.4 °C)] 98.9 °F (37.2 °C)  Pulse:  [66-73] 69  Resp:  [15-20] 16  SpO2:  [92 %-97 %] 92 %  BP: (158-197)/(55-85) 176/80     Weight:  "63.5 kg (139 lb 15.9 oz)  Body mass index is 23.3 kg/m².    Foot Exam    Laboratory:  A1C:   Recent Labs   Lab 09/17/23  0931   HGBA1C 5.7*     Blood Cultures: No results for input(s): "LABBLOO" in the last 48 hours.  BMP:   Recent Labs   Lab 09/21/23  0417   GLU 93      K 3.4*   CL 96   CO2 27   BUN 18   CREATININE 5.5*   CALCIUM 8.8   MG 2.0     CBC:   Recent Labs   Lab 09/21/23 0417   WBC 8.85   RBC 3.85*   HGB 10.7*   HCT 34.5*      MCV 90   MCH 27.8   MCHC 31.0*     CMP:   Recent Labs   Lab 09/17/23  0916 09/18/23 0418 09/21/23 0417   *   < > 93   CALCIUM 9.4   < > 8.8   ALBUMIN 3.5   < > 2.5*   PROT 7.2  --   --       < > 136   K 4.0   < > 3.4*   CO2 24   < > 27   CL 97   < > 96   BUN 40*   < > 18   CREATININE 10.9*   < > 5.5*   ALKPHOS 84  --   --    ALT 8*  --   --    AST 20  --   --    BILITOT 1.0  --   --     < > = values in this interval not displayed.     Coagulation: No results for input(s): "PT", "INR", "APTT" in the last 168 hours.  CRP:   Recent Labs   Lab 09/17/23  0916   CRP 26.4*     ESR:   Recent Labs   Lab 09/17/23  0916   SEDRATE 60*     Prealbumin: No results for input(s): "PREALBUMIN" in the last 48 hours.  Wound Cultures: No results for input(s): "LABAERO" in the last 4320 hours.  Microbiology Results (last 7 days)       Procedure Component Value Units Date/Time    Blood Culture #2 **CANNOT BE ORDERED STAT** [[id number]] Collected: 09/17/23 1338    Order Status: Completed Specimen: Blood from Antecubital, Left Arm Updated: 09/20/23 2012     Blood Culture, Routine No Growth to date      No Growth to date      No Growth to date      No Growth to date    Blood Culture #1 **CANNOT BE ORDERED STAT** [[id number]] Collected: 09/17/23 1337    Order Status: Completed Specimen: Blood Updated: 09/20/23 2012     Blood Culture, Routine No Growth to date      No Growth to date      No Growth to date      No Growth to date            Diagnostic Results:  MRI: I have reviewed " all pertinent results/findings within the past 24 hours.  Equivocal for osteomyelitis distal left hallux  X-Ray: I have reviewed all pertinent results/findings within the past 24 hours.  No signs osteolysis clearly.    Clinical Findings:  Stable dry indurated gangrene of the distal 1st and 2nd toes left without pus tracking fluctuance malodor signs of infection.    Otherwise Skin thin, shiny, atrophic, with decreased density and distribution of pedal hair bilateral, but without hyperpigmentation, zahida discoloration,  ulcers, masses, nodules or cords palpated bilateral feet and legs.    Toes warm, capillary refill about 5 seconds bilateral.  Left foot cooler than right.        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  All understand the patient's to undergo of revascularization tomorrow with Dr. Cobb.  Following that, she will stay the weekend for.  If demarcation.  The 1st reasonable time barring complication, she will undergo hallux and 2nd toe left amputation.  At some point after healing, she will need diabetic shoes with appropriate  filler.  These will likely be best fabricated when she is at her home base ([place]).  All verbalized understanding.  Questions answered.    Continue Betadine swabs, minimal ambulation, antibiotics, hemodialysis.    We will follow    Félix Perez DPM  Podiatry  [facility] ([address])

## 2023-09-21 NOTE — PROGRESS NOTES
"Nephrology  Progress Note    Admit Date: 9/17/2023   LOS: 4 days     SUBJECTIVE:     Follow-up For:  Gangrene of toe of left foot    History of Present Illness:  Patient is a 67 y.o. female presents with left foot gangrene, vomiting, feeling ill.  Ext med hx as outlined below including ESRD on HD MWF in [place].  Has been dealing with necrotic left toes for few months and followed by vascular in [place].  Was here visiting daughter and felt bad.  Admitted for eval/treatment.  Consulted for HD needs.  Pt seen and examined on HD.  Consents signed.  Tired from being up all night with pain.  Updated team and daughter at bedside.  W/up noted.     Interval History:    extensive discussion with pt/family/team.  No c/o this am.  Tentative plans on DC today and f/up with vascular in [place].  Labs stable.  No CP/SOB        Review of Systems:  Constitutional: No fever or chills  Respiratory: No cough or shortness of breath  Cardiovascular: No chest pain or palpitations  Gastrointestinal: No nausea or vomiting  Neurological: No confusion or weakness    OBJECTIVE:     Vital Signs Range (Last 24H):  BP (!) 182/81 (BP Location: Left arm, Patient Position: Lying)   Pulse 72   Temp 98 °F (36.7 °C) (Oral)   Resp 15   Ht 5' 5" (1.651 m)   Wt 63.5 kg (139 lb 15.9 oz)   SpO2 (!) 93%   BMI 23.30 kg/m²     Temp:  [96.4 °F (35.8 °C)-99.4 °F (37.4 °C)]   Pulse:  [55-74]   Resp:  [15-20]   BP: (127-197)/()   SpO2:  [93 %-97 %]     I & O (Last 24H):  Intake/Output Summary (Last 24 hours) at 9/21/2023 0840  Last data filed at 9/21/2023 0621  Gross per 24 hour   Intake 877.68 ml   Output 3000 ml   Net -2122.32 ml         Physical Exam:  General appearance:  NAD today.    Eyes:  Conjunctivae/corneas clear. PERRL.  Lungs: Normal respiratory effort,   clear to auscultation bilaterally   Heart: Regular rate and rhythm, S1, S2 normal, no murmur, rub or nelly.  Abdomen: Soft, non-tender non-distended; bowel sounds normal; no masses,  no " "organomegaly  Extremities: No cyanosis or clubbing. No edema.    Skin: Skin color, texture, turgor normal. No rashes or lesions  Neurologic: Normal strength and tone. No focal numbness or weakness   Right arm AVF+  Left big/second toes necrotic.     Laboratory Data:  Recent Labs   Lab 09/21/23 0417   WBC 8.85   RBC 3.85*   HGB 10.7*   HCT 34.5*      MCV 90   MCH 27.8   MCHC 31.0*         BMP:   Recent Labs   Lab 09/21/23 0417   GLU 93      K 3.4*   CL 96   CO2 27   BUN 18   CREATININE 5.5*   CALCIUM 8.8   MG 2.0   PHOS 3.2       Lab Results   Component Value Date    CALCIUM 8.8 09/21/2023    PHOS 3.2 09/21/2023       Lab Results   Component Value Date    CALCIUM 8.8 09/21/2023    PHOS 3.2 09/21/2023       No results found for: "URICACID"    BNP  No results for input(s): "BNP", "BNPTRIAGEBLO" in the last 168 hours.    Medications:  Medication list was reviewed and changes noted under Assessment/Plan.    Diagnostic Results:    reviewed    ASSESSMENT/PLAN:       ESRD on HD MWF in [place]:  -consulted for HD needs.  -consents signed  -continue HD MWF while here and if she stays after hospitalization will need temp outpt unit.   -labs noted and bath adjusted.  -R arm AVF+  -9/19: HD yesterday w/o issue.  No need today.  -9/20:  HD today after angio  -9/21:  no need for HD today.  Cont MWF if stays here.  Renally dose meds, avoid nephrotoxins, and monitor I/O's closely.       Necrotic Left toes:  -defer to pod/vascular/cards.  -angio noted with severe PAD  -anticipate AKA amputation vs extensive Fem-pop bypass     HTN:  -UF on HD  -increase ARB/CCB.      MBD:  -binders/nephrocaps.   Changed to renvela.  Pt states that she also takes tums at home  -using calcitriol on HD days.     Anemia of CKD:  -at goal currently.   -trend      As above  Will follow for renal needs   Ok to DC   F/up with [place] team.             "

## 2023-09-21 NOTE — PROGRESS NOTES
"[facility] ([address] Medicine  Progress Note    Patient Name: Shannan Figueredo  MRN: [id number]  Patient Class: IP- Inpatient   Admission Date: 9/17/2023  Length of Stay: 4 days  Attending Physician: Brook Colon MD  Primary Care Provider: Roxi, Primary Doctor        Subjective:     Principal Problem:Gangrene of toe of left foot        HPI:  Ms. Figueredo is a 67/F with PMH HTN, DMII (diet-controlled), PVD, ESRD on HD (M/W/F), anemia who presented to [facility] 09/17 with a two day progressive history of worsening L foot pain, swelling, fatigue, nausea and vomiting. History obtained from patient and daughter Stephanie ([phone]); they are from the [place], [place] area. Patient reports several weeks ago she "stubbed her toe," noting initially some pain, redness and swelling. Gradually symptoms progressed with duskiness to her toe and she informed her daughter of her pain for which she was brought to ER in [place] on 08/25 for further evaluation. With duskiness to 1st toe and surrounding erythema she was treated for cellulitis with a course of ciprofloxacin and metronidazole, but failed to improve. She followed up with her podiatrist 09/12 where she was prescribed hydrocodone-acetaminophen and a ten day course of TMP-SMX which she has been taking. She was referred to vascular surgery with plan for angiography and potential intervention  She and her family came to [place] for the weekend but her pain worsened; had been attempting to minimize use of hydrocodone-acetaminophen but developed nausea, vomiting and progressive fatigue. She subsequently presented to ED for further evaluation. ED workup was notable for L 1st toe dry-appearing gangrene with surrounding erythema and swelling, removed nail of second toe, XR L foot demonstrating diffuse osteopenia, L calcaneal and forefoot soft tissue swelling without definitive evidence of osteomyelitis in setting of osteopenia, no leukocytosis, elevated sed " rate and CRP, and elevated BUN/Cr in setting of ESRD. She notes she went without dialysis on 09/15 due to her trip. Blood cultures were ordered and she received piperacillin-tazobactam and vancomycin. Hospital medicine was subsequently contacted for admission.      Overview/Hospital Course:  Admitted with gangrene of L 1st toe. Surgery, nephrology, podiatry consulted; U/S arterial demonstrated L SFA occlusion with distal reconstitution. Cardiology consulted for angiography/potential angioplasty.      Interval History: No acute events overnight, no new complaints. Daughter reported confusion episode last night but none today. Discussed plan of care with patient / daughters (in person and via phone).     Review of Systems   Constitutional:  Positive for fatigue. Negative for chills and fever.   Respiratory:  Negative for cough and shortness of breath.    Cardiovascular:  Negative for chest pain and palpitations.   Gastrointestinal:  Negative for abdominal pain, nausea and vomiting.     Objective:     Vital Signs (Most Recent):  Temp: 98.9 °F (37.2 °C) (09/21/23 1214)  Pulse: 69 (09/21/23 1214)  Resp: 16 (09/21/23 1214)  BP: (!) 176/80 (09/21/23 1214)  SpO2: (!) 92 % (09/21/23 1214) Vital Signs (24h Range):  Temp:  [97.8 °F (36.6 °C)-99.4 °F (37.4 °C)] 98.9 °F (37.2 °C)  Pulse:  [65-73] 69  Resp:  [15-20] 16  SpO2:  [92 %-97 %] 92 %  BP: (158-197)/(55-97) 176/80     Weight: 63.5 kg (139 lb 15.9 oz)  Body mass index is 23.3 kg/m².    Intake/Output Summary (Last 24 hours) at 9/21/2023 1338  Last data filed at 9/21/2023 0621  Gross per 24 hour   Intake 877.68 ml   Output 3000 ml   Net -2122.32 ml           Physical Exam  Vitals and nursing note reviewed.   Constitutional:       General: She is not in acute distress.     Appearance: She is well-developed.   HENT:      Head: Normocephalic and atraumatic.   Eyes:      General:         Right eye: No discharge.         Left eye: No discharge.      Conjunctiva/sclera:  Conjunctivae normal.   Cardiovascular:      Rate and Rhythm: Normal rate.      Comments: Diminished BLE pulses.  Pulmonary:      Effort: Pulmonary effort is normal. No respiratory distress.      Comments: Mildly diminished at bases.  Abdominal:      Palpations: Abdomen is soft.      Tenderness: There is no abdominal tenderness.   Musculoskeletal:         General: Normal range of motion.      Right lower leg: No edema.      Left lower leg: No edema.      Comments: RUE fistula with thrill.   Skin:     Comments: LLE cool to touch and dry. L first toe with gangrenous changes with surrounding erythema, no noted purulence or drainage. L 2nd toe with absent nail.   Neurological:      Mental Status: She is alert, oriented to person, place, and time and easily aroused.           Significant Labs:   CBC:  Recent Labs   Lab 09/19/23  0342 09/20/23  0546 09/21/23  0417   WBC 7.39 7.51 8.85   HGB 10.1* 11.3* 10.7*   HCT 32.4* 36.8* 34.5*    210 200   GRAN 68.5  5.1 67.3  5.1 69.3  6.1   LYMPH 12.6*  0.9* 14.1*  1.1 12.2*  1.1   MONO 14.2  1.1* 12.1  0.9 13.6  1.2*   EOS 0.2 0.4 0.3   BASO 0.08 0.07 0.07     CMP:  Recent Labs   Lab 09/19/23  0341 09/20/23  0546 09/21/23  0417    138 136   K 3.8 3.7 3.4*   CL 95 95 96   CO2 25 26 27   BUN 17 28* 18   CREATININE 6.3* 8.1* 5.5*   * 72 93   CALCIUM 8.4* 9.1 8.8   MG 2.0 2.1 2.0   PHOS 5.1* 5.3* 3.2   ALBUMIN 2.8* 2.9* 2.5*   ANIONGAP 16 17* 13        Significant Imaging: I have reviewed and interpreted all pertinent imaging results/findings within the past 24 hours.      Assessment/Plan:      * Gangrene of toe of left foot  HTN, PVD  - L 1st toe gangrene predominantly dry with some surrounding erythema/swelling; with concern for worsening infection started pipeacillin-tazobactam and vancomycin IV. 2nd toe with some involvement as well. Continue broad spectrum antibiotic therapy for now. No evidence of sepsis at this time.  - U/S arterial BLE showed L  "distal SFA occlusion with distal reconstitution. Discussed with vascular surgery; cardiology consult for consideration of angiography / potential angioplasty.  - Podiatry consulted, appreciate assistance. MRI L foot with "examination markedly degraded by patient motion artifact. Questionable marrow edema within the 1st distal phalanx, not well evaluated given aforementioned limitation but possibly related to osteomyelitis given reported history of gangrene."  - Continue hydrocodone-acetaminophen 5-325mg PO q6hr PRN, hydromorphone 0.5mg IV q6hr PRN.  - Continue labetalol 300mg PO BID, losartan 50mg PO daily, nifedipine 30mg PO daily.  - Blood culture NGTD  - s/p angiogram today remarkable for severe PAD with no areas amenable to angioplasty  - Vascular surgery, Dr. Cobb spoke with patient and family yesterday, and they were undecided on what to do concerning possible intervention here or in [place]. They asked for more time and this was discussed with Surgery, and plan for in depth discussion today    Type 2 diabetes mellitus with chronic kidney disease on chronic dialysis  - Reports diet-controlled; no current medications.  - HgbA1c 5.7.  - Continue low-dose sliding scale insulin aspart 0-5U subQ TIDWM PRN, monitor requirements.    ESRD (end stage renal disease)  Anemia, hyperparathyroidism  - ESRD on HD, reports missed session Friday prior to presentation due to travel.  - Mild anemia without evidence of bleeding.  - Continue allopurinol 300mg PO daily, calcium acetate 2001mg PO TIDWM.  - Nephrology consulted for HD assistance.      VTE Risk Mitigation (From admission, onward)         Ordered     IP VTE HIGH RISK PATIENT  Once         09/17/23 0152                      Brook Colon MD  Department of Hospital Medicine   [facility] ([address])  "

## 2023-09-21 NOTE — PROGRESS NOTES
[facility]    Admission date:  9/17/2023     Assessment    Atherosclerotic peripheral vascular disease with gangrene  Very limited options for revascularization.    Plan and Discussion    Started aspirin and Plavix for her peripheral arterial disease.  She has a copy of angiogram films to take with her to [place].    Subjective    No problems overnight.    Medications  Current Facility-Administered Medications   Medication Dose Route Frequency Provider Last Rate Last Admin    0.9%  NaCl infusion   Intravenous Once Rao Sosa MD        acetaminophen tablet 650 mg  650 mg Oral Q6H PRN Rao Sosa MD        aspirin EC tablet 81 mg  81 mg Oral Daily Rao Sosa MD   81 mg at 09/21/23 0848    calcitRIOL capsule 0.25 mcg  0.25 mcg Oral Every Mon, Wed, Fri Rao Sosa MD   0.25 mcg at 09/20/23 1000    calcium carbonate 200 mg calcium (500 mg) chewable tablet 500 mg  500 mg Oral BID Rao Sosa MD   500 mg at 09/21/23 0848    carvediloL tablet 3.125 mg  3.125 mg Oral BID Rao Sosa MD   3.125 mg at 09/21/23 0848    clopidogreL tablet 75 mg  75 mg Oral Daily Rao Sosa MD   75 mg at 09/21/23 0848    dextrose 10% bolus 125 mL 125 mL  12.5 g Intravenous PRN Rao Sosa MD        dextrose 10% bolus 250 mL 250 mL  25 g Intravenous PRN Rao Sosa MD        diphenhydrAMINE capsule 25 mg  25 mg Oral Q6H PRN Brook Colon MD   25 mg at 09/20/23 2044    glucagon (human recombinant) injection 1 mg  1 mg Intramuscular PRN Rao Sosa MD        glucose chewable tablet 16 g  16 g Oral PRN Rao Sosa MD        glucose chewable tablet 24 g  24 g Oral PRN Rao Sosa MD   24 g at 09/20/23 1444    hydrALAZINE injection 10 mg  10 mg Intravenous Q6H PRN Rao Sosa MD   10 mg at 09/21/23 0027    HYDROcodone-acetaminophen 5-325 mg per tablet 1 tablet  1 tablet Oral Q6H PRN Rao Sosa D., MD   1 tablet at 09/20/23 2051    HYDROmorphone injection 0.5 mg  0.5 mg  Intravenous Q6H PRN Rao Sosa MD   0.5 mg at 09/21/23 0907    insulin aspart U-100 pen 0-5 Units  0-5 Units Subcutaneous QID (AC + HS) PRN Rao Sosa MD        loperamide capsule 2 mg  2 mg Oral Daily PRN Rao Sosa MD   2 mg at 09/19/23 1749    losartan tablet 100 mg  100 mg Oral Daily Nirmal Betts, NP   100 mg at 09/21/23 0851    melatonin tablet 6 mg  6 mg Oral Nightly PRN Rao Sosa MD   6 mg at 09/19/23 2040    mupirocin 2 % ointment   Nasal BID Rao Sosa MD   Given at 09/21/23 0852    NIFEdipine 24 hr tablet 60 mg  60 mg Oral Daily Nirmal Betts, NP   60 mg at 09/21/23 0848    ondansetron disintegrating tablet 4 mg  4 mg Oral Q6H PRN Rao Sosa MD        ondansetron injection 4 mg  4 mg Intravenous Q6H PRN Rao Sosa MD        piperacillin-tazobactam (ZOSYN) 4.5 g in dextrose 5 % in water (D5W) 100 mL IVPB (MB+)  4.5 g Intravenous Q12H Rao Sosa MD   Stopped at 09/21/23 0502    sevelamer carbonate tablet 1,600 mg  1,600 mg Oral TID WM Rao Sosa MD   1,600 mg at 09/21/23 0848    sodium chloride 0.9% bolus 250 mL 250 mL  250 mL Intravenous PRN Rao Sosa MD        sodium chloride 0.9% flush 10 mL  10 mL Intravenous PRN Rao Sosa MD        vancomycin - pharmacy to dose   Intravenous pharmacy to manage frequency Rao Sosa MD        vitamin renal formula (B-complex-vitamin c-folic acid) 1 mg per capsule 1 capsule  1 capsule Oral Daily Rao Sosa MD   1 capsule at 09/21/23 0848        Physical Exam    Temp:  [97.8 °F (36.6 °C)-99.4 °F (37.4 °C)]   Pulse:  [65-74]   Resp:  [15-20]   BP: (129-197)/(55-99)   SpO2:  [92 %-97 %]    Wt Readings from Last 3 Encounters:   09/18/23 63.5 kg (139 lb 15.9 oz)     Physical Exam  Right femoral access site looks good without bleeding, swelling, or hematoma.    Labs  Recent Results (from the past 24 hour(s))   POCT glucose    Collection Time: 09/20/23  2:39 PM   Result Value Ref Range    POCT  Glucose 48 (LL) 70 - 110 mg/dL   POCT glucose    Collection Time: 09/20/23  3:36 PM   Result Value Ref Range    POCT Glucose 111 (H) 70 - 110 mg/dL   POCT glucose    Collection Time: 09/20/23  8:58 PM   Result Value Ref Range    POCT Glucose 108 70 - 110 mg/dL   Magnesium    Collection Time: 09/21/23  4:17 AM   Result Value Ref Range    Magnesium 2.0 1.6 - 2.6 mg/dL   CBC with Automated Differential    Collection Time: 09/21/23  4:17 AM   Result Value Ref Range    WBC 8.85 3.90 - 12.70 K/uL    RBC 3.85 (L) 4.00 - 5.40 M/uL    Hemoglobin 10.7 (L) 12.0 - 16.0 g/dL    Hematocrit 34.5 (L) 37.0 - 48.5 %    MCV 90 82 - 98 fL    MCH 27.8 27.0 - 31.0 pg    MCHC 31.0 (L) 32.0 - 36.0 g/dL    RDW 14.4 11.5 - 14.5 %    Platelets 200 150 - 450 K/uL    MPV 8.8 (L) 9.2 - 12.9 fL    Immature Granulocytes 0.6 (H) 0.0 - 0.5 %    Gran # (ANC) 6.1 1.8 - 7.7 K/uL    Immature Grans (Abs) 0.05 (H) 0.00 - 0.04 K/uL    Lymph # 1.1 1.0 - 4.8 K/uL    Mono # 1.2 (H) 0.3 - 1.0 K/uL    Eos # 0.3 0.0 - 0.5 K/uL    Baso # 0.07 0.00 - 0.20 K/uL    nRBC 0 0 /100 WBC    Gran % 69.3 38.0 - 73.0 %    Lymph % 12.2 (L) 18.0 - 48.0 %    Mono % 13.6 4.0 - 15.0 %    Eosinophil % 3.5 0.0 - 8.0 %    Basophil % 0.8 0.0 - 1.9 %    Differential Method Automated    Renal Function Panel    Collection Time: 09/21/23  4:17 AM   Result Value Ref Range    Glucose 93 70 - 110 mg/dL    Sodium 136 136 - 145 mmol/L    Potassium 3.4 (L) 3.5 - 5.1 mmol/L    Chloride 96 95 - 110 mmol/L    CO2 27 23 - 29 mmol/L    BUN 18 8 - 23 mg/dL    Calcium 8.8 8.7 - 10.5 mg/dL    Creatinine 5.5 (H) 0.5 - 1.4 mg/dL    Albumin 2.5 (L) 3.5 - 5.2 g/dL    Phosphorus 3.2 2.7 - 4.5 mg/dL    eGFR 8 (A) >60 mL/min/1.73 m^2    Anion Gap 13 8 - 16 mmol/L   POCT glucose    Collection Time: 09/21/23  8:22 AM   Result Value Ref Range    POCT Glucose 109 70 - 110 mg/dL   POCT glucose    Collection Time: 09/21/23 12:12 PM   Result Value Ref Range    POCT Glucose 91 70 - 110 mg/dL               Rao GARCIA  MD Sheila

## 2023-09-22 NOTE — PT/OT/SLP PROGRESS
Occupational Therapy      Patient Name:  Shannan Figueredo   MRN:  [id number]    Patient not seen today secondary to Off the floor for procedure/surgery. Will follow-up as scheduling permits and patient availability/appropriate for therapy.    9/22/2023

## 2023-09-22 NOTE — TRANSFER OF CARE
"Anesthesia Transfer of Care Note    Patient: Shannan Figueredo    Procedure(s) Performed: Procedure(s) (LRB):  EXPLORATION, VESSEL, FEMORAL AND POPLITEAL (Left)    Patient location: ICU    Anesthesia Type: general    Transport from OR: Transported from OR on 6-10 L/min O2 by face mask with adequate spontaneous ventilation    Post pain: adequate analgesia    Post assessment: no apparent anesthetic complications    Post vital signs: stable    Level of consciousness: awake and alert    Nausea/Vomiting: no nausea/vomiting    Complications: none    Transfer of care protocol was followed      Last vitals:   Visit Vitals  BP (!) 161/85   Pulse 71   Temp 36.4 °C (97.6 °F)   Resp 18   Ht 5' 5" (1.651 m)   Wt 63.5 kg (139 lb 15.9 oz)   SpO2 98%   BMI 23.30 kg/m²     "

## 2023-09-22 NOTE — PROGRESS NOTES
"[facility] - Intensive Care [facility] Medicine  Progress Note    Patient Name: Shannan Figueredo  MRN: [id number]  Patient Class: IP- Inpatient   Admission Date: 9/17/2023  Length of Stay: 5 days  Attending Physician: Franky Cobb Jr., MD  Primary Care Provider: Roxi, Primary Doctor        Subjective:     Principal Problem:Gangrene of toe of left foot        HPI:  Ms. Figueredo is a 67/F with PMH HTN, DMII (diet-controlled), PVD, ESRD on HD (M/W/F), anemia who presented to [facility] 09/17 with a two day progressive history of worsening L foot pain, swelling, fatigue, nausea and vomiting. History obtained from patient and daughter Stephanie ([phone]); they are from the [place], [place] area. Patient reports several weeks ago she "stubbed her toe," noting initially some pain, redness and swelling. Gradually symptoms progressed with duskiness to her toe and she informed her daughter of her pain for which she was brought to ER in [place] on 08/25 for further evaluation. With duskiness to 1st toe and surrounding erythema she was treated for cellulitis with a course of ciprofloxacin and metronidazole, but failed to improve. She followed up with her podiatrist 09/12 where she was prescribed hydrocodone-acetaminophen and a ten day course of TMP-SMX which she has been taking. She was referred to vascular surgery with plan for angiography and potential intervention  She and her family came to [place] for the weekend but her pain worsened; had been attempting to minimize use of hydrocodone-acetaminophen but developed nausea, vomiting and progressive fatigue. She subsequently presented to ED for further evaluation. ED workup was notable for L 1st toe dry-appearing gangrene with surrounding erythema and swelling, removed nail of second toe, XR L foot demonstrating diffuse osteopenia, L calcaneal and forefoot soft tissue swelling without definitive evidence of osteomyelitis in setting of osteopenia, no leukocytosis, " elevated sed rate and CRP, and elevated BUN/Cr in setting of ESRD. She notes she went without dialysis on 09/15 due to her trip. Blood cultures were ordered and she received piperacillin-tazobactam and vancomycin. Hospital medicine was subsequently contacted for admission.      Overview/Hospital Course:  Admitted with gangrene of L 1st toe.  Empirically treated with Zosyn and vancomycin.  Surgery, nephrology, podiatry consulted; U/S arterial demonstrated L SFA occlusion with distal reconstitution. Cardiology consulted, angiography with no vessels amenable for angioplasty.  Vascular surgery consulted.      Interval History: No acute events overnight, underwent HD this am and then followed by exploration of the left lower extremity vessels, unable to perform femoral and popliteal bypass.  Patient arrived to the ICU postprocedure and still under the effects of anesthesia.  Sister and daughter at the bedside.      Review of Systems   Unable to perform ROS: Mental status change     Objective:     Vital Signs (Most Recent):  Temp: 98.4 °F (36.9 °C) (09/22/23 1403)  Pulse: 72 (09/22/23 1601)  Resp: (!) 24 (09/22/23 1601)  BP: (!) 143/66 (09/22/23 1601)  SpO2: 98 % (09/22/23 1601) Vital Signs (24h Range):  Temp:  [97.6 °F (36.4 °C)-98.9 °F (37.2 °C)] 98.4 °F (36.9 °C)  Pulse:  [67-74] 72  Resp:  [16-25] 24  SpO2:  [94 %-100 %] 98 %  BP: (112-168)/(55-99) 143/66     Weight: 63.5 kg (139 lb 15.9 oz)  Body mass index is 23.3 kg/m².    Intake/Output Summary (Last 24 hours) at 9/22/2023 1645  Last data filed at 9/22/2023 1339  Gross per 24 hour   Intake 1480 ml   Output 2800 ml   Net -1320 ml           Physical Exam  Vitals and nursing note reviewed.   Constitutional:       General: She is not in acute distress.     Appearance: She is well-developed.   HENT:      Head: Normocephalic and atraumatic.   Eyes:      General:         Right eye: No discharge.         Left eye: No discharge.      Conjunctiva/sclera: Conjunctivae normal.    Cardiovascular:      Rate and Rhythm: Normal rate.      Comments: Diminished BLE pulses.  Pulmonary:      Effort: Pulmonary effort is normal. No respiratory distress.      Comments: Mildly diminished at bases.  Abdominal:      Palpations: Abdomen is soft.      Tenderness: There is no abdominal tenderness.   Musculoskeletal:         General: Normal range of motion.      Right lower leg: No edema.      Left lower leg: No edema.      Comments: RUE fistula with thrill.   Skin:     Comments: LLE cool to touch and dry. L first toe with gangrenous changes with surrounding erythema, no noted purulence or drainage. L 2nd toe with absent nail. Surgical dressing in place   Neurological:      Mental Status: She is alert and easily aroused.      Comments: Just placed in ICU post procedure, still lethargic from anesthesia           Significant Labs:   CBC:  Recent Labs   Lab 09/20/23  0546 09/21/23  0417 09/22/23  0511   WBC 7.51 8.85 9.35   HGB 11.3* 10.7* 12.0   HCT 36.8* 34.5* 38.9    200 236   GRAN 67.3  5.1 69.3  6.1 69.5  6.5   LYMPH 14.1*  1.1 12.2*  1.1 13.9*  1.3   MONO 12.1  0.9 13.6  1.2* 12.3  1.2*   EOS 0.4 0.3 0.3   BASO 0.07 0.07 0.07     CMP:  Recent Labs   Lab 09/20/23  0546 09/21/23  0417 09/22/23  0511    136 135*   K 3.7 3.4* 3.6   CL 95 96 93*   CO2 26 27 26   BUN 28* 18 32*   CREATININE 8.1* 5.5* 7.0*   GLU 72 93 91   CALCIUM 9.1 8.8 9.5   MG 2.1 2.0 2.2   PHOS 5.3* 3.2 3.9   ALBUMIN 2.9* 2.5* 2.8*   ANIONGAP 17* 13 16        Significant Imaging: I have reviewed and interpreted all pertinent imaging results/findings within the past 24 hours.      Assessment/Plan:      * Gangrene of toe of left foot  HTN, PVD  - L 1st toe gangrene predominantly dry with some surrounding erythema/swelling; with concern for worsening infection started pipeacillin-tazobactam and vancomycin IV. 2nd toe with some involvement as well. Continue broad spectrum antibiotic therapy for now. No evidence of sepsis  "at this time.  - U/S arterial BLE showed L distal SFA occlusion with distal reconstitution. Discussed with vascular surgery; cardiology consult for consideration of angiography / potential angioplasty.  - Podiatry consulted, appreciate assistance. MRI L foot with "examination markedly degraded by patient motion artifact. Questionable marrow edema within the 1st distal phalanx, not well evaluated given aforementioned limitation but possibly related to osteomyelitis given reported history of gangrene."  - Continue hydrocodone-acetaminophen 5-325mg PO q6hr PRN, hydromorphone 0.5mg IV q6hr PRN.  - Continue labetalol 300mg PO BID, losartan 50mg PO daily, nifedipine 30mg PO daily.  - Blood culture NGTD  - s/p angiogram remarkable for severe PAD with no areas amenable to angioplasty on 9/20  - Vascular surgery, Dr. Cobb spoke with patient and family extensively and patient elected to undergo attempt at revascularization.  Vessel exploration, unable to perform femoral and popliteal bypass today  - Placed in ICU postprocedure as per protocol under Dr. Cobb    Type 2 diabetes mellitus with chronic kidney disease on chronic dialysis  - Reports diet-controlled; no current medications.  - HgbA1c 5.7.  - Continue low-dose sliding scale insulin aspart 0-5U subQ TIDWM PRN, monitor requirements.    ESRD (end stage renal disease)  Anemia, hyperparathyroidism  - ESRD on HD, reports missed session Friday prior to presentation due to travel.  - Mild anemia without evidence of bleeding.  - Continue allopurinol 300mg PO daily, calcium acetate 2001mg PO TIDWM.  - Nephrology consulted for HD assistance.    VTE Risk Mitigation (From admission, onward)         Ordered     IP VTE HIGH RISK PATIENT  Once         09/17/23 1711                      Brook Colon MD  Department of Hospital Medicine   [facility] - Intensive Care ([employer])  "

## 2023-09-22 NOTE — PROGRESS NOTES
"Nephrology  Progress Note    Admit Date: 9/17/2023   LOS: 5 days     SUBJECTIVE:     Follow-up For:  Gangrene of toe of left foot    History of Present Illness:  Patient is a 67 y.o. female presents with left foot gangrene, vomiting, feeling ill.  Ext med hx as outlined below including ESRD on HD MWF in [place].  Has been dealing with necrotic left toes for few months and followed by vascular in [place].  Was here visiting daughter and felt bad.  Admitted for eval/treatment.  Consulted for HD needs.  Pt seen and examined on HD.  Consents signed.  Tired from being up all night with pain.  Updated team and daughter at bedside.  W/up noted.     Interval History:    Events/plans noted.  Seen on HD earlier this am.  OR today with Dr. Cobb for ext fem pop attempt.  Discussed with family at bedside.         Review of Systems:  Constitutional: No fever or chills  Respiratory: No cough or shortness of breath  Cardiovascular: No chest pain or palpitations  Gastrointestinal: No nausea or vomiting  Neurological: No confusion or weakness    OBJECTIVE:     Vital Signs Range (Last 24H):  BP (!) 161/85   Pulse 71   Temp 97.6 °F (36.4 °C) (Oral)   Resp 18   Ht 5' 5" (1.651 m)   Wt 63.5 kg (139 lb 15.9 oz)   SpO2 98%   BMI 23.30 kg/m²     Temp:  [97.6 °F (36.4 °C)-99.2 °F (37.3 °C)]   Pulse:  [67-72]   Resp:  [16-18]   BP: (148-176)/(65-99)   SpO2:  [92 %-99 %]     I & O (Last 24H):  Intake/Output Summary (Last 24 hours) at 9/22/2023 0912  Last data filed at 9/22/2023 0805  Gross per 24 hour   Intake 680 ml   Output 2500 ml   Net -1820 ml         Physical Exam:  General appearance:  NAD today.    Eyes:  Conjunctivae/corneas clear. PERRL.  Lungs: Normal respiratory effort,   clear to auscultation bilaterally   Heart: Regular rate and rhythm, S1, S2 normal, no murmur, rub or nelly.  Abdomen: Soft, non-tender non-distended; bowel sounds normal; no masses,  no organomegaly  Extremities: No cyanosis or clubbing. No edema.    Skin: " "Skin color, texture, turgor normal. No rashes or lesions  Neurologic: Normal strength and tone. No focal numbness or weakness   Right arm AVF+  Left big/second toes necrotic.     Laboratory Data:  Recent Labs   Lab 09/22/23  0511   WBC 9.35   RBC 4.34   HGB 12.0   HCT 38.9      MCV 90   MCH 27.6   MCHC 30.8*         BMP:   Recent Labs   Lab 09/22/23  0511   GLU 91   *   K 3.6   CL 93*   CO2 26   BUN 32*   CREATININE 7.0*   CALCIUM 9.5   MG 2.2   PHOS 3.9       Lab Results   Component Value Date    CALCIUM 9.5 09/22/2023    PHOS 3.9 09/22/2023       Lab Results   Component Value Date    CALCIUM 9.5 09/22/2023    PHOS 3.9 09/22/2023       No results found for: "URICACID"    BNP  No results for input(s): "BNP", "BNPTRIAGEBLO" in the last 168 hours.    Medications:  Medication list was reviewed and changes noted under Assessment/Plan.    Diagnostic Results:    reviewed    ASSESSMENT/PLAN:       ESRD on HD MWF in [place]:  -consulted for HD needs.  -consents signed  -continue HD MWF while here and if she stays after hospitalization will need temp outpt unit.   -labs noted and bath adjusted.  -R arm AVF+  -9/19: HD yesterday w/o issue.  No need today.  -9/20:  HD today after angio  -9/21:  no need for HD today.  Cont MWF if stays here.    -9/22:  seen on HD this am. Renally dose meds, avoid nephrotoxins, and monitor I/O's closely.  May need to set up HD unit since she will likely need to stay in city for surgical follow up.       Necrotic Left toes:  -defer to pod/vascular/cards.  -angio noted with severe PAD  -anticipate AKA amputation vs extensive Fem-pop bypass today.      HTN:  -UF on HD  -increased ARB/CCB.      MBD:  -binders/nephrocaps.   Changed to renvela.  Pt states that she also takes tums at home  -using calcitriol on HD days.     Anemia of CKD:  -at goal currently.   -trend      As above  Will follow for renal needs               "

## 2023-09-22 NOTE — ANESTHESIA POSTPROCEDURE EVALUATION
Anesthesia Post Evaluation    Patient: Shannan Figueredo    Procedure(s) Performed: Procedure(s) (LRB):  EXPLORATION, VESSEL, FEMORAL AND POPLITEAL (Left)    Final Anesthesia Type: general      Patient location during evaluation: ICU  Patient participation: Yes- Able to Participate  Level of consciousness: awake and alert  Post-procedure vital signs: reviewed and stable  Pain management: adequate  Airway patency: patent  BOB mitigation strategies: Extubation while patient is awake  PONV status at discharge: No PONV  Anesthetic complications: no      Cardiovascular status: hemodynamically stable  Respiratory status: unassisted  Hydration status: euvolemic  Follow-up not needed.          Vitals Value Taken Time   /90 09/22/23 1411   Temp 98 09/22/23 1411   Pulse 73 09/22/23 1410   Resp 25 09/22/23 1410   SpO2 100 % 09/22/23 1410   Vitals shown include unvalidated device data.      No case tracking events are documented in the log.      Pain/Alan Score: Pain Rating Prior to Med Admin: 8 (9/22/2023  8:39 AM)  Pain Rating Post Med Admin: 0 (9/21/2023 11:57 PM)

## 2023-09-22 NOTE — SUBJECTIVE & OBJECTIVE
Interval History: No acute events overnight, underwent HD this am and then followed by exploration of the left lower extremity vessels, unable to perform femoral and popliteal bypass.  Patient arrived to the ICU postprocedure and still under the effects of anesthesia.  Sister and daughter at the bedside.      Review of Systems   Unable to perform ROS: Mental status change     Objective:     Vital Signs (Most Recent):  Temp: 98.4 °F (36.9 °C) (09/22/23 1403)  Pulse: 72 (09/22/23 1601)  Resp: (!) 24 (09/22/23 1601)  BP: (!) 143/66 (09/22/23 1601)  SpO2: 98 % (09/22/23 1601) Vital Signs (24h Range):  Temp:  [97.6 °F (36.4 °C)-98.9 °F (37.2 °C)] 98.4 °F (36.9 °C)  Pulse:  [67-74] 72  Resp:  [16-25] 24  SpO2:  [94 %-100 %] 98 %  BP: (112-168)/(55-99) 143/66     Weight: 63.5 kg (139 lb 15.9 oz)  Body mass index is 23.3 kg/m².    Intake/Output Summary (Last 24 hours) at 9/22/2023 1645  Last data filed at 9/22/2023 1339  Gross per 24 hour   Intake 1480 ml   Output 2800 ml   Net -1320 ml           Physical Exam  Vitals and nursing note reviewed.   Constitutional:       General: She is not in acute distress.     Appearance: She is well-developed.   HENT:      Head: Normocephalic and atraumatic.   Eyes:      General:         Right eye: No discharge.         Left eye: No discharge.      Conjunctiva/sclera: Conjunctivae normal.   Cardiovascular:      Rate and Rhythm: Normal rate.      Comments: Diminished BLE pulses.  Pulmonary:      Effort: Pulmonary effort is normal. No respiratory distress.      Comments: Mildly diminished at bases.  Abdominal:      Palpations: Abdomen is soft.      Tenderness: There is no abdominal tenderness.   Musculoskeletal:         General: Normal range of motion.      Right lower leg: No edema.      Left lower leg: No edema.      Comments: RUE fistula with thrill.   Skin:     Comments: LLE cool to touch and dry. L first toe with gangrenous changes with surrounding erythema, no noted purulence or  drainage. L 2nd toe with absent nail. Surgical dressing in place   Neurological:      Mental Status: She is alert and easily aroused.      Comments: Just placed in ICU post procedure, still lethargic from anesthesia           Significant Labs:   CBC:  Recent Labs   Lab 09/20/23  0546 09/21/23  0417 09/22/23  0511   WBC 7.51 8.85 9.35   HGB 11.3* 10.7* 12.0   HCT 36.8* 34.5* 38.9    200 236   GRAN 67.3  5.1 69.3  6.1 69.5  6.5   LYMPH 14.1*  1.1 12.2*  1.1 13.9*  1.3   MONO 12.1  0.9 13.6  1.2* 12.3  1.2*   EOS 0.4 0.3 0.3   BASO 0.07 0.07 0.07     CMP:  Recent Labs   Lab 09/20/23  0546 09/21/23  0417 09/22/23  0511    136 135*   K 3.7 3.4* 3.6   CL 95 96 93*   CO2 26 27 26   BUN 28* 18 32*   CREATININE 8.1* 5.5* 7.0*   GLU 72 93 91   CALCIUM 9.1 8.8 9.5   MG 2.1 2.0 2.2   PHOS 5.3* 3.2 3.9   ALBUMIN 2.9* 2.5* 2.8*   ANIONGAP 17* 13 16        Significant Imaging: I have reviewed and interpreted all pertinent imaging results/findings within the past 24 hours.

## 2023-09-22 NOTE — PLAN OF CARE
Plan of care reviewed with patient and daughter. No significant events overnight. Patient NPO after midnight, patient informed and verbalized understanding. Safety maintained.    Problem: Infection  Goal: Absence of Infection Signs and Symptoms  Outcome: Ongoing, Progressing     Problem: Adult Inpatient Plan of Care  Goal: Plan of Care Review  Outcome: Ongoing, Progressing     Problem: Diabetes Comorbidity  Goal: Blood Glucose Level Within Targeted Range  Outcome: Ongoing, Progressing     Problem: Impaired Wound Healing  Goal: Optimal Wound Healing  Outcome: Ongoing, Progressing     Problem: Skin Injury Risk Increased  Goal: Skin Health and Integrity  Outcome: Ongoing, Progressing

## 2023-09-22 NOTE — ANESTHESIA PREPROCEDURE EVALUATION
09/22/2023  Shannan Figueredo is a 67 y.o., female.      Pre-op Assessment    I have reviewed the Patient Summary Reports.     I have reviewed the Nursing Notes. I have reviewed the NPO Status.   I have reviewed the Medications.     Review of Systems  Anesthesia Hx:  No problems with previous Anesthesia    Social:  Non-Smoker    Hematology/Oncology:         -- Anemia:   Cardiovascular:   Hypertension, well controlled CAD   PVD hyperlipidemia    Pulmonary:  Pulmonary Normal    Renal/:   Chronic Renal Disease, ESRD, Dialysis    Hepatic/GI:  Hepatic/GI Normal    Endocrine:   Diabetes, well controlled        Physical Exam  General: Well nourished, Cooperative and Alert    Airway:  Mallampati: II   Mouth Opening: Normal  TM Distance: Normal  Tongue: Normal  Neck ROM: Normal ROM    Dental:  Intact        Anesthesia Plan  Type of Anesthesia, risks & benefits discussed:    Anesthesia Type: Gen ETT  Intra-op Monitoring Plan: Standard ASA Monitors  Post Op Pain Control Plan: multimodal analgesia  Induction:  IV  Airway Plan: Video  Informed Consent: Informed consent signed with the Patient and all parties understand the risks and agree with anesthesia plan.  All questions answered.   ASA Score: 3    Ready For Surgery From Anesthesia Perspective.     .

## 2023-09-22 NOTE — OP NOTE
[facility] Intensive Care [facility]  Surgery Department  Operative Note    SUMMARY     Patient: Shannan Figueredo    Medical Record: [id number]    Date of Procedure: 9/22/2023     Surgeon: Surgeon(s) and Role:     * Franky Cobb Jr., MD - Primary    Assisting Surgeon: None    Pre-Operative Diagnosis: Peripheral vascular disease, unspecified [I73.9]    Post-Operative Diagnosis: Post-Op Diagnosis Codes:     * Peripheral vascular disease, unspecified [I73.9]    Procedure: Procedure(s) (LRB):  EXPLORATION, VESSEL, FEMORAL AND POPLITEAL (Left)    Procedure in Detail:  The patient was brought to the operating room and placed in supine position.  The abdomen and left lower extremity prepped and draped in sterile fashion.  Prepped of the angiogram showed occlusion of the proximal superficial femoral artery.  There appeared to be reconstitution of the popliteal artery with ready disease going down to the trifurcation.  There there appeared to be single-vessel runoff to the foot.  An incision was made along the medial aspect of the distal thigh.  Using sharp dissection the incision was carried down between the vastus medialis and the sartorius muscles.  Popliteal fossa was entered and the popliteal artery isolated.  The artery was densely calcified throughout the upper popliteal space.  The dissection was carried down by extended medial incision to the distal popliteal artery.  Here again the artery was densely calcified.  The proximal tibial vessels were also densely calcified.  Proximal and distal control were obtained of the popliteal artery with vessel loops.  The common femoral artery was then exposed through a longitudinal incision made above the vessel.  This vessel to appeared to have dense calcifications and the incision was carried up across inguinal ligament where the external iliac artery appeared to have some soft areas which could serve as an inflow site.  A Branham loop technique was used to provide occlusion of  the vessels.  There appeared to be a soft area in the mid to proximal distal popliteal artery.  A longitudinal arteriotomy was then made with a 11. Blade.  Upon entering the popliteal artery there was significant intraluminal calcifications which appeared to be more significant than seen on angiography.  An area for a clear outflow tract distal to the arteriotomy could not be established.  There was minimal inflow and distal back bleeding due to the copious calcification within the vessel..  This dissection was carried down to the proximal tibial vessels.  These also appear to have significant calcification.  At this point it appeared that the patient would not support an infra inguinal bypass.  Concerned that further dissection and manipulation of the more proximal vessels would be futile and result in a higher eventual amputation.  The arteriotomy was then closed with running 6. Mountainair-Ryan suture.  The muscle closed in layers with running 2-0 Vicryl and interrupted 3-0 Vicryl.  The skin incisions closed with 4-0 Monocryl.  The wounds sterilely dressed.  Patient tolerated the procedure well left the operating room in stable condition.  Estimated blood loss 200 cc.  At the end the procedure all sponge lap and instrument counts were correct.

## 2023-09-22 NOTE — ANESTHESIA PROCEDURE NOTES
Intubation    Date/Time: 9/22/2023 10:17 AM    Performed by: Ander Montanez CRNA  Authorized by: Bimal Lino MD    Intubation:     Induction:  Intravenous    Intubated:  Postinduction    Mask Ventilation:  Easy mask    Attempts:  1    Attempted By:  CRNA    Method of Intubation:  Video laryngoscopy    Blade:  [place] 3    Laryngeal View Grade: Grade I - full view of cords      Difficult Airway Encountered?: No      Complications:  None    Airway Device:  Oral endotracheal tube    Airway Device Size:  7.0    Style/Cuff Inflation:  Cuffed (inflated to minimal occlusive pressure)    Inflation Amount (mL):  5    Tube secured:  21    Secured at:  The lips    Placement Verified By:  Capnometry    Complicating Factors:  None    Findings Post-Intubation:  BS equal bilateral and atraumatic/condition of teeth unchanged

## 2023-09-22 NOTE — PROGRESS NOTES
Pharmacokinetic Assessment Follow Up: IV Vancomycin    Vancomycin serum concentration assessment(s):    The trough level was drawn correctly and can be used to guide therapy at this time. The measurement is within the desired definitive target range of 15 to 25 mcg/mL. 500 mg x1 dose today @2000 post HD session MWF. If no HD, please hold dose.     Vancomycin Regimen Plan:    Re-dose when the random level is less than 25 mcg/mL, next level to be drawn at 0400 on 9/25/23 .     Drug levels (last 3 results):  Recent Labs   Lab Result Units 09/20/23  0546 09/22/23  0511   Vancomycin, Random ug/mL 23.3 23.7       Pharmacy will continue to follow and monitor vancomycin.    Please contact pharmacy at extension [phone] for questions regarding this assessment.    Thank you for the consult,   Devin Campbell       Patient brief summary:  Shannan Figueredo is a 67 y.o. female initiated on antimicrobial therapy with IV Vancomycin for treatment of bone/joint infection    The patient's current regimen is 63.5 kg    Drug Allergies:   Review of patient's allergies indicates:  No Known Allergies    Actual Body Weight:   63.5 kg    Renal Function:   Estimated Creatinine Clearance: 8.8 mL/min (A) (based on SCr of 5.5 mg/dL (H)).,     Dialysis Method (if applicable):  intermittent HD    CBC (last 72 hours):  Recent Labs   Lab Result Units 09/20/23  0546 09/21/23  0417 09/22/23  0511   WBC K/uL 7.51 8.85 9.35   Hemoglobin g/dL 11.3* 10.7* 12.0   Hematocrit % 36.8* 34.5* 38.9   Platelets K/uL 210 200 236   Gran % % 67.3 69.3 69.5   Lymph % % 14.1* 12.2* 13.9*   Mono % % 12.1 13.6 12.3   Eosinophil % % 4.9 3.5 3.0   Basophil % % 0.9 0.8 0.7   Differential Method  Automated Automated Automated       Metabolic Panel (last 72 hours):  Recent Labs   Lab Result Units 09/20/23  0546 09/21/23  0417   Sodium mmol/L 138 136   Potassium mmol/L 3.7 3.4*   Chloride mmol/L 95 96   CO2 mmol/L 26 27   Glucose mg/dL 72 93   BUN mg/dL 28* 18   Creatinine mg/dL  8.1* 5.5*   Albumin g/dL 2.9* 2.5*   Magnesium mg/dL 2.1 2.0   Phosphorus mg/dL 5.3* 3.2       Vancomycin Administrations:  vancomycin given in the last 96 hours                     vancomycin (VANCOCIN) 500 mg in dextrose 5 % in water (D5W) 100 mL IVPB (MB+) (mg) 500 mg New Bag 09/20/23 2029    vancomycin (VANCOCIN) 500 mg in dextrose 5 % in water (D5W) 100 mL IVPB (MB+) (mg) 500 mg New Bag 09/18/23 2037                    Microbiologic Results:  Microbiology Results (last 7 days)       Procedure Component Value Units Date/Time    Blood Culture #1 **CANNOT BE ORDERED STAT** [[id number]] Collected: 09/17/23 1337    Order Status: Completed Specimen: Blood Updated: 09/21/23 2012     Blood Culture, Routine No Growth to date      No Growth to date      No Growth to date      No Growth to date      No Growth to date    Blood Culture #2 **CANNOT BE ORDERED STAT** [[id number]] Collected: 09/17/23 1339    Order Status: Completed Specimen: Blood from Antecubital, Left Arm Updated: 09/21/23 2012     Blood Culture, Routine No Growth to date      No Growth to date      No Growth to date      No Growth to date      No Growth to date

## 2023-09-22 NOTE — PT/OT/SLP PROGRESS
Physical Therapy      Patient Name:  Shannan Figueredo   MRN:  [id number]    Patient not seen today secondary to Off the floor for procedure/surgery. Will follow-up later today as scheduling permits.

## 2023-09-22 NOTE — ASSESSMENT & PLAN NOTE
"HTN, PVD  - L 1st toe gangrene predominantly dry with some surrounding erythema/swelling; with concern for worsening infection started pipeacillin-tazobactam and vancomycin IV. 2nd toe with some involvement as well. Continue broad spectrum antibiotic therapy for now. No evidence of sepsis at this time.  - U/S arterial BLE showed L distal SFA occlusion with distal reconstitution. Discussed with vascular surgery; cardiology consult for consideration of angiography / potential angioplasty.  - Podiatry consulted, appreciate assistance. MRI L foot with "examination markedly degraded by patient motion artifact. Questionable marrow edema within the 1st distal phalanx, not well evaluated given aforementioned limitation but possibly related to osteomyelitis given reported history of gangrene."  - Continue hydrocodone-acetaminophen 5-325mg PO q6hr PRN, hydromorphone 0.5mg IV q6hr PRN.  - Continue labetalol 300mg PO BID, losartan 50mg PO daily, nifedipine 30mg PO daily.  - Blood culture NGTD  - s/p angiogram remarkable for severe PAD with no areas amenable to angioplasty on 9/20  - Vascular surgery, Dr. Cobb spoke with patient and family extensively and patient elected to undergo attempt at revascularization.  Vessel exploration, unable to perform femoral and popliteal bypass today  - Placed in ICU postprocedure as per protocol under Dr. Cobb  "

## 2023-09-23 NOTE — PROGRESS NOTES
"Nephrology  Progress Note    Admit Date: 9/17/2023   LOS: 6 days     SUBJECTIVE:     Follow-up For:  Gangrene of toe of left foot    History of Present Illness:  Patient is a 67 y.o. female presents with left foot gangrene, vomiting, feeling ill.  Ext med hx as outlined below including ESRD on HD MWF in [place].  Has been dealing with necrotic left toes for few months and followed by vascular in [place].  Was here visiting daughter and felt bad.  Admitted for eval/treatment.  Consulted for HD needs.  Pt seen and examined on HD.  Consents signed.  Tired from being up all night with pain.  Updated team and daughter at bedside.  W/up noted.     Interval History:    No issues overnight.       Review of Systems:  Constitutional: No fever or chills  Respiratory: No cough or shortness of breath  Cardiovascular: No chest pain or palpitations  Gastrointestinal: No nausea or vomiting  Neurological: No confusion or weakness    OBJECTIVE:     Vital Signs Range (Last 24H):  BP (!) 152/67   Pulse 73   Temp 97.9 °F (36.6 °C) (Oral)   Resp 17   Ht 5' 5" (1.651 m)   Wt 63.5 kg (139 lb 15.9 oz)   SpO2 97%   BMI 23.30 kg/m²     Temp:  [97.6 °F (36.4 °C)-98.4 °F (36.9 °C)]   Pulse:  [69-81]   Resp:  [11-25]   BP: (112-178)/(55-85)   SpO2:  [78 %-100 %]     I & O (Last 24H):  Intake/Output Summary (Last 24 hours) at 9/23/2023 1222  Last data filed at 9/23/2023 1045  Gross per 24 hour   Intake 809.43 ml   Output 300 ml   Net 509.43 ml         Physical Exam:  General appearance:  NAD today.    Eyes:  Conjunctivae/corneas clear. PERRL.  Lungs: Normal respiratory effort,   clear to auscultation bilaterally   Heart: Regular rate and rhythm, S1, S2 normal, no murmur, rub or nelly.  Abdomen: Soft, non-tender non-distended; bowel sounds normal; no masses,  no organomegaly  Extremities: No cyanosis or clubbing. No edema.    Skin: Skin color, texture, turgor normal. No rashes or lesions  Neurologic: Normal strength and tone. No focal numbness " "or weakness   Right arm AVF+  Left big/second toes necrotic.     Laboratory Data:  Recent Labs   Lab 09/23/23  0357   WBC 11.43   RBC 3.27*   HGB 9.1*   HCT 28.9*      MCV 88   MCH 27.8   MCHC 31.5*         BMP:   Recent Labs   Lab 09/23/23  0357      *   K 4.8   CL 96   CO2 26   BUN 25*   CREATININE 5.8*   CALCIUM 8.6*   MG 2.0   PHOS 5.6*       Lab Results   Component Value Date    CALCIUM 8.6 (L) 09/23/2023    PHOS 5.6 (H) 09/23/2023       Lab Results   Component Value Date    CALCIUM 8.6 (L) 09/23/2023    PHOS 5.6 (H) 09/23/2023       No results found for: "URICACID"    BNP  No results for input(s): "BNP", "BNPTRIAGEBLO" in the last 168 hours.    Medications:  Medication list was reviewed and changes noted under Assessment/Plan.    Diagnostic Results:    reviewed    ASSESSMENT/PLAN:       ESRD on HD MWF in [place]:  -consulted for HD needs.  -consents signed  -continue HD MWF while here and if she stays after hospitalization will need temp outpt unit.   -labs noted and bath adjusted.  -R arm AVF+  -9/19: HD yesterday w/o issue.  No need today.  -9/20:  HD today after angio  -9/21:  no need for HD today.  Cont MWF if stays here.    -9/22:  seen on HD this am. Renally dose meds, avoid nephrotoxins, and monitor I/O's closely.  May need to set up HD unit since she will likely need to stay in city for surgical follow up.  See on HD Monday  Will start process to find HD unit in the city for eventual discharge.       Necrotic Left toes:  -defer to pod/vascular/cards.  -angio noted with severe PAD  -Status post exploration femoral and popliteal arteries 9/22     HTN:  -UF on HD  -increased ARB/CCB. Will follow     MBD:  -binders/nephrocaps.   Changed to renvela.  Pt states that she also takes tums at home  -using calcitriol on HD days.     Anemia of CKD:  -at goal currently.   -trend      As above  Will follow for renal needs               "

## 2023-09-23 NOTE — CARE UPDATE
Update given to Stephanie (daughter) and Gabbie (sister) in the presence of the patient. All questions and concerns were addressed.    JOSE Colon MD

## 2023-09-23 NOTE — PROGRESS NOTES
Physical Therapy    1450: Pt not seen by PT today secondary to pt fatigue - family states she did not get a good night's sleep and has been tired all day. She is transferring back to the floor and PT will re-eval her tomorrow.       Nichelle Blackmon, PT, DPT  9/23/2023

## 2023-09-23 NOTE — SUBJECTIVE & OBJECTIVE
Interval History: No acute events overnight, had pain issue to leg unrelieved by diilaudid and Norco 5 x 2 doses given by eICU. Patient reports pain in leg controlled this morning, had no new complaints. No family at the bedside this morning.    Review of Systems   Constitutional:  Negative for chills, fatigue and fever.   Respiratory:  Negative for cough and shortness of breath.    Cardiovascular:  Negative for chest pain and palpitations.   Musculoskeletal:  Positive for arthralgias and myalgias.     Objective:     Vital Signs (Most Recent):  Temp: 97.7 °F (36.5 °C) (09/23/23 0315)  Pulse: 69 (09/23/23 0505)  Resp: (!) 22 (09/23/23 0505)  BP: (!) 151/68 (09/23/23 0500)  SpO2: 97 % (09/23/23 0505) Vital Signs (24h Range):  Temp:  [97.6 °F (36.4 °C)-98.4 °F (36.9 °C)] 97.7 °F (36.5 °C)  Pulse:  [69-81] 69  Resp:  [11-25] 22  SpO2:  [78 %-100 %] 97 %  BP: (112-178)/(55-85) 151/68     Weight: 63.5 kg (139 lb 15.9 oz)  Body mass index is 23.3 kg/m².    Intake/Output Summary (Last 24 hours) at 9/23/2023 0739  Last data filed at 9/22/2023 1852  Gross per 24 hour   Intake 1569.43 ml   Output 2800 ml   Net -1230.57 ml           Physical Exam  Vitals and nursing note reviewed.   Constitutional:       General: She is not in acute distress.     Appearance: She is well-developed.   HENT:      Head: Normocephalic and atraumatic.   Eyes:      General:         Right eye: No discharge.         Left eye: No discharge.      Conjunctiva/sclera: Conjunctivae normal.   Cardiovascular:      Rate and Rhythm: Normal rate.      Comments: Diminished BLE pulses.  Pulmonary:      Effort: Pulmonary effort is normal. No respiratory distress.      Comments: Mildly diminished at bases.  Abdominal:      Palpations: Abdomen is soft.      Tenderness: There is no abdominal tenderness.   Musculoskeletal:         General: Normal range of motion.      Right lower leg: No edema.      Left lower leg: No edema.      Comments: RUE fistula with thrill.    Skin:     Comments: LLE cool to touch and dry. L first toe with gangrenous changes with surrounding erythema, no noted purulence or drainage. L 2nd toe with absent nail. Surgical dressing in place    Neurological:      Mental Status: She is alert, oriented to person, place, and time and easily aroused.           Significant Labs:   CBC:  Recent Labs   Lab 09/21/23 0417 09/22/23  0511 09/23/23  0357   WBC 8.85 9.35 11.43   HGB 10.7* 12.0 9.1*   HCT 34.5* 38.9 28.9*    236 230   GRAN 69.3  6.1 69.5  6.5 70.0  8.0*   LYMPH 12.2*  1.1 13.9*  1.3 12.9*  1.5   MONO 13.6  1.2* 12.3  1.2* 15.3*  1.8*   EOS 0.3 0.3 0.0   BASO 0.07 0.07 0.08     CMP:  Recent Labs   Lab 09/21/23 0417 09/22/23  0511 09/23/23  0357    135* 134*   K 3.4* 3.6 4.8   CL 96 93* 96   CO2 27 26 26   BUN 18 32* 25*   CREATININE 5.5* 7.0* 5.8*   GLU 93 91 109   CALCIUM 8.8 9.5 8.6*   MG 2.0 2.2 2.0   PHOS 3.2 3.9 5.6*   ALBUMIN 2.5* 2.8* 2.3*   ANIONGAP 13 16 12        Significant Imaging: I have reviewed and interpreted all pertinent imaging results/findings within the past 24 hours.

## 2023-09-23 NOTE — PROGRESS NOTES
Active pharmacy to dose vancomycin consult:    Patient reviewed, renal function reviewed (HD MWF,) cultures reviewed, no new levels; Next levels due: 9/25/23 @ 0059    Please contact inpatient pharmacy with any questions: [phone]  Thanks, Sailaja Daniels PharmD

## 2023-09-23 NOTE — EICU
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

## 2023-09-23 NOTE — PLAN OF CARE
Plan of care reviewed with patient. Patient voiced understanding. NSR on monitor. No acute distress noted. Side rails x2, bed in lowest position, call bell within reach, pt advised to call for assistance. Maintain bed alarm for patient safety. Pt's daughter at bedside.     Problem: Adult Inpatient Plan of Care  Goal: Plan of Care Review  Outcome: Ongoing, Progressing

## 2023-09-23 NOTE — PROGRESS NOTES
"[facility] - [facility]  Progress Note    Patient Name: Shannan Figueredo  MRN: [id number]  Patient Class: IP- Inpatient   Admission Date: 9/17/2023  Length of Stay: 6 days  Attending Physician: Franky Cobb Jr., MD  Primary Care Provider: Roxi, Primary Doctor        Subjective:     Principal Problem:Gangrene of toe of left foot        HPI:  Ms. Figueredo is a 67/F with PMH HTN, DMII (diet-controlled), PVD, ESRD on HD (M/W/F), anemia who presented to [facility] 09/17 with a two day progressive history of worsening L foot pain, swelling, fatigue, nausea and vomiting. History obtained from patient and daughter Stephanie ([phone]); they are from the [place], [place] area. Patient reports several weeks ago she "stubbed her toe," noting initially some pain, redness and swelling. Gradually symptoms progressed with duskiness to her toe and she informed her daughter of her pain for which she was brought to ER in [place] on 08/25 for further evaluation. With duskiness to 1st toe and surrounding erythema she was treated for cellulitis with a course of ciprofloxacin and metronidazole, but failed to improve. She followed up with her podiatrist 09/12 where she was prescribed hydrocodone-acetaminophen and a ten day course of TMP-SMX which she has been taking. She was referred to vascular surgery with plan for angiography and potential intervention  She and her family came to [place] for the weekend but her pain worsened; had been attempting to minimize use of hydrocodone-acetaminophen but developed nausea, vomiting and progressive fatigue. She subsequently presented to ED for further evaluation. ED workup was notable for L 1st toe dry-appearing gangrene with surrounding erythema and swelling, removed nail of second toe, XR L foot demonstrating diffuse osteopenia, L calcaneal and forefoot soft tissue swelling without definitive evidence of osteomyelitis in setting of osteopenia, no leukocytosis, " elevated sed rate and CRP, and elevated BUN/Cr in setting of ESRD. She notes she went without dialysis on 09/15 due to her trip. Blood cultures were ordered and she received piperacillin-tazobactam and vancomycin. Hospital medicine was subsequently contacted for admission.      Overview/Hospital Course:  Admitted with gangrene of L 1st toe.  Empirically treated with Zosyn and vancomycin.  Vascular Surgery, nephrology, podiatry consulted; U/S arterial demonstrated L SFA occlusion with distal reconstitution. Cardiology consulted, angiography with no vessels amenable for angioplasty on 9/20.  Underwent exploration of the left lower extremity vessels, femoral and popliteal bypass unable to be performed due to no adequate available vessels for bypass on 9/22.      Interval History: No acute events overnight, had pain issue to leg unrelieved by diilaudid and Norco 5 x 2 doses given by eICU. Patient reports pain in leg controlled this morning, had no new complaints. No family at the bedside this morning.    Review of Systems   Constitutional:  Negative for chills, fatigue and fever.   Respiratory:  Negative for cough and shortness of breath.    Cardiovascular:  Negative for chest pain and palpitations.   Musculoskeletal:  Positive for arthralgias and myalgias.     Objective:     Vital Signs (Most Recent):  Temp: 97.7 °F (36.5 °C) (09/23/23 0315)  Pulse: 69 (09/23/23 0505)  Resp: (!) 22 (09/23/23 0505)  BP: (!) 151/68 (09/23/23 0500)  SpO2: 97 % (09/23/23 0505) Vital Signs (24h Range):  Temp:  [97.6 °F (36.4 °C)-98.4 °F (36.9 °C)] 97.7 °F (36.5 °C)  Pulse:  [69-81] 69  Resp:  [11-25] 22  SpO2:  [78 %-100 %] 97 %  BP: (112-178)/(55-85) 151/68     Weight: 63.5 kg (139 lb 15.9 oz)  Body mass index is 23.3 kg/m².    Intake/Output Summary (Last 24 hours) at 9/23/2023 0739  Last data filed at 9/22/2023 1852  Gross per 24 hour   Intake 1569.43 ml   Output 2800 ml   Net -1230.57 ml           Physical Exam  Vitals and nursing note  reviewed.   Constitutional:       General: She is not in acute distress.     Appearance: She is well-developed.   HENT:      Head: Normocephalic and atraumatic.   Eyes:      General:         Right eye: No discharge.         Left eye: No discharge.      Conjunctiva/sclera: Conjunctivae normal.   Cardiovascular:      Rate and Rhythm: Normal rate.      Comments: Diminished BLE pulses.  Pulmonary:      Effort: Pulmonary effort is normal. No respiratory distress.      Comments: Mildly diminished at bases.  Abdominal:      Palpations: Abdomen is soft.      Tenderness: There is no abdominal tenderness.   Musculoskeletal:         General: Normal range of motion.      Right lower leg: No edema.      Left lower leg: No edema.      Comments: RUE fistula with thrill.   Skin:     Comments: LLE cool to touch and dry. L first toe with gangrenous changes with surrounding erythema, no noted purulence or drainage. L 2nd toe with absent nail. Surgical dressing in place    Neurological:      Mental Status: She is alert, oriented to person, place, and time and easily aroused.           Significant Labs:   CBC:  Recent Labs   Lab 09/21/23 0417 09/22/23  0511 09/23/23  0357   WBC 8.85 9.35 11.43   HGB 10.7* 12.0 9.1*   HCT 34.5* 38.9 28.9*    236 230   GRAN 69.3  6.1 69.5  6.5 70.0  8.0*   LYMPH 12.2*  1.1 13.9*  1.3 12.9*  1.5   MONO 13.6  1.2* 12.3  1.2* 15.3*  1.8*   EOS 0.3 0.3 0.0   BASO 0.07 0.07 0.08     CMP:  Recent Labs   Lab 09/21/23 0417 09/22/23  0511 09/23/23  0357    135* 134*   K 3.4* 3.6 4.8   CL 96 93* 96   CO2 27 26 26   BUN 18 32* 25*   CREATININE 5.5* 7.0* 5.8*   GLU 93 91 109   CALCIUM 8.8 9.5 8.6*   MG 2.0 2.2 2.0   PHOS 3.2 3.9 5.6*   ALBUMIN 2.5* 2.8* 2.3*   ANIONGAP 13 16 12        Significant Imaging: I have reviewed and interpreted all pertinent imaging results/findings within the past 24 hours.      Assessment/Plan:      * Gangrene of toe of left foot  HTN, PVD  - L 1st toe gangrene  "predominantly dry with some surrounding erythema/swelling; with concern for worsening infection started pipeacillin-tazobactam and vancomycin IV. 2nd toe with some involvement as well. Continue broad spectrum antibiotic therapy for now. No evidence of sepsis at this time.  - U/S arterial BLE showed L distal SFA occlusion with distal reconstitution. Discussed with vascular surgery; cardiology consult for consideration of angiography / potential angioplasty.  - Podiatry consulted, appreciate assistance. MRI L foot with "examination markedly degraded by patient motion artifact. Questionable marrow edema within the 1st distal phalanx, not well evaluated given aforementioned limitation but possibly related to osteomyelitis given reported history of gangrene."  - Continue hydrocodone-acetaminophen 5-325mg PO q6hr PRN, hydromorphone 0.5mg IV q6hr PRN.  - Continue labetalol 300mg PO BID, losartan 50mg PO daily, nifedipine 30mg PO daily.  - Blood culture NGTD  - s/p angiogram remarkable for severe PAD with no areas amenable to angioplasty on 9/20  - Vascular surgery, Dr. Cobb spoke with patient and family extensively and patient elected to undergo attempt at revascularization.  Vessel exploration done, but unable to perform femoral and popliteal bypass on 9/22  - Placed in ICU postprocedure as per protocol under Dr. Cobb yesterday post procedure, and vitals stable. Will discuss with Dr. Cobb on stepdown to floor today  - Will update family and will need to discuss with podiatry on any plans for surgical intervention pending their review     Type 2 diabetes mellitus with chronic kidney disease on chronic dialysis  - Reports diet-controlled; no current medications.  - HgbA1c 5.7.  - Continue low-dose sliding scale insulin aspart 0-5U subQ TIDWM PRN, monitor requirements.    ESRD (end stage renal disease)  Anemia, hyperparathyroidism  - ESRD on HD, reports missed session Friday prior to presentation due to travel.  - Mild " anemia without evidence of bleeding.  - Continue allopurinol 300mg PO daily, calcium acetate 2001mg PO TIDWM.  - Nephrology consulted for HD assistance.      VTE Risk Mitigation (From admission, onward)         Ordered     IP VTE HIGH RISK PATIENT  Once         09/17/23 2921                    Brook Colon MD  Department of Hospital Medicine   [facility] ([place])

## 2023-09-23 NOTE — PROGRESS NOTES
Postop day 1.  Status post exploration femoral and popliteal arteries      Subjective   Incisional and left foot pain last night, minimal discomfort today    PE  Afebrile  Vital signs stable  Left lower extremity-dressings clean, dry intact.  Foot cool, able to move ankles and toes    Impression/plan  Non-reconstructible peripheral vascular disease with rest pain and dry gangrenous changes great toe/ 2nd toe.  Have spoken at length with patient and family.  Await further demarcation extremity.  Digital or transmetatarsal amputation foot be useless since there is inadequate blood flow flow for wounds to heal.  Expressed my opinion that at this time a below-knee amputation would be the definitive treatment for this patient.  Explained the patient may proceed for a time interval with local care for toes 1 and 2 as long as gangrene remains dry and pain is tolerable.  Exact time interval  this is can continue is not foreseeable.  Compelling criteria for BKA amputation would be active infection or intractable pain.   Okay to transfer patient from ICU.  We will request PT to evaluate for ambulation.

## 2023-09-23 NOTE — ASSESSMENT & PLAN NOTE
"HTN, PVD  - L 1st toe gangrene predominantly dry with some surrounding erythema/swelling; with concern for worsening infection started pipeacillin-tazobactam and vancomycin IV. 2nd toe with some involvement as well. Continue broad spectrum antibiotic therapy for now. No evidence of sepsis at this time.  - U/S arterial BLE showed L distal SFA occlusion with distal reconstitution. Discussed with vascular surgery; cardiology consult for consideration of angiography / potential angioplasty.  - Podiatry consulted, appreciate assistance. MRI L foot with "examination markedly degraded by patient motion artifact. Questionable marrow edema within the 1st distal phalanx, not well evaluated given aforementioned limitation but possibly related to osteomyelitis given reported history of gangrene."  - Continue hydrocodone-acetaminophen 5-325mg PO q6hr PRN, hydromorphone 0.5mg IV q6hr PRN.  - Continue labetalol 300mg PO BID, losartan 50mg PO daily, nifedipine 30mg PO daily.  - Blood culture NGTD  - s/p angiogram remarkable for severe PAD with no areas amenable to angioplasty on 9/20  - Vascular surgery, Dr. Cobb spoke with patient and family extensively and patient elected to undergo attempt at revascularization.  Vessel exploration done, but unable to perform femoral and popliteal bypass on 9/22  - Placed in ICU postprocedure as per protocol under Dr. Cobb yesterday post procedure, and vitals stable. Will discuss with Dr. Cobb on stepdown to floor today  - Will update family and will need to discuss with podiatry on any plans for surgical intervention pending their review   "

## 2023-09-24 NOTE — NURSING TRANSFER
Nursing Transfer Note      9/24/2023   11:43 AM    Nurse giving handoff:Samara BRIGGS  Nurse receiving handoff:CHESTER Chowdary    Reason patient is being transferred: Pt stepped down from ICU    Transfer To:  363    Transfer via bed    Transfer with patient belongings    Transported by patient escort and bedside RN    Transfer Vital Signs:  Blood Pressure:127/59  Heart Rate:63  O2:96%  Temperature:98.4 axillary  Respirations:11      Order for Tele Monitor? Yes    Additional Lines: none    4eyes on Skin: yes    Medicines sent: none    Any special needs or follow-up needed: N/A    Patient belongings transferred with patient: Yes    Chart send with patient: Yes    Notified: daughter at bedside for transfer    Patient reassessed at: 9/24/23 @ 1045  Upon arrival to floor: cardiac monitor applied, patient oriented to room, call bell in reach, and bed in lowest position. L posterior tibial pulse assessed via Doppler during handoff. CHESTER Chowdary at bedside to receive pt.

## 2023-09-24 NOTE — PROGRESS NOTES
"Nephrology  Progress Note    Admit Date: 9/17/2023   LOS: 7 days     SUBJECTIVE:     Follow-up For:  Gangrene of toe of left foot    History of Present Illness:  Patient is a 67 y.o. female presents with left foot gangrene, vomiting, feeling ill.  Ext med hx as outlined below including ESRD on HD MWF in [place].  Has been dealing with necrotic left toes for few months and followed by vascular in [place].  Was here visiting daughter and felt bad.  Admitted for eval/treatment.  Consulted for HD needs.  Pt seen and examined on HD.  Consents signed.  Tired from being up all night with pain.  Updated team and daughter at bedside.  W/up noted.     Interval History:    No issues overnight.       Review of Systems:  Constitutional: No fever or chills  Respiratory: No cough or shortness of breath  Cardiovascular: No chest pain or palpitations  Gastrointestinal: No nausea or vomiting  Neurological: No confusion or weakness    OBJECTIVE:     Vital Signs Range (Last 24H):  BP (!) 183/73   Pulse 69   Temp 97.6 °F (36.4 °C) (Oral)   Resp 18   Ht 5' 5" (1.651 m)   Wt 63.5 kg (139 lb 15.9 oz)   SpO2 95%   BMI 23.30 kg/m²     Temp:  [97.6 °F (36.4 °C)-97.8 °F (36.6 °C)]   Pulse:  [64-74]   Resp:  [10-29]   BP: (111-183)/(54-73)   SpO2:  [94 %-100 %]     I & O (Last 24H):  Intake/Output Summary (Last 24 hours) at 9/24/2023 1006  Last data filed at 9/23/2023 1045  Gross per 24 hour   Intake 240 ml   Output --   Net 240 ml         Physical Exam:  General appearance:  NAD today.    Eyes:  Conjunctivae/corneas clear. PERRL.  Lungs: Normal respiratory effort,   clear to auscultation bilaterally   Heart: Regular rate and rhythm, S1, S2 normal, no murmur, rub or nelly.  Abdomen: Soft, non-tender non-distended; bowel sounds normal; no masses,  no organomegaly  Extremities: No cyanosis or clubbing. No edema.    Skin: Skin color, texture, turgor normal. No rashes or lesions  Neurologic: Normal strength and tone. No focal numbness or " "weakness   Right arm AVF+  Left big/second toes necrotic.     Laboratory Data:  Recent Labs   Lab 09/24/23  0402   WBC 10.90   RBC 2.74*   HGB 7.6*   HCT 24.1*      MCV 88   MCH 27.7   MCHC 31.5*         BMP:   Recent Labs   Lab 09/24/23  0402   GLU 95   *   K 4.5   CL 94*   CO2 23   BUN 38*   CREATININE 7.6*   CALCIUM 8.5*   MG 2.1   PHOS 5.8*       Lab Results   Component Value Date    CALCIUM 8.5 (L) 09/24/2023    PHOS 5.8 (H) 09/24/2023       Lab Results   Component Value Date    CALCIUM 8.5 (L) 09/24/2023    PHOS 5.8 (H) 09/24/2023       No results found for: "URICACID"    BNP  No results for input(s): "BNP", "BNPTRIAGEBLO" in the last 168 hours.    Medications:  Medication list was reviewed and changes noted under Assessment/Plan.    Diagnostic Results:    reviewed    ASSESSMENT/PLAN:       ESRD on HD MWF in [place]:  -consulted for HD needs.  -consents signed  -continue HD MWF while here and if she stays after hospitalization will need temp outpt unit.   -labs noted and bath adjusted.  -R arm AVF+  -9/19: HD yesterday w/o issue.  No need today.  -9/20:  HD today after angio  -9/21:  no need for HD today.  Cont MWF if stays here.    -9/22:  seen on HD this am. Renally dose meds, avoid nephrotoxins, and monitor I/O's closely.  May need to set up HD unit since she will likely need to stay in city for surgical follow up.  See on HD Monday  Will start process to find HD unit in the city for eventual discharge.      Necrotic Left toes:  -defer to pod/vascular/cards.  -angio noted with severe PAD  -Status post exploration femoral and popliteal arteries 9/22 with Non-reconstructible peripheral vascular disease.  Patient deciding on amputation. I think patient should have this done in [place] where she lives. At this point she just needs pain control.     HTN:  -UF on HD  -increased ARB/CCB. Will follow     MBD:  -binders/nephrocaps.   Changed to renvela.  Pt states that she also takes tums at home  -using " calcitriol on HD days.     Anemia of CKD:  -at goal currently.   -trending lower. Due to surgery? PLT stable  May need transfusion on HD tomorrow      As above  Will follow for renal needs

## 2023-09-24 NOTE — SUBJECTIVE & OBJECTIVE
Interval History: No acute events overnight, pain to leg and just got pain medications this morning and report its making her sleepy.    Review of Systems   Constitutional:  Negative for chills, fatigue and fever.   Respiratory:  Negative for cough and shortness of breath.    Cardiovascular:  Negative for chest pain and palpitations.   Musculoskeletal:  Positive for arthralgias and myalgias.     Objective:     Vital Signs (Most Recent):  Temp: 98.4 °F (36.9 °C) (09/24/23 0715)  Pulse: 65 (09/24/23 1000)  Resp: 17 (09/24/23 1000)  BP: (!) 116/56 (09/24/23 1000)  SpO2: 98 % (09/24/23 1000) Vital Signs (24h Range):  Temp:  [97.6 °F (36.4 °C)-98.4 °F (36.9 °C)] 98.4 °F (36.9 °C)  Pulse:  [64-71] 65  Resp:  [10-28] 17  SpO2:  [94 %-100 %] 98 %  BP: (111-183)/(54-73) 116/56     Weight: 63.5 kg (139 lb 15.9 oz)  Body mass index is 23.3 kg/m².  No intake or output data in the 24 hours ending 09/24/23 1100        Physical Exam  Vitals and nursing note reviewed.   Constitutional:       General: She is not in acute distress.     Appearance: She is well-developed.   HENT:      Head: Normocephalic and atraumatic.   Eyes:      General:         Right eye: No discharge.         Left eye: No discharge.      Conjunctiva/sclera: Conjunctivae normal.   Cardiovascular:      Rate and Rhythm: Normal rate.      Comments: Diminished BLE pulses.  Pulmonary:      Effort: Pulmonary effort is normal. No respiratory distress.      Comments: Mildly diminished at bases.  Abdominal:      Palpations: Abdomen is soft.      Tenderness: There is no abdominal tenderness.   Musculoskeletal:         General: Normal range of motion.      Right lower leg: No edema.      Left lower leg: No edema.      Comments: RUE fistula with thrill.   Skin:     Comments: LLE cool to touch and dry. L first toe with gangrenous changes with surrounding erythema, no noted purulence or drainage. L 2nd toe with absent nail. Surgical dressing in place to upper leg. Doppler pulse  present   Neurological:      Mental Status: She is alert, oriented to person, place, and time and easily aroused.           Significant Labs:   CBC:  Recent Labs   Lab 09/22/23  0511 09/23/23  0357 09/24/23  0402   WBC 9.35 11.43 10.90   HGB 12.0 9.1* 7.6*   HCT 38.9 28.9* 24.1*    230 235   GRAN 69.5  6.5 70.0  8.0* 64.5  7.0   LYMPH 13.9*  1.3 12.9*  1.5 14.4*  1.6   MONO 12.3  1.2* 15.3*  1.8* 15.9*  1.7*   EOS 0.3 0.0 0.3   BASO 0.07 0.08 0.09     CMP:  Recent Labs   Lab 09/22/23  0511 09/23/23  0357 09/24/23  0402   * 134* 134*   K 3.6 4.8 4.5   CL 93* 96 94*   CO2 26 26 23   BUN 32* 25* 38*   CREATININE 7.0* 5.8* 7.6*   GLU 91 109 95   CALCIUM 9.5 8.6* 8.5*   MG 2.2 2.0 2.1   PHOS 3.9 5.6* 5.8*   ALBUMIN 2.8* 2.3* 2.3*   ANIONGAP 16 12 17*        Significant Imaging: I have reviewed and interpreted all pertinent imaging results/findings within the past 24 hours.

## 2023-09-24 NOTE — PLAN OF CARE
Problem: Occupational Therapy  Goal: Occupational Therapy Goal  Description: OT re-evaluation completed with goals updated as appropriate.      Goals to be met by: 10/8/2023     Patient will increase functional independence with ADLs by performing:    UE Dressing with Minimal Assistance.  LE Dressing with Moderate Assistance.  Grooming while EOB with Contact Guard Assistance.  Toileting from bedside commode with Moderate Assistance for hygiene and clothing management.   Bathing from  sitting at sink with Moderate Assistance.  Toilet transfer to bedside commode with Maximum Assistance.    9/24/2023 1415 by Blanca Batista, OT  Outcome: Ongoing, Progressing     Initial OT re-eval/treat complete.  Has TTB, SPC, and rollator currently in use at home.  Currently needs BSC and W/C though will defer to next level of care.  Anticipate need for post acute OT in SNF though will confer with PT to finalize recommendation.  To benefit from continued acute care OT services to increase independence in self-care/functional transfers.  OT to follow.

## 2023-09-24 NOTE — ASSESSMENT & PLAN NOTE
"HTN, PVD  - L 1st toe gangrene predominantly dry with some surrounding erythema/swelling; with concern for worsening infection started pipeacillin-tazobactam and vancomycin IV. 2nd toe with some involvement as well. Continue broad spectrum antibiotic therapy for now. No evidence of sepsis at this time.  - U/S arterial BLE showed L distal SFA occlusion with distal reconstitution. Discussed with vascular surgery; cardiology consult for consideration of angiography / potential angioplasty.  - Podiatry consulted, appreciate assistance. MRI L foot with "examination markedly degraded by patient motion artifact. Questionable marrow edema within the 1st distal phalanx, not well evaluated given aforementioned limitation but possibly related to osteomyelitis given reported history of gangrene."  - Continue hydrocodone-acetaminophen 5-325mg PO q6hr PRN, hydromorphone 0.5mg IV q6hr PRN.  - Continue labetalol 300mg PO BID, losartan 50mg PO daily, nifedipine 30mg PO daily.  - Blood culture NGTD  - s/p angiogram remarkable for severe PAD with no areas amenable to angioplasty on 9/20  - Vascular surgery, Dr. Cobb spoke with patient and family extensively and patient elected to undergo attempt at revascularization.  Vessel exploration done, but unable to perform femoral and popliteal bypass on 9/22  - Placed in ICU postprocedure as per protocol under Dr. Cobb yesterday post procedure, and vitals stable. Stepdown to floor on 9/23 but awaiting available floor bed  - Will update family later today  "

## 2023-09-24 NOTE — CARE UPDATE
09/24/23 0812   PRE-TX-O2   Device (Oxygen Therapy) room air   SpO2 95 %   Pulse Oximetry Type Continuous   $ Pulse Oximetry - Multiple Charge Pulse Oximetry - Multiple   Pulse 69

## 2023-09-24 NOTE — PROGRESS NOTES
Active pharmacy to dose vancomycin consult:    Patient reviewed, renal function reviewed (HD MWF, scheduled for tomorrow,) cultures reviewed, no new levels, continue current therapy; Next levels due: 9/25/23 @ 9146    Please contact inpatient pharmacy with any questions: [phone]  Thanks, Sailaja Daniels PharmD

## 2023-09-24 NOTE — PT/OT/SLP RE-EVAL
Occupational Therapy   Re-evaluation and Treatment    Name: Shannan Figueredo  MRN: [id number]  Admitting Diagnosis:  Gangrene of toe of left foot  Recent Surgery: Procedure(s) (LRB):  EXPLORATION, VESSEL, FEMORAL AND POPLITEAL (Left) 2 Days Post-Op    Recommendations:     Discharge Recommendations:  (Anticipate need for post acute OT in SNF though will confer with PT to finalize recommendation.)  Discharge Equipment Recommendations: wheelchair, bedside commode (though will defer to next level of care)  Barriers to discharge:  Inaccessible home environment (current functional level)    Assessment:   Initial OT re-eval/treat complete.  Very groggy this day requiring increased cuing for alertness throughout session resulting in CGA for balance while seated EOB though when alert requiring only SBA.  MOD A for bed mobility and MAX A for sit<>stand this day with increased lift assist needed.  TOTAL A for LB dress for shoe cover and sx shoe.  Previously MOD I with ADL and IADL.  Will require interdisciplinary therapy services to return safely to previous living situation and PLOF.  Lives on 2nd FL of home while daughter and granddaughter live on 1st level.  Has TTB, SPC, and RW currently in use at home.  Currently needs BSC and W/C though will defer to next level of care.  Anticipate need for post acute OT in SNF though will confer with PT to finalize recommendation.  To benefit from continued acute care OT services to increase independence in self-care/functional transfers.  OT to follow.     Shannan Figueredo is a 67 y.o. female with a medical diagnosis of Gangrene of toe of left foot.  She presents with below deficits decreasing independence in self-care/functional transfers.  Performance deficits affecting function are weakness, impaired endurance, impaired self care skills, impaired functional mobility, gait instability, impaired balance, impaired cognition, decreased upper extremity function, decreased lower extremity  function, decreased safety awareness, pain, impaired skin, orthopedic precautions, decreased ROM.      Rehab Prognosis:  Good; patient would benefit from acute skilled OT services to address these deficits and reach maximum level of function.       Plan:     Patient to be seen 5 x/week to address the above listed problems via self-care/home management, therapeutic activities, therapeutic exercises  Plan of Care Expires: 10/08/23  Plan of Care Reviewed with: patient, daughter, family    Subjective     Chief Complaint: Initially with no pain prior to activity though with c/o LLE foot/leg pain during/after standing.    Patient/Family stated goals: No goals stated at this time.   Communicated with: Ricarda MARTINEZ LPN prior to session.  Pain/Comfort:  Pain Rating 1: 7/10 (with standing)  Location - Side 1: Left  Location 1: foot (leg)  Pain Addressed 1: Reposition, Distraction, Cessation of Activity, Nurse notified  Pain Rating Post-Intervention 1: 7/10 (at rest)    Objective:     Communicated with: Ricarda MARTINEZ LPN prior to session.  Patient found HOB elevated with: peripheral IV, telemetry, bed alarm with family present upon OT entry to room.    General Precautions: Standard, fall, diabetic (RUE limb alert)  Orthopedic Precautions: Full weight bearing (per comment noted in PT orders)  Braces:  (LLE sx shoe)  Respiratory Status: Room air    Occupational Performance:    Bed Mobility:    MOD A for upper trunk management and consistent verbal/tactile cuing for BLE management when coming to sit EOB; Sit>supine with MOD A for BLE trunk management.  Requiring MAX A for forward scooting though only needing MOD A for backward scoot.  Bridging towards HOB with verbal cue for RLE knee flexion, manual assist for R-heel stabilization in bed, and HOHA for LUE hand placement at overhead railing with scoots X 2 and 2-inch upward excursion noted.  Static sitting balance ranged from CGA and SBA as Pt. Requiring increased cues for arousal.    "    Functional Mobility/Transfers:  Initial sit>stand attempted with RW though Pt. Unable to clear bed.  Sit<>stand X 2 trials without RW with UE supported on bed rail and MAX A of 1 person for lift assist with forward stooped posture noted; increased cues for more forward hips and upright trunk though with minimal follow through.      Activities of Daily Living:  TOTAL A for donning shoe cover and sx shoe to LLE-foot while seated EOB.      Cognitive/Visual Perceptual:  Cognitive/Psychosocial Skills:  -       Oriented to: Person, Place, Situation, and knows correct year though reports the month as "December" clarified question was for current month and not birth month with Pt. Then responding "I don't know."    -       Follows Commands/attention:Follows 65% of one-step commands  -       Communication: able to answer questions though reports some incorrect answers regarding living situation with daughter present to clarify/correct  -       Memory: Deficits noted  -       Safety awareness/insight to disability: impaired   -       Mood/Affect/Coping skills/emotional control: Cooperative and Groggy  Visual/Perceptual:  -grossly intact    Physical Exam:  Postural examination/scapula alignment: -       Rounded shoulders  -       Forward head  Skin integrity: dressing noted near L-groin and L-leg; Pt. Also with dry gangrenous toe all well documented in EMR  Upper Extremity Range of Motion:  -       Right Upper Extremity: WFL except approx. 70% of shoulder flex  -       Left Upper Extremity: WFL except approx. 80% of shoulder flex  Upper Extremity Strength: -       Right Upper Extremity: 4-/5 gross and shoulder not tested due to limb alert  -       Left Upper Extremity: 4-/5 gross   Strength: -       Right Upper Extremity: fair plus  -       Left Upper Extremity: fair plus  Fine Motor Coordination: -       Intact  Left hand thumb/finger opposition skills and Right hand thumb/finger opposition skills    [facility] 6 " Click:  [facility] Total Score: 12    Treatment & Education:  Educated on role of OT and POC.   MOD A for upper trunk management and consistent verbal/tactile cuing for BLE management when coming to sit EOB; Sit>supine with MOD A for BLE trunk management.  Requiring MAX A for forward scooting though only needing MOD A for backward scoot.  Bridging towards HOB with verbal cue for RLE knee flexion, manual assist for R-heel stabilization in bed, and HOHA for LUE hand placement at overhead railing with scoots X 2 and 2-inch upward excursion noted.  Static sitting balance ranged from CGA and SBA as Pt. Requiring increased cues for arousal.     Initial sit>stand attempted with RW though Pt. Unable to clear bed.  Sit<>stand X 2 trials without RW with UE supported on bed rail and MAX A of 1 person for lift assist with forward stooped posture noted; increased cues for more forward hips and upright trunk though with minimal follow through.    TOTAL A for donning shoe cover and sx shoe to LLE-foot while seated EOB.    Received call light review and importance of calling for assist as needed.     Patient left HOB elevated with all lines intact, call button in reach, nursing notified, and family present    GOALS:   Multidisciplinary Problems       Occupational Therapy Goals          Problem: Occupational Therapy    Goal Priority Disciplines Outcome Interventions   Occupational Therapy Goal     OT, PT/OT Ongoing, Progressing    Description: OT re-evaluation completed with goals updated as appropriate.      Goals to be met by: 10/8/2023     Patient will increase functional independence with ADLs by performing:    UE Dressing with Minimal Assistance.  LE Dressing with Moderate Assistance.  Grooming while EOB with Contact Guard Assistance.  Toileting from bedside commode with Moderate Assistance for hygiene and clothing management.   Bathing from  sitting at sink with Moderate Assistance.  Toilet transfer to bedside commode with Maximum  Assistance.                             History:     Past Medical History:   Diagnosis Date    Coronary artery disease     Diabetes mellitus, type II     End stage renal disease     Hyperlipidemia     Hypertension     Peripheral vascular disease          Past Surgical History:   Procedure Laterality Date    ANGIOGRAPHY OF LOWER EXTREMITY Left 9/20/2023    Procedure: Angiogram Extremity Unilateral;  Surgeon: Rao Sosa MD;  Location: [facility] CATH LAB;  Service: Cardiology;  Laterality: Left;  right femoral access    CAROTID ENDARTERECTOMY Left     HYSTERECTOMY         Time Tracking:     OT Date of Treatment: 09/24/23  OT Start Time: 1319  OT Stop Time: 1358  OT Total Time (min): 39 min    Billable Minutes:Evaluation 15  Therapeutic Activity 24 9/24/2023

## 2023-09-24 NOTE — CARE UPDATE
Update given to family at the bedside (both daughters and sister) in the presence of the patient. We reviewed the discussion they had with Dr. Cobb and current treatment plan going forward. All questions and concerns were addressed.    JOSE Colon MD

## 2023-09-24 NOTE — PROGRESS NOTES
Addendum: with further review, pt has been seeing her primary MD for well woman care. She can schedule f/u visit.   [facility] - Med Surg ([address])  General Surgery  Progress Note    Subjective:  Left foot rest pain persists     Interval History:  Postop day 2.  Status post exploration popliteal artery.  Diagnosis-dry gangrene foot.  Patient afebrile, vital signs stable.  Incisions clean and dry.  Left ankle.  Popliteal artery not amenable to bypass due to extensive calcification.  Discussed therapeutic options with patient and family.  As long as gangrene remains dry and there is no signs of active infection and pain is controllable amputation is not compelling.  Amputation of toes of forefoot will not heal due to extent of peripheral vascular disease.  Loss level of the amputation which appears viable at this time is a below-knee amputation.    Post-Op Info:  Procedure(s) (LRB):  EXPLORATION, VESSEL, FEMORAL AND POPLITEAL (Left)   2 Days Post-Op      Medications:  Continuous Infusions:  Scheduled Meds:   sodium chloride 0.9%   Intravenous Once    aspirin  81 mg Oral Daily    calcitRIOL  0.25 mcg Oral Every Mon, Wed, Fri    calcium carbonate  500 mg Oral BID    carvediloL  3.125 mg Oral BID    clopidogreL  75 mg Oral Daily    losartan  100 mg Oral Daily    NIFEdipine  60 mg Oral Daily    piperacillin-tazobactam (Zosyn) IV (PEDS and ADULTS) (extended infusion is not appropriate)  4.5 g Intravenous Q12H    sevelamer carbonate  1,600 mg Oral TID WM    vitamin renal formula (B-complex-vitamin c-folic acid)  1 capsule Oral Daily     PRN Meds:acetaminophen, dextrose 10%, dextrose 10%, diphenhydrAMINE, glucagon (human recombinant), glucose, glucose, hydrALAZINE, HYDROcodone-acetaminophen, HYDROmorphone, insulin aspart U-100, loperamide, melatonin, ondansetron, ondansetron, simethicone, sodium chloride 0.9%, sodium chloride 0.9%, Pharmacy to dose Vancomycin consult **AND** vancomycin - pharmacy to dose     Objective:     Vital Signs (Most Recent):  Temp: 98 °F (36.7 °C) (09/24/23 1133)  Pulse: 65 (09/24/23 1200)  Resp: 15 (09/24/23  1159)  BP: 133/62 (09/24/23 1133)  SpO2: 96 % (09/24/23 1133) Vital Signs (24h Range):  Temp:  [97.6 °F (36.4 °C)-98.4 °F (36.9 °C)] 98 °F (36.7 °C)  Pulse:  [63-71] 65  Resp:  [11-28] 15  SpO2:  [94 %-100 %] 96 %  BP: (111-183)/(54-73) 133/62     No intake or output data in the 24 hours ending 09/24/23 1325    Physical Exam  Afebrile   Vital signs stable   WD/WN female   Left lower extremity-incisions clean, dry, intact.  Left foot cool, motor function intact toes and foot.    Significant Labs:  CBC:   Recent Labs   Lab 09/24/23  0402   WBC 10.90   RBC 2.74*   HGB 7.6*   HCT 24.1*      MCV 88   MCH 27.7   MCHC 31.5*     CMP:   Recent Labs   Lab 09/24/23  0402   GLU 95   CALCIUM 8.5*   ALBUMIN 2.3*   *   K 4.5   CO2 23   CL 94*   BUN 38*   CREATININE 7.6*       Significant Diagnostics:  Non-reconstructible peripheral vascular disease left lower extremity with dry gangrene and rest pain.    Assessment/Plan:     Therapeutic options discussed at length with patient and family members.  Patient and family considering returned to used in for further treatment.  All questions answered.  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

## 2023-09-24 NOTE — PLAN OF CARE
Problem: Physical Therapy  Goal: Physical Therapy Goal  Description: Goals to be met by: 10/24/23    Patient will perform the following to increase strength, improve mobility, and return to prior level of function:    1. Supine <> sit with independence.  2. Sit<>stand with SBA with least restrictive assistive device.  3. Gait x 50 feet with CGA with least restrictive assistive device.  4. Ascend/descend 12 step(s) with bilateral handrails and Min assist       Outcome: Ongoing, Progressing   Pt with no new orders following surgery.  She was transferred from ICU room 363 today.  Pt was found with HOB elevated with a dressing on her medial L LE.  She was able to move to sitting on the side of the bed with SBA.   She required Mod assist to transfer sit to stand with a RW and Darco sandal on the L foot.  Pt is full weight bearing on the L LE.  Pt did not attempt to take a step.  She required Mod assist to scoot up in bed in the sitting position and Mod assist to transfer from sitting to supine.

## 2023-09-24 NOTE — PT/OT/SLP EVAL
Physical Therapy Evaluation    Patient Name:  Shannan Figueredo   MRN:  [id number]    Recommendations:     Discharge Recommendations: nursing facility, skilled   Discharge Equipment Recommendations: bedside commode, wheelchair   Barriers to discharge: Inaccessible home and Decreased caregiver support    Assessment:     Shannan Figueredo is a 67 y.o. female admitted with a medical diagnosis of Gangrene of toe of left foot.  She presents with the following impairments/functional limitations: weakness, impaired endurance, impaired sensation, impaired self care skills, impaired functional mobility, gait instability, impaired balance, impaired cognition, decreased coordination, decreased lower extremity function, decreased safety awareness, pain, impaired skin, impaired joint extensibility, orthopedic precautions. Pt with no new orders following surgery.  She was transferred from ICU room 363 today.  Pt was found with HOB elevated with a dressing on her medial L LE.  She was able to move to sitting on the side of the bed with SBA.   She required Mod assist to transfer sit to stand with a RW and Darco sandal on the L foot.  Pt is full weight bearing on the L LE.  Pt did not attempt to take a step.  She required Mod assist to scoot up in bed in the sitting position and Mod assist to transfer from sitting to supine. .    Rehab Prognosis: Fair; patient would benefit from acute skilled PT services to address these deficits and reach maximum level of function.    Recent Surgery: Procedure(s) (LRB):  EXPLORATION, VESSEL, FEMORAL AND POPLITEAL (Left) 2 Days Post-Op    Plan:     During this hospitalization, patient to be seen 5 x/week to address the identified rehab impairments via gait training, therapeutic activities, therapeutic exercises, neuromuscular re-education and progress toward the following goals:    Plan of Care Expires:  10/24/23    Subjective     Chief Complaint: L LE pain  Patient/Family Comments/goals: Pt would like  to return home to [place], [place]  Pain/Comfort:  Pain Rating 1: 3/10  Location - Side 1: Left  Location 1: leg  Pain Addressed 1: Distraction  Pain Rating Post-Intervention 1: 3/10    Patients cultural, spiritual, Anabaptism conflicts given the current situation: no    Living Environment:  Pt lives in a two story home with her daughter with no steps to enter and 12 steps to her room on the second floor  Prior to admission, patients level of function was in need of assistance.  Equipment used at home: bath bench, rollator, cane, straight.  DME owned (not currently used): none.  Upon discharge, patient will have assistance from her daughter.    Objective:     Communicated with Nurse prior to session.  Patient found HOB elevated with peripheral IV, telemetry, Other (comments) (Wound dressing L medial LE)  upon PT entry to room.    General Precautions: Standard, diabetic, fall  Orthopedic Precautions:Full weight bearing (Darco sandal for L foot)   Braces: N/A  Respiratory Status: Room air    Exams:  Cognitive Exam:  Patient is oriented to Person, Place, Situation, and Pt was able to identify the correct year, but was not able to identify the correct month reporting twice that it was December vs September.  RLE ROM: WFL  RLE Strength: WFL except hip flexion 2/5  LLE ROM: WFL except foot and ankle that were not tested  LLE Strength: Deficits: 2/5 hip flexion, knee flexion and extension.  Foot and ankle were not tested    Functional Mobility:  Bed Mobility:     Rolling Left:  modified independence  Supine to Sit: stand by assistance  Sit to Supine: moderate assistance  Transfers:     Sit to Stand:  moderate assistance with rolling walker      AM-PAC 6 CLICK MOBILITY  Total Score:12       Treatment & Education:  Physical Therapy evaluation was performed.  Pt received transfer training for sit to stand. Scooting to the L and sit to supine.  Pt was able to perform supine to sit with Mod [place], sit<>stand with Mod assist  and sit to supine with Mod assist.     Patient left HOB elevated with all lines intact, call button in reach, Nurse notified, and Family present.    GOALS:   Multidisciplinary Problems       Physical Therapy Goals          Problem: Physical Therapy    Goal Priority Disciplines Outcome Goal Variances Interventions   Physical Therapy Goal     PT, PT/OT Ongoing, Progressing     Description: Goals to be met by: 10/24/23    Patient will perform the following to increase strength, improve mobility, and return to prior level of function:    1. Supine <> sit with independence.  2. Sit<>stand with SBA with least restrictive assistive device.  3. Gait x 50 feet with CGA with least restrictive assistive device.  4. Ascend/descend 12 step(s) with bilateral handrails and Min assist                            History:     Past Medical History:   Diagnosis Date    Coronary artery disease     Diabetes mellitus, type II     End stage renal disease     Hyperlipidemia     Hypertension     Peripheral vascular disease        Past Surgical History:   Procedure Laterality Date    ANGIOGRAPHY OF LOWER EXTREMITY Left 9/20/2023    Procedure: Angiogram Extremity Unilateral;  Surgeon: Rao Sosa MD;  Location: [facility] CATH LAB;  Service: Cardiology;  Laterality: Left;  right femoral access    CAROTID ENDARTERECTOMY Left     HYSTERECTOMY         Time Tracking:     PT Received On: 09/24/23  PT Start Time: 1523     PT Stop Time: 1551  PT Total Time (min): 28 min     Billable Minutes: Evaluation 14 and Therapeutic Activity 14      09/24/2023

## 2023-09-24 NOTE — PROGRESS NOTES
"[facility] Surg ([address] Medicine  Progress Note    Patient Name: Shannan Figueredo  MRN: [id number]  Patient Class: IP- Inpatient   Admission Date: 9/17/2023  Length of Stay: 7 days  Attending Physician: Brook Colon MD  Primary Care Provider: Roxi, Primary Doctor        Subjective:     Principal Problem:Gangrene of toe of left foot        HPI:  Ms. Figueredo is a 67/F with PMH HTN, DMII (diet-controlled), PVD, ESRD on HD (M/W/F), anemia who presented to [facility] 09/17 with a two day progressive history of worsening L foot pain, swelling, fatigue, nausea and vomiting. History obtained from patient and daughter Stephanie ([phone]); they are from the [place], [place] area. Patient reports several weeks ago she "stubbed her toe," noting initially some pain, redness and swelling. Gradually symptoms progressed with duskiness to her toe and she informed her daughter of her pain for which she was brought to ER in [place] on 08/25 for further evaluation. With duskiness to 1st toe and surrounding erythema she was treated for cellulitis with a course of ciprofloxacin and metronidazole, but failed to improve. She followed up with her podiatrist 09/12 where she was prescribed hydrocodone-acetaminophen and a ten day course of TMP-SMX which she has been taking. She was referred to vascular surgery with plan for angiography and potential intervention  She and her family came to [place] for the weekend but her pain worsened; had been attempting to minimize use of hydrocodone-acetaminophen but developed nausea, vomiting and progressive fatigue. She subsequently presented to ED for further evaluation. ED workup was notable for L 1st toe dry-appearing gangrene with surrounding erythema and swelling, removed nail of second toe, XR L foot demonstrating diffuse osteopenia, L calcaneal and forefoot soft tissue swelling without definitive evidence of osteomyelitis in setting of osteopenia, no leukocytosis, elevated sed " rate and CRP, and elevated BUN/Cr in setting of ESRD. She notes she went without dialysis on 09/15 due to her trip. Blood cultures were ordered and she received piperacillin-tazobactam and vancomycin. Hospital medicine was subsequently contacted for admission.      Overview/Hospital Course:  Admitted with gangrene of L 1st toe.  Empirically treated with Zosyn and vancomycin.  Vascular Surgery, nephrology, podiatry consulted; U/S arterial demonstrated L SFA occlusion with distal reconstitution. Cardiology consulted, angiography with no vessels amenable for angioplasty on 9/20.  Underwent exploration of the left lower extremity vessels, femoral and popliteal bypass unable to be performed due to no adequate available vessels for bypass on 9/22.      Interval History: No acute events overnight, pain to leg and just got pain medications this morning and report its making her sleepy.    Review of Systems   Constitutional:  Negative for chills, fatigue and fever.   Respiratory:  Negative for cough and shortness of breath.    Cardiovascular:  Negative for chest pain and palpitations.   Musculoskeletal:  Positive for arthralgias and myalgias.     Objective:     Vital Signs (Most Recent):  Temp: 98.4 °F (36.9 °C) (09/24/23 0715)  Pulse: 65 (09/24/23 1000)  Resp: 17 (09/24/23 1000)  BP: (!) 116/56 (09/24/23 1000)  SpO2: 98 % (09/24/23 1000) Vital Signs (24h Range):  Temp:  [97.6 °F (36.4 °C)-98.4 °F (36.9 °C)] 98.4 °F (36.9 °C)  Pulse:  [64-71] 65  Resp:  [10-28] 17  SpO2:  [94 %-100 %] 98 %  BP: (111-183)/(54-73) 116/56     Weight: 63.5 kg (139 lb 15.9 oz)  Body mass index is 23.3 kg/m².  No intake or output data in the 24 hours ending 09/24/23 1100        Physical Exam  Vitals and nursing note reviewed.   Constitutional:       General: She is not in acute distress.     Appearance: She is well-developed.   HENT:      Head: Normocephalic and atraumatic.   Eyes:      General:         Right eye: No discharge.         Left eye:  No discharge.      Conjunctiva/sclera: Conjunctivae normal.   Cardiovascular:      Rate and Rhythm: Normal rate.      Comments: Diminished BLE pulses.  Pulmonary:      Effort: Pulmonary effort is normal. No respiratory distress.      Comments: Mildly diminished at bases.  Abdominal:      Palpations: Abdomen is soft.      Tenderness: There is no abdominal tenderness.   Musculoskeletal:         General: Normal range of motion.      Right lower leg: No edema.      Left lower leg: No edema.      Comments: RUE fistula with thrill.   Skin:     Comments: LLE cool to touch and dry. L first toe with gangrenous changes with surrounding erythema, no noted purulence or drainage. L 2nd toe with absent nail. Surgical dressing in place to upper leg. Doppler pulse present   Neurological:      Mental Status: She is alert, oriented to person, place, and time and easily aroused.           Significant Labs:   CBC:  Recent Labs   Lab 09/22/23  0511 09/23/23  0357 09/24/23  0402   WBC 9.35 11.43 10.90   HGB 12.0 9.1* 7.6*   HCT 38.9 28.9* 24.1*    230 235   GRAN 69.5  6.5 70.0  8.0* 64.5  7.0   LYMPH 13.9*  1.3 12.9*  1.5 14.4*  1.6   MONO 12.3  1.2* 15.3*  1.8* 15.9*  1.7*   EOS 0.3 0.0 0.3   BASO 0.07 0.08 0.09     CMP:  Recent Labs   Lab 09/22/23  0511 09/23/23  0357 09/24/23  0402   * 134* 134*   K 3.6 4.8 4.5   CL 93* 96 94*   CO2 26 26 23   BUN 32* 25* 38*   CREATININE 7.0* 5.8* 7.6*   GLU 91 109 95   CALCIUM 9.5 8.6* 8.5*   MG 2.2 2.0 2.1   PHOS 3.9 5.6* 5.8*   ALBUMIN 2.8* 2.3* 2.3*   ANIONGAP 16 12 17*        Significant Imaging: I have reviewed and interpreted all pertinent imaging results/findings within the past 24 hours.      Assessment/Plan:      * Gangrene of toe of left foot  HTN, PVD  - L 1st toe gangrene predominantly dry with some surrounding erythema/swelling; with concern for worsening infection started pipeacillin-tazobactam and vancomycin IV. 2nd toe with some involvement as well. Continue  "broad spectrum antibiotic therapy for now. No evidence of sepsis at this time.  - U/S arterial BLE showed L distal SFA occlusion with distal reconstitution. Discussed with vascular surgery; cardiology consult for consideration of angiography / potential angioplasty.  - Podiatry consulted, appreciate assistance. MRI L foot with "examination markedly degraded by patient motion artifact. Questionable marrow edema within the 1st distal phalanx, not well evaluated given aforementioned limitation but possibly related to osteomyelitis given reported history of gangrene."  - Continue hydrocodone-acetaminophen 5-325mg PO q6hr PRN, hydromorphone 0.5mg IV q6hr PRN.  - Continue labetalol 300mg PO BID, losartan 50mg PO daily, nifedipine 30mg PO daily.  - Blood culture NGTD  - s/p angiogram remarkable for severe PAD with no areas amenable to angioplasty on 9/20  - Vascular surgery, Dr. Cobb spoke with patient and family extensively and patient elected to undergo attempt at revascularization.  Vessel exploration done, but unable to perform femoral and popliteal bypass on 9/22  - Placed in ICU postprocedure as per protocol under Dr. Cobb yesterday post procedure, and vitals stable. Stepdown to floor on 9/23 but awaiting available floor bed  - Will update family later today    Type 2 diabetes mellitus with chronic kidney disease on chronic dialysis  - Reports diet-controlled; no current medications.  - HgbA1c 5.7.  - Continue low-dose sliding scale insulin aspart 0-5U subQ TIDWM PRN, monitor requirements.    ESRD (end stage renal disease)  Anemia, hyperparathyroidism  - ESRD on HD, reports missed session Friday prior to presentation due to travel.  - Mild anemia without evidence of bleeding.  - Continue allopurinol 300mg PO daily, calcium acetate 2001mg PO TIDWM.  - Nephrology consulted for HD assistance.      VTE Risk Mitigation (From admission, onward)         Ordered     IP VTE HIGH RISK PATIENT  Once         09/17/23 1351    "                 Brook Colon MD  Department of Hospital Medicine   [facility] Surg ([address])

## 2023-09-25 NOTE — CARE UPDATE
Plan of care discussed with daughter in the patient's room along with case management. Daughter had questions concerning discharge plan - family interested in discharge to another facility in [place] for BKA that was discussed as treatment option and transport via ambulance. Explained that transfer to another facility will not likely be covered by insurance when procedure can be done here, and the plan that was originally discussed was for the patient to discharge from here to her physicians in [place] after the patient was deemed appropriate by surgery for discharge. Will discuss treatment plan with Dr. Cobb and case management to look into cost of transport via ambulance, and again update family.

## 2023-09-25 NOTE — PROGRESS NOTES
[facility] - Med Surg ([address])  General Surgery  Progress Note    Subjective:     Interval History:  Postop day 3.  Status post exploration popliteal artery and femoral vessels.  Diagnosis-dry gangrene of left foot.  First and 2nd toes.  Non-reconstructible  infrainguinal peripheral vascular disease.  Foot cool below ankle.  Digital or forefoot amputation not a viable option due to non-reconstructible peripheral vascular disease.  Patient complain of incisional pain and rest pain left foot.  Incisions clean and dry.  PT to further evaluate and ambulate patient today    Post-Op Info:  Procedure(s) (LRB):  EXPLORATION, VESSEL, FEMORAL AND POPLITEAL (Left)   3 Days Post-Op      Medications:  Continuous Infusions:  Scheduled Meds:   sodium chloride 0.9%   Intravenous Once    aspirin  81 mg Oral Daily    calcitRIOL  0.25 mcg Oral Every Mon, Wed, Fri    calcium carbonate  500 mg Oral BID    carvediloL  3.125 mg Oral BID    clopidogreL  75 mg Oral Daily    LIDOcaine  1 patch Transdermal Q24H    losartan  100 mg Oral Daily    NIFEdipine  60 mg Oral Daily    piperacillin-tazobactam (Zosyn) IV (PEDS and ADULTS) (extended infusion is not appropriate)  4.5 g Intravenous Q12H    sevelamer carbonate  1,600 mg Oral TID WM    vancomycin (VANCOCIN) IV (PEDS and ADULTS)  500 mg Intravenous Once    vitamin renal formula (B-complex-vitamin c-folic acid)  1 capsule Oral Daily     PRN Meds:0.9%  NaCl infusion (for blood administration), acetaminophen, dextrose 10%, dextrose 10%, diphenhydrAMINE, glucagon (human recombinant), glucose, glucose, hydrALAZINE, HYDROcodone-acetaminophen, HYDROmorphone, insulin aspart U-100, loperamide, melatonin, ondansetron, ondansetron, simethicone, sodium chloride 0.9%, sodium chloride 0.9%, Pharmacy to dose Vancomycin consult **AND** vancomycin - pharmacy to dose     Objective:     Vital Signs (Most Recent):  Temp: 98.1 °F (36.7 °C) (09/25/23 0749)  Pulse: 73 (09/25/23 0749)  Resp: 18 (09/25/23  0749)  BP: (!) 148/68 (09/25/23 0749)  SpO2: 97 % (09/25/23 0749) Vital Signs (24h Range):  Temp:  [97.8 °F (36.6 °C)-98.4 °F (36.9 °C)] 98.1 °F (36.7 °C)  Pulse:  [63-77] 73  Resp:  [11-19] 18  SpO2:  [93 %-98 %] 97 %  BP: ()/(40-70) 148/68       Intake/Output Summary (Last 24 hours) at 9/25/2023 0915  Last data filed at 9/24/2023 1838  Gross per 24 hour   Intake 418.6 ml   Output --   Net 418.6 ml       Physical Exam  Afebrile   Vital signs stable   Left lower extremity-incisions clean, dry, extremity warm to below ankle.    Significant Labs:  CBC:   Recent Labs   Lab 09/25/23  0346   WBC 13.81*   RBC 2.42*   HGB 6.7*   HCT 21.4*      MCV 88   MCH 27.7   MCHC 31.3*       Significant Diagnostics:      Assessment/Plan:     Non-reconstructible peripheral vascular disease with dry gangrene of 1st and 2nd digits left foot.  Definitive treatment would be below-knee amputation.  Due to extent of peripheral vascular disease digital or transmetatarsal amputation is not a viable option.  Compelling indications for BKA would be sepsis, metabolic derangement, severe pain.  Spoke with patient and family this morning.  Questions answered.  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

## 2023-09-25 NOTE — PROGRESS NOTES
"Nephrology  Progress Note    Admit Date: 9/17/2023   LOS: 8 days     SUBJECTIVE:     Follow-up For:  Gangrene of toe of left foot    History of Present Illness:  Patient is a 67 y.o. female presents with left foot gangrene, vomiting, feeling ill.  Ext med hx as outlined below including ESRD on HD MWF in [place].  Has been dealing with necrotic left toes for few months and followed by vascular in [place].  Was here visiting daughter and felt bad.  Admitted for eval/treatment.  Consulted for HD needs.  Pt seen and examined on HD.  Consents signed.  Tired from being up all night with pain.  Updated team and daughter at bedside.  W/up noted.       Interval History:   discussed with pt/family/team at bedside.  HD/Blood today.  Some issues with pain noted.        Review of Systems:  Constitutional: No fever or chills  Respiratory: No cough or shortness of breath  Cardiovascular: No chest pain or palpitations  Gastrointestinal: No nausea or vomiting  Neurological: No confusion or weakness    OBJECTIVE:     Vital Signs Range (Last 24H):  BP (!) 148/68 (BP Location: Left arm, Patient Position: Sitting)   Pulse 73   Temp 98.1 °F (36.7 °C) (Oral)   Resp 18   Ht 5' 5" (1.651 m)   Wt 63.5 kg (139 lb 15.9 oz)   SpO2 97%   BMI 23.30 kg/m²     Temp:  [97.8 °F (36.6 °C)-98.4 °F (36.9 °C)]   Pulse:  [63-77]   Resp:  [11-19]   BP: ()/(40-70)   SpO2:  [93 %-98 %]     I & O (Last 24H):  Intake/Output Summary (Last 24 hours) at 9/25/2023 0900  Last data filed at 9/24/2023 1838  Gross per 24 hour   Intake 418.6 ml   Output --   Net 418.6 ml         Physical Exam:  General appearance:  NAD today.    Eyes:  Conjunctivae/corneas clear. PERRL.  Lungs: Normal respiratory effort,   clear to auscultation bilaterally   Heart: Regular rate and rhythm, S1, S2 normal, no murmur, rub or nelly.  Abdomen: Soft, non-tender non-distended; bowel sounds normal; no masses,  no organomegaly  Extremities: No cyanosis or clubbing. No edema.    Skin: " "Skin color, texture, turgor normal. No rashes or lesions  Neurologic: Normal strength and tone. No focal numbness or weakness   Right arm AVF+  Left big/second toes necrotic.  leg scar noted.     Laboratory Data:  Recent Labs   Lab 09/25/23  0346   WBC 13.81*   RBC 2.42*   HGB 6.7*   HCT 21.4*      MCV 88   MCH 27.7   MCHC 31.3*         BMP:   Recent Labs   Lab 09/25/23  0346   *   *   K 4.0   CL 90*   CO2 22*   BUN 48*   CREATININE 8.3*   CALCIUM 8.2*   MG 2.1   PHOS 5.1*       Lab Results   Component Value Date    CALCIUM 8.2 (L) 09/25/2023    PHOS 5.1 (H) 09/25/2023       Lab Results   Component Value Date    CALCIUM 8.2 (L) 09/25/2023    PHOS 5.1 (H) 09/25/2023       No results found for: "URICACID"    BNP  No results for input(s): "BNP", "BNPTRIAGEBLO" in the last 168 hours.    Medications:  Medication list was reviewed and changes noted under Assessment/Plan.    Diagnostic Results:    reviewed    ASSESSMENT/PLAN:       ESRD on HD MWF in [place]:  -consulted for HD needs.  -consents signed  -continue HD MWF while here and if she stays after hospitalization will need temp outpt unit.   -labs noted and bath adjusted.  -R arm AVF+  -9/19: HD yesterday w/o issue.  No need today.  -9/20:  HD today after angio  -9/21:  no need for HD today.  Cont MWF if stays here.    -9/22:  seen on HD this am.   -9/25:  HD with Blood today.  Renally dose meds, avoid nephrotoxins, and monitor I/O's closely.    Necrotic Left toes:  -defer to pod/vascular/cards.  -angio noted with severe PAD  -Status post exploration femoral and popliteal arteries 9/22 with Non-reconstructible peripheral vascular disease.  Patient deciding on amputation. I think patient should have this done in [place] where she lives. At this point she just needs pain control.     HTN:  -UF on HD  -increased ARB/CCB. Will follow     MBD:  -binders/nephrocaps.   Changed to renvela.  Pt states that she also takes tums at home  -using calcitriol on HD " days.     Anemia of CKD:  -at goal currently.   -trending lower. Due to surgery? PLT stable  -9/25:  blood today.       As above  Will follow for renal needs   Ok to DC to [place].

## 2023-09-25 NOTE — PROGRESS NOTES
Pharmacokinetic Assessment Follow Up: IV Vancomycin    Vancomycin serum concentration assessment(s):    The random level was drawn correctly and can be used to guide therapy at this time. The measurement is within the desired definitive target range of 15 to 20 mcg/mL.    Vancomycin Regimen Plan:    Administer 500 mg IV once today after dialysis. Re-dose when the pre-dialysis random level is less than 25 mcg/mL, next level to be drawn at 0430 on 9/27/23    Drug levels (last 3 results):  Recent Labs   Lab Result Units 09/25/23  0346   Vancomycin-Trough ug/mL 21.7       Pharmacy will continue to follow and monitor vancomycin.    Please contact pharmacy at extension [phone] for questions regarding this assessment.    Thank you for the consult,   Perla Singh       Patient brief summary:  Shannan Figueredo is a 67 y.o. female initiated on antimicrobial therapy with IV Vancomycin for treatment of bone/joint infection    Drug Allergies:   Review of patient's allergies indicates:  No Known Allergies    Actual Body Weight:   63.5 kg    Renal Function:   Estimated Creatinine Clearance: 5.8 mL/min (A) (based on SCr of 8.3 mg/dL (H)).,     Dialysis Method (if applicable):  N/A    CBC (last 72 hours):  Recent Labs   Lab Result Units 09/23/23 0357 09/24/23 0402 09/25/23  0346   WBC K/uL 11.43 10.90 13.81*   Hemoglobin g/dL 9.1* 7.6* 6.7*   Hematocrit % 28.9* 24.1* 21.4*   Platelets K/uL 230 235 222   Gran % % 70.0 64.5 67.2   Lymph % % 12.9* 14.4* 11.0*   Mono % % 15.3* 15.9* 14.8   Eosinophil % % 0.1 2.5 3.5   Basophil % % 0.7 0.8 0.7   Differential Method  Automated Automated Automated       Metabolic Panel (last 72 hours):  Recent Labs   Lab Result Units 09/23/23 0357 09/24/23 0402 09/25/23  0346   Sodium mmol/L 134* 134* 131*   Potassium mmol/L 4.8 4.5 4.0   Chloride mmol/L 96 94* 90*   CO2 mmol/L 26 23 22*   Glucose mg/dL 109 95 203*   BUN mg/dL 25* 38* 48*   Creatinine mg/dL 5.8* 7.6* 8.3*   Albumin g/dL 2.3* 2.3*  2.0*   Magnesium mg/dL 2.0 2.1 2.1   Phosphorus mg/dL 5.6* 5.8* 5.1*       Vancomycin Administrations:  vancomycin given in the last 96 hours                     vancomycin (VANCOCIN) 500 mg in dextrose 5 % in water (D5W) 100 mL IVPB (MB+) (mg) 500 mg New Bag 09/22/23 2004    vancomycin injection (g) 1 g Given 09/22/23 1041                    Microbiologic Results:  Microbiology Results (last 7 days)       Procedure Component Value Units Date/Time    Blood Culture #2 **CANNOT BE ORDERED STAT** [[id number]] Collected: 09/17/23 1338    Order Status: Completed Specimen: Blood from Antecubital, Left Arm Updated: 09/22/23 2012     Blood Culture, Routine No growth after 5 days.    Blood Culture #1 **CANNOT BE ORDERED STAT** [[id number]] Collected: 09/17/23 1337    Order Status: Completed Specimen: Blood Updated: 09/22/23 2012     Blood Culture, Routine No growth after 5 days.

## 2023-09-25 NOTE — PLAN OF CARE
CM informed both patient daughters at bedside that CM was able confirm with Medicare mileage limit of 60 miles will be covered for ambulance transportation services. In addition informed patient per Dr Cobb patient will be ok to d/c home to [place] to follow up with physicians.

## 2023-09-25 NOTE — PHYSICIAN QUERY
PT Name: Shannan Figueredo  MR #: [id number]    DOCUMENTATION CLARIFICATION      CDS/: Stacie Miner RN BSN              Contact information:[email or web address]    This form is a permanent document in the medical record.      Query Date: September 25, 2023    By submitting this query, we are merely seeking further clarification of documentation. Please utilize your independent clinical judgment when addressing the question(s) below.    The Medical Record contains the following:   Indicators  Supporting Clinical Findings Location in Medical Record   x Anemia documented 9/20 Nephrology PN   Anemia of CKD: 9/20 Progress note   x H&H 9/22  hgb  12.0  hct 38.9  9/25  hgb 6.7     hct  21.4 9/22-9/25/23 Lab    BP                    HR      Bleeding     x Procedure/Surgery Performed/EBL 9/22 Op  Procedure:   EXPLORATION, VESSEL, FEMORAL AND POPLITEAL (Left)    Estimated blood loss 200 cc 9/22/23 Op   x Transfusion(s) 9/25 transfuse 1 unit RBC 9/25/23 Transfusion Summary   x Acute/Chronic illness 9/17 H&P  Gangrene of toe of left foot  HTN, PVD  Type 2 diabetes mellitus with chronic kidney disease on chronic dialysis  ESRD (end stage renal disease 9/17/23 H&P    Treatments      Other       Provider, please clarify Anemia diagnosis  associated with above clinical findings.   [  x ] Acute blood loss anemia expected post-operatively superimposed on chronic Anemia   [   ] Other Hematological Diagnosis (please specify): _________________            Please document in your progress notes daily for the duration of treatment, until resolved, and include in your discharge summary.    Form No. [id number]

## 2023-09-25 NOTE — PT/OT/SLP PROGRESS
Physical Therapy      Patient Name:  Shannan Figueredo   MRN:  [id number]    Patient not seen today secondary to Dialysis. Will follow-up next treatment day.

## 2023-09-25 NOTE — PLAN OF CARE
Problem: Adult Inpatient Plan of Care  Goal: Plan of Care Review  Outcome: Ongoing, Progressing  Flowsheets (Taken 9/25/2023 9502)  Plan of Care Reviewed With: patient  Goal: Optimal Comfort and Wellbeing  Outcome: Ongoing, Progressing     Problem: Diabetes Comorbidity  Goal: Blood Glucose Level Within Targeted Range  Outcome: Ongoing, Progressing     Problem: Impaired Wound Healing  Goal: Optimal Wound Healing  Outcome: Ongoing, Progressing     Problem: Skin Injury Risk Increased  Goal: Skin Health and Integrity  Outcome: Ongoing, Progressing     Problem: Device-Related Complication Risk (Hemodialysis)  Goal: Safe, Effective Therapy Delivery  Outcome: Ongoing, Progressing

## 2023-09-26 NOTE — PROGRESS NOTES
Patient reviewed, renal function stable, cultures reviewed, no new levels, continue current therapy; Next levels due: 9/27/2023 at 04:30    Please contact inpatient pharmacy at [phone] with any questions.     Thank you,  Simin Mauro  09/26/2023

## 2023-09-26 NOTE — SUBJECTIVE & OBJECTIVE
Interval History: No acute events overnight. Pain mildly improved from prior. Discussed plan of care with patient/family at bedside. Patient deciding on pursuing surgery here vs. back in [place]. No new concerns at this time.    Review of Systems   Constitutional:  Negative for chills and fever.   Respiratory:  Negative for cough and shortness of breath.    Cardiovascular:  Negative for chest pain and palpitations.   Gastrointestinal:  Negative for abdominal pain, nausea and vomiting.     Objective:     Vital Signs (Most Recent):  Temp: 99 °F (37.2 °C) (09/26/23 1548)  Pulse: 79 (09/26/23 1548)  Resp: 18 (09/26/23 1548)  BP: (!) 184/79 (09/26/23 1548)  SpO2: 96 % (09/26/23 1548) Vital Signs (24h Range):  Temp:  [98 °F (36.7 °C)-99.2 °F (37.3 °C)] 99 °F (37.2 °C)  Pulse:  [70-79] 79  Resp:  [16-18] 18  SpO2:  [95 %-98 %] 96 %  BP: (157-184)/(70-79) 184/79     Weight: 63.5 kg (139 lb 15.9 oz)  Body mass index is 23.3 kg/m².    Intake/Output Summary (Last 24 hours) at 9/26/2023 1618  Last data filed at 9/26/2023 1415  Gross per 24 hour   Intake 675 ml   Output --   Net 675 ml         Physical Exam  Vitals and nursing note reviewed.   Constitutional:       General: She is not in acute distress.     Appearance: She is well-developed.   HENT:      Head: Normocephalic and atraumatic.   Eyes:      General:         Right eye: No discharge.         Left eye: No discharge.      Conjunctiva/sclera: Conjunctivae normal.   Cardiovascular:      Rate and Rhythm: Normal rate.      Pulses: Normal pulses.   Pulmonary:      Effort: Pulmonary effort is normal. No respiratory distress.   Abdominal:      Palpations: Abdomen is soft.      Tenderness: There is no abdominal tenderness.   Musculoskeletal:         General: Normal range of motion.      Right lower leg: No edema.      Left lower leg: No edema.   Skin:     General: Skin is warm and dry.      Comments: Dry gangrenous changes of L 1st and 2nd toes.   Neurological:      Mental Status:  She is alert and oriented to person, place, and time.       Significant Labs:   CBC:  Recent Labs   Lab 09/24/23  0402 09/25/23 0346 09/26/23  0359   WBC 10.90 13.81* 14.60*   HGB 7.6* 6.7* 8.9*   HCT 24.1* 21.4* 27.5*    222 214   GRAN 64.5  7.0 67.2  9.3* 74.0*   LYMPH 14.4*  1.6 11.0*  1.5 7.0*  CANCELED   MONO 15.9*  1.7* 14.8  2.0* 12.0  CANCELED   EOS 0.3 0.5 CANCELED   BASO 0.09 0.09 CANCELED   BMP:  Recent Labs   Lab 09/24/23  0402 09/25/23 0346 09/26/23  0359   * 131* 135*   K 4.5 4.0 3.5   CL 94* 90* 93*   CO2 23 22* 25   BUN 38* 48* 20   CREATININE 7.6* 8.3* 4.6*   GLU 95 203* 104   CALCIUM 8.5* 8.2* 8.7   MG 2.1 2.1 2.0   PHOS 5.8* 5.1* 3.5     Significant Imaging: I have reviewed and interpreted all pertinent imaging results/findings within the past 24 hours.

## 2023-09-26 NOTE — ASSESSMENT & PLAN NOTE
"HTN, PVD  - L 1st toe gangrene predominantly dry with some surrounding erythema/swelling; with concern for worsening infection started pipeacillin-tazobactam and vancomycin IV. 2nd toe with some involvement as well. Continue broad spectrum antibiotic therapy for now. No evidence of sepsis at this time.  - U/S arterial BLE showed L distal SFA occlusion with distal reconstitution. Discussed with vascular surgery; cardiology consult for consideration of angiography / potential angioplasty.  - Podiatry consulted, appreciate assistance. MRI L foot with "examination markedly degraded by patient motion artifact. Questionable marrow edema within the 1st distal phalanx, not well evaluated given aforementioned limitation but possibly related to osteomyelitis given reported history of gangrene."  - Continue hydrocodone-acetaminophen 5-325mg PO q6hr PRN, hydromorphone 0.5mg IV q6hr PRN.  - Continue labetalol 300mg PO BID, losartan 50mg PO daily, nifedipine 30mg PO daily.  - Blood culture NGTD  - s/p angiogram remarkable for severe PAD with no areas amenable to angioplasty on 9/20  - Vascular surgery, Dr. Cobb spoke with patient and family extensively and patient elected to undergo attempt at revascularization.  Vessel exploration done, but unable to perform femoral and popliteal bypass on 9/22  - Placed in ICU postprocedure as per protocol under Dr. Cobb post procedure, and vitals stable. Stepdown to floor on 9/23   - Dr. Cobb discussed remaining surgical option, with recommendation for BKA as only viable option given the extent of her peripheral vascular disease.  Family intends to likely pursue BKA in [place].  Patient to be monitored until Wednesday by Dr. Cobb, and can likely discharge thereafter.  Will be discussions with Dr. Cobb and family for discharge planning  "

## 2023-09-26 NOTE — SUBJECTIVE & OBJECTIVE
Interval History: No acute events overnight, no reported bleeding. Pain to leg took a while last night to get it under control. Both daughters at the bedside and update given. They asked if lidocaine patch would be an option to add for her leg pain.    Review of Systems   Constitutional:  Negative for chills, fatigue and fever.   Respiratory:  Negative for cough and shortness of breath.    Cardiovascular:  Negative for chest pain and palpitations.   Musculoskeletal:  Positive for arthralgias and myalgias.     Objective:     Vital Signs (Most Recent):  Temp: 99.2 °F (37.3 °C) (09/25/23 1628)  Pulse: 74 (09/25/23 1628)  Resp: 18 (09/25/23 1628)  BP: (!) 157/70 (09/25/23 1628)  SpO2: 97 % (09/25/23 1628) Vital Signs (24h Range):  Temp:  [97.8 °F (36.6 °C)-99.2 °F (37.3 °C)] 99.2 °F (37.3 °C)  Pulse:  [72-77] 74  Resp:  [16-20] 18  SpO2:  [95 %-98 %] 97 %  BP: ()/(40-74) 157/70     Weight: 63.5 kg (139 lb 15.9 oz)  Body mass index is 23.3 kg/m².    Intake/Output Summary (Last 24 hours) at 9/25/2023 1938  Last data filed at 9/25/2023 1421  Gross per 24 hour   Intake 500 ml   Output 2460 ml   Net -1960 ml           Physical Exam  Vitals and nursing note reviewed.   Constitutional:       General: She is not in acute distress.     Appearance: She is well-developed.   HENT:      Head: Normocephalic and atraumatic.   Eyes:      General:         Right eye: No discharge.         Left eye: No discharge.      Conjunctiva/sclera: Conjunctivae normal.   Cardiovascular:      Rate and Rhythm: Normal rate.      Comments: Diminished BLE pulses.  Pulmonary:      Effort: Pulmonary effort is normal. No respiratory distress.      Comments: Mildly diminished at bases.  Abdominal:      Palpations: Abdomen is soft.      Tenderness: There is no abdominal tenderness.   Musculoskeletal:         General: Normal range of motion.      Right lower leg: No edema.      Left lower leg: No edema.      Comments: RUE fistula with thrill.   Skin:      Comments: LLE cool to touch and dry. L first toe with gangrenous changes with surrounding erythema, no noted purulence or drainage. L 2nd toe with absent nail. Surgical dressing in place to upper leg. Doppler pulse present   Neurological:      Mental Status: She is alert, oriented to person, place, and time and easily aroused.           Significant Labs:   CBC:  Recent Labs   Lab 09/23/23  0357 09/24/23  0402 09/25/23  0346   WBC 11.43 10.90 13.81*   HGB 9.1* 7.6* 6.7*   HCT 28.9* 24.1* 21.4*    235 222   GRAN 70.0  8.0* 64.5  7.0 67.2  9.3*   LYMPH 12.9*  1.5 14.4*  1.6 11.0*  1.5   MONO 15.3*  1.8* 15.9*  1.7* 14.8  2.0*   EOS 0.0 0.3 0.5   BASO 0.08 0.09 0.09     CMP:  Recent Labs   Lab 09/23/23  0357 09/24/23  0402 09/25/23  0346   * 134* 131*   K 4.8 4.5 4.0   CL 96 94* 90*   CO2 26 23 22*   BUN 25* 38* 48*   CREATININE 5.8* 7.6* 8.3*    95 203*   CALCIUM 8.6* 8.5* 8.2*   MG 2.0 2.1 2.1   PHOS 5.6* 5.8* 5.1*   ALBUMIN 2.3* 2.3* 2.0*   ANIONGAP 12 17* 19*        Significant Imaging: I have reviewed and interpreted all pertinent imaging results/findings within the past 24 hours.

## 2023-09-26 NOTE — PROGRESS NOTES
[facility] ([address])  Adult Nutrition  Progress Note    SUMMARY     Recommendations  1) Continue diabetic, renal diet as tolerated    3) Encourage intake of meals    4) RD to follow to monitor nutrition status    Goals:   Pt will maintain 50-75% intake of meals by RD follow up  Nutrition Goal Status: new  Communication of RD Recs:  (POC)    Assessment and Plan  Nutrition Problem  Increased nutrient needs    Related to (etiology):   Diagnosis r/symptoms    Signs and Symptoms (as evidenced by):   ESRD on HD; poor wound healing 2/2 gangrene infection of L toe; 0% intake of breakfast/lunch 9/26    Interventions (treatment strategy):  CHO modified diet  Mineral modified diet  Collaboration with other providers    Nutrition Diagnosis Status:   Continues      Malnutrition Assessment     Hair/Scalp (Micronutrient): none               Mu-ism Region (Muscle Loss): mild depletion  Clavicle Bone Region (Muscle Loss): mild depletion                 Reason for Assessment    Reason For Assessment: RD follow-up  Diagnosis:  (Gangrene of toe of left foot)  Relevant Medical History:   Patient Active Problem List   Diagnosis    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 Family able to order meals for pt, restrictions limiting to pt's preference. No fish added to diet order. Continues with poor appetite. Pt able to eat toast this am. Not drinking nutrition supplements Novasource. Encouraged intake of meals d/t increased nutrient needs.  Pt with ESRD on HD MWF.  lb per pt. No  "GI intolerance, pt c/o toe pain. PIV. Yuan 17- L toe, heel.  Nutrition Discharge Planning: dc on ADA, renal diet as tolerated    Nutrition Risk Screen    Nutrition Risk Screen: large or nonhealing wound, burn or pressure injury    Nutrition/Diet History    Patient Reported Diet/Restrictions/Preferences: diabetic diet  Spiritual, Cultural Beliefs, Orthodox Practices, Values that Affect Care: no  Factors Affecting Nutritional Intake: pain, decreased appetite    Anthropometrics    Temp: 98.2 °F (36.8 °C)  Height: 5' 5" (165.1 cm)  Height (inches): 65 in  Weight Method: Bed Scale  Weight: 63.5 kg (139 lb 15.9 oz)  Weight (lb): 139.99 lb  Ideal Body Weight (IBW), Female: 125 lb  % Ideal Body Weight, Female (lb): 111.99 %  BMI (Calculated): 23.3  BMI Grade: 18.5-24.9 - normal       Lab/Procedures/Meds    Pertinent Labs Reviewed: reviewed  CBC:  Recent Labs   Lab 09/26/23  0359   WBC 14.60*   HGB 8.9*   HCT 27.5*        CMP:  Recent Labs   Lab 09/26/23  0359   CALCIUM 8.7   ALBUMIN 2.3*   *   K 3.5   CO2 25   CL 93*   BUN 20   CREATININE 4.6*     Pertinent Medications Reviewed: reviewed  Scheduled Meds:   sodium chloride 0.9%   Intravenous Once    aspirin  81 mg Oral Daily    calcitRIOL  0.25 mcg Oral Every Mon, Wed, Fri    carvediloL  3.125 mg Oral BID    ciprofloxacin HCl  500 mg Oral Q12H    clopidogreL  75 mg Oral Daily    doxycycline  100 mg Oral Q12H    LIDOcaine  1 patch Transdermal Q24H    losartan  100 mg Oral Daily    NIFEdipine  60 mg Oral Daily    sevelamer carbonate  1,600 mg Oral TID WM    vitamin renal formula (B-complex-vitamin c-folic acid)  1 capsule Oral Daily     Continuous Infusions:  PRN Meds:.acetaminophen, dextrose 10%, dextrose 10%, diphenhydrAMINE, glucagon (human recombinant), glucose, glucose, hydrALAZINE, HYDROcodone-acetaminophen, HYDROmorphone, insulin aspart U-100, loperamide, melatonin, ondansetron, ondansetron, simethicone, sodium chloride 0.9%, sodium chloride " 0.9%    Estimated/Assessed Needs    Weight Used For Calorie Calculations: 63.5 kg (139 lb 15.9 oz)  Energy Calorie Requirements (kcal): 6784-9942  Energy Need Method: Kcal/kg (30-35)  Protein Requirements: 76 g (1.2 g/kg)  Weight Used For Protein Calculations: 63.5 kg (139 lb 15.9 oz)  Fluid Requirements (mL): 1 mL/kcal  Estimated Fluid Requirement Method:  (or per MD)  RDA Method (mL): 1905  CHO Requirement: 238g= 50%dv      Nutrition Prescription Ordered    Current Diet Order: diabetic - renal - 2000 kcal    Evaluation of Received Nutrient/Fluid Intake    I/O: -5.2 L since admit  Energy Calories Required: not meeting needs  Protein Required: not meeting needs  Fluid Required: not meeting needs  Comments: LBM: 9/21/23  Tolerance: not tolerating  % Intake of Estimated Energy Needs: 0 - 25 %  % Meal Intake: 0 - 25 %  Intake/Output - Last 3 Shifts         09/24 0700 09/25 0659 09/25 0700 09/26 0659 09/26 0700 09/27 0659    P.O. 170      Blood  675 0    Other  500     IV Piggyback 248.6      Total Intake(mL/kg) 418.6 (6.6) 1175 (18.5) 0 (0)    Other  2460     Total Output  2460     Net +418.6 -1285 0           Stool Occurrence 0 x            Nutrition Risk    Level of Risk/Frequency of Follow-up:  (1-2 x/week)       Monitor and Evaluation    Food and Nutrient Intake: energy intake, food and beverage intake  Food and Nutrient Adminstration: diet order  Knowledge/Beliefs/Attitudes: beliefs and attitudes  Physical Activity and Function: nutrition-related ADLs and IADLs  Anthropometric Measurements: weight, weight change  Biochemical Data, Medical Tests and Procedures: electrolyte and renal panel, gastrointestinal profile, glucose/endocrine profile, inflammatory profile, lipid profile  Nutrition-Focused Physical Findings: overall appearance       Nutrition Follow-Up    RD Follow-up?: Yes    Tonie Lucero RDN, [facility]

## 2023-09-26 NOTE — PROGRESS NOTES
"[facility] Surg ([address] Medicine  Progress Note    Patient Name: Shannan Figueredo  MRN: [id number]  Patient Class: IP- Inpatient   Admission Date: 9/17/2023  Length of Stay: 8 days  Attending Physician: Brook Colon MD  Primary Care Provider: Roxi, Primary Doctor        Subjective:     Principal Problem:Gangrene of toe of left foot        HPI:  Ms. Figueredo is a 67/F with PMH HTN, DMII (diet-controlled), PVD, ESRD on HD (M/W/F), anemia who presented to [facility] 09/17 with a two day progressive history of worsening L foot pain, swelling, fatigue, nausea and vomiting. History obtained from patient and daughter Stephanie ([phone]); they are from the [place], [place] area. Patient reports several weeks ago she "stubbed her toe," noting initially some pain, redness and swelling. Gradually symptoms progressed with duskiness to her toe and she informed her daughter of her pain for which she was brought to ER in [place] on 08/25 for further evaluation. With duskiness to 1st toe and surrounding erythema she was treated for cellulitis with a course of ciprofloxacin and metronidazole, but failed to improve. She followed up with her podiatrist 09/12 where she was prescribed hydrocodone-acetaminophen and a ten day course of TMP-SMX which she has been taking. She was referred to vascular surgery with plan for angiography and potential intervention  She and her family came to [place] for the weekend but her pain worsened; had been attempting to minimize use of hydrocodone-acetaminophen but developed nausea, vomiting and progressive fatigue. She subsequently presented to ED for further evaluation. ED workup was notable for L 1st toe dry-appearing gangrene with surrounding erythema and swelling, removed nail of second toe, XR L foot demonstrating diffuse osteopenia, L calcaneal and forefoot soft tissue swelling without definitive evidence of osteomyelitis in setting of osteopenia, no leukocytosis, elevated sed " rate and CRP, and elevated BUN/Cr in setting of ESRD. She notes she went without dialysis on 09/15 due to her trip. Blood cultures were ordered and she received piperacillin-tazobactam and vancomycin. Hospital medicine was subsequently contacted for admission.      Overview/Hospital Course:  Admitted with gangrene of L 1st toe.  Empirically treated with Zosyn and vancomycin.  Vascular Surgery, nephrology, podiatry consulted; U/S arterial demonstrated L SFA occlusion with distal reconstitution. Cardiology consulted, angiography with no vessels amenable for angioplasty on 9/20.  Underwent exploration of the left lower extremity vessels, femoral and popliteal bypass unable to be performed due to no adequate available vessels for bypass on 9/22.      Interval History: No acute events overnight, no reported bleeding. Pain to leg took a while last night to get it under control. Both daughters at the bedside and update given. They asked if lidocaine patch would be an option to add for her leg pain.    Review of Systems   Constitutional:  Negative for chills, fatigue and fever.   Respiratory:  Negative for cough and shortness of breath.    Cardiovascular:  Negative for chest pain and palpitations.   Musculoskeletal:  Positive for arthralgias and myalgias.     Objective:     Vital Signs (Most Recent):  Temp: 99.2 °F (37.3 °C) (09/25/23 1628)  Pulse: 74 (09/25/23 1628)  Resp: 18 (09/25/23 1628)  BP: (!) 157/70 (09/25/23 1628)  SpO2: 97 % (09/25/23 1628) Vital Signs (24h Range):  Temp:  [97.8 °F (36.6 °C)-99.2 °F (37.3 °C)] 99.2 °F (37.3 °C)  Pulse:  [72-77] 74  Resp:  [16-20] 18  SpO2:  [95 %-98 %] 97 %  BP: ()/(40-74) 157/70     Weight: 63.5 kg (139 lb 15.9 oz)  Body mass index is 23.3 kg/m².    Intake/Output Summary (Last 24 hours) at 9/25/2023 1938  Last data filed at 9/25/2023 1421  Gross per 24 hour   Intake 500 ml   Output 2460 ml   Net -1960 ml           Physical Exam  Vitals and nursing note reviewed.    Constitutional:       General: She is not in acute distress.     Appearance: She is well-developed.   HENT:      Head: Normocephalic and atraumatic.   Eyes:      General:         Right eye: No discharge.         Left eye: No discharge.      Conjunctiva/sclera: Conjunctivae normal.   Cardiovascular:      Rate and Rhythm: Normal rate.      Comments: Diminished BLE pulses.  Pulmonary:      Effort: Pulmonary effort is normal. No respiratory distress.      Comments: Mildly diminished at bases.  Abdominal:      Palpations: Abdomen is soft.      Tenderness: There is no abdominal tenderness.   Musculoskeletal:         General: Normal range of motion.      Right lower leg: No edema.      Left lower leg: No edema.      Comments: RUE fistula with thrill.   Skin:     Comments: LLE cool to touch and dry. L first toe with gangrenous changes with surrounding erythema, no noted purulence or drainage. L 2nd toe with absent nail. Surgical dressing in place to upper leg. Doppler pulse present   Neurological:      Mental Status: She is alert, oriented to person, place, and time and easily aroused.           Significant Labs:   CBC:  Recent Labs   Lab 09/23/23 0357 09/24/23 0402 09/25/23  0346   WBC 11.43 10.90 13.81*   HGB 9.1* 7.6* 6.7*   HCT 28.9* 24.1* 21.4*    235 222   GRAN 70.0  8.0* 64.5  7.0 67.2  9.3*   LYMPH 12.9*  1.5 14.4*  1.6 11.0*  1.5   MONO 15.3*  1.8* 15.9*  1.7* 14.8  2.0*   EOS 0.0 0.3 0.5   BASO 0.08 0.09 0.09     CMP:  Recent Labs   Lab 09/23/23  0357 09/24/23  0402 09/25/23  0346   * 134* 131*   K 4.8 4.5 4.0   CL 96 94* 90*   CO2 26 23 22*   BUN 25* 38* 48*   CREATININE 5.8* 7.6* 8.3*    95 203*   CALCIUM 8.6* 8.5* 8.2*   MG 2.0 2.1 2.1   PHOS 5.6* 5.8* 5.1*   ALBUMIN 2.3* 2.3* 2.0*   ANIONGAP 12 17* 19*        Significant Imaging: I have reviewed and interpreted all pertinent imaging results/findings within the past 24 hours.      Assessment/Plan:      * Gangrene of toe of left  "foot  HTN, PVD  - L 1st toe gangrene predominantly dry with some surrounding erythema/swelling; with concern for worsening infection started pipeacillin-tazobactam and vancomycin IV. 2nd toe with some involvement as well. Continue broad spectrum antibiotic therapy for now. No evidence of sepsis at this time.  - U/S arterial BLE showed L distal SFA occlusion with distal reconstitution. Discussed with vascular surgery; cardiology consult for consideration of angiography / potential angioplasty.  - Podiatry consulted, appreciate assistance. MRI L foot with "examination markedly degraded by patient motion artifact. Questionable marrow edema within the 1st distal phalanx, not well evaluated given aforementioned limitation but possibly related to osteomyelitis given reported history of gangrene."  - Continue hydrocodone-acetaminophen 5-325mg PO q6hr PRN, hydromorphone 0.5mg IV q6hr PRN.  - Continue labetalol 300mg PO BID, losartan 50mg PO daily, nifedipine 30mg PO daily.  - Blood culture NGTD  - s/p angiogram remarkable for severe PAD with no areas amenable to angioplasty on 9/20  - Vascular surgery, Dr. Cobb spoke with patient and family extensively and patient elected to undergo attempt at revascularization.  Vessel exploration done, but unable to perform femoral and popliteal bypass on 9/22  - Placed in ICU postprocedure as per protocol under Dr. Cobb post procedure, and vitals stable. Stepdown to floor on 9/23   - Dr. Cobb discussed remaining surgical option, with recommendation for BKA as only viable option given the extent of her peripheral vascular disease.  Family intends to likely pursue BKA in [place].  Patient to be monitored until Wednesday by Dr. Cobb, and can likely discharge thereafter.  Will be discussions with Dr. Cobb and family for discharge planning    Type 2 diabetes mellitus with chronic kidney disease on chronic dialysis  - Reports diet-controlled; no current medications.  - HgbA1c 5.7.  - " Continue low-dose sliding scale insulin aspart 0-5U subQ TIDWM PRN, monitor requirements.    ESRD (end stage renal disease)  Anemia, hyperparathyroidism  - ESRD on HD, reports missed session Friday prior to presentation due to travel.  - Mild anemia without evidence of bleeding.  - Continue allopurinol 300mg PO daily, calcium acetate 2001mg PO TIDWM.  - Nephrology consulted for HD assistance  - Noted drop in H&H with no obvious source of bleeding, plan to transfuse 1 unit packed red blood cells today with dialysis      VTE Risk Mitigation (From admission, onward)         Ordered     IP VTE HIGH RISK PATIENT  Once         09/17/23 1351                    Brook Colon MD  Department of Hospital Medicine   [facility] ([address])

## 2023-09-26 NOTE — PLAN OF CARE
Recommendations  1) Continue diabetic, renal diet as tolerated    3) Encourage intake of meals    4) RD to follow to monitor nutrition status    Goals:   Pt will maintain 50-75% intake of meals by RD follow up  Nutrition Goal Status: new  Communication of RD Recs:  (POC)

## 2023-09-26 NOTE — PROGRESS NOTES
"Nephrology  Progress Note    Admit Date: 9/17/2023   LOS: 9 days     SUBJECTIVE:     Follow-up For:  Gangrene of toe of left foot    History of Present Illness:  Patient is a 67 y.o. female presents with left foot gangrene, vomiting, feeling ill.  Ext med hx as outlined below including ESRD on HD MWF in [place].  Has been dealing with necrotic left toes for few months and followed by vascular in [place].  Was here visiting daughter and felt bad.  Admitted for eval/treatment.  Consulted for HD needs.  Pt seen and examined on HD.  Consents signed.  Tired from being up all night with pain.  Updated team and daughter at bedside.  W/up noted.       Interval History:   HD/Blood yesterday.  Events noted.  Pt/family asking for transportation and specialist in [place] to assist w/ surgery.  Explained that they need to discuss with their PCP/Nephrologist in [place] who they recommend.  Some confusion at night with pain meds.  Updated team.        Review of Systems:  Constitutional: No fever or chills  Respiratory: No cough or shortness of breath  Cardiovascular: No chest pain or palpitations  Gastrointestinal: No nausea or vomiting  Neurological: No confusion or weakness    OBJECTIVE:     Vital Signs Range (Last 24H):  BP (!) 157/70 (BP Location: Left arm, Patient Position: Lying)   Pulse 71   Temp 98 °F (36.7 °C) (Oral)   Resp 16   Ht 5' 5" (1.651 m)   Wt 63.5 kg (139 lb 15.9 oz)   SpO2 97%   BMI 23.30 kg/m²     Temp:  [98 °F (36.7 °C)-99.2 °F (37.3 °C)]   Pulse:  [70-78]   Resp:  [16-20]   BP: (157-175)/(70-78)   SpO2:  [95 %-98 %]     I & O (Last 24H):  Intake/Output Summary (Last 24 hours) at 9/26/2023 1001  Last data filed at 9/26/2023 0000  Gross per 24 hour   Intake 1175 ml   Output 2460 ml   Net -1285 ml         Physical Exam:  General appearance:  NAD just drowsy    Eyes:  Conjunctivae/corneas clear. PERRL.  Lungs: Normal respiratory effort,   clear to auscultation bilaterally   Heart: Regular rate and rhythm, S1, S2 " "normal, no murmur, rub or nelly.  Abdomen: Soft, non-tender non-distended; bowel sounds normal; no masses,  no organomegaly  Extremities: No cyanosis or clubbing. No edema.    Skin: Skin color, texture, turgor normal. No rashes or lesions  Neurologic: Normal strength and tone. No focal numbness or weakness   Right arm AVF+  Left big/second toes necrotic.  leg scar noted.     Laboratory Data:  Recent Labs   Lab 09/26/23  0359   WBC 14.60*   RBC 3.07*   HGB 8.9*   HCT 27.5*      MCV 90   MCH 29.0   MCHC 32.4         BMP:   Recent Labs   Lab 09/26/23  0359      *   K 3.5   CL 93*   CO2 25   BUN 20   CREATININE 4.6*   CALCIUM 8.7   MG 2.0   PHOS 3.5       Lab Results   Component Value Date    CALCIUM 8.7 09/26/2023    PHOS 3.5 09/26/2023       Lab Results   Component Value Date    CALCIUM 8.7 09/26/2023    PHOS 3.5 09/26/2023       No results found for: "URICACID"    BNP  No results for input(s): "BNP", "BNPTRIAGEBLO" in the last 168 hours.    Medications:  Medication list was reviewed and changes noted under Assessment/Plan.    Diagnostic Results:    reviewed    ASSESSMENT/PLAN:       ESRD on HD MWF in [place]:  -consulted for HD needs.  -consents signed  -continue HD MWF while here and if she stays after hospitalization will need temp outpt unit.   -labs noted and bath adjusted.  -R arm AVF+  -9/19: HD yesterday w/o issue.  No need today.  -9/20:  HD today after angio  -9/21:  no need for HD today.  Cont MWF if stays here.    -9/22:  seen on HD this am.   -9/25:  HD with Blood today.    -9/26:  stable from renal to DC back to [place] w/ family.  Renally dose meds, avoid nephrotoxins, and monitor I/O's closely.    Necrotic Left toes:  -defer to pod/vascular/cards.  -angio noted with severe PAD  -Status post exploration femoral and popliteal arteries 9/22 with Non-reconstructible peripheral vascular disease.  Patient deciding on amputation. I think patient should have this done in [place] where she lives. " At this point she just needs pain control.  Needs to discuss with OWN Team in [place] who they prefer to use.       HTN:  -UF on HD  -increased ARB/CCB. Will follow     MBD:  -binders/nephrocaps.   Changed to renvela.  Pt states that she also takes tums at home  -using calcitriol on HD days.     Anemia of CKD:  -at goal currently.   -trending lower. Due to surgery? PLT stable  -9/25:  blood today.       As above  Will follow for renal needs   Ok to DC to [place].

## 2023-09-26 NOTE — PT/OT/SLP PROGRESS
Physical Therapy      Patient Name:  Shannan Figueredo   MRN:  [id number]    Patient not seen today secondary to pt refused 2/2 L foot pain. Attempted to encourage pt to participate in bed level therex but pt continued to refuse. Will follow-up tomorrow.

## 2023-09-26 NOTE — PT/OT/SLP PROGRESS
Occupational Therapy   Treatment    Name: Shannan Figueredo  MRN: [id number]  Admitting Diagnosis:  Gangrene of toe of left foot  4 Days Post-Op    Recommendations:     Discharge Recommendations: nursing facility, skilled  Discharge Equipment Recommendations:  bedside commode, wheelchair  Barriers to discharge:   (Current functional level)    Assessment:     Shannan Figueredo is a 67 y.o. female with a medical diagnosis of Gangrene of toe of left foot.  She presents with daughters in room and pt sleeping.  Daughters reporting that pt is going to make a decision about having left BKA by tomorrow and discuss with Dr Cristina.  Performance deficits affecting function are weakness, impaired endurance, impaired self care skills, impaired functional mobility, gait instability, impaired balance, impaired sensation, impaired cognition, decreased coordination, decreased upper extremity function, decreased lower extremity function, decreased safety awareness, pain, decreased ROM, impaired skin, impaired joint extensibility, orthopedic precautions.     Rehab Prognosis:  Fair; patient would benefit from acute skilled OT services to address these deficits and reach maximum level of function.       Plan:     Patient to be seen 5 x/week to address the above listed problems via self-care/home management, therapeutic activities, therapeutic exercises  Plan of Care Expires: 10/08/23  Plan of Care Reviewed with: daughter    Subjective     Chief Complaint: Pt fatigued; Pt not opening eyes.  Patient/Family Comments/goals: Daughter receptive to learning positioning techniques and how to assist pt with bed mobility.  Pain/Comfort:  Pain Rating 1:  (Pt not rating pain)    Objective:     Communicated with: nurse prior to session.  Patient found HOB elevated, pt scooted down in bed, with telemetry, peripheral IV (left, lower, medial, LE dressing) upon OT entry to room.    General Precautions: Standard, diabetic, fall    Orthopedic Precautions:  (left Darco shoe)  Braces: N/A  Respiratory Status: Room air     Occupational Performance:     Bed Mobility:    Scooting to HOB: Total A x 2      Functional Mobility/Transfers:  Functional Mobility: NT    Activities of Daily Living:  NT; Pt's daughter reporting pt ate some soup just prior to OT session.  Daughter reporting concerns about pt's poor appetite that has been going on for months.    [facility] 6 Click ADL: 11    Treatment & Education:  Role of OT, POC, scooting pt up in bed using draw sheet, positioning of pt in bed and use of bed controls for upright supported sitting, not putting 4 bed rails up to avoid restraint, safety     Patient left  upright supported sitting in bed, bilateral UE supported on pillows  with all lines intact, call button in reach, bed alarm on, and daughter present    GOALS:   Multidisciplinary Problems       Occupational Therapy Goals          Problem: Occupational Therapy    Goal Priority Disciplines Outcome Interventions   Occupational Therapy Goal     OT, PT/OT Ongoing, Progressing    Description: OT re-evaluation completed with goals updated as appropriate.      Goals to be met by: 10/8/2023     Patient will increase functional independence with ADLs by performing:    UE Dressing with Minimal Assistance.  LE Dressing with Moderate Assistance.  Grooming while EOB with Contact Guard Assistance.  Toileting from bedside commode with Moderate Assistance for hygiene and clothing management.   Bathing from  sitting at sink with Moderate Assistance.  Toilet transfer to bedside commode with Maximum Assistance.                             Time Tracking:     OT Date of Treatment: 09/26/23  OT Start Time: 1718  OT Stop Time: 1732  OT Total Time (min): 14 min    Billable Minutes:Self Care/Home Management 14    OT/ROGER: OT     Number of ROGER visits since last OT visit: 1    9/26/2023

## 2023-09-26 NOTE — ASSESSMENT & PLAN NOTE
Anemia, hyperparathyroidism  - ESRD on HD, reports missed session Friday prior to presentation due to travel.  - Mild anemia without evidence of bleeding.  - Continue allopurinol 300mg PO daily, calcium acetate 2001mg PO TIDWM.  - Nephrology consulted for HD assistance  - Noted drop in H&H with no obvious source of bleeding, plan to transfuse 1 unit packed red blood cells today with dialysis

## 2023-09-26 NOTE — PROGRESS NOTES
[facility] ([address])  General Surgery  Progress Note    Subjective:     Interval History:  59-year-old female with end-stage renal disease on dialysis admitted on 09/17/2023 with rest pain left foot and dry gangrene of the 1st and 2nd toe.  Patient underwent aortogram with runoff which showed severe infra inguinal disease not amenable to percutaneous intervention.  Patient has subsequently undergone exploration of the popliteal and femoral arteries of the left lower extremity.  Patient had severe disease of the popliteal and proximal tibial vessels such that bypass or endarterectomy was not possible.  Patient remained afebrile with stable vital signs.  Her surgical incisions are clean and dry.  Patient has rest pain of the left foot and dry gangrene on physical exam today.  Patient and family are aware that digital or forefoot amputation would not heal due to severity of peripheral vascular disease.  Patient and family appear to understand that below-knee amputation would be required for healing.  Patient and family offered BKA here at [facility].  At this point the patient wishes to return home to [place] for definitive treatment of the extremity.  Logistics are being worked out by case management and primary care.  I have left a message with the patient's primary care provider Dr. Chandrakant Carr in [place], [place] ([phone]) in an effort to ensure proper surgical follow-up.    Post-Op Info:  Procedure(s):  CREATION, BYPASS, ARTERIAL, FEMORAL TO POPLITEAL, USING GRAFT   4 Days Post-Op      Medications:  Continuous Infusions:  Scheduled Meds:   sodium chloride 0.9%   Intravenous Once    aspirin  81 mg Oral Daily    calcitRIOL  0.25 mcg Oral Every Mon, Wed, Fri    carvediloL  3.125 mg Oral BID    clopidogreL  75 mg Oral Daily    LIDOcaine  1 patch Transdermal Q24H    losartan  100 mg Oral Daily    NIFEdipine  60 mg Oral Daily    piperacillin-tazobactam (Zosyn) IV (PEDS and ADULTS) (extended infusion  is not appropriate)  4.5 g Intravenous Q12H    sevelamer carbonate  1,600 mg Oral TID WM    vitamin renal formula (B-complex-vitamin c-folic acid)  1 capsule Oral Daily     PRN Meds:0.9%  NaCl infusion (for blood administration), acetaminophen, dextrose 10%, dextrose 10%, diphenhydrAMINE, glucagon (human recombinant), glucose, glucose, hydrALAZINE, HYDROcodone-acetaminophen, HYDROmorphone, insulin aspart U-100, loperamide, melatonin, ondansetron, ondansetron, simethicone, sodium chloride 0.9%, sodium chloride 0.9%, Pharmacy to dose Vancomycin consult **AND** vancomycin - pharmacy to dose     Objective:     Vital Signs (Most Recent):  Temp: 98.2 °F (36.8 °C) (09/26/23 1153)  Pulse: 74 (09/26/23 1153)  Resp: 18 (09/26/23 1153)  BP: (!) 168/75 (09/26/23 1153)  SpO2: 95 % (09/26/23 1153) Vital Signs (24h Range):  Temp:  [98 °F (36.7 °C)-99.2 °F (37.3 °C)] 98.2 °F (36.8 °C)  Pulse:  [70-78] 74  Resp:  [16-20] 18  SpO2:  [95 %-98 %] 95 %  BP: (157-175)/(70-78) 168/75       Intake/Output Summary (Last 24 hours) at 9/26/2023 1235  Last data filed at 9/26/2023 0000  Gross per 24 hour   Intake 1175 ml   Output 2460 ml   Net -1285 ml       Physical Exam  Afebrile   Vital signs stable  Patient awake, alert, oriented  Left lower extremity-surgical dressings dry and intact.  Left lower extremity warm to level of the ankle.  Dry gangrene 1st and 2nd toes.    Significant Labs:  CBC:   Recent Labs   Lab 09/26/23  0359   WBC 14.60*   RBC 3.07*   HGB 8.9*   HCT 27.5*      MCV 90   MCH 29.0   MCHC 32.4       Significant Diagnostics:      Assessment/Plan:     Non reconstructible peripheral vascular disease with dry gangrene of the left lower extremity.  Surgically stable.  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

## 2023-09-26 NOTE — ASSESSMENT & PLAN NOTE
"HTN, PVD  - L 1st toe gangrene predominantly dry with some surrounding erythema/swelling; with concern for worsening infection started pipeacillin-tazobactam and vancomycin IV. 2nd toe with some involvement as well. Continue broad spectrum antibiotic therapy for now. No evidence of sepsis at this time.  - U/S arterial BLE showed L distal SFA occlusion with distal reconstitution. Discussed with vascular surgery; cardiology consult for consideration of angiography / potential angioplasty.  - Podiatry consulted, appreciate assistance. MRI L foot with "examination markedly degraded by patient motion artifact. Questionable marrow edema within the 1st distal phalanx, not well evaluated given aforementioned limitation but possibly related to osteomyelitis given reported history of gangrene."  - Continue hydrocodone-acetaminophen 5-325mg PO q6hr PRN, add oxycodone-acetaminophen 7.5-325mg PO q6hr PRN. Continue hydromorphone 0.5mg IV q6hr PRN for breakthrough pain.  - Continue labetalol 300mg PO BID, losartan 50mg PO daily, nifedipine 30mg PO daily.  - Blood cultures showed no growth.  - s/p angiogram remarkable for severe PAD with no areas amenable to angioplasty on 9/20  - Vascular surgery, Dr. Cobb spoke with patient and family extensively and patient elected to undergo attempt at revascularization.  Vessel exploration done, but unable to perform femoral and popliteal bypass on 9/22. Discussed surgical options; limited, with BKA likely only viable for appropriate healing given extent of vascular disease.   - Discussing surgery here vs. [place].  "

## 2023-09-26 NOTE — PROGRESS NOTES
"[facility] Surg ([address] Medicine  Progress Note    Patient Name: Shannan Figueredo  MRN: [id number]  Patient Class: IP- Inpatient   Admission Date: 9/17/2023  Length of Stay: 9 days  Attending Physician: JOSE Cristina MD  Primary Care Provider: Roxi, Primary Doctor        Subjective:     Principal Problem:Gangrene of toe of left foot    HPI:  Ms. Figueredo is a 67/F with PMH HTN, DMII (diet-controlled), PVD, ESRD on HD (M/W/F), anemia who presented to [facility] 09/17 with a two day progressive history of worsening L foot pain, swelling, fatigue, nausea and vomiting. History obtained from patient and daughter Stephanie ([phone]); they are from the [place], [place] area. Patient reports several weeks ago she "stubbed her toe," noting initially some pain, redness and swelling. Gradually symptoms progressed with duskiness to her toe and she informed her daughter of her pain for which she was brought to ER in [place] on 08/25 for further evaluation. With duskiness to 1st toe and surrounding erythema she was treated for cellulitis with a course of ciprofloxacin and metronidazole, but failed to improve. She followed up with her podiatrist 09/12 where she was prescribed hydrocodone-acetaminophen and a ten day course of TMP-SMX which she has been taking. She was referred to vascular surgery with plan for angiography and potential intervention  She and her family came to [place] for the weekend but her pain worsened; had been attempting to minimize use of hydrocodone-acetaminophen but developed nausea, vomiting and progressive fatigue. She subsequently presented to ED for further evaluation. ED workup was notable for L 1st toe dry-appearing gangrene with surrounding erythema and swelling, removed nail of second toe, XR L foot demonstrating diffuse osteopenia, L calcaneal and forefoot soft tissue swelling without definitive evidence of osteomyelitis in setting of osteopenia, no leukocytosis, elevated sed " rate and CRP, and elevated BUN/Cr in setting of ESRD. She notes she went without dialysis on 09/15 due to her trip. Blood cultures were ordered and she received piperacillin-tazobactam and vancomycin. Hospital medicine was subsequently contacted for admission.      Overview/Hospital Course:  Admitted with gangrene of L 1st toe.  Empirically treated with Zosyn and vancomycin.  Vascular Surgery, nephrology, podiatry consulted; U/S arterial demonstrated L SFA occlusion with distal reconstitution. Cardiology consulted, angiography with no vessels amenable for angioplasty on 9/20.  Underwent exploration of the left lower extremity vessels, femoral and popliteal bypass unable to be performed due to no adequate available vessels for bypass on 9/22.      Interval History: No acute events overnight. Pain mildly improved from prior. Discussed plan of care with patient/family at bedside. Patient deciding on pursuing surgery here vs. back in [place]. No new concerns at this time.    Review of Systems   Constitutional:  Negative for chills and fever.   Respiratory:  Negative for cough and shortness of breath.    Cardiovascular:  Negative for chest pain and palpitations.   Gastrointestinal:  Negative for abdominal pain, nausea and vomiting.     Objective:     Vital Signs (Most Recent):  Temp: 99 °F (37.2 °C) (09/26/23 1548)  Pulse: 79 (09/26/23 1548)  Resp: 18 (09/26/23 1548)  BP: (!) 184/79 (09/26/23 1548)  SpO2: 96 % (09/26/23 1548) Vital Signs (24h Range):  Temp:  [98 °F (36.7 °C)-99.2 °F (37.3 °C)] 99 °F (37.2 °C)  Pulse:  [70-79] 79  Resp:  [16-18] 18  SpO2:  [95 %-98 %] 96 %  BP: (157-184)/(70-79) 184/79     Weight: 63.5 kg (139 lb 15.9 oz)  Body mass index is 23.3 kg/m².    Intake/Output Summary (Last 24 hours) at 9/26/2023 1618  Last data filed at 9/26/2023 1415  Gross per 24 hour   Intake 675 ml   Output --   Net 675 ml         Physical Exam  Vitals and nursing note reviewed.   Constitutional:       General: She is not in  acute distress.     Appearance: She is well-developed.   HENT:      Head: Normocephalic and atraumatic.   Eyes:      General:         Right eye: No discharge.         Left eye: No discharge.      Conjunctiva/sclera: Conjunctivae normal.   Cardiovascular:      Rate and Rhythm: Normal rate.      Pulses: Normal pulses.   Pulmonary:      Effort: Pulmonary effort is normal. No respiratory distress.   Abdominal:      Palpations: Abdomen is soft.      Tenderness: There is no abdominal tenderness.   Musculoskeletal:         General: Normal range of motion.      Right lower leg: No edema.      Left lower leg: No edema.   Skin:     General: Skin is warm and dry.      Comments: Dry gangrenous changes of L 1st and 2nd toes.   Neurological:      Mental Status: She is alert and oriented to person, place, and time.       Significant Labs:   CBC:  Recent Labs   Lab 09/24/23  0402 09/25/23  0346 09/26/23  0359   WBC 10.90 13.81* 14.60*   HGB 7.6* 6.7* 8.9*   HCT 24.1* 21.4* 27.5*    222 214   GRAN 64.5  7.0 67.2  9.3* 74.0*   LYMPH 14.4*  1.6 11.0*  1.5 7.0*  CANCELED   MONO 15.9*  1.7* 14.8  2.0* 12.0  CANCELED   EOS 0.3 0.5 CANCELED   BASO 0.09 0.09 CANCELED   BMP:  Recent Labs   Lab 09/24/23  0402 09/25/23  0346 09/26/23  0359   * 131* 135*   K 4.5 4.0 3.5   CL 94* 90* 93*   CO2 23 22* 25   BUN 38* 48* 20   CREATININE 7.6* 8.3* 4.6*   GLU 95 203* 104   CALCIUM 8.5* 8.2* 8.7   MG 2.1 2.1 2.0   PHOS 5.8* 5.1* 3.5     Significant Imaging: I have reviewed and interpreted all pertinent imaging results/findings within the past 24 hours.      Assessment/Plan:      * Gangrene of toe of left foot  HTN, PVD  - L 1st toe gangrene predominantly dry with some surrounding erythema/swelling; with concern for worsening infection started pipeacillin-tazobactam and vancomycin IV. 2nd toe with some involvement as well. Continue broad spectrum antibiotic therapy for now. No evidence of sepsis at this time.  - U/S arterial BLE  "showed L distal SFA occlusion with distal reconstitution. Discussed with vascular surgery; cardiology consult for consideration of angiography / potential angioplasty.  - Podiatry consulted, appreciate assistance. MRI L foot with "examination markedly degraded by patient motion artifact. Questionable marrow edema within the 1st distal phalanx, not well evaluated given aforementioned limitation but possibly related to osteomyelitis given reported history of gangrene."  - Continue hydrocodone-acetaminophen 5-325mg PO q6hr PRN, add oxycodone-acetaminophen 7.5-325mg PO q6hr PRN. Continue hydromorphone 0.5mg IV q6hr PRN for breakthrough pain.  - Continue labetalol 300mg PO BID, losartan 50mg PO daily, nifedipine 30mg PO daily.  - Blood cultures showed no growth.  - s/p angiogram remarkable for severe PAD with no areas amenable to angioplasty on 9/20  - Vascular surgery, Dr. Cobb spoke with patient and family extensively and patient elected to undergo attempt at revascularization.  Vessel exploration done, but unable to perform femoral and popliteal bypass on 9/22. Discussed surgical options; limited, with BKA likely only viable for appropriate healing given extent of vascular disease.   - Discussing surgery here vs. [place].    Type 2 diabetes mellitus with chronic kidney disease on chronic dialysis  - Reports diet-controlled; no current medications.  - HgbA1c 5.7.  - Continue low-dose sliding scale insulin aspart 0-5U subQ TIDWM PRN, monitor requirements.    ESRD (end stage renal disease)  Anemia, hyperparathyroidism  - ESRD on HD, reports missed session Friday prior to presentation due to travel.  - Mild anemia without evidence of bleeding.  - Continue allopurinol 300mg PO daily, calcium acetate 2001mg PO TIDWM.  - Transfused 1U pRBCs 09/25 with appropriate response.  - Nephrology consulted for HD assistance.      VTE Risk Mitigation (From admission, onward)           Ordered     IP VTE HIGH RISK PATIENT  Once         " 09/17/23 1351                    Discharge Planning   LUBNA:      Code Status: Full Code   Is the patient medically ready for discharge?:     Reason for patient still in hospital (select all that apply): Treatment  Discharge Plan A: Home Health   Discharge Delays: None known at this time    TANESHA Cristina MD  Department of Hospital Medicine   [facility] ([address])

## 2023-09-26 NOTE — ASSESSMENT & PLAN NOTE
Anemia, hyperparathyroidism  - ESRD on HD, reports missed session Friday prior to presentation due to travel.  - Mild anemia without evidence of bleeding.  - Continue allopurinol 300mg PO daily, calcium acetate 2001mg PO TIDWM.  - Transfused 1U pRBCs 09/25 with appropriate response.  - Nephrology consulted for HD assistance.

## 2023-09-27 NOTE — ASSESSMENT & PLAN NOTE
"HTN, PVD  - L 1st toe gangrene predominantly dry with some surrounding erythema/swelling; with concern for worsening infection started pipeacillin-tazobactam and vancomycin IV. 2nd toe with some involvement as well. Continue broad spectrum antibiotic therapy for now. No evidence of sepsis at this time.  - U/S arterial BLE showed L distal SFA occlusion with distal reconstitution. Discussed with vascular surgery; cardiology consult for consideration of angiography / potential angioplasty.  - Podiatry consulted, appreciate assistance. MRI L foot with "examination markedly degraded by patient motion artifact. Questionable marrow edema within the 1st distal phalanx, not well evaluated given aforementioned limitation but possibly related to osteomyelitis given reported history of gangrene."  - Continue hydrocodone-acetaminophen 5-325mg PO q6hr PRN, add oxycodone-acetaminophen 7.5-325mg PO q6hr PRN. Continue hydromorphone 0.5mg IV q6hr PRN for breakthrough pain.  - Continue labetalol 300mg PO BID, losartan 50mg PO daily, nifedipine 30mg PO daily.  - Blood cultures showed no growth.  - s/p angiogram remarkable for severe PAD with no areas amenable to angioplasty on 9/20  - Vascular surgery, Dr. Cobb spoke with patient and family extensively and patient elected to undergo attempt at revascularization.  Vessel exploration done, but unable to perform femoral and popliteal bypass on 9/22. Discussed surgical options; limited, with BKA likely only viable for appropriate healing given extent of vascular disease.   - Discussing surgery here vs. [place]; leaning toward procedure here. Discussed with Dr. Cobb.   "

## 2023-09-27 NOTE — SUBJECTIVE & OBJECTIVE
Interval History: No acute events overnight. Discussed plan of care with patient and daughters (one via phone) at bedside. Patient preferring pursuing surgery in [place] vs return to [place]. Discussed logistics and what next steps would look like after procedure.    Review of Systems   Constitutional:  Negative for chills and fever.   Respiratory:  Negative for cough and shortness of breath.    Cardiovascular:  Negative for chest pain and palpitations.   Gastrointestinal:  Negative for abdominal pain, nausea and vomiting.     Objective:     Vital Signs (Most Recent):  Temp: 98.2 °F (36.8 °C) (09/27/23 1215)  Pulse: 77 (09/27/23 1215)  Resp: 14 (09/27/23 1410)  BP: (!) 186/82 (09/27/23 1215)  SpO2: 97 % (09/27/23 0837) Vital Signs (24h Range):  Temp:  [98.2 °F (36.8 °C)-99 °F (37.2 °C)] 98.2 °F (36.8 °C)  Pulse:  [71-80] 77  Resp:  [14-20] 14  SpO2:  [96 %-98 %] 97 %  BP: (160-194)/() 186/82     Weight: 61.2 kg (134 lb 14.7 oz)  Body mass index is 22.45 kg/m².    Intake/Output Summary (Last 24 hours) at 9/27/2023 1417  Last data filed at 9/27/2023 1215  Gross per 24 hour   Intake 700 ml   Output 2500 ml   Net -1800 ml           Physical Exam  Vitals and nursing note reviewed.   Constitutional:       General: She is not in acute distress.     Appearance: She is well-developed.   HENT:      Head: Normocephalic and atraumatic.   Eyes:      General:         Right eye: No discharge.         Left eye: No discharge.      Conjunctiva/sclera: Conjunctivae normal.   Cardiovascular:      Rate and Rhythm: Normal rate.      Pulses: Normal pulses.   Pulmonary:      Effort: Pulmonary effort is normal. No respiratory distress.   Abdominal:      Palpations: Abdomen is soft.      Tenderness: There is no abdominal tenderness.   Musculoskeletal:         General: Normal range of motion.      Right lower leg: No edema.      Left lower leg: No edema.   Skin:     General: Skin is warm and dry.      Comments: Dry gangrenous changes  of L 1st and 2nd toes.   Neurological:      Mental Status: She is alert and oriented to person, place, and time.       Significant Labs:   CBC:  Recent Labs   Lab 09/25/23 0346 09/26/23 0359 09/27/23 0359   WBC 13.81* 14.60* 15.51*   HGB 6.7* 8.9* 9.4*   HCT 21.4* 27.5* 29.4*    214 251   GRAN 67.2  9.3* 74.0* 70.3  10.9*   LYMPH 11.0*  1.5 7.0*  CANCELED 8.8*  1.4   MONO 14.8  2.0* 12.0  CANCELED 16.1*  2.5*   EOS 0.5 CANCELED 0.4   BASO 0.09 CANCELED 0.09     BMP:  Recent Labs   Lab 09/25/23 0346 09/26/23 0359 09/27/23 0359   * 135* 132*   K 4.0 3.5 3.7   CL 90* 93* 91*   CO2 22* 25 24   BUN 48* 20 32*   CREATININE 8.3* 4.6* 6.3*   * 104 76   CALCIUM 8.2* 8.7 9.2   MG 2.1 2.0 2.1   PHOS 5.1* 3.5 4.1       Significant Imaging: I have reviewed and interpreted all pertinent imaging results/findings within the past 24 hours.

## 2023-09-27 NOTE — PROGRESS NOTES
"Nephrology  Progress Note    Admit Date: 9/17/2023   LOS: 10 days     SUBJECTIVE:     Follow-up For:  Gangrene of toe of left foot    History of Present Illness:  Patient is a 67 y.o. female presents with left foot gangrene, vomiting, feeling ill.  Ext med hx as outlined below including ESRD on HD MWF in [place].  Has been dealing with necrotic left toes for few months and followed by vascular in [place].  Was here visiting daughter and felt bad.  Admitted for eval/treatment.  Consulted for HD needs.  Pt seen and examined on HD.  Consents signed.  Tired from being up all night with pain.  Updated team and daughter at bedside.  W/up noted.       Interval History:   Awaiting decision about BKA here or [place].  Seen on HD this am.  Discussed with team.         Review of Systems:  Constitutional: No fever or chills  Respiratory: No cough or shortness of breath  Cardiovascular: No chest pain or palpitations  Gastrointestinal: No nausea or vomiting  Neurological: No confusion or weakness    OBJECTIVE:     Vital Signs Range (Last 24H):  BP (!) 194/78   Pulse 74   Temp 98.7 °F (37.1 °C)   Resp 16   Ht 5' 5" (1.651 m)   Wt 61.2 kg (134 lb 14.7 oz)   SpO2 97%   BMI 22.45 kg/m²     Temp:  [98.2 °F (36.8 °C)-99 °F (37.2 °C)]   Pulse:  [73-79]   Resp:  [16-18]   BP: (160-194)/(67-79)   SpO2:  [95 %-98 %]     I & O (Last 24H):  Intake/Output Summary (Last 24 hours) at 9/27/2023 0922  Last data filed at 9/26/2023 1551  Gross per 24 hour   Intake 200 ml   Output --   Net 200 ml         Physical Exam:  General appearance:  NAD     Eyes:  Conjunctivae/corneas clear. PERRL.  Lungs: Normal respiratory effort,   clear to auscultation bilaterally   Heart: Regular rate and rhythm, S1, S2 normal, no murmur, rub or nelly.  Abdomen: Soft, non-tender non-distended; bowel sounds normal; no masses,  no organomegaly  Extremities: No cyanosis or clubbing. No edema.    Skin: Skin color, texture, turgor normal. No rashes or lesions  Neurologic: " "Normal strength and tone. No focal numbness or weakness   Right arm AVF+  Left big/second toes necrotic.  leg scar noted.     Laboratory Data:  Recent Labs   Lab 09/27/23  0359   WBC 15.51*   RBC 3.26*   HGB 9.4*   HCT 29.4*      MCV 90   MCH 28.8   MCHC 32.0         BMP:   Recent Labs   Lab 09/27/23  0359   GLU 76   *   K 3.7   CL 91*   CO2 24   BUN 32*   CREATININE 6.3*   CALCIUM 9.2   MG 2.1   PHOS 4.1       Lab Results   Component Value Date    CALCIUM 9.2 09/27/2023    PHOS 4.1 09/27/2023       Lab Results   Component Value Date    CALCIUM 9.2 09/27/2023    PHOS 4.1 09/27/2023       No results found for: "URICACID"    BNP  No results for input(s): "BNP", "BNPTRIAGEBLO" in the last 168 hours.    Medications:  Medication list was reviewed and changes noted under Assessment/Plan.    Diagnostic Results:    reviewed    ASSESSMENT/PLAN:       ESRD on HD MWF in [place]:  -consulted for HD needs.  -consents signed  -continue HD MWF while here and if she stays after hospitalization will need temp outpt unit.   -labs noted and bath adjusted.  -R arm AVF+  -9/19: HD yesterday w/o issue.  No need today.  -9/20:  HD today after angio  -9/21:  no need for HD today.  Cont MWF if stays here.    -9/22:  seen on HD this am.   -9/25:  HD with Blood today.    -9/26:  stable from renal to DC back to [place] w/ family.    -9/27:  seen on HD.  UF 2 L.  Renally dose meds, avoid nephrotoxins, and monitor I/O's closely.    Necrotic Left toes:  -defer to pod/vascular/cards.  -angio noted with severe PAD  -Status post exploration femoral and popliteal arteries 9/22 with Non-reconstructible peripheral vascular disease.  Patient deciding on amputation. I think patient should have this done in [place] where she lives. At this point she just needs pain control.  Needs to discuss with OWN Team in [place] who they prefer to use.        HTN:  -UF on HD  -increased ARB/CCB. Will follow  -UF on HD today.  Likely pain related.    "   MBD:  -binders/nephrocaps.   Changed to renvela.  Pt states that she also takes tums at home  -using calcitriol on HD days.     Anemia of CKD:  -at goal currently.   -trending lower. Due to surgery? PLT stable  -9/25:  blood today.   -9/27:  stable.       As above  Will follow for renal needs   Ok to DC to [place].

## 2023-09-27 NOTE — PT/OT/SLP PROGRESS
Physical Therapy      Patient Name:  Shannan Figueredo   MRN:  [id number]    Patient not seen today secondary to Dialysis. Will follow-up later today as scheduling permits.

## 2023-09-27 NOTE — PROGRESS NOTES
"[facility] ([address] Medicine  Progress Note    Patient Name: Shannan Figueredo  MRN: [id number]  Patient Class: IP- Inpatient   Admission Date: 9/17/2023  Length of Stay: 10 days  Attending Physician: JOSE Cristina MD  Primary Care Provider: Roxi, Primary Doctor        Subjective:     Principal Problem:Gangrene of toe of left foot        HPI:  Ms. Figueredo is a 67/F with PMH HTN, DMII (diet-controlled), PVD, ESRD on HD (M/W/F), anemia who presented to [facility] 09/17 with a two day progressive history of worsening L foot pain, swelling, fatigue, nausea and vomiting. History obtained from patient and daughter Stephanie ([phone]); they are from the [place], [place] area. Patient reports several weeks ago she "stubbed her toe," noting initially some pain, redness and swelling. Gradually symptoms progressed with duskiness to her toe and she informed her daughter of her pain for which she was brought to ER in [place] on 08/25 for further evaluation. With duskiness to 1st toe and surrounding erythema she was treated for cellulitis with a course of ciprofloxacin and metronidazole, but failed to improve. She followed up with her podiatrist 09/12 where she was prescribed hydrocodone-acetaminophen and a ten day course of TMP-SMX which she has been taking. She was referred to vascular surgery with plan for angiography and potential intervention  She and her family came to [place] for the weekend but her pain worsened; had been attempting to minimize use of hydrocodone-acetaminophen but developed nausea, vomiting and progressive fatigue. She subsequently presented to ED for further evaluation. ED workup was notable for L 1st toe dry-appearing gangrene with surrounding erythema and swelling, removed nail of second toe, XR L foot demonstrating diffuse osteopenia, L calcaneal and forefoot soft tissue swelling without definitive evidence of osteomyelitis in setting of osteopenia, no leukocytosis, elevated " sed rate and CRP, and elevated BUN/Cr in setting of ESRD. She notes she went without dialysis on 09/15 due to her trip. Blood cultures were ordered and she received piperacillin-tazobactam and vancomycin. Hospital medicine was subsequently contacted for admission.      Overview/Hospital Course:  Admitted with gangrene of L 1st toe.  Empirically treated with Zosyn and vancomycin.  Vascular Surgery, nephrology, podiatry consulted; U/S arterial demonstrated L SFA occlusion with distal reconstitution. Cardiology consulted, angiography with no vessels amenable for angioplasty on 9/20.  Underwent exploration of the left lower extremity vessels, femoral and popliteal bypass unable to be performed due to no adequate available vessels for bypass on 9/22.      Interval History: No acute events overnight. Discussed plan of care with patient and daughters (one via phone) at bedside. Patient preferring pursuing surgery in [place] vs return to [place]. Discussed logistics and what next steps would look like after procedure.    Review of Systems   Constitutional:  Negative for chills and fever.   Respiratory:  Negative for cough and shortness of breath.    Cardiovascular:  Negative for chest pain and palpitations.   Gastrointestinal:  Negative for abdominal pain, nausea and vomiting.     Objective:     Vital Signs (Most Recent):  Temp: 98.2 °F (36.8 °C) (09/27/23 1215)  Pulse: 77 (09/27/23 1215)  Resp: 14 (09/27/23 1410)  BP: (!) 186/82 (09/27/23 1215)  SpO2: 97 % (09/27/23 0837) Vital Signs (24h Range):  Temp:  [98.2 °F (36.8 °C)-99 °F (37.2 °C)] 98.2 °F (36.8 °C)  Pulse:  [71-80] 77  Resp:  [14-20] 14  SpO2:  [96 %-98 %] 97 %  BP: (160-194)/() 186/82     Weight: 61.2 kg (134 lb 14.7 oz)  Body mass index is 22.45 kg/m².    Intake/Output Summary (Last 24 hours) at 9/27/2023 1417  Last data filed at 9/27/2023 1215  Gross per 24 hour   Intake 700 ml   Output 2500 ml   Net -1800 ml           Physical Exam  Vitals and nursing  note reviewed.   Constitutional:       General: She is not in acute distress.     Appearance: She is well-developed.   HENT:      Head: Normocephalic and atraumatic.   Eyes:      General:         Right eye: No discharge.         Left eye: No discharge.      Conjunctiva/sclera: Conjunctivae normal.   Cardiovascular:      Rate and Rhythm: Normal rate.      Pulses: Normal pulses.   Pulmonary:      Effort: Pulmonary effort is normal. No respiratory distress.   Abdominal:      Palpations: Abdomen is soft.      Tenderness: There is no abdominal tenderness.   Musculoskeletal:         General: Normal range of motion.      Right lower leg: No edema.      Left lower leg: No edema.   Skin:     General: Skin is warm and dry.      Comments: Dry gangrenous changes of L 1st and 2nd toes.   Neurological:      Mental Status: She is alert and oriented to person, place, and time.       Significant Labs:   CBC:  Recent Labs   Lab 09/25/23  0346 09/26/23  0359 09/27/23  0359   WBC 13.81* 14.60* 15.51*   HGB 6.7* 8.9* 9.4*   HCT 21.4* 27.5* 29.4*    214 251   GRAN 67.2  9.3* 74.0* 70.3  10.9*   LYMPH 11.0*  1.5 7.0*  CANCELED 8.8*  1.4   MONO 14.8  2.0* 12.0  CANCELED 16.1*  2.5*   EOS 0.5 CANCELED 0.4   BASO 0.09 CANCELED 0.09     BMP:  Recent Labs   Lab 09/25/23  0346 09/26/23  0359 09/27/23  0359   * 135* 132*   K 4.0 3.5 3.7   CL 90* 93* 91*   CO2 22* 25 24   BUN 48* 20 32*   CREATININE 8.3* 4.6* 6.3*   * 104 76   CALCIUM 8.2* 8.7 9.2   MG 2.1 2.0 2.1   PHOS 5.1* 3.5 4.1       Significant Imaging: I have reviewed and interpreted all pertinent imaging results/findings within the past 24 hours.      Assessment/Plan:      * Gangrene of toe of left foot  HTN, PVD  - L 1st toe gangrene predominantly dry with some surrounding erythema/swelling; with concern for worsening infection started pipeacillin-tazobactam and vancomycin IV. 2nd toe with some involvement as well. Continue broad spectrum antibiotic therapy  "for now. No evidence of sepsis at this time.  - U/S arterial BLE showed L distal SFA occlusion with distal reconstitution. Discussed with vascular surgery; cardiology consult for consideration of angiography / potential angioplasty.  - Podiatry consulted, appreciate assistance. MRI L foot with "examination markedly degraded by patient motion artifact. Questionable marrow edema within the 1st distal phalanx, not well evaluated given aforementioned limitation but possibly related to osteomyelitis given reported history of gangrene."  - Continue hydrocodone-acetaminophen 5-325mg PO q6hr PRN, add oxycodone-acetaminophen 7.5-325mg PO q6hr PRN. Continue hydromorphone 0.5mg IV q6hr PRN for breakthrough pain.  - Continue labetalol 300mg PO BID, losartan 50mg PO daily, nifedipine 30mg PO daily.  - Blood cultures showed no growth.  - s/p angiogram remarkable for severe PAD with no areas amenable to angioplasty on 9/20  - Vascular surgery, Dr. Cobb spoke with patient and family extensively and patient elected to undergo attempt at revascularization.  Vessel exploration done, but unable to perform femoral and popliteal bypass on 9/22. Discussed surgical options; limited, with BKA likely only viable for appropriate healing given extent of vascular disease.   - Discussing surgery here vs. [place]; leaning toward procedure here. Discussed with Dr. Cobb.     Type 2 diabetes mellitus with chronic kidney disease on chronic dialysis  - Reports diet-controlled; no current medications.  - HgbA1c 5.7.  - Continue low-dose sliding scale insulin aspart 0-5U subQ TIDWM PRN, monitor requirements.    ESRD (end stage renal disease)  Anemia, hyperparathyroidism  - ESRD on HD, reports missed session Friday prior to presentation due to travel.  - Mild anemia without evidence of bleeding.  - Continue allopurinol 300mg PO daily, calcium acetate 2001mg PO TIDWM.  - Transfused 1U pRBCs 09/25 with appropriate response.  - Nephrology consulted for HD " assistance.    VTE Risk Mitigation (From admission, onward)         Ordered     IP VTE HIGH RISK PATIENT  Once         09/17/23 1351                Discharge Planning   LUBNA:      Code Status: Full Code   Is the patient medically ready for discharge?:     Reason for patient still in hospital (select all that apply): Treatment  Discharge Plan A: Home Health   Discharge Delays: None known at this time              TANESHA Cristina MD  Department of Hospital Medicine   Scientologist - [facility] Surg ([address])

## 2023-09-27 NOTE — PT/OT/SLP PROGRESS
Physical Therapy      Patient Name:  Shannan Figueredo   MRN:  [id number]    Patient not seen today secondary to weakness and fatigue post-dialysis, and pain. Will follow up next treatment day.

## 2023-09-27 NOTE — PLAN OF CARE
Resting comfortably in bed at this time.  VSS on RA and afebrile this shift.  Tolerating pain on PO.   Without  appetite and anuric this shift, iHD MWF.  Repositions self independently & Dependently in bed Not participated with PT OT.       LLE- Painted c betadine and ROGER.   -DP -palpable pulse and Cold to touch with rest pain..     BKA - to be decided in AM CARLOS A vs Evin Cobb at bedside x2 this shift rt dc planning.    Free from injury or skin breakdown; Fall precautions maintained and call light in reach.  POC updated questions answered and comments acknowledged.  Purposeful hourly rounding completed this shift.

## 2023-09-27 NOTE — PROGRESS NOTES
[facility] Surg ([address])  General Surgery  Progress Note    Subjective:     Interval History:  59-year-old female with end-stage renal disease on dialysis admitted on 09/17/2023 with rest pain left foot and dry gangrene of the 1st and 2nd toe.  Patient underwent aortogram with runoff which showed severe infra inguinal disease not amenable to percutaneous intervention.  Patient has subsequently undergone exploration of the popliteal and femoral arteries of the left lower extremity.  Patient had severe disease of the popliteal and proximal tibial vessels such that bypass or endarterectomy was not possible.  Patient remained afebrile with stable vital signs.  Her surgical incisions are clean and dry.  Patient has rest pain of the left foot and dry gangrene on physical exam today.  For dialysis today.  Surgical incisions are clean, dry, intact.  Patient and family are aware that digital or forefoot amputation would not heal due to severity of peripheral vascular disease.  Patient and family appear to understand that below-knee amputation would be required for healing.  Patient and family offered BKA here at [facility].  At this point the patient wishes to return home to [place] for definitive treatment of the extremity.  Logistics are being worked out by case management and primary care.  I have left a message with the patient's primary care provider Dr. Chandrakant Carr in [place], [place] ([phone]) in an effort to ensure proper surgical follow-up.    Post-Op Info:  Procedure(s):  CREATION, BYPASS, ARTERIAL, FEMORAL TO POPLITEAL, USING GRAFT   5 Days Post-Op      Medications:  Continuous Infusions:  Scheduled Meds:   sodium chloride 0.9%   Intravenous Once    aspirin  81 mg Oral Daily    calcitRIOL  0.25 mcg Oral Every Mon, Wed, Fri    carvediloL  3.125 mg Oral BID    ciprofloxacin HCl  500 mg Oral Daily    clopidogreL  75 mg Oral Daily    doxycycline  100 mg Oral Q12H    LIDOcaine  1 patch Transdermal Q24H     losartan  100 mg Oral Daily    NIFEdipine  60 mg Oral Daily    sevelamer carbonate  1,600 mg Oral TID WM    vitamin renal formula (B-complex-vitamin c-folic acid)  1 capsule Oral Daily     PRN Meds:acetaminophen, dextrose 10%, dextrose 10%, diphenhydrAMINE, glucagon (human recombinant), glucose, glucose, hydrALAZINE, HYDROcodone-acetaminophen, HYDROmorphone, insulin aspart U-100, loperamide, melatonin, ondansetron, ondansetron, oxyCODONE-acetaminophen, simethicone, sodium chloride 0.9%, sodium chloride 0.9%     Objective:     Vital Signs (Most Recent):  Temp: 98.7 °F (37.1 °C) (09/27/23 0742)  Pulse: 74 (09/27/23 0742)  Resp: 20 (09/27/23 1105)  BP: (!) 194/78 (09/27/23 0742)  SpO2: 97 % (09/27/23 0837) Vital Signs (24h Range):  Temp:  [98.3 °F (36.8 °C)-99 °F (37.2 °C)] 98.7 °F (37.1 °C)  Pulse:  [73-79] 74  Resp:  [16-20] 20  SpO2:  [96 %-98 %] 97 %  BP: (160-194)/(67-79) 194/78       Intake/Output Summary (Last 24 hours) at 9/27/2023 1314  Last data filed at 9/26/2023 1551  Gross per 24 hour   Intake 200 ml   Output --   Net 200 ml         Physical Exam  Afebrile   Vital signs stable  Patient awake, alert, oriented  Left lower extremity-femoral and popliteal wounds clean, dry, intact.  Left lower extremity warm to level of the ankle.  Dry gangrene 1st and 2nd toes.    Significant Labs:  CBC:   Recent Labs   Lab 09/27/23  0359   WBC 15.51*   RBC 3.26*   HGB 9.4*   HCT 29.4*      MCV 90   MCH 28.8   MCHC 32.0         Significant Diagnostics:      Assessment/Plan:     Non reconstructible peripheral vascular disease with dry gangrene of the left lower extremity.  Surgically stable.  Await final disposition.  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

## 2023-09-27 NOTE — PLAN OF CARE
Tolerating pain on POx2 and IVx1.   Without  appetite and anuric this shift, iHD MWF.  Repositions self independently & Dependently in bed Not participated with PT OT.        LLE- Painted c betadine and ROGER.   -DP - s palpable pulse and Cold to touch with rest pain..      BKA - remains to be decided  [facility] vs [facility]  .     Free from injury or skin breakdown; Fall precautions maintained and call light in reach.  POC updated questions answered and comments acknowledged.  Purposeful hourly rounding completed this shift.

## 2023-09-27 NOTE — PT/OT/SLP PROGRESS
Occupational Therapy      Patient Name:  Shannan Figueredo   MRN:  [id number]    Patient not seen today secondary to Dialysis. Will follow-up later today is schedule permits.    9/27/2023

## 2023-09-27 NOTE — PROGRESS NOTES
Pharmacist Renal Dose Adjustment Note    Shannan Figueredo is a 67 y.o. female being treated with the medication cipro    Patient Data:    Vital Signs (Most Recent):  Temp: 98.7 °F (37.1 °C) (09/27/23 0742)  Pulse: 74 (09/27/23 0742)  Resp: 20 (09/27/23 1105)  BP: (!) 194/78 (09/27/23 0742)  SpO2: 97 % (09/27/23 0837) Vital Signs (72h Range):  Temp:  [97.8 °F (36.6 °C)-99.2 °F (37.3 °C)]   Pulse:  [63-79]   Resp:  [14-20]   BP: ()/(40-79)   SpO2:  [93 %-98 %]      Recent Labs   Lab 09/25/23  0346 09/26/23  0359 09/27/23  0359   CREATININE 8.3* 4.6* 6.3*     Serum creatinine: 6.3 mg/dL (H) 09/27/23 0359  Estimated creatinine clearance: 7.7 mL/min (A)    Medication: Cipro 500mg po BID will be changed to medication:500mg po daily     Pharmacist's Name: Stevo Lawrence  Pharmacist's Extension: [phone]

## 2023-09-28 NOTE — NURSING
BP at 0446 178/75. prn IV hydralazine given however the IV was difficult to flush. rechecked BP 45 mins later and /86. I mentioned putting in a new IV as unsure if IV was patent and daughter states this one was put in yesterday w ultrasound as pt is a very hard stick. daughter wondering if she can take a pill instead. Notified JUAN Lewis NP. Stated to give daily a.m. meds at this time

## 2023-09-28 NOTE — PLAN OF CARE
Patient to have BKA in am. Discharge disposition pending therapy recs post discharge. CM provided daughter at bedside with list of SNF and IPR for reference.   09/28/23 1331   Discharge Reassessment   Assessment Type Discharge Planning Reassessment   Did the patient's condition or plan change since previous assessment? No   Discharge Plan discussed with: Patient;Adult children   Discharge Plan A Skilled Nursing Facility   Discharge Plan B Rehab   DME Needed Upon Discharge  wheelchair;walker, rolling;bedside commode   Why the patient remains in the hospital Requires continued medical care   Post-Acute Status   Discharge Delays (!) Procedure Scheduling (IR, OR, Labs, Echo, Cath, Echo, EEG)

## 2023-09-28 NOTE — PLAN OF CARE
Without  appetite and anuric this shift, HD MWF.  Repositions self independently & dependently in bed    Participated with PT - NOT cleared for nursing transfers        LLE- Painted c betadine and ROGER.   -DP - s palpable pulse and Cold to touch with rest pain, s change over x3 shifts           BKA - NPO ordered for p 0001 tonight    Free from injury or skin breakdown; Fall precautions maintained and call light in reach.  POC updated questions answered and comments acknowledged.  Purposeful hourly rounding completed this shift.

## 2023-09-28 NOTE — PROGRESS NOTES
"Nephrology  Progress Note    Admit Date: 9/17/2023   LOS: 11 days     SUBJECTIVE:     Follow-up For:  Gangrene of toe of left foot    History of Present Illness:  Patient is a 67 y.o. female presents with left foot gangrene, vomiting, feeling ill.  Ext med hx as outlined below including ESRD on HD MWF in [place].  Has been dealing with necrotic left toes for few months and followed by vascular in [place].  Was here visiting daughter and felt bad.  Admitted for eval/treatment.  Consulted for HD needs.  Pt seen and examined on HD.  Consents signed.  Tired from being up all night with pain.  Updated team and daughter at bedside.  W/up noted.       Interval History:   Still back and forth with BKA decisions.  Discussed with family/team.  BP noted.  See below.          Review of Systems:  Constitutional: No fever or chills  Respiratory: No cough or shortness of breath  Cardiovascular: No chest pain or palpitations  Gastrointestinal: No nausea or vomiting  Neurological: No confusion or weakness    OBJECTIVE:     Vital Signs Range (Last 24H):  BP (!) 191/76   Pulse 78   Temp 97.8 °F (36.6 °C) (Oral)   Resp 18   Ht 5' 5" (1.651 m)   Wt 61.2 kg (134 lb 14.7 oz)   SpO2 98%   BMI 22.45 kg/m²     Temp:  [97.8 °F (36.6 °C)-99 °F (37.2 °C)]   Pulse:  [73-84]   Resp:  [14-20]   BP: (143-199)/()   SpO2:  [95 %-98 %]     I & O (Last 24H):  Intake/Output Summary (Last 24 hours) at 9/28/2023 0921  Last data filed at 9/28/2023 0643  Gross per 24 hour   Intake 740 ml   Output 2500 ml   Net -1760 ml         Physical Exam:  General appearance:  Frail.  Appears older than stated age.    Eyes:  Conjunctivae/corneas clear. PERRL.  Lungs: Normal respiratory effort,   clear to auscultation bilaterally   Heart: Regular rate and rhythm, S1, S2 normal, no murmur, rub or nelly.  Abdomen: Soft, non-tender non-distended; bowel sounds normal; no masses,  no organomegaly  Extremities: No cyanosis or clubbing. No edema.    Skin: Skin color, " "texture, turgor normal. No rashes or lesions  Neurologic: Normal strength and tone. No focal numbness or weakness   Right arm AVF+  Left big/second toes necrotic.  leg scar noted.     Laboratory Data:  Recent Labs   Lab 09/28/23  0508   WBC 17.79*   RBC 3.29*   HGB 9.5*   HCT 30.1*      MCV 92   MCH 28.9   MCHC 31.6*         BMP:   Recent Labs   Lab 09/28/23  0508   GLU 73   *   K 3.4*   CL 93*   CO2 25   BUN 22   CREATININE 4.5*   CALCIUM 9.3   MG 2.0   PHOS 3.6       Lab Results   Component Value Date    CALCIUM 9.3 09/28/2023    PHOS 3.6 09/28/2023       Lab Results   Component Value Date    CALCIUM 9.3 09/28/2023    PHOS 3.6 09/28/2023       No results found for: "URICACID"    BNP  No results for input(s): "BNP", "BNPTRIAGEBLO" in the last 168 hours.    Medications:  Medication list was reviewed and changes noted under Assessment/Plan.    Diagnostic Results:    reviewed    ASSESSMENT/PLAN:       ESRD on HD MWF in [place]:  -consulted for HD needs.  -consents signed  -continue HD MWF while here and if she stays after hospitalization will need temp outpt unit.   -labs noted and bath adjusted.  -R arm AVF+  -9/19: HD yesterday w/o issue.  No need today.  -9/20:  HD today after angio  -9/21:  no need for HD today.  Cont MWF if stays here.    -9/22:  seen on HD this am.   -9/25:  HD with Blood today.    -9/26:  stable from renal to DC back to [place] w/ family.    -9/27:  seen on HD.  UF 2 L.    -9/28:  nothing today. Renally dose meds, avoid nephrotoxins, and monitor I/O's closely.    Necrotic Left toes:  -defer to pod/vascular/cards.  -angio noted with severe PAD  -Status post exploration femoral and popliteal arteries 9/22 with Non-reconstructible peripheral vascular disease.  Patient deciding on amputation. I think patient should have this done in [place] where she lives. At this point she just needs pain control.  Needs to discuss with OWN Team in [place] who they prefer to use.  -need answer about location " of surgery.  If here will need ext rehab/HD setup.  Risks of delay explained.         HTN:  -UF on HD  -increased ARB/CCB. Will follow  -UF on HD today.  Likely pain related.   -9/28:  adjust meds.      MBD:  -binders/nephrocaps.   Changed to renvela.  Pt states that she also takes tums at home  -using calcitriol on HD days.     Anemia of CKD:  -at goal currently.   -trending lower. Due to surgery? PLT stable  -9/25:  blood today.   -9/27:  stable.       As above  Will follow for renal needs   Ok to DC to [place] or OR tomorrow here......

## 2023-09-28 NOTE — SUBJECTIVE & OBJECTIVE
Interval History: No acute events overnight. Discussed plan of care with patient and daughter at bedside. Opting for surgical intervention in [place]. Slept deeply overnight but showing some signs of hospital-acquired delirium.    Review of Systems   Constitutional:  Negative for chills and fever.   Respiratory:  Negative for cough and shortness of breath.    Cardiovascular:  Negative for chest pain and palpitations.   Gastrointestinal:  Negative for abdominal pain, nausea and vomiting.     Objective:     Vital Signs (Most Recent):  Temp: 98.8 °F (37.1 °C) (09/28/23 1625)  Pulse: 68 (09/28/23 1625)  Resp: 16 (09/28/23 1625)  BP: 136/63 (09/28/23 1625)  SpO2: 98 % (09/28/23 1625) Vital Signs (24h Range):  Temp:  [97.8 °F (36.6 °C)-99 °F (37.2 °C)] 98.8 °F (37.1 °C)  Pulse:  [68-84] 68  Resp:  [14-18] 16  SpO2:  [95 %-98 %] 98 %  BP: (136-199)/(63-86) 136/63     Weight: 61.2 kg (134 lb 14.7 oz)  Body mass index is 22.45 kg/m².    Intake/Output Summary (Last 24 hours) at 9/28/2023 1900  Last data filed at 9/28/2023 0643  Gross per 24 hour   Intake 240 ml   Output 0 ml   Net 240 ml           Physical Exam  Vitals and nursing note reviewed.   Constitutional:       General: She is sleeping. She is not in acute distress.     Appearance: She is well-developed.   HENT:      Head: Normocephalic and atraumatic.   Eyes:      General:         Right eye: No discharge.         Left eye: No discharge.      Conjunctiva/sclera: Conjunctivae normal.   Cardiovascular:      Rate and Rhythm: Normal rate.      Pulses: Normal pulses.   Pulmonary:      Effort: Pulmonary effort is normal. No respiratory distress.   Abdominal:      Palpations: Abdomen is soft.      Tenderness: There is no abdominal tenderness.   Musculoskeletal:         General: Normal range of motion.      Right lower leg: No edema.      Left lower leg: No edema.   Skin:     General: Skin is warm and dry.      Comments: Dry gangrenous changes of L 1st and 2nd toes.    Neurological:      Mental Status: She is easily aroused. Mental status is at baseline.       Significant Labs:   CBC:  Recent Labs   Lab 09/26/23  0359 09/27/23 0359 09/28/23  0508   WBC 14.60* 15.51* 17.79*   HGB 8.9* 9.4* 9.5*   HCT 27.5* 29.4* 30.1*    251 235   GRAN 74.0* 70.3  10.9* 74.4*  13.2*   LYMPH 7.0*  CANCELED 8.8*  1.4 7.3*  1.3   MONO 12.0  CANCELED 16.1*  2.5* 15.0  2.7*   EOS CANCELED 0.4 0.3   BASO CANCELED 0.09 0.10     BMP:  Recent Labs   Lab 09/26/23 0359 09/27/23  0359 09/28/23  0508   * 132* 134*   K 3.5 3.7 3.4*   CL 93* 91* 93*   CO2 25 24 25   BUN 20 32* 22   CREATININE 4.6* 6.3* 4.5*    76 73   CALCIUM 8.7 9.2 9.3   MG 2.0 2.1 2.0   PHOS 3.5 4.1 3.6       Significant Imaging: I have reviewed and interpreted all pertinent imaging results/findings within the past 24 hours.

## 2023-09-28 NOTE — PT/OT/SLP PROGRESS
Physical Therapy Treatment    Patient Name:  Shannan Figueredo   MRN:  [id number]    Recommendations:     Discharge Recommendations: nursing facility, skilled  Discharge Equipment Recommendations: bedside commode, wheelchair  Barriers to discharge: Inaccessible home and Decreased caregiver support    Assessment:     Shannan Figueredo is a 67 y.o. female admitted with a medical diagnosis of Gangrene of toe of left foot.  She presents with the following impairments/functional limitations: weakness, gait instability, pain, orthopedic precautions, impaired balance, impaired cognition, impaired endurance, impaired functional mobility, impaired joint extensibility, impaired self care skills, impaired sensation, impaired skin, decreased coordination, decreased lower extremity function, decreased ROM, decreased safety awareness, decreased upper extremity function.    Supine>sit with modA  Bed>Chair with no AD and maxA, squat/pivot  Pt initially with BP of 180/78, so therapy deferred while BSC was brought to pt's room; later BP of 162/71 and transfer to chair performed  Pt very lethargic; awaiting surgery tomorrow  Rec SNF    Rehab Prognosis: Fair; patient would benefit from acute skilled PT services to address these deficits and reach maximum level of function.    Recent Surgery: Procedure(s):  CREATION, BYPASS, ARTERIAL, FEMORAL TO POPLITEAL, USING GRAFT 6 Days Post-Op    Plan:     During this hospitalization, patient to be seen 5 x/week to address the identified rehab impairments via gait training, therapeutic activities, therapeutic exercises, neuromuscular re-education and progress toward the following goals:    Plan of Care Expires:  10/24/23    Subjective     Chief Complaint: pt lethargic, mostly wanting to sleep  Patient/Family Comments/goals: daughter encouraging pt to participate  Pain/Comfort:  Pain Rating 1: other (see comments) (pain in L LE, not rated)  Location - Side 1: Left  Location - Orientation 1:  generalized  Location 1: leg  Pain Addressed 1: Reposition, Distraction, Cessation of Activity  Pain Rating Post-Intervention 1: other (see comments) (not rated)      Objective:     Communicated with nurse Lambert prior to session.  Patient found HOB elevated with telemetry, peripheral IV, bed alarm upon PT entry to room.     General Precautions: Standard, diabetic, fall  Orthopedic Precautions: Full weight bearing (Darco sandal for L foot)  Braces: N/A  Respiratory Status: Room air     Functional Mobility:  Bed Mobility:     Supine to Sit: moderate assistance  Transfers:     Bed to Chair: maximal assistance with  no AD  using  Squat Pivot      AM-PAC 6 CLICK MOBILITY  Turning over in bed (including adjusting bedclothes, sheets and blankets)?: 3  Sitting down on and standing up from a chair with arms (e.g., wheelchair, bedside commode, etc.): 2  Moving from lying on back to sitting on the side of the bed?: 3  Moving to and from a bed to a chair (including a wheelchair)?: 2  Need to walk in hospital room?: 1  Climbing 3-5 steps with a railing?: 1  Basic Mobility Total Score: 12       Treatment & Education:  Role of PT and importance of OOB discussed with daughter and with pt    Patient left up in chair with all lines intact, call button in reach, nurse Lambert notified, and daughter present..    GOALS:   Multidisciplinary Problems       Physical Therapy Goals          Problem: Physical Therapy    Goal Priority Disciplines Outcome Goal Variances Interventions   Physical Therapy Goal     PT, PT/OT Ongoing, Progressing     Description: Goals to be met by: 10/24/23    Patient will perform the following to increase strength, improve mobility, and return to prior level of function:    1. Supine <> sit with independence.  2. Sit<>stand with SBA with least restrictive assistive device.  3. Gait x 50 feet with CGA with least restrictive assistive device.  4. Ascend/descend 12 step(s) with bilateral handrails and Min assist                             Time Tracking:     PT Received On: 09/28/23  PT Start Time: 1004     PT Stop Time: 1221  PT Total Time (min): 137 min     Billable Minutes: Therapeutic Activity 28  Session divided in two: 10:04-10:17 (13 mins) + 12:06-12:21 (15 mins) = 28 mins total treatment time    Treatment Type: Treatment  PT/PTA: PTA     Number of PTA visits since last PT visit: 2     09/28/2023

## 2023-09-28 NOTE — PT/OT/SLP PROGRESS
Physical Therapy Treatment    Patient Name:  Shannan Figueredo   MRN:  [id number]    Recommendations:     Discharge Recommendations: nursing facility, skilled  Discharge Equipment Recommendations: bedside commode, wheelchair  Barriers to discharge: Inaccessible home and Decreased caregiver support    Assessment:     Shannan Figueredo is a 67 y.o. female admitted with a medical diagnosis of Gangrene of toe of left foot.  She presents with the following impairments/functional limitations: weakness, gait instability, pain, orthopedic precautions, impaired balance, impaired cognition, impaired endurance, impaired functional mobility, impaired joint extensibility, impaired self care skills, impaired sensation, impaired skin, decreased coordination, decreased lower extremity function, decreased ROM, decreased safety awareness, decreased upper extremity function.    Chair>Bed with maxA using squat/pivot  Sit>supine with maxA x 2  Scoot toward HOB with totalA x 2  Roll L and R with modA  Pt in chair about 80 mins, mostly sleeping  Rec SNF    Rehab Prognosis: Fair; patient would benefit from acute skilled PT services to address these deficits and reach maximum level of function.    Recent Surgery: Procedure(s):  CREATION, BYPASS, ARTERIAL, FEMORAL TO POPLITEAL, USING GRAFT 6 Days Post-Op    Plan:     During this hospitalization, patient to be seen 5 x/week to address the identified rehab impairments via gait training, therapeutic activities, therapeutic exercises, neuromuscular re-education and progress toward the following goals:    Plan of Care Expires:  10/24/23    Subjective     Chief Complaint: tired  Patient/Family Comments/goals: pt denied need for BSC, just wanted to get back in bed  Pain/Comfort:  Pain Rating 1: other (see comments) (not rated)  Location - Side 1: Left  Location - Orientation 1: generalized  Location 1: leg  Pain Addressed 1: Reposition, Distraction, Cessation of Activity  Pain Rating Post-Intervention  1: other (see comments) (not rated)      Objective:     Communicated with nurse Corley prior to session.  Patient found up in chair with telemetry, peripheral IV upon PT entry to room.     General Precautions: Standard, diabetic, fall  Orthopedic Precautions: Full weight bearing (Darco sandal for L foot)  Braces: N/A  Respiratory Status: Room air     Functional Mobility:  Bed Mobility:     Rolling Left:  moderate assistance  Rolling Right: moderate assistance  Scooting: total assistance and of 2 persons  Sit to Supine: maximal assistance and of 2 persons  Transfers:     Chair to Bed: maximal assistance with  no AD  using  Squat Pivot      AM-PAC 6 CLICK MOBILITY  Turning over in bed (including adjusting bedclothes, sheets and blankets)?: 3  Sitting down on and standing up from a chair with arms (e.g., wheelchair, bedside commode, etc.): 2  Moving from lying on back to sitting on the side of the bed?: 3  Moving to and from a bed to a chair (including a wheelchair)?: 2  Need to walk in hospital room?: 1  Climbing 3-5 steps with a railing?: 1  Basic Mobility Total Score: 12       Treatment & Education:  Transfer as noted    Patient left HOB elevated with all lines intact, call button in reach, bed alarm on, nurse Corley notified, and daughter present..    GOALS:   Multidisciplinary Problems       Physical Therapy Goals          Problem: Physical Therapy    Goal Priority Disciplines Outcome Goal Variances Interventions   Physical Therapy Goal     PT, PT/OT Ongoing, Progressing     Description: Goals to be met by: 10/24/23    Patient will perform the following to increase strength, improve mobility, and return to prior level of function:    1. Supine <> sit with independence.  2. Sit<>stand with SBA with least restrictive assistive device.  3. Gait x 50 feet with CGA with least restrictive assistive device.  4. Ascend/descend 12 step(s) with bilateral handrails and Min assist                            Time Tracking:      PT Received On: 09/28/23  PT Start Time: 1338     PT Stop Time: 1346  PT Total Time (min): 8 min     Billable Minutes: Therapeutic Activity 8    Treatment Type: Treatment  PT/PTA: PTA     Number of PTA visits since last PT visit: 2     09/28/2023

## 2023-09-29 NOTE — ANESTHESIA PREPROCEDURE EVALUATION
09/29/2023  Shannan Figueredo is a 67 y.o., female.      Pre-op Assessment    I have reviewed the Patient Summary Reports.     I have reviewed the Nursing Notes. I have reviewed the NPO Status.   I have reviewed the Medications.     Review of Systems  Anesthesia Hx:  No problems with previous Anesthesia  Denies Family Hx of Anesthesia complications.   Denies Personal Hx of Anesthesia complications.   Hematology/Oncology:  Hematology Normal   Oncology Normal     EENT/Dental:EENT/Dental Normal   Cardiovascular:  Cardiovascular Normal     Pulmonary:  Pulmonary Normal    Renal/:  Renal/ Normal Chronic Renal Disease, ESRD     Hepatic/GI:  Hepatic/GI Normal    Musculoskeletal:  Musculoskeletal Normal    Neurological:  Neurology Normal    Endocrine:  Endocrine Normal    Dermatological:  Skin Normal    Psych:  Psychiatric Normal           Physical Exam  General: Well nourished and Alert    Airway:  Mallampati: II   Mouth Opening: Normal  Tongue: Normal    Dental:  Intact        Anesthesia Plan  Type of Anesthesia, risks & benefits discussed:    Anesthesia Type: Gen ETT  Intra-op Monitoring Plan: Standard ASA Monitors  Post Op Pain Control Plan: multimodal analgesia and peripheral nerve block  Induction:  IV  Airway Plan: Video  Informed Consent: Informed consent signed with the Patient and all parties understand the risks and agree with anesthesia plan.  All questions answered.   ASA Score: 3  Anesthesia Plan Notes: Blocks discussed  Recent unsuccessful fem-pop  Was dialyzed this morning    Ready For Surgery From Anesthesia Perspective.     .

## 2023-09-29 NOTE — ANESTHESIA PROCEDURE NOTES
Intubation    Date/Time: 9/29/2023 12:10 PM    Performed by: Indiana Antunez  Authorized by: Angus Martines MD    Intubation:     Induction:  Intravenous    Intubated:  Postinduction    Mask Ventilation:  Easy mask    Attempts:  1    Attempted By:  CRNA    Method of Intubation:  Video laryngoscopy    Blade:  [place] 3    Laryngeal View Grade: Grade I - full view of cords      Difficult Airway Encountered?: No      Complications:  None    Airway Device:  Oral endotracheal tube    Airway Device Size:  7.0    Style/Cuff Inflation:  Cuffed (inflated to minimal occlusive pressure)    Tube secured:  20    Secured at:  The lips    Placement Verified By:  Capnometry    Complicating Factors:  None    Findings Post-Intubation:  BS equal bilateral and atraumatic/condition of teeth unchanged

## 2023-09-29 NOTE — ASSESSMENT & PLAN NOTE
"HTN, PVD  - L 1st toe gangrene predominantly dry with some surrounding erythema/swelling; with concern for worsening infection started pipeacillin-tazobactam and vancomycin IV. 2nd toe with some involvement as well. Continue broad spectrum antibiotic therapy for now. No evidence of sepsis at this time.  - U/S arterial BLE showed L distal SFA occlusion with distal reconstitution. Discussed with vascular surgery; cardiology consult for consideration of angiography / potential angioplasty.  - Podiatry consulted, appreciate assistance. MRI L foot with "examination markedly degraded by patient motion artifact. Questionable marrow edema within the 1st distal phalanx, not well evaluated given aforementioned limitation but possibly related to osteomyelitis given reported history of gangrene."  - Continue hydrocodone-acetaminophen 5-325mg PO q6hr PRN, add oxycodone-acetaminophen 7.5-325mg PO q6hr PRN. Continue hydromorphone 0.5mg IV q6hr PRN for breakthrough pain.  - Continue labetalol 300mg PO BID, losartan 50mg PO daily, nifedipine 30mg PO daily.  - Blood cultures showed no growth.  - s/p angiogram remarkable for severe PAD with no areas amenable to angioplasty on 9/20  - Vascular surgery, Dr. Cobb spoke with patient and family extensively and patient elected to undergo attempt at revascularization.  Vessel exploration done, but unable to perform femoral and popliteal bypass on 9/22. Discussed surgical options; limited, with BKA likely only viable for appropriate healing given extent of vascular disease.   - To OR today.  "

## 2023-09-29 NOTE — ANESTHESIA PROCEDURE NOTES
Left adductor canal    Patient location during procedure: holding area   Block not for primary anesthetic.  Reason for block: at surgeon's request and post-op pain management   Post-op Pain Location: Left leg   Timeout: 9/29/2023 11:40 AM   End time: 9/29/2023 11:45 AM    Staffing  Authorizing Provider: Angus Martines MD  Performing Provider: Angus Martines MD    Staffing  Performed by: Angus Martines MD  Authorized by: Angus Martines MD    Preanesthetic Checklist  Completed: patient identified, IV checked, site marked, risks and benefits discussed, surgical consent, monitors and equipment checked, pre-op evaluation and timeout performed  Peripheral Block  Patient position: supine  Prep: ChloraPrep and site prepped and draped  Patient monitoring: heart rate and continuous pulse ox  Block type: adductor canal  Laterality: left  Injection technique: single shot  Needle  Needle type: Echogenic   Needle gauge: 20 G  Needle length: 4 in  Needle localization: anatomical landmarks and ultrasound guidance   -ultrasound image captured on disc.  Assessment  Injection assessment: negative aspiration, negative parasthesia and local visualized surrounding nerve  Paresthesia pain: none  Heart rate change: no  Slow fractionated injection: yes  Pain Tolerance: comfortable throughout block and no complaints  Medications:    Medications: bupivacaine (pf) (MARCAINE) injection 0.5% - Perineural   15 mL - 9/29/2023 11:50:00 AM

## 2023-09-29 NOTE — PROGRESS NOTES
"[facility] ([address] Medicine  Progress Note    Patient Name: Shannan Figueredo  MRN: [id number]  Patient Class: IP- Inpatient   Admission Date: 9/17/2023  Length of Stay: 12 days  Attending Physician: JOSE Cristina MD  Primary Care Provider: Roxi, Primary Doctor        Subjective:     Principal Problem:Gangrene of toe of left foot        HPI:  Ms. Figueredo is a 67/F with PMH HTN, DMII (diet-controlled), PVD, ESRD on HD (M/W/F), anemia who presented to [facility] 09/17 with a two day progressive history of worsening L foot pain, swelling, fatigue, nausea and vomiting. History obtained from patient and daughter Stephanie ([phone]); they are from the [place], [place] area. Patient reports several weeks ago she "stubbed her toe," noting initially some pain, redness and swelling. Gradually symptoms progressed with duskiness to her toe and she informed her daughter of her pain for which she was brought to ER in [place] on 08/25 for further evaluation. With duskiness to 1st toe and surrounding erythema she was treated for cellulitis with a course of ciprofloxacin and metronidazole, but failed to improve. She followed up with her podiatrist 09/12 where she was prescribed hydrocodone-acetaminophen and a ten day course of TMP-SMX which she has been taking. She was referred to vascular surgery with plan for angiography and potential intervention  She and her family came to [place] for the weekend but her pain worsened; had been attempting to minimize use of hydrocodone-acetaminophen but developed nausea, vomiting and progressive fatigue. She subsequently presented to ED for further evaluation. ED workup was notable for L 1st toe dry-appearing gangrene with surrounding erythema and swelling, removed nail of second toe, XR L foot demonstrating diffuse osteopenia, L calcaneal and forefoot soft tissue swelling without definitive evidence of osteomyelitis in setting of osteopenia, no leukocytosis, elevated " sed rate and CRP, and elevated BUN/Cr in setting of ESRD. She notes she went without dialysis on 09/15 due to her trip. Blood cultures were ordered and she received piperacillin-tazobactam and vancomycin. Hospital medicine was subsequently contacted for admission.      Overview/Hospital Course:  Admitted with gangrene of L 1st toe.  Empirically treated with Zosyn and vancomycin.  Vascular Surgery, nephrology, podiatry consulted; U/S arterial demonstrated L SFA occlusion with distal reconstitution. Cardiology consulted, angiography with no vessels amenable for angioplasty on 9/20.  Underwent exploration of the left lower extremity vessels, femoral and popliteal bypass unable to be performed due to no adequate available vessels for bypass on 9/22.      Interval History: No acute events overnight. Discussed plan of care with patient and daughter at bedside. Plan for HD and then surgery.    Review of Systems   Constitutional:  Negative for chills and fever.   Respiratory:  Negative for cough and shortness of breath.    Cardiovascular:  Negative for chest pain and palpitations.   Gastrointestinal:  Negative for abdominal pain, nausea and vomiting.     Objective:     Vital Signs (Most Recent):  Temp: 98.3 °F (36.8 °C) (09/29/23 0734)  Pulse: 74 (09/29/23 0734)  Resp: 18 (09/29/23 0734)  BP: (!) 147/67 (09/29/23 0734)  SpO2: 98 % (09/29/23 0823) Vital Signs (24h Range):  Temp:  [98 °F (36.7 °C)-98.8 °F (37.1 °C)] 98.3 °F (36.8 °C)  Pulse:  [68-74] 74  Resp:  [16-18] 18  SpO2:  [96 %-100 %] 98 %  BP: (123-191)/(59-76) 147/67     Weight: 61.2 kg (134 lb 14.7 oz)  Body mass index is 22.45 kg/m².    Intake/Output Summary (Last 24 hours) at 9/29/2023 0854  Last data filed at 9/28/2023 2200  Gross per 24 hour   Intake 190 ml   Output 0 ml   Net 190 ml           Physical Exam  Vitals and nursing note reviewed.   Constitutional:       General: She is sleeping. She is not in acute distress.     Appearance: She is well-developed.    HENT:      Head: Normocephalic and atraumatic.   Eyes:      General:         Right eye: No discharge.         Left eye: No discharge.      Conjunctiva/sclera: Conjunctivae normal.   Cardiovascular:      Rate and Rhythm: Normal rate.      Pulses: Normal pulses.   Pulmonary:      Effort: Pulmonary effort is normal. No respiratory distress.   Abdominal:      Palpations: Abdomen is soft.      Tenderness: There is no abdominal tenderness.   Musculoskeletal:         General: Normal range of motion.      Right lower leg: No edema.      Left lower leg: No edema.   Skin:     General: Skin is warm and dry.      Comments: Dry gangrenous changes of L 1st and 2nd toes.   Neurological:      Mental Status: She is easily aroused. Mental status is at baseline.       Significant Labs:   CBC:  Recent Labs   Lab 09/27/23  0359 09/28/23  0508 09/29/23  0551   WBC 15.51* 17.79* 21.30*   HGB 9.4* 9.5* 9.4*   HCT 29.4* 30.1* 29.3*    235 252   GRAN 70.3  10.9* 74.4*  13.2* 78.1*  16.6*   LYMPH 8.8*  1.4 7.3*  1.3 6.8*  1.4   MONO 16.1*  2.5* 15.0  2.7* 12.5  2.7*   EOS 0.4 0.3 0.2   BASO 0.09 0.10 0.10     BMP:  Recent Labs   Lab 09/27/23  0359 09/28/23  0508 09/29/23  0551   * 134* 135*   K 3.7 3.4* 4.1   CL 91* 93* 93*   CO2 24 25 23   BUN 32* 22 36*   CREATININE 6.3* 4.5* 6.2*   GLU 76 73 73   CALCIUM 9.2 9.3 9.3   MG 2.1 2.0 2.1   PHOS 4.1 3.6 4.9*       Significant Imaging: I have reviewed and interpreted all pertinent imaging results/findings within the past 24 hours.      Assessment/Plan:      * Gangrene of toe of left foot  HTN, PVD  - L 1st toe gangrene predominantly dry with some surrounding erythema/swelling; with concern for worsening infection started pipeacillin-tazobactam and vancomycin IV. 2nd toe with some involvement as well. Continue broad spectrum antibiotic therapy for now. No evidence of sepsis at this time.  - U/S arterial BLE showed L distal SFA occlusion with distal reconstitution. Discussed  "with vascular surgery; cardiology consult for consideration of angiography / potential angioplasty.  - Podiatry consulted, appreciate assistance. MRI L foot with "examination markedly degraded by patient motion artifact. Questionable marrow edema within the 1st distal phalanx, not well evaluated given aforementioned limitation but possibly related to osteomyelitis given reported history of gangrene."  - Continue hydrocodone-acetaminophen 5-325mg PO q6hr PRN, add oxycodone-acetaminophen 7.5-325mg PO q6hr PRN. Continue hydromorphone 0.5mg IV q6hr PRN for breakthrough pain.  - Continue labetalol 300mg PO BID, losartan 50mg PO daily, nifedipine 30mg PO daily.  - Blood cultures showed no growth.  - s/p angiogram remarkable for severe PAD with no areas amenable to angioplasty on 9/20  - Vascular surgery, Dr. Cobb spoke with patient and family extensively and patient elected to undergo attempt at revascularization.  Vessel exploration done, but unable to perform femoral and popliteal bypass on 9/22. Discussed surgical options; limited, with BKA likely only viable for appropriate healing given extent of vascular disease.   - To OR today.    Type 2 diabetes mellitus with chronic kidney disease on chronic dialysis  - Reports diet-controlled; no current medications.  - HgbA1c 5.7.  - Continue low-dose sliding scale insulin aspart 0-5U subQ TIDWM PRN, monitor requirements.    ESRD (end stage renal disease)  Anemia, hyperparathyroidism  - ESRD on HD, reports missed session Friday prior to presentation due to travel.  - Mild anemia without evidence of bleeding.  - Continue allopurinol 300mg PO daily, calcium acetate 2001mg PO TIDWM.  - Transfused 1U pRBCs 09/25 with appropriate response.  - Nephrology consulted for HD assistance.    VTE Risk Mitigation (From admission, onward)         Ordered     IP VTE HIGH RISK PATIENT  Once         09/28/23 1906                Discharge Planning   LUBNA:      Code Status: Full Code   Is the " patient medically ready for discharge?:     Reason for patient still in hospital (select all that apply): Treatment  Discharge Plan A: Skilled Nursing Facility   Discharge Delays: (!) Procedure Scheduling (IR, OR, Labs, Echo, Cath, Echo, EEG)              TANESHA Cristina MD  Department of Hospital Medicine   [facility] ([address])

## 2023-09-29 NOTE — OP NOTE
[facility] [address]  Surgery Department  Operative Note    SUMMARY     Patient: Shannan Figueredo    Medical Record: [id number]    Date of Procedure: 9/29/2023     Surgeon: Surgeon(s) and Role:     * Franky Cobb Jr., MD - Primary    Assisting Surgeon: None    Pre-Operative Diagnosis: Diabetes [E11.9], gangrene left foot peripheral vascular disease    Post-Operative Diagnosis: Post-Op Diagnosis Codes:     * Diabetes [E11.9], gangrene left foot, peripheral vascular disease    Procedure: Procedure(s) (LRB):  AMPUTATION, BELOW KNEE / LEFT (Left)    Procedure in Detail:  The patient was brought to the operating room and placed in supine position.  The left lower extremity prepped and draped in a sterile fashion.  A fishmouth incision made with anterior and posterior flaps.  The flaps were made at the lowest level where there appeared to be adequate blood flow for healing.  Using sharp dissection the flaps were developed and hemostasis obtained with 2 and 3-0 Vicryl ties.  The tibial vessels ligated with 2-0 Vicryl ligatures.  The dissection carried down to the tibia and fibula.  A TMS saw used to transect the tibia and the fibula.  The fibula was transected approximately 1-1/2 cm above the end of the tibia.  The bone edges were then smoothed with a rasp.  The wounds were irrigated.  And then inspected.  Layered closure was then undertaken.  The anterior and posterior flaps closed with interrupted 0 Vicryl sutures.  The skin closed with a stapling device.  The wounds were then sterilely dressed and a posterior splint applied.  The patient tolerated the procedure well and left the operating room in good condition.  At the end of procedure all sponge lap and instrument counts were correct.  Estimated blood loss 150 cc

## 2023-09-29 NOTE — PROGRESS NOTES
09/29/23 1056   Post-Hemodialysis Assessment   Rinseback Volume (mL) 250 mL   Blood Volume Processed (Liters) 44.3 L   Dialyzer Clearance Lightly streaked   Duration of Treatment 120 minutes   Additional Fluid Intake (mL) 500 mL   Total UF (mL) 1500 mL   Net Fluid Removal 1000   Patient Response to Treatment tolerated   Arterial bleeding stop time (min) 5 min   Venous bleeding stop time (min) 5 min   Post-Hemodialysis Comments tx completed per P&P; report called to primary nurse. Patient to OR via bed, daughter accompanied pt with transport.

## 2023-09-29 NOTE — ANESTHESIA PROCEDURE NOTES
Left popliteal    Patient location during procedure: holding area   Block not for primary anesthetic.  Reason for block: at surgeon's request and post-op pain management   Post-op Pain Location: Left leg   Start time: 9/29/2023 11:46 AM  Timeout: 9/29/2023 11:40 AM   End time: 9/29/2023 11:50 AM    Staffing  Authorizing Provider: Angus Martines MD  Performing Provider: Angus Martines MD    Staffing  Performed by: Angus Martines MD  Authorized by: Angus Martines MD    Preanesthetic Checklist  Completed: patient identified, IV checked, site marked, risks and benefits discussed, surgical consent, monitors and equipment checked, pre-op evaluation and timeout performed  Peripheral Block  Patient position: supine  Prep: ChloraPrep  Patient monitoring: heart rate, continuous pulse ox and continuous capnometry  Block type: popliteal  Laterality: left  Injection technique: single shot  Needle  Needle type: Echogenic   Needle gauge: 20 G  Needle length: 4 in  Needle localization: ultrasound guidance   -ultrasound image captured on disc.  Assessment  Injection assessment: negative aspiration, negative parasthesia and local visualized surrounding nerve  Paresthesia pain: none  Heart rate change: no  Slow fractionated injection: yes  Pain Tolerance: comfortable throughout block and no complaints  Medications:    Medications: bupivacaine (pf) (MARCAINE) injection 0.5% - Perineural   15 mL - 9/29/2023 11:50:00 AM

## 2023-09-29 NOTE — ASSESSMENT & PLAN NOTE
"HTN, PVD  - L 1st toe gangrene predominantly dry with some surrounding erythema/swelling; with concern for worsening infection started pipeacillin-tazobactam and vancomycin IV. 2nd toe with some involvement as well. Continue broad spectrum antibiotic therapy for now. No evidence of sepsis at this time.  - U/S arterial BLE showed L distal SFA occlusion with distal reconstitution. Discussed with vascular surgery; cardiology consult for consideration of angiography / potential angioplasty.  - Podiatry consulted, appreciate assistance. MRI L foot with "examination markedly degraded by patient motion artifact. Questionable marrow edema within the 1st distal phalanx, not well evaluated given aforementioned limitation but possibly related to osteomyelitis given reported history of gangrene."  - Continue hydrocodone-acetaminophen 5-325mg PO q6hr PRN, add oxycodone-acetaminophen 7.5-325mg PO q6hr PRN. Continue hydromorphone 0.5mg IV q6hr PRN for breakthrough pain.  - Continue labetalol 300mg PO BID, losartan 50mg PO daily, nifedipine 30mg PO daily.  - Blood cultures showed no growth.  - s/p angiogram remarkable for severe PAD with no areas amenable to angioplasty on 9/20  - Vascular surgery, Dr. Cobb spoke with patient and family extensively and patient elected to undergo attempt at revascularization.  Vessel exploration done, but unable to perform femoral and popliteal bypass on 9/22. Discussed surgical options; limited, with BKA likely only viable for appropriate healing given extent of vascular disease.   - Plan for surgery here. NPO at midnight for OR likely in AM.  "

## 2023-09-29 NOTE — PT/OT/SLP PROGRESS
Physical Therapy      Patient Name:  Shannan Figueredo   MRN:  [id number]    Patient not seen today secondary to pt to OR for L BKA today. Received new order to resume P.T. post-op,  will follow-up tomorrow for re-evaluation.

## 2023-09-29 NOTE — PROGRESS NOTES
"[facility] ([address] Medicine  Progress Note    Patient Name: Shannan Figueredo  MRN: [id number]  Patient Class: IP- Inpatient   Admission Date: 9/17/2023  Length of Stay: 11 days  Attending Physician: JOSE Cristina MD  Primary Care Provider: Roxi, Primary Doctor        Subjective:     Principal Problem:Gangrene of toe of left foot        HPI:  Ms. Figueredo is a 67/F with PMH HTN, DMII (diet-controlled), PVD, ESRD on HD (M/W/F), anemia who presented to [facility] 09/17 with a two day progressive history of worsening L foot pain, swelling, fatigue, nausea and vomiting. History obtained from patient and daughter Stephanie ([phone]); they are from the [place], [place] area. Patient reports several weeks ago she "stubbed her toe," noting initially some pain, redness and swelling. Gradually symptoms progressed with duskiness to her toe and she informed her daughter of her pain for which she was brought to ER in [place] on 08/25 for further evaluation. With duskiness to 1st toe and surrounding erythema she was treated for cellulitis with a course of ciprofloxacin and metronidazole, but failed to improve. She followed up with her podiatrist 09/12 where she was prescribed hydrocodone-acetaminophen and a ten day course of TMP-SMX which she has been taking. She was referred to vascular surgery with plan for angiography and potential intervention  She and her family came to [place] for the weekend but her pain worsened; had been attempting to minimize use of hydrocodone-acetaminophen but developed nausea, vomiting and progressive fatigue. She subsequently presented to ED for further evaluation. ED workup was notable for L 1st toe dry-appearing gangrene with surrounding erythema and swelling, removed nail of second toe, XR L foot demonstrating diffuse osteopenia, L calcaneal and forefoot soft tissue swelling without definitive evidence of osteomyelitis in setting of osteopenia, no leukocytosis, elevated " sed rate and CRP, and elevated BUN/Cr in setting of ESRD. She notes she went without dialysis on 09/15 due to her trip. Blood cultures were ordered and she received piperacillin-tazobactam and vancomycin. Hospital medicine was subsequently contacted for admission.      Overview/Hospital Course:  Admitted with gangrene of L 1st toe.  Empirically treated with Zosyn and vancomycin.  Vascular Surgery, nephrology, podiatry consulted; U/S arterial demonstrated L SFA occlusion with distal reconstitution. Cardiology consulted, angiography with no vessels amenable for angioplasty on 9/20.  Underwent exploration of the left lower extremity vessels, femoral and popliteal bypass unable to be performed due to no adequate available vessels for bypass on 9/22.      Interval History: No acute events overnight. Discussed plan of care with patient and daughter at bedside. Opting for surgical intervention in [place]. Slept deeply overnight but showing some signs of hospital-acquired delirium.    Review of Systems   Constitutional:  Negative for chills and fever.   Respiratory:  Negative for cough and shortness of breath.    Cardiovascular:  Negative for chest pain and palpitations.   Gastrointestinal:  Negative for abdominal pain, nausea and vomiting.     Objective:     Vital Signs (Most Recent):  Temp: 98.8 °F (37.1 °C) (09/28/23 1625)  Pulse: 68 (09/28/23 1625)  Resp: 16 (09/28/23 1625)  BP: 136/63 (09/28/23 1625)  SpO2: 98 % (09/28/23 1625) Vital Signs (24h Range):  Temp:  [97.8 °F (36.6 °C)-99 °F (37.2 °C)] 98.8 °F (37.1 °C)  Pulse:  [68-84] 68  Resp:  [14-18] 16  SpO2:  [95 %-98 %] 98 %  BP: (136-199)/(63-86) 136/63     Weight: 61.2 kg (134 lb 14.7 oz)  Body mass index is 22.45 kg/m².    Intake/Output Summary (Last 24 hours) at 9/28/2023 1900  Last data filed at 9/28/2023 0643  Gross per 24 hour   Intake 240 ml   Output 0 ml   Net 240 ml           Physical Exam  Vitals and nursing note reviewed.   Constitutional:        General: She is sleeping. She is not in acute distress.     Appearance: She is well-developed.   HENT:      Head: Normocephalic and atraumatic.   Eyes:      General:         Right eye: No discharge.         Left eye: No discharge.      Conjunctiva/sclera: Conjunctivae normal.   Cardiovascular:      Rate and Rhythm: Normal rate.      Pulses: Normal pulses.   Pulmonary:      Effort: Pulmonary effort is normal. No respiratory distress.   Abdominal:      Palpations: Abdomen is soft.      Tenderness: There is no abdominal tenderness.   Musculoskeletal:         General: Normal range of motion.      Right lower leg: No edema.      Left lower leg: No edema.   Skin:     General: Skin is warm and dry.      Comments: Dry gangrenous changes of L 1st and 2nd toes.   Neurological:      Mental Status: She is easily aroused. Mental status is at baseline.       Significant Labs:   CBC:  Recent Labs   Lab 09/26/23  0359 09/27/23  0359 09/28/23  0508   WBC 14.60* 15.51* 17.79*   HGB 8.9* 9.4* 9.5*   HCT 27.5* 29.4* 30.1*    251 235   GRAN 74.0* 70.3  10.9* 74.4*  13.2*   LYMPH 7.0*  CANCELED 8.8*  1.4 7.3*  1.3   MONO 12.0  CANCELED 16.1*  2.5* 15.0  2.7*   EOS CANCELED 0.4 0.3   BASO CANCELED 0.09 0.10     BMP:  Recent Labs   Lab 09/26/23  0359 09/27/23  0359 09/28/23  0508   * 132* 134*   K 3.5 3.7 3.4*   CL 93* 91* 93*   CO2 25 24 25   BUN 20 32* 22   CREATININE 4.6* 6.3* 4.5*    76 73   CALCIUM 8.7 9.2 9.3   MG 2.0 2.1 2.0   PHOS 3.5 4.1 3.6       Significant Imaging: I have reviewed and interpreted all pertinent imaging results/findings within the past 24 hours.      Assessment/Plan:      * Gangrene of toe of left foot  HTN, PVD  - L 1st toe gangrene predominantly dry with some surrounding erythema/swelling; with concern for worsening infection started pipeacillin-tazobactam and vancomycin IV. 2nd toe with some involvement as well. Continue broad spectrum antibiotic therapy for now. No evidence of  "sepsis at this time.  - U/S arterial BLE showed L distal SFA occlusion with distal reconstitution. Discussed with vascular surgery; cardiology consult for consideration of angiography / potential angioplasty.  - Podiatry consulted, appreciate assistance. MRI L foot with "examination markedly degraded by patient motion artifact. Questionable marrow edema within the 1st distal phalanx, not well evaluated given aforementioned limitation but possibly related to osteomyelitis given reported history of gangrene."  - Continue hydrocodone-acetaminophen 5-325mg PO q6hr PRN, add oxycodone-acetaminophen 7.5-325mg PO q6hr PRN. Continue hydromorphone 0.5mg IV q6hr PRN for breakthrough pain.  - Continue labetalol 300mg PO BID, losartan 50mg PO daily, nifedipine 30mg PO daily.  - Blood cultures showed no growth.  - s/p angiogram remarkable for severe PAD with no areas amenable to angioplasty on 9/20  - Vascular surgery, Dr. Cobb spoke with patient and family extensively and patient elected to undergo attempt at revascularization.  Vessel exploration done, but unable to perform femoral and popliteal bypass on 9/22. Discussed surgical options; limited, with BKA likely only viable for appropriate healing given extent of vascular disease.   - Plan for surgery here. NPO at midnight for OR likely in AM.    Type 2 diabetes mellitus with chronic kidney disease on chronic dialysis  - Reports diet-controlled; no current medications.  - HgbA1c 5.7.  - Continue low-dose sliding scale insulin aspart 0-5U subQ TIDWM PRN, monitor requirements.    ESRD (end stage renal disease)  Anemia, hyperparathyroidism  - ESRD on HD, reports missed session Friday prior to presentation due to travel.  - Mild anemia without evidence of bleeding.  - Continue allopurinol 300mg PO daily, calcium acetate 2001mg PO TIDWM.  - Transfused 1U pRBCs 09/25 with appropriate response.  - Nephrology consulted for HD assistance.      VTE Risk Mitigation (From admission, " onward)         Ordered     IP VTE HIGH RISK PATIENT  Once         09/28/23 1906                Discharge Planning   LUBNA:      Code Status: Full Code   Is the patient medically ready for discharge?:     Reason for patient still in hospital (select all that apply): Treatment  Discharge Plan A: Skilled Nursing Facility   Discharge Delays: (!) Procedure Scheduling (IR, OR, Labs, Echo, Cath, Echo, EEG)              TANESHA Cristina MD  Department of Hospital Medicine   [facility] ([address])

## 2023-09-29 NOTE — PLAN OF CARE
Plan of care reviewed with patient and daughter.  Patient anuric with poor appetite this shift. Increase in fluids and food encouraged. NPO after midnight. Safety maintained.     Problem: Infection  Goal: Absence of Infection Signs and Symptoms  Outcome: Ongoing, Progressing     Problem: Adult Inpatient Plan of Care  Goal: Plan of Care Review  Outcome: Ongoing, Progressing  Goal: Optimal Comfort and Wellbeing  Outcome: Ongoing, Progressing     Problem: Diabetes Comorbidity  Goal: Blood Glucose Level Within Targeted Range  Outcome: Ongoing, Progressing     Problem: Impaired Wound Healing  Goal: Optimal Wound Healing  Outcome: Ongoing, Progressing     Problem: Skin Injury Risk Increased  Goal: Skin Health and Integrity  Outcome: Ongoing, Progressing     Problem: Hemodynamic Instability (Hemodialysis)  Goal: Effective Tissue Perfusion  Outcome: Ongoing, Progressing     Problem: Fall Injury Risk  Goal: Absence of Fall and Fall-Related Injury  Outcome: Ongoing, Progressing     Problem: Oral Intake Inadequate  Goal: Improved Oral Intake  Outcome: Ongoing, Progressing

## 2023-09-29 NOTE — SUBJECTIVE & OBJECTIVE
Interval History: No acute events overnight. Discussed plan of care with patient and daughter at bedside. Plan for HD and then surgery.    Review of Systems   Constitutional:  Negative for chills and fever.   Respiratory:  Negative for cough and shortness of breath.    Cardiovascular:  Negative for chest pain and palpitations.   Gastrointestinal:  Negative for abdominal pain, nausea and vomiting.     Objective:     Vital Signs (Most Recent):  Temp: 98.3 °F (36.8 °C) (09/29/23 0734)  Pulse: 74 (09/29/23 0734)  Resp: 18 (09/29/23 0734)  BP: (!) 147/67 (09/29/23 0734)  SpO2: 98 % (09/29/23 0823) Vital Signs (24h Range):  Temp:  [98 °F (36.7 °C)-98.8 °F (37.1 °C)] 98.3 °F (36.8 °C)  Pulse:  [68-74] 74  Resp:  [16-18] 18  SpO2:  [96 %-100 %] 98 %  BP: (123-191)/(59-76) 147/67     Weight: 61.2 kg (134 lb 14.7 oz)  Body mass index is 22.45 kg/m².    Intake/Output Summary (Last 24 hours) at 9/29/2023 0854  Last data filed at 9/28/2023 2200  Gross per 24 hour   Intake 190 ml   Output 0 ml   Net 190 ml           Physical Exam  Vitals and nursing note reviewed.   Constitutional:       General: She is sleeping. She is not in acute distress.     Appearance: She is well-developed.   HENT:      Head: Normocephalic and atraumatic.   Eyes:      General:         Right eye: No discharge.         Left eye: No discharge.      Conjunctiva/sclera: Conjunctivae normal.   Cardiovascular:      Rate and Rhythm: Normal rate.      Pulses: Normal pulses.   Pulmonary:      Effort: Pulmonary effort is normal. No respiratory distress.   Abdominal:      Palpations: Abdomen is soft.      Tenderness: There is no abdominal tenderness.   Musculoskeletal:         General: Normal range of motion.      Right lower leg: No edema.      Left lower leg: No edema.   Skin:     General: Skin is warm and dry.      Comments: Dry gangrenous changes of L 1st and 2nd toes.   Neurological:      Mental Status: She is easily aroused. Mental status is at baseline.        Significant Labs:   CBC:  Recent Labs   Lab 09/27/23  0359 09/28/23  0508 09/29/23  0551   WBC 15.51* 17.79* 21.30*   HGB 9.4* 9.5* 9.4*   HCT 29.4* 30.1* 29.3*    235 252   GRAN 70.3  10.9* 74.4*  13.2* 78.1*  16.6*   LYMPH 8.8*  1.4 7.3*  1.3 6.8*  1.4   MONO 16.1*  2.5* 15.0  2.7* 12.5  2.7*   EOS 0.4 0.3 0.2   BASO 0.09 0.10 0.10     BMP:  Recent Labs   Lab 09/27/23 0359 09/28/23  0508 09/29/23  0551   * 134* 135*   K 3.7 3.4* 4.1   CL 91* 93* 93*   CO2 24 25 23   BUN 32* 22 36*   CREATININE 6.3* 4.5* 6.2*   GLU 76 73 73   CALCIUM 9.2 9.3 9.3   MG 2.1 2.0 2.1   PHOS 4.1 3.6 4.9*       Significant Imaging: I have reviewed and interpreted all pertinent imaging results/findings within the past 24 hours.

## 2023-09-29 NOTE — ANESTHESIA POSTPROCEDURE EVALUATION
Anesthesia Post Evaluation    Patient: Shannan Figueredo    Procedure(s) Performed: Procedure(s) (LRB):  AMPUTATION, BELOW KNEE / LEFT (Left)    Final Anesthesia Type: general      Patient location during evaluation: PACU  Patient participation: Yes- Able to Participate  Level of consciousness: awake and alert  Post-procedure vital signs: reviewed and stable  Pain management: adequate  Airway patency: patent    PONV status at discharge: No PONV  Anesthetic complications: no      Cardiovascular status: blood pressure returned to baseline  Respiratory status: unassisted  Hydration status: euvolemic  Follow-up not needed.          Vitals Value Taken Time   /58 09/29/23 1505   Temp 36.6 °C (97.8 °F) 09/29/23 1411   Pulse 69 09/29/23 1517   Resp 16 09/29/23 1411   SpO2 96 % 09/29/23 1517   Vitals shown include unvalidated device data.      No case tracking events are documented in the log.      Pain/Alan Score: Pain Rating Prior to Med Admin: 4 (9/29/2023  3:16 PM)  Pain Rating Post Med Admin: 4 (9/28/2023  2:53 PM)  Alan Score: 9 (9/29/2023  2:41 PM)

## 2023-09-29 NOTE — PROGRESS NOTES
"Nephrology  Progress Note    Admit Date: 9/17/2023   LOS: 12 days     SUBJECTIVE:     Follow-up For:  Gangrene of toe of left foot    History of Present Illness:  Patient is a 67 y.o. female presents with left foot gangrene, vomiting, feeling ill.  Ext med hx as outlined below including ESRD on HD MWF in [place].  Has been dealing with necrotic left toes for few months and followed by vascular in [place].  Was here visiting daughter and felt bad.  Admitted for eval/treatment.  Consulted for HD needs.  Pt seen and examined on HD.  Consents signed.  Tired from being up all night with pain.  Updated team and daughter at bedside.  W/up noted.       Interval History:   Seen on HD prior to OR for B/AKA.  Discussed with pt/daughter/team.  Pt drowsy.          Review of Systems:  Constitutional: No fever or chills  Respiratory: No cough or shortness of breath  Cardiovascular: No chest pain or palpitations  Gastrointestinal: No nausea or vomiting  Neurological: No confusion or weakness    OBJECTIVE:     Vital Signs Range (Last 24H):  BP (!) 147/67 (BP Location: Left arm, Patient Position: Lying)   Pulse 74   Temp 98.3 °F (36.8 °C) (Oral)   Resp 18   Ht 5' 5" (1.651 m)   Wt 61.2 kg (134 lb 14.7 oz)   SpO2 98%   BMI 22.45 kg/m²     Temp:  [98 °F (36.7 °C)-98.8 °F (37.1 °C)]   Pulse:  [68-74]   Resp:  [16-18]   BP: (123-162)/(59-71)   SpO2:  [96 %-100 %]     I & O (Last 24H):  Intake/Output Summary (Last 24 hours) at 9/29/2023 0922  Last data filed at 9/28/2023 2200  Gross per 24 hour   Intake 190 ml   Output 0 ml   Net 190 ml         Physical Exam:  General appearance:  Frail.  Appears older than stated age.    Eyes:  Conjunctivae/corneas clear. PERRL.  Lungs: Normal respiratory effort,   clear to auscultation bilaterally   Heart: Regular rate and rhythm, S1, S2 normal, no murmur, rub or nelly.  Abdomen: Soft, non-tender non-distended; bowel sounds normal; no masses,  no organomegaly  Extremities: No cyanosis or clubbing. " "No edema.    Skin: Skin color, texture, turgor normal. No rashes or lesions  Neurologic: Normal strength and tone. No focal numbness or weakness   Right arm AVF+  Left big/second toes necrotic.  leg scar noted.     Laboratory Data:  Recent Labs   Lab 09/29/23  0551   WBC 21.30*   RBC 3.23*   HGB 9.4*   HCT 29.3*      MCV 91   MCH 29.1   MCHC 32.1         BMP:   Recent Labs   Lab 09/29/23  0551   GLU 73   *   K 4.1   CL 93*   CO2 23   BUN 36*   CREATININE 6.2*   CALCIUM 9.3   MG 2.1   PHOS 4.9*       Lab Results   Component Value Date    CALCIUM 9.3 09/29/2023    PHOS 4.9 (H) 09/29/2023       Lab Results   Component Value Date    CALCIUM 9.3 09/29/2023    PHOS 4.9 (H) 09/29/2023       No results found for: "URICACID"    BNP  No results for input(s): "BNP", "BNPTRIAGEBLO" in the last 168 hours.    Medications:  Medication list was reviewed and changes noted under Assessment/Plan.    Diagnostic Results:    reviewed    ASSESSMENT/PLAN:       ESRD on HD MWF in [place]:  -consulted for HD needs.  -consents signed  -continue HD MWF while here and if she stays after hospitalization will need temp outpt unit.   -labs noted and bath adjusted.  -R arm AVF+  -9/19: HD yesterday w/o issue.  No need today.  -9/20:  HD today after angio  -9/21:  no need for HD today.  Cont MWF if stays here.    -9/22:  seen on HD this am.   -9/25:  HD with Blood today.    -9/26:  stable from renal to DC back [place] w/ family.    -9/27:  seen on HD.  UF 2 L.    -9/28:  nothing today.   -9/29:  seen on HD.  Renally dose meds, avoid nephrotoxins, and monitor I/O's closely.    Necrotic Left toes:  -defer to pod/vascular/cards.  -angio noted with severe PAD  -Status post exploration femoral and popliteal arteries 9/22 with Non-reconstructible peripheral vascular disease.  Patient deciding on amputation. I think patient should have this done in [place] where she lives. At this point she just needs pain control.  Needs to discuss with OWN Team " in [place] who they prefer to use.  -need answer about location of surgery.  If here will need ext rehab/HD setup.  Risks of delay explained.   -OR today.  Will need SW to work on Rehab/HD unit post op.         HTN:  -UF on HD  -increased ARB/CCB. Will follow  -UF on HD today.  Likely pain related.   -9/28:  adjust meds.      MBD:  -binders/nephrocaps.   Changed to renvela.  Pt states that she also takes tums at home  -using calcitriol on HD days.     Anemia of CKD:  -at goal currently.   -trending lower. Due to surgery? PLT stable  -9/25:  blood today.   -9/27:  stable.       As above  Will follow for renal needs

## 2023-09-29 NOTE — OR NURSING
Pt's V/S stable on room air while in PACU. S/p left BKA, dressing clean, dry, intact. BG 76 upon arrival to PACU, per Dr. Martines, give 200cc D5NS IV. Re-checked after D5, . One time dose of IV tylenol given. Patient denies pain at this time. Report given to CHESTER Hernandez on [address]. Transferred to room 363, safety precautions in place. Family updated on plan of care.

## 2023-09-30 NOTE — PLAN OF CARE
Problem: Gas Exchange Impaired  Goal: Optimal Gas Exchange  Outcome: Ongoing, Progressing  Intervention: Optimize Oxygenation and Ventilation  Flowsheets (Taken 9/30/2023 7734)  Airway/Ventilation Management: airway patency maintained  Head of Bed (HOB) Positioning: HOB elevated   Patient in no apparent distress. Sat's  97 % on room air . Will continue to monitor.

## 2023-09-30 NOTE — PROGRESS NOTES
"Nephrology  Progress Note    Admit Date: 9/17/2023   LOS: 13 days     SUBJECTIVE:     Follow-up For:  Gangrene of toe of left foot    History of Present Illness:  Patient is a 67 y.o. female presents with left foot gangrene, vomiting, feeling ill.  Ext med hx as outlined below including ESRD on HD MWF in [place].  Has been dealing with necrotic left toes for few months and followed by vascular in [place].  Was here visiting daughter and felt bad.  Admitted for eval/treatment.  Consulted for HD needs.  Pt seen and examined on HD.  Consents signed.  Tired from being up all night with pain.  Updated team and daughter at bedside.  W/up noted.       Interval History:   Seen and examined. Daughter at bedside. Now s/p BKA.        Review of Systems:  Constitutional: No fever or chills  Respiratory: No cough or shortness of breath  Cardiovascular: No chest pain or palpitations  Gastrointestinal: No nausea or vomiting  Neurological: No confusion or weakness    OBJECTIVE:     Vital Signs Range (Last 24H):  BP (!) 120/57 (BP Location: Left arm, Patient Position: Lying)   Pulse 70   Temp 97.6 °F (36.4 °C) (Oral)   Resp 18   Ht 5' 5" (1.651 m)   Wt 61.2 kg (134 lb 14.7 oz)   SpO2 98%   BMI 22.45 kg/m²     Temp:  [97 °F (36.1 °C)-98.5 °F (36.9 °C)]   Pulse:  [63-74]   Resp:  [16-18]   BP: (107-127)/(53-60)   SpO2:  [96 %-100 %]     I & O (Last 24H):  Intake/Output Summary (Last 24 hours) at 9/30/2023 1032  Last data filed at 9/30/2023 0139  Gross per 24 hour   Intake 1270 ml   Output 1500 ml   Net -230 ml         Physical Exam:  General appearance:  Frail.  Appears older than stated age.    Eyes:  Conjunctivae/corneas clear. PERRL.  Lungs: Normal respiratory effort,   clear to auscultation bilaterally   Heart: Regular rate and rhythm, S1, S2 normal, no murmur, rub or nelly.  Abdomen: Soft, non-tender non-distended; bowel sounds normal; no masses,  no organomegaly  Extremities: No cyanosis or clubbing. No edema.    Skin: Skin " "color, texture, turgor normal. No rashes or lesions  Neurologic: Normal strength and tone. No focal numbness or weakness   Right arm AVF+  Left big/second toes necrotic.  leg scar noted.     Laboratory Data:  Recent Labs   Lab 09/30/23  0519   WBC 18.59*   RBC 3.22*   HGB 9.2*   HCT 29.4*      MCV 91   MCH 28.6   MCHC 31.3*         BMP:   Recent Labs   Lab 09/30/23  0519   GLU 89      K 4.0   CL 96   CO2 26   BUN 34*   CREATININE 5.7*   CALCIUM 9.7   MG 2.1   PHOS 5.2*       Lab Results   Component Value Date    CALCIUM 9.7 09/30/2023    PHOS 5.2 (H) 09/30/2023       Lab Results   Component Value Date    CALCIUM 9.7 09/30/2023    PHOS 5.2 (H) 09/30/2023       No results found for: "URICACID"    BNP  No results for input(s): "BNP", "BNPTRIAGEBLO" in the last 168 hours.    Medications:  Medication list was reviewed and changes noted under Assessment/Plan.    Diagnostic Results:    reviewed    ASSESSMENT/PLAN:       ESRD on HD MWF in [place]:  -consulted for HD needs.  -consents signed  -continue HD MWF while here and if she stays after hospitalization will need temp outpt unit.   -labs noted and bath adjusted.  -R arm AVF+  -9/19: HD yesterday w/o issue.  No need today.  -9/20:  HD today after angio  -9/21:  no need for HD today.  Cont MWF if stays here.    -9/22:  seen on HD this am.   -9/25:  HD with Blood today.    -9/26:  stable from renal to [place] back [place] w/ family.    -9/27:  seen on HD.  UF 2 L.    -9/28:  nothing today.   -9/29:  seen on HD.  Renally dose meds, avoid nephrotoxins, and monitor I/O's closely.  -9/30: stable today; plan for next dialysis session on Monday.    Necrotic Left toes:  S/p BKA on 9/29  -defer to pod/vascular/cards.  -angio noted with severe PAD  -Status post exploration femoral and popliteal arteries 9/22 with Non-reconstructible peripheral vascular disease.  Patient deciding on amputation. I think patient should have this done in [place] where she lives. At this point she " just needs pain control.  Needs to discuss with OWN Team in [place] who they prefer to use.  -need answer about location of surgery.  If here will need ext rehab/HD setup.  Risks of delay explained.   -s/p BKA on 9/29.  Will need SW to work on Rehab/HD unit post op.         HTN:  -UF on HD  -increased ARB/CCB. Will follow  -UF on HD today.  Likely pain related.   -9/28:  adjust meds.      MBD:  -binders/nephrocaps.   Changed to renvela.  Pt states that she also takes tums at home  -using calcitriol on HD days.     Anemia of CKD:  -at goal currently.   -trending lower. Due to surgery? PLT stable  -9/25:  blood today.   -9/27:  stable.       As above  Will follow for renal needs

## 2023-09-30 NOTE — PLAN OF CARE
Outcome: Ongoing, Progressing     Problem: Physical Therapy  Goal: Physical Therapy Goal  Description: Patient will perform the following to increase strength, improve mobility, and return to prior level of function:    1. Supine <> sit without bed features.  2. Rolling R/L without bed features and SBA.   3. Sit<>stand with min A of 2 with RW  3. OOB mobility assessment      Pt did not speak today - daughter said when she is overwhelmed this happens. Pt did nod her head to answer my questions. She was agreeable to proceed with PT. She required Max A of 2 for sit<>stand with RW, CGA sitting EOB, Max A of 2 for supine<>sit, dependent of 2 scoot to HOB. Pt appropriate for SNF.

## 2023-09-30 NOTE — ASSESSMENT & PLAN NOTE
"HTN, PVD  - L 1st toe gangrene predominantly dry with some surrounding erythema/swelling; with concern for worsening infection started piperacillin-tazobactam and vancomycin IV. 2nd toe with involvement as well.  - U/S arterial BLE showed L distal SFA occlusion with distal reconstitution. Discussed with vascular surgery and cardiology consulted for consideration of angiography / potential angioplasty.  - Podiatry consulted. MRI L foot with "examination markedly degraded by patient motion artifact. Questionable marrow edema within the 1st distal phalanx, not well evaluated given aforementioned limitation but possibly related to osteomyelitis given reported history of gangrene."  - Blood cultures showed no growth.  - s/p angiogram remarkable for severe PAD with no areas amenable to angioplasty on 9/20. Elected to undergo attempt at revascularization 09/22 but unable to perform femoral and popliteal bypass on 9/22. Discussed surgical options with BKA only viable for appropriate healing given extent of vascular disease.   - Underwent BKA 09/30. Leukocytosis improving. Given removal of infectious source, discontinue PO antibiotics.  - Continue hydrocodone-acetaminophen 5-325mg PO q6hr PRN, oxycodone-acetaminophen 7.5-325mg PO q6hr PRN. Continue hydromorphone 0.2mg IV q6hr PRN for breakthrough pain.  - Continue carvedilol 12.5mg PO BID, losartan 100mg PO daily, nifedipine 60mg PO BID.  "

## 2023-09-30 NOTE — PROGRESS NOTES
Surgery Inpatient Progress Note    Date: 09/30/2023    Overnight events: POD 1 s/p L BKA with Dr. Cobb.  Patient received pre-op block, pain well controlled.      O:   Vitals:    09/30/23 0738   BP:    Pulse: 70   Resp: 18   Temp:        Physical Exam   Gen: elderly female, NAD  HEENT: normocephalic, atraumatic, PERRL, EOMI   CV: RRR, no murmurs  Resp: nonlabored, CTAB   Abd: soft, NTND   MSK: LLE with surgical dressing in place, clean and dry     Component Ref Range & Units 05:19 1 d ago 2 d ago 3 d ago 4 d ago 5 d ago 6 d ago   WBC 3.90 - 12.70 K/uL 18.59 High   21.30 High   17.79 High   15.51 High   14.60 High   13.81 High   10.90    RBC 4.00 - 5.40 M/uL 3.22 Low   3.23 Low   3.29 Low   3.26 Low   3.07 Low   2.42 Low   2.74 Low     Hemoglobin 12.0 - 16.0 g/dL 9.2 Low   9.4 Low   9.5 Low   9.4 Low   8.9 Low   6.7 Low   7.6 Low     Hematocrit 37.0 - 48.5 % 29.4 Low   29.3 Low   30.1 Low   29.4 Low   27.5 Low   21.4 Low   24.1 Low     MCV 82 - 98 fL 91  91  92  90  90  88  88    MCH 27.0 - 31.0 pg 28.6  29.1  28.9  28.8  29.0  27.7  27.7    MCHC 32.0 - 36.0 g/dL 31.3 Low   32.1  31.6 Low   32.0  32.4  31.3 Low   31.5 Low     RDW 11.5 - 14.5 % 16.0 High   15.5 High   15.5 High   14.9 High   14.3  14.4  14.6 High     Platelets 150 - 450 K/uL 264  252  235          Component Ref Range & Units 05:19 1 d ago 2 d ago 3 d ago 4 d ago 5 d ago 6 d ago   Glucose 70 - 110 mg/dL 89  73  73  76  104  203 High   95    Sodium 136 - 145 mmol/L 138  135 Low   134 Low   132 Low   135 Low   131 Low   134 Low     Potassium 3.5 - 5.1 mmol/L 4.0  4.1 CM  3.4 Low   3.7  3.5  4.0  4.5    Chloride 95 - 110 mmol/L 96  93 Low   93 Low   91 Low   93 Low   90 Low   94 Low     CO2 23 - 29 mmol/L 26  23  25  24  25  22 Low   23    BUN 8 - 23 mg/dL 34 High   36 High   22  32 High   20  48 High   38 High     Calcium 8.7 - 10.5 mg/dL 9.7  9.3  9.3  9.2  8.7  8.2 Low   8.5 Low     Creatinine 0.5 - 1.4 mg/dL 5.7 High   6.2 High   4.5 High   6.3 High    4.6 High   8.3 High   7.6 High     Albumin 3.5 - 5.2 g/dL 2.2 Low   1.9 Low   2.0 Low   2.1 Low   2.3 Low   2.0 Low   2.3 Low     Phosphorus 2.7 - 4.5 mg/dL 5.2 High   4.9 High   3.6  4.1  3.5  5.1 High   5.8 High     eGFR >60 mL/min/1.73 m^2 8 Abnormal   7 Abnormal   10 Abnormal   7 Abnormal   10 Abnormal   5 Abnormal   5 Abnormal     Anion Gap 8 - 16 mmol/L 16  19 High   16          Assessment and plan:   Shannan Figueredo is a 67 y.o. female with PVD who is now s/p L BKA with Dr. Cobb on 9/29    - labs reviewed and stable   - will remove surgical dressing on POD 2 to inspect incision   - PT/OT   - diet as tolerated  - multimodal pain control as block wears off       Cece Carcamo MD  Staff Surgeon   Colon [facility] Rectal Surgery

## 2023-09-30 NOTE — PT/OT/SLP PROGRESS
Occupational Therapy      Patient Name:  Shannan Figueredo   MRN:  [id number]    [id number]:  Patient not seen today secondary to the adamant request of family to return tomorrow; daughter reports that Pt. Is very fatigued after PT session.  Discussed importance of OOB activity with family verbalizing understanding; wants Pt. Seen tomorrow in A.M.  Will follow-up tomorrow morning, 10/1/2023.    9/30/2023

## 2023-09-30 NOTE — PROGRESS NOTES
"[facility] ([address] Medicine  Progress Note    Patient Name: Shannan Figueredo  MRN: [id number]  Patient Class: IP- Inpatient   Admission Date: 9/17/2023  Length of Stay: 13 days  Attending Physician: JSOE Cristina MD  Primary Care Provider: Roxi, Primary Doctor        Subjective:     Principal Problem:Gangrene of toe of left foot        HPI:  Ms. Figueredo is a 67/F with PMH HTN, DMII (diet-controlled), PVD, ESRD on HD (M/W/F), anemia who presented to [facility] 09/17 with a two day progressive history of worsening L foot pain, swelling, fatigue, nausea and vomiting. History obtained from patient and daughter Stephanie ([phone]); they are from the [place], [place] area. Patient reports several weeks ago she "stubbed her toe," noting initially some pain, redness and swelling. Gradually symptoms progressed with duskiness to her toe and she informed her daughter of her pain for which she was brought to ER in [place] on 08/25 for further evaluation. With duskiness to 1st toe and surrounding erythema she was treated for cellulitis with a course of ciprofloxacin and metronidazole, but failed to improve. She followed up with her podiatrist 09/12 where she was prescribed hydrocodone-acetaminophen and a ten day course of TMP-SMX which she has been taking. She was referred to vascular surgery with plan for angiography and potential intervention  She and her family came to [place] for the weekend but her pain worsened; had been attempting to minimize use of hydrocodone-acetaminophen but developed nausea, vomiting and progressive fatigue. She subsequently presented to ED for further evaluation. ED workup was notable for L 1st toe dry-appearing gangrene with surrounding erythema and swelling, removed nail of second toe, XR L foot demonstrating diffuse osteopenia, L calcaneal and forefoot soft tissue swelling without definitive evidence of osteomyelitis in setting of osteopenia, no leukocytosis, elevated " sed rate and CRP, and elevated BUN/Cr in setting of ESRD. She notes she went without dialysis on 09/15 due to her trip. Blood cultures were ordered and she received piperacillin-tazobactam and vancomycin. Hospital medicine was subsequently contacted for admission.      Overview/Hospital Course:  Admitted with gangrene of L 1st toe.  Empirically treated with Zosyn and vancomycin.  Vascular Surgery, nephrology, podiatry consulted; U/S arterial demonstrated L SFA occlusion with distal reconstitution. Cardiology consulted, angiography with no vessels amenable for angioplasty on 9/20.  Underwent exploration of the left lower extremity vessels, femoral and popliteal bypass unable to be performed due to no adequate available vessels for bypass on 9/22.      Interval History: No acute events overnight. Feeling well post-op with some anticipated pain. Flatter affect this morning; suspect related to adjustment to amputation. Discussed with patient and daughter at bedside. No other concerns at this time.    Review of Systems   Constitutional:  Negative for chills and fever.   Respiratory:  Negative for cough and shortness of breath.    Cardiovascular:  Negative for chest pain and palpitations.   Gastrointestinal:  Negative for abdominal pain, nausea and vomiting.     Objective:     Vital Signs (Most Recent):  Temp: 97.6 °F (36.4 °C) (09/30/23 0854)  Pulse: 70 (09/30/23 0854)  Resp: 18 (09/30/23 0915)  BP: (!) 120/57 (09/30/23 0854)  SpO2: 98 % (09/30/23 0854) Vital Signs (24h Range):  Temp:  [97 °F (36.1 °C)-98.5 °F (36.9 °C)] 97.6 °F (36.4 °C)  Pulse:  [63-74] 70  Resp:  [16-18] 18  SpO2:  [96 %-100 %] 98 %  BP: (107-127)/(53-59) 120/57     Weight: 61.2 kg (134 lb 14.7 oz)  Body mass index is 22.45 kg/m².    Intake/Output Summary (Last 24 hours) at 9/30/2023 1056  Last data filed at 9/30/2023 0139  Gross per 24 hour   Intake 770 ml   Output 0 ml   Net 770 ml         Physical Exam  Vitals and nursing note reviewed.    Constitutional:       General: She is not in acute distress.     Appearance: She is well-developed.   HENT:      Head: Normocephalic and atraumatic.   Eyes:      General:         Right eye: No discharge.         Left eye: No discharge.      Conjunctiva/sclera: Conjunctivae normal.   Cardiovascular:      Rate and Rhythm: Normal rate.      Pulses: Normal pulses.   Pulmonary:      Effort: Pulmonary effort is normal. No respiratory distress.   Abdominal:      Palpations: Abdomen is soft.      Tenderness: There is no abdominal tenderness.   Musculoskeletal:         General: Normal range of motion.      Right lower leg: No edema.      Comments: s/p LLE BKA with dressing in place.   Skin:     General: Skin is warm and dry.   Neurological:      Mental Status: She is alert and oriented to person, place, and time.           Significant Labs:   CBC:  Recent Labs   Lab 09/28/23  0508 09/29/23  0551 09/30/23  0519   WBC 17.79* 21.30* 18.59*   HGB 9.5* 9.4* 9.2*   HCT 30.1* 29.3* 29.4*    252 264   GRAN 74.4*  13.2* 78.1*  16.6* 79.3*  14.7*   LYMPH 7.3*  1.3 6.8*  1.4 6.7*  1.3   MONO 15.0  2.7* 12.5  2.7* 11.0  2.1*   EOS 0.3 0.2 0.0   BASO 0.10 0.10 0.06   BMP:  Recent Labs   Lab 09/28/23  0508 09/29/23  0551 09/30/23  0519   * 135* 138   K 3.4* 4.1 4.0   CL 93* 93* 96   CO2 25 23 26   BUN 22 36* 34*   CREATININE 4.5* 6.2* 5.7*   GLU 73 73 89   CALCIUM 9.3 9.3 9.7   MG 2.0 2.1 2.1   PHOS 3.6 4.9* 5.2*     Significant Imaging: I have reviewed and interpreted all pertinent imaging results/findings within the past 24 hours.      Assessment/Plan:      * Gangrene of toe of left foot  HTN, PVD  - L 1st toe gangrene predominantly dry with some surrounding erythema/swelling; with concern for worsening infection started piperacillin-tazobactam and vancomycin IV. 2nd toe with involvement as well.  - U/S arterial BLE showed L distal SFA occlusion with distal reconstitution. Discussed with vascular surgery and  "cardiology consulted for consideration of angiography / potential angioplasty.  - Podiatry consulted. MRI L foot with "examination markedly degraded by patient motion artifact. Questionable marrow edema within the 1st distal phalanx, not well evaluated given aforementioned limitation but possibly related to osteomyelitis given reported history of gangrene."  - Blood cultures showed no growth.  - s/p angiogram remarkable for severe PAD with no areas amenable to angioplasty on 9/20. Elected to undergo attempt at revascularization 09/22 but unable to perform femoral and popliteal bypass on 9/22. Discussed surgical options with BKA only viable for appropriate healing given extent of vascular disease.   - Underwent BKA 09/30. Leukocytosis improving. Given removal of infectious source, discontinue PO antibiotics.  - Continue hydrocodone-acetaminophen 5-325mg PO q6hr PRN, oxycodone-acetaminophen 7.5-325mg PO q6hr PRN. Continue hydromorphone 0.2mg IV q6hr PRN for breakthrough pain.  - Continue carvedilol 12.5mg PO BID, losartan 100mg PO daily, nifedipine 60mg PO BID.    Type 2 diabetes mellitus with chronic kidney disease on chronic dialysis  - Reports diet-controlled; no current medications.  - HgbA1c 5.7.  - Continue low-dose sliding scale insulin aspart 0-5U subQ TIDWM PRN, monitor requirements.    ESRD (end stage renal disease)  Anemia, hyperparathyroidism  - ESRD on HD, reports missed session Friday prior to presentation due to travel.  - Mild anemia without evidence of bleeding.  - Continue allopurinol 300mg PO daily, calcium acetate 2001mg PO TIDWM.  - Transfused 1U pRBCs 09/25 with appropriate response.  - Nephrology consulted for HD assistance.    VTE Risk Mitigation (From admission, onward)         Ordered     IP VTE HIGH RISK PATIENT  Once         09/28/23 1906                Discharge Planning   LUBNA:      Code Status: Full Code   Is the patient medically ready for discharge?:     Reason for patient still in " hospital (select all that apply): Treatment  Discharge Plan A: (P) Skilled Nursing Facility   Discharge Delays: (!) Procedure Scheduling (IR, OR, Labs, Echo, Cath, Echo, EEG)      TANESHA Cristina MD  Department of Hospital Medicine   [facility] - Med Surg ([address])

## 2023-09-30 NOTE — SUBJECTIVE & OBJECTIVE
Interval History: No acute events overnight. Feeling well post-op with some anticipated pain. Flatter affect this morning; suspect related to adjustment to amputation. Discussed with patient and daughter at bedside. No other concerns at this time.    Review of Systems   Constitutional:  Negative for chills and fever.   Respiratory:  Negative for cough and shortness of breath.    Cardiovascular:  Negative for chest pain and palpitations.   Gastrointestinal:  Negative for abdominal pain, nausea and vomiting.     Objective:     Vital Signs (Most Recent):  Temp: 97.6 °F (36.4 °C) (09/30/23 0854)  Pulse: 70 (09/30/23 0854)  Resp: 18 (09/30/23 0915)  BP: (!) 120/57 (09/30/23 0854)  SpO2: 98 % (09/30/23 0854) Vital Signs (24h Range):  Temp:  [97 °F (36.1 °C)-98.5 °F (36.9 °C)] 97.6 °F (36.4 °C)  Pulse:  [63-74] 70  Resp:  [16-18] 18  SpO2:  [96 %-100 %] 98 %  BP: (107-127)/(53-59) 120/57     Weight: 61.2 kg (134 lb 14.7 oz)  Body mass index is 22.45 kg/m².    Intake/Output Summary (Last 24 hours) at 9/30/2023 1056  Last data filed at 9/30/2023 0139  Gross per 24 hour   Intake 770 ml   Output 0 ml   Net 770 ml         Physical Exam  Vitals and nursing note reviewed.   Constitutional:       General: She is not in acute distress.     Appearance: She is well-developed.   HENT:      Head: Normocephalic and atraumatic.   Eyes:      General:         Right eye: No discharge.         Left eye: No discharge.      Conjunctiva/sclera: Conjunctivae normal.   Cardiovascular:      Rate and Rhythm: Normal rate.      Pulses: Normal pulses.   Pulmonary:      Effort: Pulmonary effort is normal. No respiratory distress.   Abdominal:      Palpations: Abdomen is soft.      Tenderness: There is no abdominal tenderness.   Musculoskeletal:         General: Normal range of motion.      Right lower leg: No edema.      Comments: s/p LLE BKA with dressing in place.   Skin:     General: Skin is warm and dry.   Neurological:      Mental Status: She is  alert and oriented to person, place, and time.           Significant Labs:   CBC:  Recent Labs   Lab 09/28/23  0508 09/29/23  0551 09/30/23  0519   WBC 17.79* 21.30* 18.59*   HGB 9.5* 9.4* 9.2*   HCT 30.1* 29.3* 29.4*    252 264   GRAN 74.4*  13.2* 78.1*  16.6* 79.3*  14.7*   LYMPH 7.3*  1.3 6.8*  1.4 6.7*  1.3   MONO 15.0  2.7* 12.5  2.7* 11.0  2.1*   EOS 0.3 0.2 0.0   BASO 0.10 0.10 0.06   BMP:  Recent Labs   Lab 09/28/23  0508 09/29/23  0551 09/30/23  0519   * 135* 138   K 3.4* 4.1 4.0   CL 93* 93* 96   CO2 25 23 26   BUN 22 36* 34*   CREATININE 4.5* 6.2* 5.7*   GLU 73 73 89   CALCIUM 9.3 9.3 9.7   MG 2.0 2.1 2.1   PHOS 3.6 4.9* 5.2*     Significant Imaging: I have reviewed and interpreted all pertinent imaging results/findings within the past 24 hours.

## 2023-09-30 NOTE — PLAN OF CARE
Spoke with daughter Stephanie regarding discharge dispo including therapy's recommendation for SNF - family agrees with plan     I certify I provided patient choice and a list to the patient/family of CMS rated SNFs. Daughter signed Patient's Choice Disclosure Form choosing the following    1.[facility]  2.[facility]  3. [facility]  4.[facility]     Daughter encouraged to provide additional choices as need for HD will make placement difficult - will review list & update CM tomorrow     Referrals forwarded via [facility]  - PPD ordered        09/30/23 7886   Post-Acute Status   Post-Acute Authorization Placement   Post-Acute Placement Status Referrals Sent   Patient choice form signed by patient/caregiver List from System Post-Acute Care   Discharge Plan   Discharge Plan A Skilled Nursing Facility

## 2023-09-30 NOTE — PLAN OF CARE
Plan of care reviewed with patient and daughter. No significant events overnight. Increase in po fluids/food encouraged. Safety maintained.    Problem: Adult Inpatient Plan of Care  Goal: Plan of Care Review  Outcome: Ongoing, Progressing     Problem: Diabetes Comorbidity  Goal: Blood Glucose Level Within Targeted Range  Outcome: Ongoing, Progressing     Problem: Skin Injury Risk Increased  Goal: Skin Health and Integrity  Outcome: Ongoing, Progressing     Problem: Hemodynamic Instability (Hemodialysis)  Goal: Effective Tissue Perfusion  Outcome: Ongoing, Progressing     Problem: Fall Injury Risk  Goal: Absence of Fall and Fall-Related Injury  Outcome: Ongoing, Progressing     Problem: Oral Intake Inadequate  Goal: Improved Oral Intake  Outcome: Ongoing, Progressing     Problem: Adjustment to Surgery (Extremity Amputation)  Goal: Optimal Coping with Amputation  Outcome: Ongoing, Progressing     Problem: Bleeding (Extremity Amputation)  Goal: Absence of Bleeding  Outcome: Ongoing, Progressing     Problem: Bowel Motility Impaired (Extremity Amputation)  Goal: Effective Bowel Elimination  Outcome: Ongoing, Progressing     Problem: Fluid and Electrolyte Imbalance (Extremity Amputation)  Goal: Fluid and Electrolyte Balance  Outcome: Ongoing, Progressing     Problem: Functional Ability Impaired (Extremity Amputation)  Goal: Optimal Functional Ability  Outcome: Ongoing, Progressing     Problem: Infection (Extremity Amputation)  Goal: Absence of Infection Signs and Symptoms  Outcome: Ongoing, Progressing     Problem: Pain (Extremity Amputation)  Goal: Acceptable Pain Control  Outcome: Ongoing, Progressing     Problem: Residual Limb Care (Extremity Amputation)  Goal: Optimal Residual Limb Healing  Outcome: Ongoing, Progressing

## 2023-09-30 NOTE — NURSING
Plan of care reviewed with patient and daughter.  VSS on room air.  Pain controlled with PRN pain medication.  Blood Glucose monitored no insulin administered per pain scale.  Patient daughter remains at the bedside.  Safety maintained, bed in lowest position, call bell within reach, bed alarm set.  Will continue to monitor.

## 2023-09-30 NOTE — PT/OT/SLP EVAL
"Physical Therapy Evaluation    Patient Name:  Shannan Figueredo   MRN:  [id number]    Recommendations:     Discharge Recommendations: nursing facility, skilled   Discharge Equipment Recommendations:  (TBD @ next level of care)   Barriers to discharge: Inaccessible home and Decreased caregiver support    Assessment:     Shannan Figueredo is a 67 y.o. female admitted with a medical diagnosis of Gangrene of toe of left foot.  She presents with the following impairments/functional limitations: weakness, gait instability, impaired balance, impaired functional mobility, impaired self care skills, impaired endurance, impaired skin, pain, decreased lower extremity function .    Pt did not speak today - daughter said when she is overwhelmed this happens. Pt did nod her head to answer my questions. She was agreeable to proceed with PT. She required Max A of 2 for sit<>stand with RW, CGA sitting EOB, Max A of 2 for supine<>sit, dependent of 2 scoot to HOB. Pt appropriate for SNF.    Rehab Prognosis: Fair; patient would benefit from acute skilled PT services to address these deficits and reach maximum level of function.    Recent Surgery: Procedure(s) (LRB):  AMPUTATION, BELOW KNEE / LEFT (Left) 1 Day Post-Op    Plan:     During this hospitalization, patient to be seen 5 x/week to address the identified rehab impairments via gait training, therapeutic activities, therapeutic exercises, wheelchair management/training and progress toward the following goals:    Plan of Care Expires:  10/30/23    Subjective     Chief Complaint: pt did not c/o  Patient/Family Comments/goals: "She doesn't speak when she is overwhelmed."  Pain/Comfort:  Pain Rating 1: other (see comments) (did not rate)  Location - Side 1: Left  Location - Orientation 1: generalized  Location 1: leg  Pain Addressed 1: Reposition, Distraction  Pain Rating Post-Intervention 1: other (see comments) (did not rate)    Patients cultural, spiritual, Faith conflicts given " the current situation: no    Living Environment:  Pt lives with daughter in a multi-level home with no steps to enter, in [place], [place]. Her bed and bathroom are on the second level of the home. She has access to a tub-shower combination in the home. She ambulates with a rollator for community distances.       Equipment used at home: rollator, bath bench, cane, straight.  DME owned (not currently used): none.  Upon discharge, patient will have assistance from daughter.    Objective:     Communicated with nurse prior to session.  Patient found HOB elevated with telemetry, peripheral IV  upon PT entry to room.    General Precautions: Standard, diabetic, fall  Orthopedic Precautions:LLE non weight bearing   Braces: N/A  Respiratory Status: Room air    Exams:  Exams:  Cognition:   Patient is oriented but nonverbal at this time  Pt follows approximately 75% of simple commands.    Mood: cooperative.   Safety Awareness: mildly in tact  Musculoskeletal:  Posture:  rounded shoulders, forward head  Integument:  cannot visualize LLE due to surgical wraps and ace bandage      Functional Mobility:  Bed Mobility:     Rolling Left:  maximal assistance and of 2 persons  Rolling Right: maximal assistance and of 2 persons  Scooting: dependence and of 2 persons  Supine to Sit: maximal assistance and of 2 persons  Sit to Supine: maximal assistance and of 2 persons  Transfers:     Sit to Stand:  maximal assistance and of 2 persons with rolling walker      AM-PAC 6 CLICK MOBILITY  Total Score:8       Treatment & Education:  Max cueing, education on sit<>stand technique, importance of daily mobility for healing and strengthening    Patient left HOB elevated with all lines intact, call button in reach, bed alarm on, and daughter present.    GOALS:   Multidisciplinary Problems       Physical Therapy Goals          Problem: Physical Therapy    Goal Priority Disciplines Outcome Goal Variances Interventions   Physical Therapy Goal     PT, PT/OT  Ongoing, Progressing     Description: Patient will perform the following to increase strength, improve mobility, and return to prior level of function:    1. Supine <> sit without bed features.  2. Rolling R/L without bed features and SBA.   3. Sit<>stand with min A of 2 with RW  3. OOB mobility assessment                           History:     Past Medical History:   Diagnosis Date    Coronary artery disease     Diabetes mellitus, type II     End stage renal disease     Hyperlipidemia     Hypertension     Peripheral vascular disease        Past Surgical History:   Procedure Laterality Date    ANGIOGRAPHY OF LOWER EXTREMITY Left 9/20/2023    Procedure: Angiogram Extremity Unilateral;  Surgeon: Rao Sosa MD;  Location: [facility], [place] CATH LAB;  Service: Cardiology;  Laterality: Left;  right femoral access    CAROTID ENDARTERECTOMY Left     CREATION OF FEMOROPOPLITEAL ARTERIAL BYPASS USING GRAFT  9/22/2023    Procedure: CREATION, BYPASS, ARTERIAL, FEMORAL TO POPLITEAL, USING GRAFT;  Surgeon: Franky Cobb Jr., MD;  Location: [facility] [place] OR;  Service: General;;  EXPLORATION VESSEL FEM & POP     HYSTERECTOMY         Time Tracking:     PT Received On: 09/30/23  PT Start Time: 0956     PT Stop Time: 1019  PT Total Time (min): 23 min     Billable Minutes: Evaluation 13 and Therapeutic Activity 10      09/30/2023

## 2023-10-01 NOTE — PROGRESS NOTES
"[facility] ([address] Medicine  Progress Note    Patient Name: Shannan Figueredo  MRN: [id number]  Patient Class: IP- Inpatient   Admission Date: 9/17/2023  Length of Stay: 14 days  Attending Physician: JOSE Cristina MD  Primary Care Provider: Roxi, Primary Doctor        Subjective:     Principal Problem:Gangrene of toe of left foot        HPI:  Ms. Figueredo is a 67/F with PMH HTN, DMII (diet-controlled), PVD, ESRD on HD (M/W/F), anemia who presented to [facility] 09/17 with a two day progressive history of worsening L foot pain, swelling, fatigue, nausea and vomiting. History obtained from patient and daughter Stephanie ([phone]); they are from the [place], [place] area. Patient reports several weeks ago she "stubbed her toe," noting initially some pain, redness and swelling. Gradually symptoms progressed with duskiness to her toe and she informed her daughter of her pain for which she was brought to ER in [place] on 08/25 for further evaluation. With duskiness to 1st toe and surrounding erythema she was treated for cellulitis with a course of ciprofloxacin and metronidazole, but failed to improve. She followed up with her podiatrist 09/12 where she was prescribed hydrocodone-acetaminophen and a ten day course of TMP-SMX which she has been taking. She was referred to vascular surgery with plan for angiography and potential intervention  She and her family came to [place] for the weekend but her pain worsened; had been attempting to minimize use of hydrocodone-acetaminophen but developed nausea, vomiting and progressive fatigue. She subsequently presented to ED for further evaluation. ED workup was notable for L 1st toe dry-appearing gangrene with surrounding erythema and swelling, removed nail of second toe, XR L foot demonstrating diffuse osteopenia, L calcaneal and forefoot soft tissue swelling without definitive evidence of osteomyelitis in setting of osteopenia, no leukocytosis, elevated " sed rate and CRP, and elevated BUN/Cr in setting of ESRD. She notes she went without dialysis on 09/15 due to her trip. Blood cultures were ordered and she received piperacillin-tazobactam and vancomycin. Hospital medicine was subsequently contacted for admission.      Overview/Hospital Course:  Admitted with gangrene of L 1st toe.  Empirically treated with Zosyn and vancomycin.  Vascular Surgery, nephrology, podiatry consulted; U/S arterial demonstrated L SFA occlusion with distal reconstitution. Cardiology consulted, angiography with no vessels amenable for angioplasty on 9/20.  Underwent exploration of the left lower extremity vessels, femoral and popliteal bypass unable to be performed due to no adequate available vessels for bypass on 9/22.      Interval History: No acute events overnight. Feeling well this morning. Discussed therapy recommendations for SNF; will pursue. No other concerns at this time.    Review of Systems   Constitutional:  Negative for chills and fever.   Respiratory:  Negative for cough and shortness of breath.    Cardiovascular:  Negative for chest pain and palpitations.   Gastrointestinal:  Negative for abdominal pain, nausea and vomiting.     Objective:     Vital Signs (Most Recent):  Temp: 98.5 °F (36.9 °C) (10/01/23 1145)  Pulse: 70 (10/01/23 1145)  Resp: 18 (10/01/23 1145)  BP: (!) 127/59 (10/01/23 1145)  SpO2: (!) 94 % (10/01/23 1145) Vital Signs (24h Range):  Temp:  [97.4 °F (36.3 °C)-98.5 °F (36.9 °C)] 98.5 °F (36.9 °C)  Pulse:  [63-71] 70  Resp:  [16-18] 18  SpO2:  [94 %-98 %] 94 %  BP: (103-145)/(51-66) 127/59     Weight: 61.2 kg (134 lb 14.7 oz)  Body mass index is 22.45 kg/m².    Intake/Output Summary (Last 24 hours) at 10/1/2023 1341  Last data filed at 10/1/2023 0408  Gross per 24 hour   Intake 80 ml   Output 0 ml   Net 80 ml         Physical Exam  Vitals and nursing note reviewed.   Constitutional:       General: She is not in acute distress.     Appearance: She is  well-developed.   HENT:      Head: Normocephalic and atraumatic.   Eyes:      General:         Right eye: No discharge.         Left eye: No discharge.      Conjunctiva/sclera: Conjunctivae normal.   Cardiovascular:      Rate and Rhythm: Normal rate.      Pulses: Normal pulses.   Pulmonary:      Effort: Pulmonary effort is normal. No respiratory distress.   Abdominal:      Palpations: Abdomen is soft.      Tenderness: There is no abdominal tenderness.   Musculoskeletal:         General: Normal range of motion.      Right lower leg: No edema.      Comments: s/p LLE BKA with dressing in place.   Skin:     General: Skin is warm and dry.   Neurological:      Mental Status: She is alert and oriented to person, place, and time.             Significant Labs:   CBC:  Recent Labs   Lab 09/29/23  0551 09/30/23  0519 10/01/23  0511   WBC 21.30* 18.59* 15.83*   HGB 9.4* 9.2* 8.2*   HCT 29.3* 29.4* 26.3*    264 282   GRAN 78.1*  16.6* 79.3*  14.7* 75.1*  11.9*   LYMPH 6.8*  1.4 6.7*  1.3 9.4*  1.5   MONO 12.5  2.7* 11.0  2.1* 10.9  1.7*   EOS 0.2 0.0 0.2   BASO 0.10 0.06 0.10   BMP:  Recent Labs   Lab 09/29/23  0551 09/30/23  0519 10/01/23  0511   * 138 136   K 4.1 4.0 3.8   CL 93* 96 96   CO2 23 26 26   BUN 36* 34* 47*   CREATININE 6.2* 5.7* 7.2*   GLU 73 89 92   CALCIUM 9.3 9.7 9.6   MG 2.1 2.1 2.1   PHOS 4.9* 5.2* 4.9*     Significant Imaging: I have reviewed and interpreted all pertinent imaging results/findings within the past 24 hours.      Assessment/Plan:      * Gangrene of toe of left foot  HTN, PVD  - L 1st toe gangrene predominantly dry with some surrounding erythema/swelling; with concern for worsening infection started piperacillin-tazobactam and vancomycin IV. 2nd toe with involvement as well.  - U/S arterial BLE showed L distal SFA occlusion with distal reconstitution. Discussed with vascular surgery and cardiology consulted for consideration of angiography / potential angioplasty.  -  "Podiatry consulted. MRI L foot with "examination markedly degraded by patient motion artifact. Questionable marrow edema within the 1st distal phalanx, not well evaluated given aforementioned limitation but possibly related to osteomyelitis given reported history of gangrene."  - Blood cultures showed no growth.  - s/p angiogram remarkable for severe PAD with no areas amenable to angioplasty on 9/20. Elected to undergo attempt at revascularization 09/22 but unable to perform femoral and popliteal bypass on 9/22. Discussed surgical options with BKA only viable for appropriate healing given extent of vascular disease.   - Underwent BKA 09/30. Leukocytosis improving. Therapy recommending SNF.  - Continue hydrocodone-acetaminophen 5-325mg PO q6hr PRN, oxycodone-acetaminophen 7.5-325mg PO q6hr PRN. Continue hydromorphone 0.2mg IV q6hr PRN for breakthrough pain.  - Continue carvedilol 12.5mg PO BID, losartan 100mg PO daily, nifedipine 60mg PO BID.    Type 2 diabetes mellitus with chronic kidney disease on chronic dialysis  - Reports diet-controlled; no current medications.  - HgbA1c 5.7.  - Continue low-dose sliding scale insulin aspart 0-5U subQ TIDWM PRN, monitor requirements.    ESRD (end stage renal disease)  Anemia, hyperparathyroidism  - ESRD on HD, reports missed session Friday prior to presentation due to travel.  - Mild anemia without evidence of bleeding.  - Continue allopurinol 300mg PO daily, calcium acetate 2001mg PO TIDWM.  - Transfused 1U pRBCs 09/25 with appropriate response.  - Nephrology consulted for HD assistance.    VTE Risk Mitigation (From admission, onward)         Ordered     IP VTE HIGH RISK PATIENT  Once         09/28/23 1906                Discharge Planning   LUBNA:      Code Status: Full Code   Is the patient medically ready for discharge?:     Reason for patient still in hospital (select all that apply): Treatment  Discharge Plan A: Skilled Nursing Facility   Discharge Delays: (!) Procedure " Scheduling (IR, OR, Labs, Echo, Cath, Echo, EEG)              TANESHA Cristina MD  Department of Hospital Medicine   [facility] ([address])

## 2023-10-01 NOTE — SUBJECTIVE & OBJECTIVE
Interval History: No acute events overnight. Feeling well this morning. Discussed therapy recommendations for SNF; will pursue. No other concerns at this time.    Review of Systems   Constitutional:  Negative for chills and fever.   Respiratory:  Negative for cough and shortness of breath.    Cardiovascular:  Negative for chest pain and palpitations.   Gastrointestinal:  Negative for abdominal pain, nausea and vomiting.     Objective:     Vital Signs (Most Recent):  Temp: 98.5 °F (36.9 °C) (10/01/23 1145)  Pulse: 70 (10/01/23 1145)  Resp: 18 (10/01/23 1145)  BP: (!) 127/59 (10/01/23 1145)  SpO2: (!) 94 % (10/01/23 1145) Vital Signs (24h Range):  Temp:  [97.4 °F (36.3 °C)-98.5 °F (36.9 °C)] 98.5 °F (36.9 °C)  Pulse:  [63-71] 70  Resp:  [16-18] 18  SpO2:  [94 %-98 %] 94 %  BP: (103-145)/(51-66) 127/59     Weight: 61.2 kg (134 lb 14.7 oz)  Body mass index is 22.45 kg/m².    Intake/Output Summary (Last 24 hours) at 10/1/2023 1341  Last data filed at 10/1/2023 0408  Gross per 24 hour   Intake 80 ml   Output 0 ml   Net 80 ml         Physical Exam  Vitals and nursing note reviewed.   Constitutional:       General: She is not in acute distress.     Appearance: She is well-developed.   HENT:      Head: Normocephalic and atraumatic.   Eyes:      General:         Right eye: No discharge.         Left eye: No discharge.      Conjunctiva/sclera: Conjunctivae normal.   Cardiovascular:      Rate and Rhythm: Normal rate.      Pulses: Normal pulses.   Pulmonary:      Effort: Pulmonary effort is normal. No respiratory distress.   Abdominal:      Palpations: Abdomen is soft.      Tenderness: There is no abdominal tenderness.   Musculoskeletal:         General: Normal range of motion.      Right lower leg: No edema.      Comments: s/p LLE BKA with dressing in place.   Skin:     General: Skin is warm and dry.   Neurological:      Mental Status: She is alert and oriented to person, place, and time.             Significant Labs:    CBC:  Recent Labs   Lab 09/29/23  0551 09/30/23  0519 10/01/23  0511   WBC 21.30* 18.59* 15.83*   HGB 9.4* 9.2* 8.2*   HCT 29.3* 29.4* 26.3*    264 282   GRAN 78.1*  16.6* 79.3*  14.7* 75.1*  11.9*   LYMPH 6.8*  1.4 6.7*  1.3 9.4*  1.5   MONO 12.5  2.7* 11.0  2.1* 10.9  1.7*   EOS 0.2 0.0 0.2   BASO 0.10 0.06 0.10   BMP:  Recent Labs   Lab 09/29/23  0551 09/30/23  0519 10/01/23  0511   * 138 136   K 4.1 4.0 3.8   CL 93* 96 96   CO2 23 26 26   BUN 36* 34* 47*   CREATININE 6.2* 5.7* 7.2*   GLU 73 89 92   CALCIUM 9.3 9.7 9.6   MG 2.1 2.1 2.1   PHOS 4.9* 5.2* 4.9*     Significant Imaging: I have reviewed and interpreted all pertinent imaging results/findings within the past 24 hours.

## 2023-10-01 NOTE — PROGRESS NOTES
Surgery Inpatient Progress Note    Date: 10/01/2023    Overnight events: worked with PT yesterday.  Pain controlled with medication     O:   Vitals:    10/01/23 0824   BP: (!) 145/66   Pulse: 71   Resp: 18   Temp: 97.4 °F (36.3 °C)       Physical Exam   Gen: elderly female, NAD  HEENT: normocephalic, atraumatic, PERRL, EOMI   CV: RRR, no murmurs  Resp: nonlabored, CTAB   Abd: soft, NTND   MSK: L BKA incision well approximated with staples, no hematoma, cellulitis or drainage     Labs:   Component Ref Range & Units 05:11 1 d ago 2 d ago 3 d ago 4 d ago 5 d ago 6 d ago   WBC 3.90 - 12.70 K/uL 15.83 High   18.59 High   21.30 High   17.79 High   15.51 High   14.60 High   13.81 High     RBC 4.00 - 5.40 M/uL 2.85 Low   3.22 Low   3.23 Low   3.29 Low   3.26 Low   3.07 Low   2.42 Low     Hemoglobin 12.0 - 16.0 g/dL 8.2 Low   9.2 Low   9.4 Low   9.5 Low   9.4 Low   8.9 Low   6.7 Low     Hematocrit 37.0 - 48.5 % 26.3 Low   29.4 Low   29.3 Low   30.1 Low   29.4 Low   27.5 Low   21.4 Low     MCV 82 - 98 fL 92  91  91  92  90  90  88    MCH 27.0 - 31.0 pg 28.8  28.6  29.1  28.9  28.8  29.0  27.7    MCHC 32.0 - 36.0 g/dL 31.2 Low   31.3 Low   32.1  31.6 Low   32.0  32.4  31.3 Low     RDW 11.5 - 14.5 % 16.1 High   16.0 High   15.5 High   15.5 High   14.9 High   14.3  14.4    Platelets 150 - 450 K/uL 282  264  252  235  251  214  222    MPV 9.2 - 12.9 fL 8.9 Low   8.7 Low   9.0 Low   8.6 Low   8.9 Low   9.0 Low   9.0 Low     Immature Granulocytes 0.0 - 0.5 % 2.8 High   2.6 High   1.3 High   1.3 High   1.5 High   CANCELED CM  2.8 High     Gran # (ANC) 1.8 - 7.7 K/uL 11.9 High   14.7 High   16.6 High           Assessment and plan:   Shannan Figueredo is a 67 y.o. female who is POD 2 s/p L BKA with Dr. Cobb    - dressing removed this AM and incision inspected, well approximated and healthy appearing.  Dressing replaced  - continue to work with PT/OT   - will continue to follow       Cece Carcamo MD  Staff Surgeon   Colon & Rectal  Surgery

## 2023-10-01 NOTE — PT/OT/SLP EVAL
Occupational Therapy   Evaluation and Treatment    Name: Shannan Figueredo  MRN: [id number]  Admitting Diagnosis: Gangrene of toe of left foot  Recent Surgery: Procedure(s) (LRB):  AMPUTATION, BELOW KNEE / LEFT (Left) 2 Days Post-Op    Recommendations:     Discharge Recommendations: nursing facility, skilled  Discharge Equipment Recommendations:  bedside commode, wheelchair (currently needed though will defer to next level of care)  Barriers to discharge:  Inaccessible home environment (current functional level)    Assessment:   OT eval/treat complete s/p LLE BKA.  Pt. Groggy this day.  Requiring increased cues for attention to task and repetition of commands with Pt. Following through with approx. 25-50% of commands.  MAX A for L-roll and TOTAL A for R-roll as well as MAX A for lateral scoots X 3 EOB and TOTAL A for partial stance from EOB with forward stooped posture noted; minimal follow through for cues for more upright trunk.  Has TTB, SPC, and rollator previously in use at home.  Previously MOD I with ADL and IADL.  Will require interdisciplinary therapy services to return safely to previous living situation and PLOF.  Lives on 2nd FL of home while daughter and granddaughter live on 1st level.  Has TTB, SPC, and RW currently in use at home. Currently needs BSC and W/C though will defer to next level of care.  Recommend post acute OT in SNF.  To benefit from continued acute care OT services to increase independence in self-care/functional transfers.  OT to follow.     Shannan Figueredo is a 67 y.o. female with a medical diagnosis of Gangrene of toe of left foot.  She presents with below deficits decreasing independence in self-care/functional transfers. Performance deficits affecting function: weakness, impaired endurance, impaired self care skills, impaired functional mobility, gait instability, impaired balance, impaired cognition, decreased coordination, decreased upper extremity function, decreased lower  "extremity function, decreased safety awareness, pain, impaired skin, orthopedic precautions, decreased ROM.      Rehab Prognosis: Good; patient would benefit from acute skilled OT services to address these deficits and reach maximum level of function.       Plan:     Patient to be seen 5 x/week to address the above listed problems via self-care/home management, therapeutic activities, therapeutic exercises  Plan of Care Expires: 10/15/23  Plan of Care Reviewed with: patient    Subjective     Chief Complaint: States, "no" when asked if in pain though with facial grimacing with bed mobility tasks.   Patient/Family Comments/goals: No goals stated at this time.     Occupational Profile:  Lives on 2nd level of home while her daughter and granddaughter live on the 1st level of home.  Bathroom setup as tub/shower combo.  Previously MOD I with ADL.    Equipment Used at Home: bath bench, rollator, cane, straight  Assistance upon Discharge: Has good family support with daughter living on 1st level of home though may not be able to assist with current level of care.     Pain/Comfort:  Pain Rating 1:  (facial grimace noted during bed mobility though when asked denies pain)  Pain Rating Post-Intervention 1:  (No signs of pain at rest)    Patients cultural, spiritual, Jewish conflicts given the current situation:  (None stated.)    Objective:     Communicated with: Amanda HALE RN prior to session.  Patient found HOB elevated with family present with peripheral IV, telemetry, bed alarm, SCD upon OT entry to room.    General Precautions: Standard, diabetic, fall (RUE limb alert)  Orthopedic Precautions:  (LLE BKA)  Braces: N/A  Respiratory Status: Room air    Occupational Performance:    Bed Mobility:    Supine>sit with MAX A for BLE management and increased initiation cues for reaching grab bar; requires repetition of commands and cues for attention to task.  Static sit EOB with SBA with BUE supported at bed.  Sit>supine with " TOTAL A for trunk and BLE management.  Lateral scoots X 3 EOB with MAX A and increased verbal cues and HOHA for proper placement prior to task along with increased verbal/tactile cuing for forward trunk during task.  1 backward scoot EOB with MAX A.  R-roll with TOTAL A and L-roll with MAX A with increased cuing and repetition of commands with eventual follow through.  Pt. Given tactile cues and manual assist for RLE knee flex and HOHA for hand placement at overhead rail along with cues for bridging though with 0 upward excursion noted upon attempt.  TOTAL A via draw sheet method for scooting towards HOB.      Functional Mobility/Transfers:  Sit<>stand from EOB with HHA RLE foot/knee block with MAX A for lift from bed and increased verbal and manual assist for preparatory positioning for task and forward flexed trunk - Pt. With forward stooped posture and only able to clear bed with partial stand.      Activities of Daily Living:  TOTAL A to don sock to RLE at bed level.  Fresh pad placed at bed during B-rolling task.    Cognitive/Visual Perceptual:  Cognitive/Psychosocial Skills:  -       Oriented to: Person and knows ; initially requiring questioned to be asked 3 times with Pt. Only giving month/day and perseverating on month/day when asked year; upon 3rd attempt able to state current year   -       Follows Commands/attention:Follows 40% of one-step commands and requires repetition  -       Communication: Pt. Not initiating communication this day; requires repetition and increased time to answer yes/no questions as well as basic orientation questions if answering at all  -       Memory: Deficits noted  -       Safety awareness/insight to disability: impaired   -       Mood/Affect/Coping skills/emotional control: Lethargic and Groggy  Visual/Perceptual:  -grossly intact    Physical Exam:  Postural examination/scapula alignment: -       Rounded shoulders  -       Forward head  Skin integrity: Visible skin intact  and ace wrapping with extension board noted to new HUEY BKSHHAEED  Upper Extremity Range of Motion:  -       Right Upper Extremity: Pt. With decreased command following; demos approx. 40% of active shoulder flexion, full elbow extension, 50% of elbow flexion during purposeful movement, and able to maintain  at bed railing once hands were placed  -       Left Upper Extremity: Pt. With decreased command following; demos approx. 50% of active shoulder flexion, full elbow extension, 50% of elbow flexion during purposeful movement, and able to maintain  at bed railing once hands were placed  Upper Extremity Strength: -       Right Upper Extremity: at least 3-/5 to 3/5 grossly; Pt. With decreased command following and impaired cognition limiting MMT  -       Left Upper Extremity: at least 3-/5 to 3/5 grossly; Pt. With decreased command following and impaired cognition limiting MMT   Strength: able to maintain  at bed railing once hands were placed bilaterally    AMPAC 6 Click ADL:  AMPAC Total Score: 10    Treatment & Education:  Educated on role of OT and POC.   Supine>sit with MAX A for BLE management and increased initiation cues for reaching grab bar; requires repetition of commands and cues for attention to task.  Static sit EOB with SBA with BUE supported at bed.  Sit>supine with TOTAL A for trunk and BLE management.  Lateral scoots X 3 EOB with MAX A and increased verbal cues and HOHA for proper placement prior to task along with increased verbal/tactile cuing for forward trunk during task.  1 backward scoot EOB with MAX A.  R-roll with TOTAL A and L-roll with MAX A with increased cuing and repetition of commands with eventual follow through.  Pt. Given tactile cues and manual assist for RLE knee flex and HOHA for hand placement at overhead rail along with cues for bridging though with 0 upward excursion noted upon attempt.  TOTAL A via draw sheet method for scooting towards HOB.    Sit<>stand from EOB  with HHA RLE foot/knee block with MAX A for lift from bed and increased verbal and manual assist for preparatory positioning for task and forward flexed trunk - Pt. With forward stooped posture and only able to clear bed with partial stand.    TOTAL A to don sock to RLE at bed level.  Fresh pad placed at bed during B-rolling task.  Received call light review and importance of calling for assist as needed.     Patient left HOB elevated with all lines intact, call button in reach, bed alarm on, nursing notified, and sisters and PCT present    GOALS:   Multidisciplinary Problems       Occupational Therapy Goals          Problem: Occupational Therapy    Goal Priority Disciplines Outcome Interventions   Occupational Therapy Goal     OT, PT/OT Ongoing, Progressing    Description: OT evaluation completed s/p LLE BKA with goals updated as appropriate.      Goals to be met by: 10/15/2023     Patient will increase functional independence with ADLs by performing:    UE Dressing with Moderate Assistance.  LE Dressing with Maximum Assistance.  Grooming while EOB with Minimum Assistance.  Toileting from bedside commode with Maximum Assistance for hygiene and clothing management.   Bathing from sitting at sink with Moderate Assistance.  Toilet transfer to bedside commode with Maximum Assistance.                             History:     Past Medical History:   Diagnosis Date    Coronary artery disease     Diabetes mellitus, type II     End stage renal disease     Hyperlipidemia     Hypertension     Peripheral vascular disease          Past Surgical History:   Procedure Laterality Date    ANGIOGRAPHY OF LOWER EXTREMITY Left 9/20/2023    Procedure: Angiogram Extremity Unilateral;  Surgeon: Rao Sosa MD;  Location[facility] CATH LAB;  Service: Cardiology;  Laterality: Left;  right femoral access    CAROTID ENDARTERECTOMY Left     CREATION OF FEMOROPOPLITEAL ARTERIAL BYPASS USING GRAFT  9/22/2023    Procedure: CREATION, BYPASS,  ARTERIAL, FEMORAL TO POPLITEAL, USING GRAFT;  Surgeon: Franky Cobb Jr., MD;  Location: [facility];  Service: General;;  EXPLORATION VESSEL FEM & POP     HYSTERECTOMY         Time Tracking:     OT Date of Treatment: 10/01/23  OT Start Time: 1113  OT Stop Time: 1145  OT Total Time (min): 32 min    Billable Minutes:Evaluation 9  Therapeutic Activity 23    10/1/2023

## 2023-10-01 NOTE — PLAN OF CARE
Problem: Occupational Therapy  Goal: Occupational Therapy Goal  Description: OT evaluation completed s/p LLE BKA with goals updated as appropriate.      Goals to be met by: 10/15/2023     Patient will increase functional independence with ADLs by performing:    UE Dressing with Moderate Assistance.  LE Dressing with Maximum Assistance.  Grooming while EOB with Minimum Assistance.  Toileting from bedside commode with Maximum Assistance for hygiene and clothing management.   Bathing from sitting at sink with Moderate Assistance.  Toilet transfer to bedside commode with Maximum Assistance.    Outcome: Ongoing, Progressing     OT eval/treat complete s/p LLE BKA.  Has TTB, SPC, and rollator previously in use at home.  Currently needs BSC and W/C though will defer to next level of care.  Recommend post acute OT in SNF.  To benefit from continued acute care OT services to increase independence in self-care/functional transfers.  OT to follow.

## 2023-10-01 NOTE — ASSESSMENT & PLAN NOTE
"HTN, PVD  - L 1st toe gangrene predominantly dry with some surrounding erythema/swelling; with concern for worsening infection started piperacillin-tazobactam and vancomycin IV. 2nd toe with involvement as well.  - U/S arterial BLE showed L distal SFA occlusion with distal reconstitution. Discussed with vascular surgery and cardiology consulted for consideration of angiography / potential angioplasty.  - Podiatry consulted. MRI L foot with "examination markedly degraded by patient motion artifact. Questionable marrow edema within the 1st distal phalanx, not well evaluated given aforementioned limitation but possibly related to osteomyelitis given reported history of gangrene."  - Blood cultures showed no growth.  - s/p angiogram remarkable for severe PAD with no areas amenable to angioplasty on 9/20. Elected to undergo attempt at revascularization 09/22 but unable to perform femoral and popliteal bypass on 9/22. Discussed surgical options with BKA only viable for appropriate healing given extent of vascular disease.   - Underwent BKA 09/30. Leukocytosis improving. Therapy recommending SNF.  - Continue hydrocodone-acetaminophen 5-325mg PO q6hr PRN, oxycodone-acetaminophen 7.5-325mg PO q6hr PRN. Continue hydromorphone 0.2mg IV q6hr PRN for breakthrough pain.  - Continue carvedilol 12.5mg PO BID, losartan 100mg PO daily, nifedipine 60mg PO BID.  "

## 2023-10-01 NOTE — PLAN OF CARE
Plan of care reviewed with patient and daughter. No significant events overnight. BP medication not given due to low BP. Pain controlled with PRN pain medication. Increase in po fluids/food encouraged. Safety maintained.     Problem: Adult Inpatient Plan of Care  Goal: Plan of Care Review  Outcome: Ongoing, Progressing     Problem: Diabetes Comorbidity  Goal: Blood Glucose Level Within Targeted Range  Outcome: Ongoing, Progressing     Problem: Skin Injury Risk Increased  Goal: Skin Health and Integrity  Outcome: Ongoing, Progressing     Problem: Hemodynamic Instability (Hemodialysis)  Goal: Effective Tissue Perfusion  Outcome: Ongoing, Progressing     Problem: Fall Injury Risk  Goal: Absence of Fall and Fall-Related Injury  Outcome: Ongoing, Progressing     Problem: Oral Intake Inadequate  Goal: Improved Oral Intake  Outcome: Ongoing, Progressing     Problem: Adjustment to Surgery (Extremity Amputation)  Goal: Optimal Coping with Amputation  Outcome: Ongoing, Progressing     Problem: Bleeding (Extremity Amputation)  Goal: Absence of Bleeding  Outcome: Ongoing, Progressing     Problem: Bowel Motility Impaired (Extremity Amputation)  Goal: Effective Bowel Elimination  Outcome: Ongoing, Progressing     Problem: Fluid and Electrolyte Imbalance (Extremity Amputation)  Goal: Fluid and Electrolyte Balance  Outcome: Ongoing, Progressing     Problem: Functional Ability Impaired (Extremity Amputation)  Goal: Optimal Functional Ability  Outcome: Ongoing, Progressing     Problem: Infection (Extremity Amputation)  Goal: Absence of Infection Signs and Symptoms  Outcome: Ongoing, Progressing     Problem: Pain (Extremity Amputation)  Goal: Acceptable Pain Control  Outcome: Ongoing, Progressing     Problem: Residual Limb Care (Extremity Amputation)  Goal: Optimal Residual Limb Healing  Outcome: Ongoing, Progressing

## 2023-10-01 NOTE — PROGRESS NOTES
"Nephrology  Progress Note    Admit Date: 9/17/2023   LOS: 14 days     SUBJECTIVE:     Follow-up For:  Gangrene of toe of left foot    History of Present Illness:  Patient is a 67 y.o. female presents with left foot gangrene, vomiting, feeling ill.  Ext med hx as outlined below including ESRD on HD MWF in [place].  Has been dealing with necrotic left toes for few months and followed by vascular in [place].  Was here visiting daughter and felt bad.  Admitted for eval/treatment.  Consulted for HD needs.  Pt seen and examined on HD.  Consents signed.  Tired from being up all night with pain.  Updated team and daughter at bedside.  W/up noted.       Interval History:   Seen and examined. Daughter at bedside. Now s/p BKA. Remains with flat affect.       Review of Systems:  Constitutional: No fever or chills  Respiratory: No cough or shortness of breath  Cardiovascular: No chest pain or palpitations  Gastrointestinal: No nausea or vomiting  Neurological: No confusion or weakness    OBJECTIVE:     Vital Signs Range (Last 24H):  BP (!) 127/59 (BP Location: Left arm, Patient Position: Lying)   Pulse 70   Temp 98.5 °F (36.9 °C) (Oral)   Resp 18   Ht 5' 5" (1.651 m)   Wt 61.2 kg (134 lb 14.7 oz)   SpO2 (!) 94%   BMI 22.45 kg/m²     Temp:  [97.4 °F (36.3 °C)-98.5 °F (36.9 °C)]   Pulse:  [63-71]   Resp:  [16-18]   BP: (103-145)/(51-66)   SpO2:  [94 %-98 %]     I & O (Last 24H):  Intake/Output Summary (Last 24 hours) at 10/1/2023 1205  Last data filed at 10/1/2023 0408  Gross per 24 hour   Intake 80 ml   Output 0 ml   Net 80 ml         Physical Exam:  General appearance:  Frail.  Appears older than stated age.    Eyes:  Conjunctivae/corneas clear. PERRL.  Lungs: Normal respiratory effort,   clear to auscultation bilaterally   Heart: Regular rate and rhythm, S1, S2 normal, no murmur, rub or nelly.  Abdomen: Soft, non-tender non-distended; bowel sounds normal; no masses,  no organomegaly  Extremities: No cyanosis or clubbing. No " "edema.    Skin: Skin color, texture, turgor normal. No rashes or lesions  Neurologic: Normal strength and tone. No focal numbness or weakness   Right arm AVF+  Left big/second toes necrotic.  leg scar noted.     Laboratory Data:  Recent Labs   Lab 10/01/23  0511   WBC 15.83*   RBC 2.85*   HGB 8.2*   HCT 26.3*      MCV 92   MCH 28.8   MCHC 31.2*         BMP:   Recent Labs   Lab 10/01/23  0511   GLU 92      K 3.8   CL 96   CO2 26   BUN 47*   CREATININE 7.2*   CALCIUM 9.6   MG 2.1   PHOS 4.9*       Lab Results   Component Value Date    CALCIUM 9.6 10/01/2023    PHOS 4.9 (H) 10/01/2023       Lab Results   Component Value Date    CALCIUM 9.6 10/01/2023    PHOS 4.9 (H) 10/01/2023       No results found for: "URICACID"    BNP  No results for input(s): "BNP", "BNPTRIAGEBLO" in the last 168 hours.    Medications:  Medication list was reviewed and changes noted under Assessment/Plan.    Diagnostic Results:    reviewed    ASSESSMENT/PLAN:       ESRD on HD MWF in [place]:  -consulted for HD needs.  -consents signed  -continue HD MWF while here and if she stays after hospitalization will need temp outpt unit.   -labs noted and bath adjusted.  -R arm AVF+  -9/19: HD yesterday w/o issue.  No need today.  -9/20:  HD today after angio  -9/21:  no need for HD today.  Cont MWF if stays here.    -9/22:  seen on HD this am.   -9/25:  HD with Blood today.    -9/26:  stable from renal to [place] back [place] w/ family.    -9/27:  seen on HD.  UF 2 L.    -9/28:  nothing today.   -9/29:  seen on HD.  Renally dose meds, avoid nephrotoxins, and monitor I/O's closely.  -10/1: stable today; plan for next dialysis session on Monday.    Necrotic Left toes:  S/p BKA on 9/29  -defer to pod/vascular/cards.  -angio noted with severe PAD  -Status post exploration femoral and popliteal arteries 9/22 with Non-reconstructible peripheral vascular disease.  Patient deciding on amputation. I think patient should have this done in [place] where she " lives. At this point she just needs pain control.  Needs to discuss with OWN Team in [place] who they prefer to use.  -need answer about location of surgery.  If here will need ext rehab/HD setup.  Risks of delay explained.   -s/p BKA on 9/29.  Will need SW to work on Rehab/HD unit post op.         HTN:  -UF on HD  -increased ARB/CCB. Will follow  -UF on HD today.  Likely pain related.   -9/28:  adjust meds.      MBD:  -binders/nephrocaps.   Changed to renvela.  Pt states that she also takes tums at home  -using calcitriol on HD days.     Anemia of CKD:  -at goal currently.   -trending lower. Due to surgery? PLT stable  -9/25:  blood today.   -9/27:  stable.       As above  Will follow for renal needs

## 2023-10-02 NOTE — PROGRESS NOTES
"Nephrology  Progress Note    Admit Date: 9/17/2023   LOS: 15 days     SUBJECTIVE:     Follow-up For:  Gangrene of toe of left foot    History of Present Illness:  Patient is a 67 y.o. female presents with left foot gangrene, vomiting, feeling ill.  Ext med hx as outlined below including ESRD on HD MWF in [place].  Has been dealing with necrotic left toes for few months and followed by vascular in [place].  Was here visiting daughter and felt bad.  Admitted for eval/treatment.  Consulted for HD needs.  Pt seen and examined on HD.  Consents signed.  Tired from being up all night with pain.  Updated team and daughter at bedside.  W/up noted.       Interval History:     S/p L BKA.  Awaiting placement.  HD today.  Updated family at bedside.  Remains withdrawn.         Review of Systems:  Constitutional: No fever or chills  Respiratory: No cough or shortness of breath  Cardiovascular: No chest pain or palpitations  Gastrointestinal: No nausea or vomiting  Neurological: No confusion or weakness    OBJECTIVE:     Vital Signs Range (Last 24H):  BP (!) 148/68 (BP Location: Left arm, Patient Position: Lying)   Pulse 65   Temp 97.8 °F (36.6 °C) (Axillary)   Resp 18   Ht 5' 5" (1.651 m)   Wt 61.2 kg (134 lb 14.7 oz)   SpO2 99%   BMI 22.45 kg/m²     Temp:  [97.8 °F (36.6 °C)-98.5 °F (36.9 °C)]   Pulse:  [65-72]   Resp:  [16-20]   BP: (127-154)/(59-68)   SpO2:  [94 %-99 %]     I & O (Last 24H):  Intake/Output Summary (Last 24 hours) at 10/2/2023 1000  Last data filed at 10/2/2023 0546  Gross per 24 hour   Intake 100 ml   Output 0 ml   Net 100 ml         Physical Exam:  General appearance:  Frail.  Appears older than stated age.    Eyes:  Conjunctivae/corneas clear. PERRL.  Lungs: Normal respiratory effort,   clear to auscultation bilaterally   Heart: Regular rate and rhythm, S1, S2 normal, no murmur, rub or nelly.  Abdomen: Soft, non-tender non-distended; bowel sounds normal; no masses,  no organomegaly  Extremities: No " "cyanosis or clubbing. No edema.    Skin: Skin color, texture, turgor normal. No rashes or lesions  Neurologic: Normal strength and tone. No focal numbness or weakness   Right arm AVF+  Left BKA wrapped.     Laboratory Data:  Recent Labs   Lab 10/02/23  0504   WBC 16.63*   RBC 2.89*   HGB 8.3*   HCT 26.4*      MCV 91   MCH 28.7   MCHC 31.4*         BMP:   Recent Labs   Lab 10/02/23  0504   GLU 86      K 3.9   CL 96   CO2 23   BUN 62*   CREATININE 8.6*   CALCIUM 9.9   MG 2.2   PHOS 5.7*       Lab Results   Component Value Date    CALCIUM 9.9 10/02/2023    PHOS 5.7 (H) 10/02/2023       Lab Results   Component Value Date    CALCIUM 9.9 10/02/2023    PHOS 5.7 (H) 10/02/2023       No results found for: "URICACID"    BNP  No results for input(s): "BNP", "BNPTRIAGEBLO" in the last 168 hours.    Medications:  Medication list was reviewed and changes noted under Assessment/Plan.    Diagnostic Results:    reviewed    ASSESSMENT/PLAN:       ESRD on HD MWF in [place]:  -consulted for HD needs.  -consents signed  -continue HD MWF while here and if she stays after hospitalization will need temp outpt unit.   -labs noted and bath adjusted.  -R arm AVF+  -9/19: HD yesterday w/o issue.  No need today.  -9/20:  HD today after angio  -9/21:  no need for HD today.  Cont MWF if stays here.    -9/22:  seen on HD this am.   -9/25:  HD with Blood today.    -9/26:  stable from renal to [place] back [place] w/ family.    -9/27:  seen on HD.  UF 2 L.    -9/28:  nothing today.   -9/29:  seen on HD.  -10/1: stable today; plan for next dialysis session on Monday.  -10/2:  HD today.    Necrotic Left toes:  S/p BKA on 9/29  -defer to pod/vascular/cards.  -angio noted with severe PAD  -Status post exploration femoral and popliteal arteries 9/22 with Non-reconstructible peripheral vascular disease.  Patient deciding on amputation. I think patient should have this done in [place] where she lives. At this point she just needs pain control.  Needs " to discuss with OWN Team in [place] who they prefer to use.  -need answer about location of surgery.  If here will need ext rehab/HD setup.  Risks of delay explained.   -s/p BKA on 9/29.  Will need SW to work on Rehab/HD unit post op.         HTN:  -UF on HD  -increased ARB/CCB. Will follow  -UF on HD today.  Likely pain related.   -9/28:  adjust meds.      MBD:  -binders/nephrocaps.   Changed to renvela.  Pt states that she also takes tums at home  -using calcitriol on HD days.     Anemia of CKD:  -9/25:  blood today.   -9/27:  stable.   -10/2:  FAREED with HD.       As above  Will follow for renal needs

## 2023-10-02 NOTE — SUBJECTIVE & OBJECTIVE
Interval History: No acute events overnight. Some pain this morning but doing well. Discussed plan of care with patient and daughters at bedside. Pursue SNF placement.    Review of Systems   Constitutional:  Negative for chills and fever.   Respiratory:  Negative for cough and shortness of breath.    Cardiovascular:  Negative for chest pain and palpitations.   Gastrointestinal:  Negative for abdominal pain, nausea and vomiting.     Objective:     Vital Signs (Most Recent):  Temp: 97.8 °F (36.6 °C) (10/02/23 1200)  Pulse: 65 (10/02/23 1200)  Resp: 18 (10/02/23 1200)  BP: (!) 148/68 (10/02/23 1200)  SpO2: 99 % (10/02/23 1200) Vital Signs (24h Range):  Temp:  [97.8 °F (36.6 °C)-98.5 °F (36.9 °C)] 97.8 °F (36.6 °C)  Pulse:  [65-72] 65  Resp:  [16-20] 18  SpO2:  [96 %-99 %] 99 %  BP: (136-154)/(62-68) 148/68     Weight: 61.2 kg (134 lb 14.7 oz)  Body mass index is 22.45 kg/m².    Intake/Output Summary (Last 24 hours) at 10/2/2023 1450  Last data filed at 10/2/2023 1437  Gross per 24 hour   Intake 600 ml   Output 2000 ml   Net -1400 ml           Physical Exam  Vitals and nursing note reviewed.   Constitutional:       General: She is not in acute distress.     Appearance: She is well-developed.   HENT:      Head: Normocephalic and atraumatic.   Eyes:      General:         Right eye: No discharge.         Left eye: No discharge.      Conjunctiva/sclera: Conjunctivae normal.   Cardiovascular:      Rate and Rhythm: Normal rate.      Pulses: Normal pulses.   Pulmonary:      Effort: Pulmonary effort is normal. No respiratory distress.   Abdominal:      Palpations: Abdomen is soft.      Tenderness: There is no abdominal tenderness.   Musculoskeletal:         General: Normal range of motion.      Right lower leg: No edema.      Comments: s/p LLE BKA with dressing in place.   Skin:     General: Skin is warm and dry.   Neurological:      Mental Status: She is alert and oriented to person, place, and time.       Significant Labs:    CBC:  Recent Labs   Lab 09/30/23  0519 10/01/23  0511 10/02/23  0504   WBC 18.59* 15.83* 16.63*   HGB 9.2* 8.2* 8.3*   HCT 29.4* 26.3* 26.4*    282 262   GRAN 79.3*  14.7* 75.1*  11.9* 71.2  11.8*   LYMPH 6.7*  1.3 9.4*  1.5 10.3*  1.7   MONO 11.0  2.1* 10.9  1.7* 13.2  2.2*   EOS 0.0 0.2 0.2   BASO 0.06 0.10 0.09     BMP:  Recent Labs   Lab 09/30/23  0519 10/01/23  0511 10/02/23  0504    136 136   K 4.0 3.8 3.9   CL 96 96 96   CO2 26 26 23   BUN 34* 47* 62*   CREATININE 5.7* 7.2* 8.6*   GLU 89 92 86   CALCIUM 9.7 9.6 9.9   MG 2.1 2.1 2.2   PHOS 5.2* 4.9* 5.7*       Significant Imaging: I have reviewed and interpreted all pertinent imaging results/findings within the past 24 hours.

## 2023-10-02 NOTE — PROGRESS NOTES
Postop day 3.  Status post left BKA  Diagnosis-PVD with gangrene    Subjective   Some leg pain    PE  Afebrile   Vital signs stable   Left lower extremity-dressing clean, dry, intact    Impression/plan  Surgically stable   Continue supportive care

## 2023-10-02 NOTE — CONSULTS
[facility] - Med Surg ([address])  Wound Care    Patient Name:  Shannan Figueredo   MRN:  [id number]  Date: 10/2/2023  Diagnosis: Gangrene of toe of left foot    History:     Past Medical History:   Diagnosis Date    Coronary artery disease     Diabetes mellitus, type II     End stage renal disease     Hyperlipidemia     Hypertension     Peripheral vascular disease        Social History     Socioeconomic History    Marital status:    Tobacco Use    Smoking status: Never    Smokeless tobacco: Never   Substance and Sexual Activity    Alcohol use: Never    Drug use: Never     Social Determinants of Health     Financial Resource Strain: Low Risk  (9/17/2023)    Overall Financial Resource Strain (CARDIA)     Difficulty of Paying Living Expenses: Not hard at all   Food Insecurity: No Food Insecurity (9/17/2023)    Hunger Vital Sign     Worried About Running Out of Food in the Last Year: Never true     Ran Out of Food in the Last Year: Never true   Transportation Needs: No Transportation Needs (9/17/2023)    PRAPARE - Transportation     Lack of Transportation (Medical): No     Lack of Transportation (Non-Medical): No   Physical Activity: Inactive (9/17/2023)    Exercise Vital Sign     Days of Exercise per Week: 0 days     Minutes of Exercise per Session: 0 min   Stress: Stress Concern Present (9/17/2023)    [employer] - Occupational Stress Questionnaire     Feeling of Stress : Very much   Social Connections: Socially Isolated (9/17/2023)    Social Connection and Isolation Panel [NHANES]     Frequency of Communication with Friends and Family: Twice a week     Frequency of Social Gatherings with Friends and Family: Never     Attends Yarsanism Services: 1 to 4 times per year     Active Member of Clubs or Organizations: No     Attends Club or Organization Meetings: Never     Marital Status:    Housing Stability: Low Risk  (9/17/2023)    Housing Stability Vital Sign     Unable to  Pay for Housing in the Last Year: No     Number of Places Lived in the Last Year: 1     Unstable Housing in the Last Year: No           [facility] Assessment Details:      10/02/23 1211   [REMOVED]      Altered Skin Integrity 10/01/23 0600 Left upper Back Other (comment)   Final Assessment Date/Final Assessment Time: 10/02/23 1211  Date First Assessed/Time First Assessed: 10/01/23 0600   Altered Skin Integrity Present on Admission - Did Patient arrive to the hospital with altered skin?: (c) suspected hospital acquired  ...   Wound Image     Description of Altered Skin Integrity   (N/A)   Dressing Appearance Open to air   Drainage Amount None   Appearance Intact   Tissue loss description Not applicable   Black (%), Wound Tissue Color 0 %   Red (%), Wound Tissue Color 0 %   Yellow (%), Wound Tissue Color 0 %   Periwound Area Intact     Wound care consultation received for Altered skin integrity to the left shoulder.  Upon assessment a blackhead / acne vulgaris noted.  Recommend for dermatology or  for a comedo extraction at discharge, outpatient.    10/02/2023

## 2023-10-02 NOTE — PROGRESS NOTES
"[facility] ([address] Medicine  Progress Note    Patient Name: Shannan Figueredo  MRN: [id number]  Patient Class: IP- Inpatient   Admission Date: 9/17/2023  Length of Stay: 15 days  Attending Physician: JOSE Cristina MD  Primary Care Provider: Roxi, Primary Doctor        Subjective:     Principal Problem:Gangrene of toe of left foot        HPI:  Ms. Figueredo is a 67/F with PMH HTN, DMII (diet-controlled), PVD, ESRD on HD (M/W/F), anemia who presented to [facility] 09/17 with a two day progressive history of worsening L foot pain, swelling, fatigue, nausea and vomiting. History obtained from patient and daughter Stephanie ([phone]); they are from the [place], [place] area. Patient reports several weeks ago she "stubbed her toe," noting initially some pain, redness and swelling. Gradually symptoms progressed with duskiness to her toe and she informed her daughter of her pain for which she was brought to ER in [place] on 08/25 for further evaluation. With duskiness to 1st toe and surrounding erythema she was treated for cellulitis with a course of ciprofloxacin and metronidazole, but failed to improve. She followed up with her podiatrist 09/12 where she was prescribed hydrocodone-acetaminophen and a ten day course of TMP-SMX which she has been taking. She was referred to vascular surgery with plan for angiography and potential intervention  She and her family came to [place] for the weekend but her pain worsened; had been attempting to minimize use of hydrocodone-acetaminophen but developed nausea, vomiting and progressive fatigue. She subsequently presented to ED for further evaluation. ED workup was notable for L 1st toe dry-appearing gangrene with surrounding erythema and swelling, removed nail of second toe, XR L foot demonstrating diffuse osteopenia, L calcaneal and forefoot soft tissue swelling without definitive evidence of osteomyelitis in setting of osteopenia, no leukocytosis, elevated " sed rate and CRP, and elevated BUN/Cr in setting of ESRD. She notes she went without dialysis on 09/15 due to her trip. Blood cultures were ordered and she received piperacillin-tazobactam and vancomycin. Hospital medicine was subsequently contacted for admission.      Overview/Hospital Course:  Admitted with gangrene of L 1st toe.  Empirically treated with Zosyn and vancomycin.  Vascular Surgery, nephrology, podiatry consulted; U/S arterial demonstrated L SFA occlusion with distal reconstitution. Cardiology consulted, angiography with no vessels amenable for angioplasty on 9/20.  Underwent exploration of the left lower extremity vessels, femoral and popliteal bypass unable to be performed due to no adequate available vessels for bypass on 9/22. Discussed potential intervention here vs [place], patient and family opted for surgery in [place]. Underwent L BKA 09/29. PT/OT recommended SNF.      Interval History: No acute events overnight. Some pain this morning but doing well. Discussed plan of care with patient and daughters at bedside. Pursue SNF placement.    Review of Systems   Constitutional:  Negative for chills and fever.   Respiratory:  Negative for cough and shortness of breath.    Cardiovascular:  Negative for chest pain and palpitations.   Gastrointestinal:  Negative for abdominal pain, nausea and vomiting.     Objective:     Vital Signs (Most Recent):  Temp: 97.8 °F (36.6 °C) (10/02/23 1200)  Pulse: 65 (10/02/23 1200)  Resp: 18 (10/02/23 1200)  BP: (!) 148/68 (10/02/23 1200)  SpO2: 99 % (10/02/23 1200) Vital Signs (24h Range):  Temp:  [97.8 °F (36.6 °C)-98.5 °F (36.9 °C)] 97.8 °F (36.6 °C)  Pulse:  [65-72] 65  Resp:  [16-20] 18  SpO2:  [96 %-99 %] 99 %  BP: (136-154)/(62-68) 148/68     Weight: 61.2 kg (134 lb 14.7 oz)  Body mass index is 22.45 kg/m².    Intake/Output Summary (Last 24 hours) at 10/2/2023 1450  Last data filed at 10/2/2023 1437  Gross per 24 hour   Intake 600 ml   Output 2000 ml   Net  -1400 ml           Physical Exam  Vitals and nursing note reviewed.   Constitutional:       General: She is not in acute distress.     Appearance: She is well-developed.   HENT:      Head: Normocephalic and atraumatic.   Eyes:      General:         Right eye: No discharge.         Left eye: No discharge.      Conjunctiva/sclera: Conjunctivae normal.   Cardiovascular:      Rate and Rhythm: Normal rate.      Pulses: Normal pulses.   Pulmonary:      Effort: Pulmonary effort is normal. No respiratory distress.   Abdominal:      Palpations: Abdomen is soft.      Tenderness: There is no abdominal tenderness.   Musculoskeletal:         General: Normal range of motion.      Right lower leg: No edema.      Comments: s/p LLE BKA with dressing in place.   Skin:     General: Skin is warm and dry.   Neurological:      Mental Status: She is alert and oriented to person, place, and time.       Significant Labs:   CBC:  Recent Labs   Lab 09/30/23  0519 10/01/23  0511 10/02/23  0504   WBC 18.59* 15.83* 16.63*   HGB 9.2* 8.2* 8.3*   HCT 29.4* 26.3* 26.4*    282 262   GRAN 79.3*  14.7* 75.1*  11.9* 71.2  11.8*   LYMPH 6.7*  1.3 9.4*  1.5 10.3*  1.7   MONO 11.0  2.1* 10.9  1.7* 13.2  2.2*   EOS 0.0 0.2 0.2   BASO 0.06 0.10 0.09     BMP:  Recent Labs   Lab 09/30/23  0519 10/01/23  0511 10/02/23  0504    136 136   K 4.0 3.8 3.9   CL 96 96 96   CO2 26 26 23   BUN 34* 47* 62*   CREATININE 5.7* 7.2* 8.6*   GLU 89 92 86   CALCIUM 9.7 9.6 9.9   MG 2.1 2.1 2.2   PHOS 5.2* 4.9* 5.7*       Significant Imaging: I have reviewed and interpreted all pertinent imaging results/findings within the past 24 hours.      Assessment/Plan:      * Gangrene of toe of left foot  HTN, PVD  - L 1st toe gangrene predominantly dry with some surrounding erythema/swelling; with concern for worsening infection started piperacillin-tazobactam and vancomycin IV. 2nd toe with involvement as well.  - U/S arterial BLE showed L distal SFA occlusion  "with distal reconstitution. Discussed with vascular surgery and cardiology consulted for consideration of angiography / potential angioplasty.  - Podiatry consulted. MRI L foot with "examination markedly degraded by patient motion artifact. Questionable marrow edema within the 1st distal phalanx, not well evaluated given aforementioned limitation but possibly related to osteomyelitis given reported history of gangrene."  - Blood cultures showed no growth.  - s/p angiogram remarkable for severe PAD with no areas amenable to angioplasty on 9/20. Elected to undergo attempt at revascularization 09/22 but unable to perform femoral and popliteal bypass on 9/22. Discussed surgical options with BKA only viable for appropriate healing given extent of vascular disease.   - Underwent BKA 09/30. Leukocytosis improving. Therapy recommending SNF. Pursuing placement.  - Continue hydrocodone-acetaminophen 5-325mg PO q6hr PRN, oxycodone-acetaminophen 7.5-325mg PO q6hr PRN.  - Continue carvedilol 12.5mg PO BID, losartan 100mg PO daily, nifedipine 60mg PO BID.    Type 2 diabetes mellitus with chronic kidney disease on chronic dialysis  - Reports diet-controlled; no current medications.  - HgbA1c 5.7.  - Continue low-dose sliding scale insulin aspart 0-5U subQ TIDWM PRN, monitor requirements.    ESRD (end stage renal disease)  Anemia, hyperparathyroidism  - ESRD on HD, reports missed session Friday prior to presentation due to travel.  - Mild anemia without evidence of bleeding.  - Continue allopurinol 300mg PO daily, calcium acetate 2001mg PO TIDWM.  - Transfused 1U pRBCs 09/25 with appropriate response.  - Nephrology consulted for HD assistance.    VTE Risk Mitigation (From admission, onward)         Ordered     IP VTE HIGH RISK PATIENT  Once         09/28/23 1906                Discharge Planning   LUBNA:      Code Status: Full Code   Is the patient medically ready for discharge?:     Reason for patient still in hospital (select all " that apply): Pending disposition  Discharge Plan A: Skilled Nursing Facility   Discharge Delays: (!) Procedure Scheduling (IR, OR, Labs, Echo, Cath, Echo, EEG)              TANESHA Cristina MD  Department of Hospital Medicine   [facility] ([address])

## 2023-10-02 NOTE — ASSESSMENT & PLAN NOTE
"HTN, PVD  - L 1st toe gangrene predominantly dry with some surrounding erythema/swelling; with concern for worsening infection started piperacillin-tazobactam and vancomycin IV. 2nd toe with involvement as well.  - U/S arterial BLE showed L distal SFA occlusion with distal reconstitution. Discussed with vascular surgery and cardiology consulted for consideration of angiography / potential angioplasty.  - Podiatry consulted. MRI L foot with "examination markedly degraded by patient motion artifact. Questionable marrow edema within the 1st distal phalanx, not well evaluated given aforementioned limitation but possibly related to osteomyelitis given reported history of gangrene."  - Blood cultures showed no growth.  - s/p angiogram remarkable for severe PAD with no areas amenable to angioplasty on 9/20. Elected to undergo attempt at revascularization 09/22 but unable to perform femoral and popliteal bypass on 9/22. Discussed surgical options with BKA only viable for appropriate healing given extent of vascular disease.   - Underwent BKA 09/30. Leukocytosis improving. Therapy recommending SNF. Pursuing placement.  - Continue hydrocodone-acetaminophen 5-325mg PO q6hr PRN, oxycodone-acetaminophen 7.5-325mg PO q6hr PRN.  - Continue carvedilol 12.5mg PO BID, losartan 100mg PO daily, nifedipine 60mg PO BID.  "

## 2023-10-02 NOTE — PLAN OF CARE
CM spoke with insurance rep who verified patient does not have any out of network benefits however due to patient urgent surgery of BKA auth request for SNF services could be approved. Advised by rep to fax all clinical notes to [phone] and claim to be evaluated for SNF approval in [place].    Patient accepted to [facility] pending insurance approval. Lawson provided with fax number to fax documents.

## 2023-10-02 NOTE — PT/OT/SLP PROGRESS
Physical Therapy      Patient Name:  Shannan Figueredo   MRN:  [id number]    Patient not seen today secondary to Dialysis. Will follow-up later today as scheduling permits.    Addendum 3:15pm  - pt still in Dialysis. Will follow-up next treatment day.

## 2023-10-02 NOTE — PT/OT/SLP PROGRESS
Occupational Therapy      Patient Name:  Shannan Figueredo   MRN:  [id number]    Patient not seen today secondary to Dialysis. Will follow-up later today as schedule permits.    10/2/2023

## 2023-10-02 NOTE — PLAN OF CARE
Problem: Adult Inpatient Plan of Care  Goal: Plan of Care Review  Outcome: Ongoing, Progressing  Goal: Absence of Hospital-Acquired Illness or Injury  Outcome: Ongoing, Progressing  Goal: Optimal Comfort and Wellbeing  Outcome: Ongoing, Progressing     Problem: Infection  Goal: Absence of Infection Signs and Symptoms  Outcome: Ongoing, Progressing     Problem: Diabetes Comorbidity  Goal: Blood Glucose Level Within Targeted Range  Outcome: Ongoing, Progressing     Problem: Impaired Wound Healing  Goal: Optimal Wound Healing  Outcome: Ongoing, Progressing     Problem: Skin Injury Risk Increased  Goal: Skin Health and Integrity  Outcome: Ongoing, Progressing     Problem: Oral Intake Inadequate  Goal: Improved Oral Intake  Outcome: Ongoing, Progressing     Problem: Adjustment to Surgery (Extremity Amputation)  Goal: Optimal Coping with Amputation  Outcome: Ongoing, Progressing     Problem: Functional Ability Impaired (Extremity Amputation)  Goal: Optimal Functional Ability  Outcome: Ongoing, Progressing     Problem: Pain (Extremity Amputation)  Goal: Acceptable Pain Control  Outcome: Ongoing, Progressing   POC reviewed with pt who verbalized understanding. AAOx1; said it was 1993 & [facility]; confused about family members. VSS on RA. Remains free of falls and injury. L BKA dressing intact. Pain treated with PRN meds per MAR. BG monitored at bedtime with no coverage needed. Repositioned with assist. Resting between care. Call light in reach and rounds made for safety. Family at bedside. Will continue to monitor.

## 2023-10-02 NOTE — PROGRESS NOTES
10/02/23 1437        Hemodialysis AV Fistula 09/17/23 Right upper arm   Placement Date: 09/17/23   Location: Right upper arm   Site Assessment Clean;Dry;Intact;No redness;No swelling;No warmth;No drainage   Patency Present;Thrill;Bruit   Status Deaccessed   Site Condition No complications   Dressing Gauze   Post-Hemodialysis Assessment   Blood Volume Processed (Liters) 80.2 L   Dialyzer Clearance Lightly streaked   Duration of Treatment 180 minutes   Additional Fluid Intake (mL) 500 mL   Total UF (mL) 2000 mL   Net Fluid Removal 1500   Patient Response to Treatment Tx completed, tolerated well, lines flushed and then needles pulled and pressure held till hemostasis achieved, no bleeding or hematoma noted   Post-Treatment Weight 57.6 kg (126 lb 15.8 oz)   Treatment Weight Change 57.6   Arterial bleeding stop time (min) 10 min   Venous bleeding stop time (min) 10 min   Post-Hemodialysis Comments no distress noted

## 2023-10-03 NOTE — PLAN OF CARE
Recommendations  1) Recommend John BID to promote wound healing    2) Continue renal diet as tolerated    3) Encourage intake of meals + nutrition supplements   4) RD to follow to monitor nutrition status    Goals:   Pt will maintain 50-75% intake of meals by RD follow up  Nutrition Goal Status: goal not met, progressing towards goal  Communication of RD Recs:  (POC)

## 2023-10-03 NOTE — PROGRESS NOTES
Postop day 4.   Status post left BKA for peripheral vascular disease and gangrene of foot     Subjective   Patient confused  Patient complain of pain    PE  Afebrile   Vital signs stable   Left lower extremity-posterior splint removed, incision clean, dry, intact    White blood cell count-19 K    Impression/plan   Surgical incision progressing  Discussed mental status with attending physician.  Appropriate changes made in pain and sedation

## 2023-10-03 NOTE — PLAN OF CARE
CM spoke with patient insurance who verified received of clinicals. However CM was informed this process can take 14 days for approval. Expedited auth requested. Provided with confidential email to expedited dept with request as well.    10/03/23 0937   Post-Acute Status   Post-Acute Authorization Placement   Post-Acute Placement Status Pending payor review/awaiting authorization (if required)   Discharge Delays (!) Post-Acute Set-up   Discharge Plan   Discharge Plan A Skilled Nursing Facility   Discharge Plan B Home with family

## 2023-10-03 NOTE — ASSESSMENT & PLAN NOTE
"HTN, PVD  - L 1st toe gangrene predominantly dry with some surrounding erythema/swelling; with concern for worsening infection started piperacillin-tazobactam and vancomycin IV. 2nd toe with involvement as well.  - U/S arterial BLE showed L distal SFA occlusion with distal reconstitution. Discussed with vascular surgery and cardiology consulted for consideration of angiography / potential angioplasty.  - Podiatry consulted. MRI L foot with "examination markedly degraded by patient motion artifact. Questionable marrow edema within the 1st distal phalanx, not well evaluated given aforementioned limitation but possibly related to osteomyelitis given reported history of gangrene."  - Blood cultures showed no growth.  - s/p angiogram remarkable for severe PAD with no areas amenable to angioplasty on 9/20. Elected to undergo attempt at revascularization 09/22 but unable to perform femoral and popliteal bypass on 9/22. Discussed surgical options with BKA only viable for appropriate healing given extent of vascular disease.   - Underwent BKA on 09/30. Leukocytosis improving. Therapy recommending SNF. Pursuing placement.  - Given possible decreased clearance of pain medications, will stop hydrocodone-acetaminophen 5-325mg PO q6hr PRN, oxycodone-acetaminophen 7.5-325mg PO q6hr PRN. Start PO dilaudid instead and add Neurontin today  - Continue carvedilol 12.5mg PO BID, losartan 100mg PO daily, nifedipine 60mg PO BID.  "

## 2023-10-03 NOTE — PROGRESS NOTES
"Nephrology  Progress Note    Admit Date: 9/17/2023   LOS: 16 days     SUBJECTIVE:     Follow-up For:  Gangrene of toe of left foot    History of Present Illness:  Patient is a 67 y.o. female presents with left foot gangrene, vomiting, feeling ill.  Ext med hx as outlined below including ESRD on HD MWF in [place].  Has been dealing with necrotic left toes for few months and followed by vascular in [place].  Was here visiting daughter and felt bad.  Admitted for eval/treatment.  Consulted for HD needs.  Pt seen and examined on HD.  Consents signed.  Tired from being up all night with pain.  Updated team and daughter at bedside.  W/up noted.       Interval History:     S/p L BKA.  Awaiting placement.  More awake/alert today.  Discussed with daughter.  Likely sundowners nightly.  Discussed with team.          Review of Systems:  Constitutional: No fever or chills  Respiratory: No cough or shortness of breath  Cardiovascular: No chest pain or palpitations  Gastrointestinal: No nausea or vomiting  Neurological: No confusion or weakness    OBJECTIVE:     Vital Signs Range (Last 24H):  BP (!) 141/65 (BP Location: Left arm, Patient Position: Lying)   Pulse 82   Temp 98.9 °F (37.2 °C) (Oral)   Resp 18   Ht 5' 5" (1.651 m)   Wt 61.2 kg (134 lb 14.7 oz)   SpO2 96%   BMI 22.45 kg/m²     Temp:  [97.8 °F (36.6 °C)-98.9 °F (37.2 °C)]   Pulse:  [65-82]   Resp:  [16-20]   BP: (141-153)/(65-68)   SpO2:  [93 %-99 %]     I & O (Last 24H):  Intake/Output Summary (Last 24 hours) at 10/3/2023 0954  Last data filed at 10/2/2023 1801  Gross per 24 hour   Intake 600 ml   Output 2000 ml   Net -1400 ml         Physical Exam:  General appearance:  Frail.  Appears older than stated age.  NAD this am.  Eating breakfast.   Eyes:  Conjunctivae/corneas clear. PERRL.  Lungs: Normal respiratory effort,   clear to auscultation bilaterally   Heart: Regular rate and rhythm, S1, S2 normal, no murmur, rub or nelly.  Abdomen: Soft, non-tender " "non-distended; bowel sounds normal; no masses,  no organomegaly  Extremities: No cyanosis or clubbing. No edema.    Skin: Skin color, texture, turgor normal. No rashes or lesions  Neurologic: Normal strength and tone. No focal numbness or weakness   Right arm AVF+  Left BKA wrapped.     Laboratory Data:  Recent Labs   Lab 10/03/23  0438   WBC 19.79*   RBC 2.86*   HGB 8.2*   HCT 26.7*      MCV 93   MCH 28.7   MCHC 30.7*         BMP:   Recent Labs   Lab 10/03/23  0438   *   *   K 4.0      CO2 26   BUN 26*   CREATININE 4.7*   CALCIUM 10.4   MG 2.3   PHOS 3.6       Lab Results   Component Value Date    CALCIUM 10.4 10/03/2023    PHOS 3.6 10/03/2023       Lab Results   Component Value Date    CALCIUM 10.4 10/03/2023    PHOS 3.6 10/03/2023       No results found for: "URICACID"    BNP  No results for input(s): "BNP", "BNPTRIAGEBLO" in the last 168 hours.    Medications:  Medication list was reviewed and changes noted under Assessment/Plan.    Diagnostic Results:    reviewed    ASSESSMENT/PLAN:       ESRD on HD MWF in [place]:  -consulted for HD needs.  -consents signed  -continue HD MWF while here and if she stays after hospitalization will need temp outpt unit.   -labs noted and bath adjusted.  -R arm AVF+  -9/19: HD yesterday w/o issue.  No need today.  -9/20:  HD today after angio  -9/21:  no need for HD today.  Cont MWF if stays here.    -9/22:  seen on HD this am.   -9/25:  HD with Blood today.    -9/26:  stable from renal to DC back to [place] w/ family.    -9/27:  seen on HD.  UF 2 L.    -9/28:  nothing today.   -9/29:  seen on HD.  -10/1: stable today; plan for next dialysis session on Monday.  -10/2:  HD today.  -10/3:  adjust bath to labs for tomorrow.  E ncourage some free water intake today/ tomorrow as well. Renally dose meds, avoid nephrotoxins, and monitor I/O's closely.      Necrotic Left toes:  S/p BKA on 9/29  -defer to pod/vascular/cards.  -angio noted with severe PAD  -Status post " exploration femoral and popliteal arteries 9/22 with Non-reconstructible peripheral vascular disease.  Patient deciding on amputation. I think patient should have this done in [place] where she lives. At this point she just needs pain control.  Needs to discuss with OWN Team in [place] who they prefer to use.  -need answer about location of surgery.  If here will need ext rehab/HD setup.  Risks of delay explained.   -s/p BKA on 9/29.  Will need SW to work on Rehab/HD unit post op.         HTN:  -UF on HD  -increased ARB/CCB. Will follow  -UF on HD today.  Likely pain related.   -9/28:  adjust meds.      MBD:  -binders/nephrocaps.   Changed to renvela.  Pt states that she also takes tums at home  -using calcitriol on HD days.     Anemia of CKD:  -9/25:  blood today.   -9/27:  stable.   -10/2:  FAREED with HD.       As above  Will follow for renal needs   Ok to DC/transfer.

## 2023-10-03 NOTE — PROGRESS NOTES
"[facility] Surg ([address] Medicine  Progress Note    Patient Name: Shannan Figueredo  MRN: [id number]  Patient Class: IP- Inpatient   Admission Date: 9/17/2023  Length of Stay: 16 days  Attending Physician: Brook Colon MD  Primary Care Provider: Roxi, Primary Doctor        Subjective:     Principal Problem:Gangrene of toe of left foot        HPI:  Ms. Figueredo is a 67/F with PMH HTN, DMII (diet-controlled), PVD, ESRD on HD (M/W/F), anemia who presented to [facility] 09/17 with a two day progressive history of worsening L foot pain, swelling, fatigue, nausea and vomiting. History obtained from patient and daughter Stephanie ([phone]); they are from the [place], [place] area. Patient reports several weeks ago she "stubbed her toe," noting initially some pain, redness and swelling. Gradually symptoms progressed with duskiness to her toe and she informed her daughter of her pain for which she was brought to ER in [place] on 08/25 for further evaluation. With duskiness to 1st toe and surrounding erythema she was treated for cellulitis with a course of ciprofloxacin and metronidazole, but failed to improve. She followed up with her podiatrist 09/12 where she was prescribed hydrocodone-acetaminophen and a ten day course of TMP-SMX which she has been taking. She was referred to vascular surgery with plan for angiography and potential intervention  She and her family came to [place] for the weekend but her pain worsened; had been attempting to minimize use of hydrocodone-acetaminophen but developed nausea, vomiting and progressive fatigue. She subsequently presented to ED for further evaluation. ED workup was notable for L 1st toe dry-appearing gangrene with surrounding erythema and swelling, removed nail of second toe, XR L foot demonstrating diffuse osteopenia, L calcaneal and forefoot soft tissue swelling without definitive evidence of osteomyelitis in setting of osteopenia, no leukocytosis, elevated " sed rate and CRP, and elevated BUN/Cr in setting of ESRD. She notes she went without dialysis on 09/15 due to her trip. Blood cultures were ordered and she received piperacillin-tazobactam and vancomycin. Hospital medicine was subsequently contacted for admission.      Overview/Hospital Course:  Admitted with gangrene of L 1st toe.  Empirically treated with Zosyn and vancomycin.  Vascular Surgery, nephrology, podiatry consulted; U/S arterial demonstrated L SFA occlusion with distal reconstitution. Cardiology consulted, angiography with no vessels amenable for angioplasty on 9/20.  Underwent exploration of the left lower extremity vessels, femoral and popliteal bypass unable to be performed due to no adequate available vessels for bypass on 9/22. Discussed potential intervention here vs [place], patient and family opted for surgery in [place]. Underwent L BKA 09/29. PT/OT recommended SNF.      Interval History: No acute events overnight. Daughter report some confusion at times intermittent that has been ongoing since being hospitalized. Discussed plan of care with patient and daughter at bedside. Pursue SNF placement.    Review of Systems   Constitutional:  Negative for chills and fever.   Respiratory:  Negative for cough and shortness of breath.    Cardiovascular:  Negative for chest pain and palpitations.   Gastrointestinal:  Negative for abdominal pain, nausea and vomiting.     Objective:     Vital Signs (Most Recent):  Temp: 98.9 °F (37.2 °C) (10/03/23 0850)  Pulse: 82 (10/03/23 0850)  Resp: 18 (10/03/23 0852)  BP: (!) 141/65 (10/03/23 0850)  SpO2: 96 % (10/03/23 0850) Vital Signs (24h Range):  Temp:  [97.8 °F (36.6 °C)-98.9 °F (37.2 °C)] 98.9 °F (37.2 °C)  Pulse:  [65-82] 82  Resp:  [16-20] 18  SpO2:  [93 %-99 %] 96 %  BP: (141-153)/(65-68) 141/65     Weight: 61.2 kg (134 lb 14.7 oz)  Body mass index is 22.45 kg/m².    Intake/Output Summary (Last 24 hours) at 10/3/2023 1125  Last data filed at 10/2/2023  1801  Gross per 24 hour   Intake 600 ml   Output 2000 ml   Net -1400 ml           Physical Exam  Vitals and nursing note reviewed.   Constitutional:       General: She is not in acute distress.     Appearance: She is well-developed.   HENT:      Head: Normocephalic and atraumatic.   Eyes:      General:         Right eye: No discharge.         Left eye: No discharge.      Conjunctiva/sclera: Conjunctivae normal.   Cardiovascular:      Rate and Rhythm: Normal rate.      Pulses: Normal pulses.   Pulmonary:      Effort: Pulmonary effort is normal. No respiratory distress.   Abdominal:      Palpations: Abdomen is soft.      Tenderness: There is no abdominal tenderness.   Musculoskeletal:         General: Normal range of motion.      Right lower leg: No edema.      Comments: s/p LLE BKA with dressing in place.   Skin:     General: Skin is warm and dry.   Neurological:      Mental Status: She is alert and oriented to person, place, and time.       Significant Labs:   CBC:  Recent Labs   Lab 10/01/23  0511 10/02/23  0504 10/03/23  0438   WBC 15.83* 16.63* 19.79*   HGB 8.2* 8.3* 8.2*   HCT 26.3* 26.4* 26.7*    262 276   GRAN 75.1*  11.9* 71.2  11.8* 73.0   LYMPH 9.4*  1.5 10.3*  1.7 10.0*  CANCELED   MONO 10.9  1.7* 13.2  2.2* 14.0  CANCELED   EOS 0.2 0.2 CANCELED   BASO 0.10 0.09 CANCELED     BMP:  Recent Labs   Lab 10/01/23  0511 10/02/23  0504 10/03/23  0438    136 148*   K 3.8 3.9 4.0   CL 96 96 109   CO2 26 23 26   BUN 47* 62* 26*   CREATININE 7.2* 8.6* 4.7*   GLU 92 86 123*   CALCIUM 9.6 9.9 10.4   MG 2.1 2.2 2.3   PHOS 4.9* 5.7* 3.6       Significant Imaging: I have reviewed and interpreted all pertinent imaging results/findings within the past 24 hours.      Assessment/Plan:      * Gangrene of toe of left foot  HTN, PVD  - L 1st toe gangrene predominantly dry with some surrounding erythema/swelling; with concern for worsening infection started piperacillin-tazobactam and vancomycin IV. 2nd toe  "with involvement as well.  - U/S arterial BLE showed L distal SFA occlusion with distal reconstitution. Discussed with vascular surgery and cardiology consulted for consideration of angiography / potential angioplasty.  - Podiatry consulted. MRI L foot with "examination markedly degraded by patient motion artifact. Questionable marrow edema within the 1st distal phalanx, not well evaluated given aforementioned limitation but possibly related to osteomyelitis given reported history of gangrene."  - Blood cultures showed no growth.  - s/p angiogram remarkable for severe PAD with no areas amenable to angioplasty on 9/20. Elected to undergo attempt at revascularization 09/22 but unable to perform femoral and popliteal bypass on 9/22. Discussed surgical options with BKA only viable for appropriate healing given extent of vascular disease.   - Underwent BKA on 09/30. Leukocytosis improving. Therapy recommending SNF. Pursuing placement.  - Given possible decreased clearance of pain medications, will stop hydrocodone-acetaminophen 5-325mg PO q6hr PRN, oxycodone-acetaminophen 7.5-325mg PO q6hr PRN. Start PO dilaudid instead and add Neurontin today  - Continue carvedilol 12.5mg PO BID, losartan 100mg PO daily, nifedipine 60mg PO BID.    Type 2 diabetes mellitus with chronic kidney disease on chronic dialysis  - Reports diet-controlled; no current medications.  - HgbA1c 5.7.  - Continue low-dose sliding scale insulin aspart 0-5U subQ TIDWM PRN, monitor requirements.    ESRD (end stage renal disease)  Anemia, hyperparathyroidism  - ESRD on HD, reports missed session Friday prior to presentation due to travel.  - Mild anemia without evidence of bleeding.  - Continue allopurinol 300mg PO daily, calcium acetate 2001mg PO TIDWM.  - Transfused 1U pRBCs 09/25 with appropriate response.  - Nephrology consulted for HD assistance.      VTE Risk Mitigation (From admission, onward)         Ordered     IP VTE HIGH RISK PATIENT  Once      "    09/28/23 1906                      Brook Colon MD  Department of Hospital Medicine   [facility] Surg ([address])

## 2023-10-03 NOTE — PT/OT/SLP PROGRESS
Physical Therapy Treatment    Patient Name:  Shannan Figueredo   MRN:  [id number]    Recommendations:     Discharge Recommendations: nursing facility, skilled  Discharge Equipment Recommendations:  (TBD @ next level of care)  Barriers to discharge: Inaccessible home and Decreased caregiver support    Assessment:     Shannan Figueredo is a 67 y.o. female admitted with a medical diagnosis of Gangrene of toe of left foot.  She presents with the following impairments/functional limitations: weakness, gait instability, pain, orthopedic precautions, impaired balance, impaired cognition, impaired endurance, impaired functional mobility, impaired self care skills, impaired skin, decreased coordination, decreased lower extremity function, decreased ROM, decreased safety awareness, decreased upper extremity function.    Supine>sit with maxA x 2  Bed>Chair toward the right with sliding board and totalA, daughter assisting to direct RUE toward chair  Pt agreeable to participate, though mostly passively for this first post-BKA transfer  Rec SNF    Rehab Prognosis: Fair; patient would benefit from acute skilled PT services to address these deficits and reach maximum level of function.    Recent Surgery: Procedure(s) (LRB):  AMPUTATION, BELOW KNEE (Left) 4 Days Post-Op    Plan:     During this hospitalization, patient to be seen 5 x/week to address the identified rehab impairments via gait training, therapeutic activities, therapeutic exercises, wheelchair management/training and progress toward the following goals:    Plan of Care Expires:  10/30/23    Subjective     Chief Complaint: pt not verbal but following with eyes and nodding, shaking head.   Patient/Family Comments/goals: pt agreeable to therapy and seems to enjoy being up in chair with daughter present and supportive  Pain/Comfort:  Pain Rating 1: other (see comments) (facial grimace with initial bed mobility, then no further indications of pain, and no rating)  Location -  Side 1: Left  Location - Orientation 1: generalized  Location 1:  (residual limb)  Pain Addressed 1: Pre-medicate for activity, Reposition, Distraction, Cessation of Activity  Pain Rating Post-Intervention 1: other (see comments) (no signs of pain at rest, pt comfortable, smiling)      Objective:     Communicated with nurse Conley prior to session.  Patient found HOB elevated with peripheral IV, telemetry upon PT entry to room.     General Precautions: Standard, diabetic, fall  Orthopedic Precautions: LLE non weight bearing  Braces: N/A  Respiratory Status: Room air     Functional Mobility:  Bed Mobility:     Supine to Sit: maximal assistance and of 2 persons  Transfers:     Bed to Chair: total assistance and of 2 persons with  slide board  using  Slide Board      AM-PAC 6 CLICK MOBILITY  Turning over in bed (including adjusting bedclothes, sheets and blankets)?: 2  Sitting down on and standing up from a chair with arms (e.g., wheelchair, bedside commode, etc.): 1  Moving from lying on back to sitting on the side of the bed?: 2  Moving to and from a bed to a chair (including a wheelchair)?: 2  Need to walk in hospital room?: 1  Climbing 3-5 steps with a railing?: 1  Basic Mobility Total Score: 9       Treatment & Education:  Seated therex R LE: AP, heel slides with AAROM x 10  Pt and daughter educated in slide board transfer  Discussed HEP with pt and daughter, did not perform this date with LLE 2/2 pain with mobility of residual limb    Patient left up in chair with all lines intact, call button in reach, nurse Yael and OT Aziza notified, and daughter present..    GOALS:   Multidisciplinary Problems       Physical Therapy Goals          Problem: Physical Therapy    Goal Priority Disciplines Outcome Goal Variances Interventions   Physical Therapy Goal     PT, PT/OT Ongoing, Progressing     Description: Patient will perform the following to increase strength, improve mobility, and return to prior level of  function:    1. Supine <> sit without bed features.  2. Rolling R/L without bed features and SBA.   3. Sit<>stand with min A of 2 with RW  3. OOB mobility assessment                           Time Tracking:     PT Received On: 10/03/23  PT Start Time: 1319     PT Stop Time: 1342  PT Total Time (min): 23 min     Billable Minutes: Therapeutic Activity 23    Treatment Type: Treatment  PT/PTA: PTA     Number of PTA visits since last PT visit: 1     10/03/2023

## 2023-10-03 NOTE — PROGRESS NOTES
[facility] ([address])  Adult Nutrition  Progress Note    SUMMARY       Recommendations  1) Recommend John BID to promote wound healing    2) Continue renal diet as tolerated    3) Encourage intake of meals + nutrition supplements   4) RD to follow to monitor nutrition status    Goals:   Pt will maintain 50-75% intake of meals by RD follow up  Nutrition Goal Status: goal not met, progressing towards goal  Communication of RD Recs:  (POC)    Assessment and Plan  Nutrition Problem  Increased nutrient needs    Related to (etiology):   Wound healing    Signs and Symptoms (as evidenced by):   Pt s/p L BKA; minimal oral intake x 16 days; increased needs 2/2 ESRD on HD     Interventions (treatment strategy):  Commercial beverage  Collaboration with other providers    Nutrition Diagnosis Status:   New      Malnutrition Assessment     Hair/Scalp (Micronutrient): none  Musculoskeletal/Lower Extremities:  (PVD s/p L BKA)   Micronutrient Evaluation Summary: suspected deficiency   Subcutaneous Fat (Malnutrition): mild depletion  Muscle Mass (Malnutrition): mild depletion   Orbital Region (Subcutaneous Fat Loss): mild depletion   [place] Region (Muscle Loss): mild depletion  Clavicle Bone Region (Muscle Loss): mild depletion  Clavicle and Acromion Bone Region (Muscle Loss): mild depletion                 Reason for Assessment    Reason For Assessment: RD follow-up  Diagnosis:  (Gangrene of toe of left foot)  Relevant Medical History:   Patient Active Problem List   Diagnosis    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 Receiving a renal diet, not tolerating. Pt continues with poor intake, although pt was able to tolerate 100% Novasource this am. Pt supplementing meals with ONS per daughter. Daughter at bedside states pt getting tired of meals provided and pt limited d/t renal restrictions (pt wanted gumbo at one point). Pt has enjoyed butter chicken noodles, and gravy and rice (25-50%). Pt does not eat fish. Continue to encourage intake of meals d/t increased nutrient needs. IVF @ 100 mL/hr. Yuan 13- L BKA. ESRD on HD MWF. NFPE pt with mild depletion noted.   Nutrition Discharge Planning: dc on ADA, renal diet as tolerated    Nutrition Risk Screen    Nutrition Risk Screen:  (post surgery)    Nutrition/Diet History    Patient Reported Diet/Restrictions/Preferences: diabetic diet  Spiritual, Cultural Beliefs, Synagogue Practices, Values that Affect Care: no  Factors Affecting Nutritional Intake: decreased appetite, pain    Anthropometrics    Temp: 99.1 °F (37.3 °C)  Height: 5' 5" (165.1 cm)  Height (inches): 65 in  Weight Method: Bed Scale  Weight: 61.2 kg (134 lb 14.7 oz)  Weight (lb): 134.92 lb  Ideal Body Weight (IBW), Female: 125 lb  % Ideal Body Weight, Female (lb): 107.94 %  BMI (Calculated): 22.5  BMI Grade: 18.5-24.9 - normal  Usual Body Weight (UBW), k.6 kg  % Usual Body Weight: 96.43  % Weight Change From Usual Weight: -3.77 %  Amputation %: 5.9  Total Amputation %: 5.9  Amputation Ideal Body Weight (IBW), Female (lb): 119.1 lb  Amputee BMI (kg/m2): 23.92 kg/m2       Lab/Procedures/Meds    Pertinent Labs Reviewed: reviewed  CBC:  Recent Labs   Lab 10/03/23  043   WBC 19.79*   HGB 8.2*   HCT 26.7*        CMP:  Recent Labs   Lab 10/03/23  0438   CALCIUM 10.4   ALBUMIN 2.1*   *   K 4.0   CO2 26      BUN 26*   CREATININE 4.7*       Pertinent Medications Reviewed: reviewed  Scheduled Meds:   sodium chloride 0.9%   Intravenous Once    aspirin  81 mg Oral Daily    calcitRIOL  " 0.25 mcg Oral Every Mon, Wed, Fri    carvediloL  12.5 mg Oral BID    clopidogreL  75 mg Oral Daily    epoetin veronica-epbx  100 Units/kg Subcutaneous Every Mon, Wed, Fri    gabapentin  100 mg Oral BID    LIDOcaine  1 patch Transdermal Q24H    losartan  100 mg Oral Daily    NIFEdipine  60 mg Oral BID    sevelamer carbonate  1,600 mg Oral TID WM    vitamin renal formula (B-complex-vitamin c-folic acid)  1 capsule Oral Daily     Continuous Infusions:  PRN Meds:.acetaminophen, cloNIDine, dextrose 10%, dextrose 10%, diphenhydrAMINE, glucagon (human recombinant), glucose, glucose, HYDROmorphone, HYDROmorphone, insulin aspart U-100, loperamide, melatonin, naloxone, ondansetron, promethazine, simethicone, sodium chloride 0.9%, sodium chloride 0.9%      Estimated/Assessed Needs    Weight Used For Calorie Calculations: 61.2 kg (134 lb 14.7 oz)  Energy Calorie Requirements (kcal): 3510-4756  Energy Need Method: Kcal/kg (30-35)  Protein Requirements: 76 g (1.2 g/kg)  Weight Used For Protein Calculations: 61.2 kg (134 lb 14.7 oz)  Fluid Requirements (mL): 1 mL/kcal  Estimated Fluid Requirement Method:  (or per MD)  RDA Method (mL): 1836  CHO Requirement: 230g=50%dv      Nutrition Prescription Ordered    Current Diet Order: Renal  Nutrition Order Comments: no fish  Oral Nutrition Supplement: Novasource Renal    Evaluation of Received Nutrient/Fluid Intake    I/O: -3.3 L since admit  Energy Calories Required: not meeting needs  Protein Required: not meeting needs  Fluid Required: not meeting needs  Comments: LBM: 9/28/23  Tolerance: not tolerating  % Intake of Estimated Energy Needs: 25 - 50 %  % Meal Intake: 25 - 50 %  Intake/Output - Last 3 Shifts         10/01 0700  10/02 0659 10/02 0700  10/03 0659 10/03 0700  10/04 0659    P.O. 100 200     Other  500     Total Intake(mL/kg) 100 (1.6) 700 (11.4)     Urine (mL/kg/hr) 0 (0)      Emesis/NG output 0      Other  2000     Stool 0      Total Output 0 2000     Net +100 -1300             Urine Occurrence 0 x      Stool Occurrence 0 x      Emesis Occurrence 0 x            Nutrition Risk    Level of Risk/Frequency of Follow-up:  (1-2 x/week)     Monitor and Evaluation    Food and Nutrient Intake: energy intake, food and beverage intake  Food and Nutrient Adminstration: diet order  Knowledge/Beliefs/Attitudes: beliefs and attitudes  Physical Activity and Function: nutrition-related ADLs and IADLs  Anthropometric Measurements: weight, weight change  Biochemical Data, Medical Tests and Procedures: electrolyte and renal panel, gastrointestinal profile, glucose/endocrine profile, inflammatory profile, lipid profile  Nutrition-Focused Physical Findings: overall appearance     Nutrition Follow-Up    RD Follow-up?: Yes    Tonie Lucero RDN, LDN

## 2023-10-04 NOTE — NURSING
Pt white count 19, MD aware, gave pain meds for pain, monitored VS and BS, back in bed resting and appears comfortable.

## 2023-10-04 NOTE — PT/OT/SLP PROGRESS
Patient's daughter calling to request follow up appointment after being seen in ER for allergic reaction yesterday. Educated that patient should come in for a nurse BP check (179/78 in ER) but would not have to see PCP. Open to seeing how much epi pen would be - Pending approval of PCP. Also suggested to update her bottle of benadryl. [facility] [place]    Physical Therapy  Not Seen    Patient Name:  Shannan Figueredo   MRN:  [id number]    Patient not seen today secondary to Dialysis. Will follow-up tomorrow, 10/5.

## 2023-10-04 NOTE — PROGRESS NOTES
"Nephrology  Progress Note    Admit Date: 9/17/2023   LOS: 17 days     SUBJECTIVE:     Follow-up For:  Gangrene of toe of left foot    History of Present Illness:  Patient is a 67 y.o. female presents with left foot gangrene, vomiting, feeling ill.  Ext med hx as outlined below including ESRD on HD MWF in [place].  Has been dealing with necrotic left toes for few months and followed by vascular in [place].  Was here visiting daughter and felt bad.  Admitted for eval/treatment.  Consulted for HD needs.  Pt seen and examined on HD.  Consents signed.  Tired from being up all night with pain.  Updated team and daughter at bedside.  W/up noted.       Interval History:     S/p L BKA.  Awaiting placement.  Very drowsy-sedate this am.  Sat in chair for 4 hrs yesterday.  Awaiting HD.  Discussed with daughter/team.      Review of Systems:  Constitutional: No fever or chills  Respiratory: No cough or shortness of breath  Cardiovascular: No chest pain or palpitations  Gastrointestinal: No nausea or vomiting  Neurological: No confusion or weakness    OBJECTIVE:     Vital Signs Range (Last 24H):  /63 (BP Location: Left arm, Patient Position: Lying)   Pulse 74   Temp 98.2 °F (36.8 °C) (Axillary)   Resp 16   Ht 5' 5" (1.651 m)   Wt 61.2 kg (134 lb 14.7 oz)   SpO2 97%   BMI 22.45 kg/m²     Temp:  [97.7 °F (36.5 °C)-99.1 °F (37.3 °C)]   Pulse:  [73-76]   Resp:  [16-18]   BP: (122-153)/(59-67)   SpO2:  [94 %-98 %]     I & O (Last 24H):No intake or output data in the 24 hours ending 10/04/23 0857      Physical Exam:  General appearance:  Frail.  Appears older than stated age.  drowsy this am.  Eyes:  Conjunctivae/corneas clear. PERRL.  Lungs: Normal respiratory effort,   clear to auscultation bilaterally.  Diminished.   Heart: Regular rate and rhythm, S1, S2 normal, no murmur, rub or nelly.  Abdomen: Soft, non-tender non-distended; bowel sounds normal; no masses,  no organomegaly  Extremities: No cyanosis or clubbing. No " "edema.    Skin: Skin color, texture, turgor normal. No rashes or lesions  Neurologic: Normal strength and tone. No focal numbness or weakness   Right arm AVF+  Left BKA wrapped.     Laboratory Data:  Recent Labs   Lab 10/04/23  0421   WBC 23.62*   RBC 2.83*   HGB 8.1*   HCT 27.0*      MCV 95   MCH 28.6   MCHC 30.0*         BMP:   Recent Labs   Lab 10/04/23  0421   *   *   K 4.2      CO2 25   BUN 48*   CREATININE 6.2*   CALCIUM 10.3   MG 2.5   PHOS 3.5       Lab Results   Component Value Date    CALCIUM 10.3 10/04/2023    PHOS 3.5 10/04/2023       Lab Results   Component Value Date    CALCIUM 10.3 10/04/2023    PHOS 3.5 10/04/2023       No results found for: "URICACID"    BNP  No results for input(s): "BNP", "BNPTRIAGEBLO" in the last 168 hours.    Medications:  Medication list was reviewed and changes noted under Assessment/Plan.    Diagnostic Results:    reviewed    ASSESSMENT/PLAN:       ESRD on HD MWF in [place]:  -consulted for HD needs.  -consents signed  -continue HD MWF while here and if she stays after hospitalization will need temp outpt unit.   -labs noted and bath adjusted.  -R arm AVF+  -9/19: HD yesterday w/o issue.  No need today.  -9/20:  HD today after angio  -9/21:  no need for HD today.  Cont MWF if stays here.    -9/22:  seen on HD this am.   -9/25:  HD with Blood today.    -9/26:  stable from renal to DC back to [place] w/ family.    -9/27:  seen on HD.  UF 2 L.    -9/28:  nothing today.   -9/29:  seen on HD.  -10/1: stable today; plan for next dialysis session on Monday.  -10/2:  HD today.  -10/3:  adjust bath to labs for tomorrow.  Encourage some free water intake today/ tomorrow as well.  -10/4:  HD with adjustments. Limit UF as seems dry.  Renally dose meds, avoid nephrotoxins, and monitor I/O's closely.      Necrotic Left toes:  S/p BKA on 9/29  -defer to pod/vascular/cards.  -angio noted with severe PAD  -Status post exploration femoral and popliteal arteries 9/22 with " Non-reconstructible peripheral vascular disease.  Patient deciding on amputation. I think patient should have this done in [place] where she lives. At this point she just needs pain control.  Needs to discuss with OWN Team in [place] who they prefer to use.  -need answer about location of surgery.  If here will need ext rehab/HD setup.  Risks of delay explained.   -s/p BKA on 9/29.  Will need SW to work on Rehab/HD unit post op.         HTN:  -UF on HD  -increased ARB/CCB. Will follow  -UF on HD today.  Likely pain related.   -9/28:  adjust meds prn and improving.      MBD:  -binders/nephrocaps.   Changed to renvela.  Pt states that she also takes tums at home  -using calcitriol on HD days.     Anemia of CKD:  -9/25:  blood today.   -9/27:  stable.   -10/2:  FAREED with HD.     Rising WBC:  -no fever  -reactive?  -check CXR.   -discussed with team.     As above  Will follow for renal needs   Ok to DC/transfer.

## 2023-10-04 NOTE — PT/OT/SLP PROGRESS
Occupational Therapy      Patient Name:  Shannan Figueredo   MRN:  [id number]    Patient not seen today secondary to Dialysis. Will follow-up as scheduling permits.    10/4/2023

## 2023-10-04 NOTE — PT/OT/SLP PROGRESS
Occupational Therapy   Treatment    Name: Shannan Figueredo  MRN: [id number]  Admitting Diagnosis:  Gangrene of toe of left foot  4 Days Post-Op    Recommendations:     Discharge Recommendations: nursing facility, skilled  Discharge Equipment Recommendations:   (Drop arm BSC, Tutu height wheelchair with removable arm rests, elevating leg rests and anti tippers, wheelchair cushion; Defer to next level of care)  Barriers to discharge:  Inaccessible home environment, Decreased caregiver support (Current functional level, new BKA)    Assessment:     Shannan Figueredo is a 67 y.o. female with a medical diagnosis of Gangrene of toe of left foot.  She presents with daughter in room.  Daughter reporting concerns of pt not being able to mentally cope with left BKA.  Pt not opening her eyes during tx session and only minimally bearing weight through right LE during chair to bed sliding board transfer. Performance deficits affecting function are weakness, impaired endurance, impaired self care skills, impaired functional mobility, gait instability, impaired balance, impaired cognition, decreased coordination, decreased safety awareness, decreased lower extremity function, decreased ROM, impaired cardiopulmonary response to activity, pain, impaired skin, edema, orthopedic precautions.     Rehab Prognosis:  Fair; patient would benefit from acute skilled OT services to address these deficits and reach maximum level of function.       Plan:     Patient to be seen 5 x/week to address the above listed problems via self-care/home management, therapeutic activities, therapeutic exercises  Plan of Care Expires: 10/15/23  Plan of Care Reviewed with: patient, daughter    Subjective     Chief Complaint: Pt not verbalizing during tx session.  Patient/Family Comments/goals: Pt not responding to OT asking her if she is wanting to get a left LE prosthesis in the future.  Pain/Comfort:  Pain Rating 1:  (Pt not rating pain.  Pt keeping eyes closed  throughout OT tx session.)    Objective:     Communicated with: nurse and PTA prior to session.  Patient found up in chair with peripheral IV, telemetry upon OT entry to room.  Pt's daughter present.    General Precautions: Standard, diabetic, fall (Right UE hemodialysis fistula, new left BKA)    Orthopedic Precautions: (New left BKA)  Braces: N/A  Respiratory Status: Room air     Occupational Performance:     Bed Mobility for ADL:    Total A  Education and training of positioning of pt in bed with reference to left BKA    ADL Transfers:  Sliding board transfer chair to bed: Total A   Functional Mobility: Pt not opening her eyes or participating. Education of pt and caregiver/daughter.      [facility] 6 Click ADL: 10    Treatment & Education:  Role of OT, POC, sliding board transfers, safety, positioning in bed with reference to left BKA    Patient left HOB elevated with all lines intact, call button in reach, nurse notified, daughter present, and right SCD cycling    GOALS:   Multidisciplinary Problems       Occupational Therapy Goals          Problem: Occupational Therapy    Goal Priority Disciplines Outcome Interventions   Occupational Therapy Goal     OT, PT/OT Ongoing, Progressing    Description: OT evaluation completed s/p LLE BKA with goals updated as appropriate.      Goals to be met by: 10/15/2023     Patient will increase functional independence with ADLs by performing:    UE Dressing with Moderate Assistance.  LE Dressing with Maximum Assistance.  Grooming while EOB with Minimum Assistance.  Toileting from bedside commode with Maximum Assistance for hygiene and clothing management.   Bathing from sitting at sink with Moderate Assistance.  Toilet transfer to bedside commode with Maximum Assistance.                             Time Tracking:     OT Date of Treatment: 10/03/23  OT Start Time: 1750  OT Stop Time: 1810  OT Total Time (min): 20 min    Billable Minutes:Self Care/Home Management 20    OT/ROGER: OT      Number of ROGER visits since last OT visit: 1    10/3/2023

## 2023-10-04 NOTE — NURSING
Unable to administer scheduled night medications d/t patient sleeping. Daughter at BS and amenable with not administering meds d/t safety.

## 2023-10-04 NOTE — PROGRESS NOTES
"[facility] Surg ([address] Medicine  Progress Note    Patient Name: Shannan Figueredo  MRN: [id number]  Patient Class: IP- Inpatient   Admission Date: 9/17/2023  Length of Stay: 17 days  Attending Physician: Brook Colon MD  Primary Care Provider: Roxi, Primary Doctor        Subjective:     Principal Problem:Gangrene of toe of left foot        HPI:  Ms. Figueredo is a 67/F with PMH HTN, DMII (diet-controlled), PVD, ESRD on HD (M/W/F), anemia who presented to [facility] 09/17 with a two day progressive history of worsening L foot pain, swelling, fatigue, nausea and vomiting. History obtained from patient and daughter Stephanie ([phone]); they are from the [place], [place] area. Patient reports several weeks ago she "stubbed her toe," noting initially some pain, redness and swelling. Gradually symptoms progressed with duskiness to her toe and she informed her daughter of her pain for which she was brought to ER in [place] on 08/25 for further evaluation. With duskiness to 1st toe and surrounding erythema she was treated for cellulitis with a course of ciprofloxacin and metronidazole, but failed to improve. She followed up with her podiatrist 09/12 where she was prescribed hydrocodone-acetaminophen and a ten day course of TMP-SMX which she has been taking. She was referred to vascular surgery with plan for angiography and potential intervention  She and her family came to [place] for the weekend but her pain worsened; had been attempting to minimize use of hydrocodone-acetaminophen but developed nausea, vomiting and progressive fatigue. She subsequently presented to ED for further evaluation. ED workup was notable for L 1st toe dry-appearing gangrene with surrounding erythema and swelling, removed nail of second toe, XR L foot demonstrating diffuse osteopenia, L calcaneal and forefoot soft tissue swelling without definitive evidence of osteomyelitis in setting of osteopenia, no leukocytosis, elevated " sed rate and CRP, and elevated BUN/Cr in setting of ESRD. She notes she went without dialysis on 09/15 due to her trip. Blood cultures were ordered and she received piperacillin-tazobactam and vancomycin. Hospital medicine was subsequently contacted for admission.      Overview/Hospital Course:  Admitted with gangrene of L 1st toe.  Empirically treated with Zosyn and vancomycin.  Vascular Surgery, nephrology, podiatry consulted; U/S arterial demonstrated L SFA occlusion with distal reconstitution. Cardiology consulted, angiography with no vessels amenable for angioplasty on 9/20.  Underwent exploration of the left lower extremity vessels, femoral and popliteal bypass unable to be performed due to no adequate available vessels for bypass on 9/22. Discussed potential intervention here vs [place], patient and family opted for surgery in [place]. Underwent L BKA 09/29. PT/OT recommended SNF.      Interval History: No acute events overnight. Daughter report patient ate well yesterday and had 2 Nepro, and sat up for a long time in chair yesterday. She has slept well and has been drowsy since getting the gabapentin. Discussed plan of care with daughter at bedside. Pursue SNF placement.    Review of Systems   Constitutional:  Negative for chills and fever.   Respiratory:  Negative for cough and shortness of breath.    Cardiovascular:  Negative for chest pain and palpitations.   Gastrointestinal:  Negative for abdominal pain, nausea and vomiting.     Objective:     Vital Signs (Most Recent):  Temp: 98.2 °F (36.8 °C) (10/04/23 0749)  Pulse: 74 (10/04/23 0749)  Resp: 16 (10/04/23 0749)  BP: 137/63 (10/04/23 0749)  SpO2: 97 % (10/04/23 0933) Vital Signs (24h Range):  Temp:  [97.7 °F (36.5 °C)-99.1 °F (37.3 °C)] 98.2 °F (36.8 °C)  Pulse:  [73-76] 74  Resp:  [16-18] 16  SpO2:  [94 %-98 %] 97 %  BP: (122-153)/(59-67) 137/63     Weight: 61.2 kg (134 lb 14.7 oz)  Body mass index is 22.45 kg/m².  No intake or output data in the 24  hours ending 10/04/23 1052        Physical Exam  Vitals and nursing note reviewed.   Constitutional:       General: She is not in acute distress.     Appearance: She is well-developed.   HENT:      Head: Normocephalic and atraumatic.   Eyes:      General:         Right eye: No discharge.         Left eye: No discharge.      Conjunctiva/sclera: Conjunctivae normal.   Cardiovascular:      Rate and Rhythm: Normal rate.      Pulses: Normal pulses.   Pulmonary:      Effort: Pulmonary effort is normal. No respiratory distress.   Abdominal:      Palpations: Abdomen is soft.      Tenderness: There is no abdominal tenderness.   Musculoskeletal:         General: Normal range of motion.      Right lower leg: No edema.      Comments: s/p LLE BKA with dressing in place.   Skin:     General: Skin is warm and dry.   Neurological:      Comments: Sleepy but arousable       Significant Labs:   CBC:  Recent Labs   Lab 10/02/23  0504 10/03/23  0438 10/04/23  0421   WBC 16.63* 19.79* 23.62*   HGB 8.3* 8.2* 8.1*   HCT 26.4* 26.7* 27.0*    276 278   GRAN 71.2  11.8* 73.0 77.0*   LYMPH 10.3*  1.7 10.0*  CANCELED 7.0*  CANCELED   MONO 13.2  2.2* 14.0  CANCELED 13.0  CANCELED   EOS 0.2 CANCELED CANCELED   BASO 0.09 CANCELED CANCELED     BMP:  Recent Labs   Lab 10/02/23  0504 10/03/23  0438 10/04/23  0421    148* 147*   K 3.9 4.0 4.2   CL 96 109 107   CO2 23 26 25   BUN 62* 26* 48*   CREATININE 8.6* 4.7* 6.2*   GLU 86 123* 125*   CALCIUM 9.9 10.4 10.3   MG 2.2 2.3 2.5   PHOS 5.7* 3.6 3.5       Significant Imaging: I have reviewed and interpreted all pertinent imaging results/findings within the past 24 hours.      Assessment/Plan:      * Gangrene of toe of left foot  HTN, PVD  - L 1st toe gangrene predominantly dry with some surrounding erythema/swelling; with concern for worsening infection started piperacillin-tazobactam and vancomycin IV. 2nd toe with involvement as well.  - U/S arterial BLE showed L distal SFA  "occlusion with distal reconstitution. Discussed with vascular surgery and cardiology consulted for consideration of angiography / potential angioplasty.  - Podiatry consulted. MRI L foot with "examination markedly degraded by patient motion artifact. Questionable marrow edema within the 1st distal phalanx, not well evaluated given aforementioned limitation but possibly related to osteomyelitis given reported history of gangrene."  - Blood cultures showed no growth.  - s/p angiogram remarkable for severe PAD with no areas amenable to angioplasty on 9/20. Elected to undergo attempt at revascularization 09/22 but unable to perform femoral and popliteal bypass on 9/22. Discussed surgical options with BKA only viable for appropriate healing given extent of vascular disease.   - Underwent BKA on 09/30. Leukocytosis improving. Therapy recommending SNF. Pursuing placement.  - Given possible decreased clearance of pain medications, stopped hydrocodone-acetaminophen 5-325mg PO q6hr PRN, oxycodone-acetaminophen 7.5-325mg PO q6hr PRN. Started PO dilaudid instead and Neurontin on 10/3  - Report increased sedation since getting Neurontin, will change it to 100 mg QHS today but still with sedation, will discontinue  - WBC increasing but no fever, possible delayed leukemoid reaction. Will continue to monitor for now and consider repeat cultures if spikes a fever  - Continue carvedilol 12.5mg PO BID, losartan 100mg PO daily, nifedipine 60mg PO BID.    Type 2 diabetes mellitus with chronic kidney disease on chronic dialysis  - Reports diet-controlled; no current medications.  - HgbA1c 5.7.  - Continue low-dose sliding scale insulin aspart 0-5U subQ TIDWM PRN, monitor requirements.    ESRD (end stage renal disease)  Anemia, hyperparathyroidism  - ESRD on HD, reports missed session Friday prior to presentation due to travel.  - Mild anemia without evidence of bleeding.  - Continue allopurinol 300mg PO daily, calcium acetate 2001mg PO " TIDWM.  - Transfused 1U pRBCs 09/25 with appropriate response.  - Nephrology consulted for HD assistance.      VTE Risk Mitigation (From admission, onward)         Ordered     IP VTE HIGH RISK PATIENT  Once         09/28/23 1906                    Brook Colon MD  Department of Hospital Medicine   [facility] Surg ([address])

## 2023-10-04 NOTE — PT/OT/SLP PROGRESS
Occupational Therapy      Patient Name:  Shannan Fgiueredo   MRN:  [id number]    Patient not seen today secondary to Dialysis. Will follow-up 10/5/23.    10/4/2023

## 2023-10-04 NOTE — SUBJECTIVE & OBJECTIVE
Interval History: No acute events overnight. Daughter report patient ate well yesterday and had 2 Nepro, and sat up for a long time in chair yesterday. She has slept well and has been drowsy since getting the gabapentin. Discussed plan of care with daughter at bedside. Pursue SNF placement.    Review of Systems   Constitutional:  Negative for chills and fever.   Respiratory:  Negative for cough and shortness of breath.    Cardiovascular:  Negative for chest pain and palpitations.   Gastrointestinal:  Negative for abdominal pain, nausea and vomiting.     Objective:     Vital Signs (Most Recent):  Temp: 98.2 °F (36.8 °C) (10/04/23 0749)  Pulse: 74 (10/04/23 0749)  Resp: 16 (10/04/23 0749)  BP: 137/63 (10/04/23 0749)  SpO2: 97 % (10/04/23 0933) Vital Signs (24h Range):  Temp:  [97.7 °F (36.5 °C)-99.1 °F (37.3 °C)] 98.2 °F (36.8 °C)  Pulse:  [73-76] 74  Resp:  [16-18] 16  SpO2:  [94 %-98 %] 97 %  BP: (122-153)/(59-67) 137/63     Weight: 61.2 kg (134 lb 14.7 oz)  Body mass index is 22.45 kg/m².  No intake or output data in the 24 hours ending 10/04/23 1052        Physical Exam  Vitals and nursing note reviewed.   Constitutional:       General: She is not in acute distress.     Appearance: She is well-developed.   HENT:      Head: Normocephalic and atraumatic.   Eyes:      General:         Right eye: No discharge.         Left eye: No discharge.      Conjunctiva/sclera: Conjunctivae normal.   Cardiovascular:      Rate and Rhythm: Normal rate.      Pulses: Normal pulses.   Pulmonary:      Effort: Pulmonary effort is normal. No respiratory distress.   Abdominal:      Palpations: Abdomen is soft.      Tenderness: There is no abdominal tenderness.   Musculoskeletal:         General: Normal range of motion.      Right lower leg: No edema.      Comments: s/p LLE BKA with dressing in place.   Skin:     General: Skin is warm and dry.   Neurological:      Comments: Sleepy but arousable       Significant Labs:   CBC:  Recent Labs    Lab 10/02/23  0504 10/03/23  0438 10/04/23  0421   WBC 16.63* 19.79* 23.62*   HGB 8.3* 8.2* 8.1*   HCT 26.4* 26.7* 27.0*    276 278   GRAN 71.2  11.8* 73.0 77.0*   LYMPH 10.3*  1.7 10.0*  CANCELED 7.0*  CANCELED   MONO 13.2  2.2* 14.0  CANCELED 13.0  CANCELED   EOS 0.2 CANCELED CANCELED   BASO 0.09 CANCELED CANCELED     BMP:  Recent Labs   Lab 10/02/23  0504 10/03/23  0438 10/04/23  0421    148* 147*   K 3.9 4.0 4.2   CL 96 109 107   CO2 23 26 25   BUN 62* 26* 48*   CREATININE 8.6* 4.7* 6.2*   GLU 86 123* 125*   CALCIUM 9.9 10.4 10.3   MG 2.2 2.3 2.5   PHOS 5.7* 3.6 3.5       Significant Imaging: I have reviewed and interpreted all pertinent imaging results/findings within the past 24 hours.

## 2023-10-04 NOTE — ASSESSMENT & PLAN NOTE
"HTN, PVD  - L 1st toe gangrene predominantly dry with some surrounding erythema/swelling; with concern for worsening infection started piperacillin-tazobactam and vancomycin IV. 2nd toe with involvement as well.  - U/S arterial BLE showed L distal SFA occlusion with distal reconstitution. Discussed with vascular surgery and cardiology consulted for consideration of angiography / potential angioplasty.  - Podiatry consulted. MRI L foot with "examination markedly degraded by patient motion artifact. Questionable marrow edema within the 1st distal phalanx, not well evaluated given aforementioned limitation but possibly related to osteomyelitis given reported history of gangrene."  - Blood cultures showed no growth.  - s/p angiogram remarkable for severe PAD with no areas amenable to angioplasty on 9/20. Elected to undergo attempt at revascularization 09/22 but unable to perform femoral and popliteal bypass on 9/22. Discussed surgical options with BKA only viable for appropriate healing given extent of vascular disease.   - Underwent BKA on 09/30. Leukocytosis improving. Therapy recommending SNF. Pursuing placement.  - Given possible decreased clearance of pain medications, stopped hydrocodone-acetaminophen 5-325mg PO q6hr PRN, oxycodone-acetaminophen 7.5-325mg PO q6hr PRN. Started PO dilaudid instead and Neurontin on 10/3  - Report increased sedation since getting Neurontin, will change it to 100 mg QHS today but still with sedation, will discontinue  - WBC increasing but no fever, possible delayed leukemoid reaction. Will continue to monitor for now and consider repeat cultures if spikes a fever  - Continue carvedilol 12.5mg PO BID, losartan 100mg PO daily, nifedipine 60mg PO BID.  "

## 2023-10-04 NOTE — PLAN OF CARE
Problem: Infection  Goal: Absence of Infection Signs and Symptoms  Outcome: Ongoing, Progressing     Problem: Adult Inpatient Plan of Care  Goal: Plan of Care Review  Outcome: Ongoing, Progressing  Goal: Patient-Specific Goal (Individualized)  Outcome: Ongoing, Progressing  Goal: Absence of Hospital-Acquired Illness or Injury  Outcome: Ongoing, Progressing  Goal: Optimal Comfort and Wellbeing  Outcome: Ongoing, Progressing     Problem: Diabetes Comorbidity  Goal: Blood Glucose Level Within Targeted Range  Outcome: Ongoing, Progressing     POC reviewed with patient and daughter. All questions and concerns addressed with daughter. Fall/safety precautions implemented and maintained. Patient anuric. HD in am. L bka. Dressing CDI. Blood glucose monitored. No acute events noted this shift. Please see flowsheet for full assessment and vitals. Bed locked in lowest position. Side rails up x2. Call bell within reach. Will continue to monitor.

## 2023-10-04 NOTE — PROGRESS NOTES
10/04/23 1820   Post-Hemodialysis Assessment   Rinseback Volume (mL) 250 mL   Blood Volume Processed (Liters) 84.3 L   Dialyzer Clearance Clear   Additional Fluid Intake (mL) 500 mL   Total UF (mL) 1500 mL   Net Fluid Removal 1000   Patient Response to Treatment tx completed, blood returned per pp   Arterial bleeding stop time (min) 10 min   Venous bleeding stop time (min) 10 min   Post-Hemodialysis Comments Blood retuned pp,

## 2023-10-05 NOTE — PROGRESS NOTES
"[facility] ([address] Medicine  Progress Note    Patient Name: Shannan Figueredo  MRN: [id number]  Patient Class: IP- Inpatient   Admission Date: 9/17/2023  Length of Stay: 18 days  Attending Physician: Brook Colon MD  Primary Care Provider: Roxi, Primary Doctor        Subjective:     Principal Problem:Gangrene of toe of left foot        HPI:  Ms. Figueredo is a 67/F with PMH HTN, DMII (diet-controlled), PVD, ESRD on HD (M/W/F), anemia who presented to [facility] 09/17 with a two day progressive history of worsening L foot pain, swelling, fatigue, nausea and vomiting. History obtained from patient and daughter Stephanie ([phone]); they are from the [place], [place] area. Patient reports several weeks ago she "stubbed her toe," noting initially some pain, redness and swelling. Gradually symptoms progressed with duskiness to her toe and she informed her daughter of her pain for which she was brought to ER in [place] on 08/25 for further evaluation. With duskiness to 1st toe and surrounding erythema she was treated for cellulitis with a course of ciprofloxacin and metronidazole, but failed to improve. She followed up with her podiatrist 09/12 where she was prescribed hydrocodone-acetaminophen and a ten day course of TMP-SMX which she has been taking. She was referred to vascular surgery with plan for angiography and potential intervention  She and her family came to [place] for the weekend but her pain worsened; had been attempting to minimize use of hydrocodone-acetaminophen but developed nausea, vomiting and progressive fatigue. She subsequently presented to ED for further evaluation. ED workup was notable for L 1st toe dry-appearing gangrene with surrounding erythema and swelling, removed nail of second toe, XR L foot demonstrating diffuse osteopenia, L calcaneal and forefoot soft tissue swelling without definitive evidence of osteomyelitis in setting of osteopenia, no leukocytosis, elevated " sed rate and CRP, and elevated BUN/Cr in setting of ESRD. She notes she went without dialysis on 09/15 due to her trip. Blood cultures were ordered and she received piperacillin-tazobactam and vancomycin. Hospital medicine was subsequently contacted for admission.      Overview/Hospital Course:  Admitted with gangrene of L 1st toe.  Empirically treated with Zosyn and vancomycin.  Vascular Surgery, nephrology, podiatry consulted; U/S arterial demonstrated L SFA occlusion with distal reconstitution. Cardiology consulted, angiography with no vessels amenable for angioplasty on 9/20.  Underwent exploration of the left lower extremity vessels, femoral and popliteal bypass unable to be performed due to no adequate available vessels for bypass on 9/22. Discussed potential intervention here vs [place], patient and family opted for surgery in [place]. Underwent L BKA 09/29. PT/OT recommended SNF.      Interval History: No acute events overnight. Patient report she just does not feel good. When asked to describe in detail, patient could not articulate. Daughter reported she was concerned that she did not get pain medications overnight since they were held by nurse. Discussed plan of care with daughter at bedside and explained the nurse is trained to look for signs of pain in relation to other clinical indicators and was holding pain medications if there was a clinical indication. Patient was later in the afternoon, sitting up and working with therapy.    Review of Systems   Constitutional:  Negative for chills and fever.   Respiratory:  Negative for cough and shortness of breath.    Cardiovascular:  Negative for chest pain and palpitations.   Gastrointestinal:  Negative for abdominal pain, nausea and vomiting.     Objective:     Vital Signs (Most Recent):  Temp: 97.9 °F (36.6 °C) (10/05/23 1601)  Pulse: 70 (10/05/23 1601)  Resp: 20 (10/05/23 1601)  BP: 135/64 (10/05/23 1601)  SpO2: 98 % (10/05/23 1601) Vital Signs (24h  Range):  Temp:  [97.9 °F (36.6 °C)-99 °F (37.2 °C)] 97.9 °F (36.6 °C)  Pulse:  [64-73] 70  Resp:  [16-20] 20  SpO2:  [92 %-99 %] 98 %  BP: (103-186)/(55-75) 135/64     Weight: 61.2 kg (134 lb 14.7 oz)  Body mass index is 22.45 kg/m².    Intake/Output Summary (Last 24 hours) at 10/5/2023 1658  Last data filed at 10/4/2023 1820  Gross per 24 hour   Intake 500 ml   Output 1500 ml   Net -1000 ml           Physical Exam  Vitals and nursing note reviewed.   Constitutional:       General: She is not in acute distress.     Appearance: She is well-developed.   HENT:      Head: Normocephalic and atraumatic.   Eyes:      General:         Right eye: No discharge.         Left eye: No discharge.      Conjunctiva/sclera: Conjunctivae normal.   Cardiovascular:      Rate and Rhythm: Normal rate.      Pulses: Normal pulses.   Pulmonary:      Effort: Pulmonary effort is normal. No respiratory distress.   Abdominal:      Palpations: Abdomen is soft.      Tenderness: There is no abdominal tenderness.   Musculoskeletal:         General: Normal range of motion.      Right lower leg: No edema.      Comments: s/p LLE BKA with dressing in place.   Skin:     General: Skin is warm and dry.   Neurological:      Comments: Slightly sleepy but more arousable and able to answer simple questions and maintain attention better than yesterday       Significant Labs:   CBC:  Recent Labs   Lab 10/03/23  0438 10/04/23  0421 10/05/23  0428   WBC 19.79* 23.62* 23.93*   HGB 8.2* 8.1* 8.6*   HCT 26.7* 27.0* 28.2*    278 291   GRAN 73.0 77.0* 78.0*   LYMPH 10.0*  CANCELED 7.0*  CANCELED 8.0*  CANCELED   MONO 14.0  CANCELED 13.0  CANCELED 10.0  CANCELED   EOS CANCELED CANCELED CANCELED   BASO CANCELED CANCELED CANCELED     BMP:  Recent Labs   Lab 10/03/23  0438 10/04/23  0421 10/05/23  0428   * 147* 140   K 4.0 4.2 3.8    107 98   CO2 26 25 30*   BUN 26* 48* 20   CREATININE 4.7* 6.2* 3.2*   * 125* 101   CALCIUM 10.4 10.3 10.0  "  MG 2.3 2.5 2.1   PHOS 3.6 3.5 2.7       Significant Imaging: I have reviewed and interpreted all pertinent imaging results/findings within the past 24 hours.      Assessment/Plan:      * Gangrene of toe of left foot  HTN, PVD  - L 1st toe gangrene predominantly dry with some surrounding erythema/swelling; with concern for worsening infection started piperacillin-tazobactam and vancomycin IV. 2nd toe with involvement as well.  - U/S arterial BLE showed L distal SFA occlusion with distal reconstitution. Discussed with vascular surgery and cardiology consulted for consideration of angiography / potential angioplasty.  - Podiatry consulted. MRI L foot with "examination markedly degraded by patient motion artifact. Questionable marrow edema within the 1st distal phalanx, not well evaluated given aforementioned limitation but possibly related to osteomyelitis given reported history of gangrene."  - Blood cultures showed no growth.  - s/p angiogram remarkable for severe PAD with no areas amenable to angioplasty on 9/20. Elected to undergo attempt at revascularization 09/22 but unable to perform femoral and popliteal bypass on 9/22. Discussed surgical options with BKA only viable for appropriate healing given extent of vascular disease.   - Underwent BKA on 09/30. Leukocytosis improving. Therapy recommending SNF. Pursuing placement.  - Given possible decreased clearance of pain medications, stopped hydrocodone-acetaminophen 5-325mg PO q6hr PRN, oxycodone-acetaminophen 7.5-325mg PO q6hr PRN. Started PO dilaudid instead and Neurontin on 10/3  - Report increased sedation since getting Neurontin, change it to 100 mg QHS on 10/4 but still with sedation but with some improvement, will discontinue Neurontin today  - WBC increased but seems to have plateaued over the past 2 days. No fever, repeat CXR overall unrevealing and no other source of infection identified. Possible delayed leukemoid reaction. Case discussed with Dr. Cobb " and he will be re-examining the surgical site  - Will continue to monitor for now and consider repeat cultures if spikes a fever   - Continue carvedilol 12.5mg PO BID, losartan 100mg PO daily, nifedipine 60mg PO BID.  - Discharge planning with SNF vs home when ready to leave hospital after stabilization and work up complete    Type 2 diabetes mellitus with chronic kidney disease on chronic dialysis  - Reports diet-controlled; no current medications.  - HgbA1c 5.7.  - Continue low-dose sliding scale insulin aspart 0-5U subQ TIDWM PRN, monitor requirements.    ESRD (end stage renal disease)  Anemia, hyperparathyroidism  - ESRD on HD, reports missed session Friday prior to presentation due to travel.  - Mild anemia without evidence of bleeding.  - Continue allopurinol 300mg PO daily, calcium acetate 2001mg PO TIDWM.  - Transfused 1U pRBCs 09/25 with appropriate response.  - Nephrology consulted for HD assistance.      VTE Risk Mitigation (From admission, onward)         Ordered     IP VTE HIGH RISK PATIENT  Once         09/28/23 1906                      Brook Colon MD  Department of Hospital Medicine   [facility] ([address])

## 2023-10-05 NOTE — PT/OT/SLP PROGRESS
Physical Therapy Treatment    Patient Name:  Shannan Figueredo   MRN:  [id number]    Recommendations:     Discharge Recommendations: nursing facility, skilled  Discharge Equipment Recommendations:  (Drop arm BSC, Tutu height wheelchair with removable arm rests, elevating leg rests and anti tippers, wheelchair cushion; Defer to next level of care)  Barriers to discharge: Inaccessible home and Decreased caregiver support    Assessment:     Shannan Figueredo is a 67 y.o. female admitted with a medical diagnosis of Gangrene of toe of left foot.  She presents with the following impairments/functional limitations: weakness, impaired endurance, impaired self care skills, impaired functional mobility, gait instability, impaired balance, impaired cognition, visual deficits, decreased coordination, decreased upper extremity function, decreased lower extremity function, decreased safety awareness, pain, decreased ROM, impaired coordination, impaired joint extensibility.    Patient with difficulty with active participation in therapy directed tasks, keeping eyes closed a large portion of session and with minimal verbal acknowledgement of directions. Performed x2 transfer with RW and squat pivot transfer to chair, all with totalA. Will continue to encourage pt and advance mobility as tolerated, rec for DC to SNF.     Rehab Prognosis:  Guarded ; patient would benefit from acute skilled PT services to address these deficits and reach maximum level of function.    Recent Surgery: Procedure(s) (LRB):  AMPUTATION, BELOW KNEE (Left) 6 Days Post-Op    Plan:     During this hospitalization, patient to be seen 5 x/week to address the identified rehab impairments via therapeutic activities, therapeutic exercises, neuromuscular re-education, wheelchair management/training and progress toward the following goals:    Plan of Care Expires:  10/30/23    Subjective     Chief Complaint: None verbalized, pt with minimal verbalizations during  "session  Patient/Family Comments/goals: Attempted to engage pt in discussion about goals, pt does not answer questions and unable to repeat back any of the conversation  Pain/Comfort:  Pain Rating 1: 0/10 (occasional facial grimace with movement wild with posterior knee stretch , does not rate pain or verbally report pain)  Location - Side 1: Left  Location 1: leg  Pain Rating Post-Intervention 1:  (facial grimace while sitting in chair)      Objective:     Communicated with CHESTER Corley prior to session - coordinated for premedication for pain.  Patient found HOB elevated with peripheral IV upon PT entry to room.     General Precautions: Standard, diabetic, fall (RUE AV fistual)  Orthopedic Precautions:  (L BKA)  Braces: N/A  Respiratory Status: Room air     Patient donned non slip sock and gait belt for OOB mobility.    Functional Mobility:  Bed Mobility:     Supine to Sit: maximal assistance  Extended time to engage pt in task  Initiates LE slide to EOB, but minimal excursion   With firm direction, able to minimal pull herself to sitting  Cueing for hand placement for appropriate balance and ability to push hips forward to EOB  Transfers:     Sit to Stand:  total assistance and of 2 persons with rolling walker  Performed 2x from elevated EOB  Hand over hand assistance for UE support on RW  Blocking of R knee  Maintains crouched stance during standing with no R knee or hip extension  Bed to Chair: total assistance with  no AD  using  Squat Pivot  Minimal to no effort from pt, possibly fatigued from sitting EOB  Balance:   Sitting balance EOB x10 min with maxA for balance d/t posterior lean.   With multimodal cueing, able to initiate forward flexion for brief periods of CGA, but quickly regresses to posterior lean.   When asked, pt reports sitting is "hard".   Insufficient postural reactions noted.   Standing balance 2x10 sec with totalA x2 people   Blocking of R knee and assistance at LUE to facilitate WB thru " RW  Maintains crouched posture with forward flexion of trunk  Minimal active engagement in standing task       AM-PAC 6 CLICK MOBILITY  Turning over in bed (including adjusting bedclothes, sheets and blankets)?: 1  Sitting down on and standing up from a chair with arms (e.g., wheelchair, bedside commode, etc.): 1  Moving from lying on back to sitting on the side of the bed?: 2  Moving to and from a bed to a chair (including a wheelchair)?: 1  Need to walk in hospital room?: 1  Climbing 3-5 steps with a railing?: 1  Basic Mobility Total Score: 7       Treatment & Education:  Pt performed supine therapeutic exercises including L quad sets, L SLR, R SAQs, R heel slides, L abd/add, R single leg bridging x 2-6 reps with verbal and tactile cues.   Intermittent active participation in therEx  Occasional facial grimacing with BLE movement, no verbal acknowledgement of pain  Extended attempted conversation on pt's goals for PT and need for active participation in suggested tasks to maintain strength and maximize her return to independent functional mobility - pt withdrawn and not engaged in discussion, when asked does not respond or sometimes shakes head yes/no    Patient left up in chair with all lines intact, call button in reach, RN Jory notified, and dtr present..    GOALS:   Multidisciplinary Problems       Physical Therapy Goals          Problem: Physical Therapy    Goal Priority Disciplines Outcome Goal Variances Interventions   Physical Therapy Goal     PT, PT/OT Ongoing, Progressing     Description: Patient will perform the following to increase strength, improve mobility, and return to prior level of function:    1. Supine<>sit with modA with appropriate use of HB features.   2. Rolling R/L with appropriate us eof HB features and Yen.   3. Sitting EOB x3 min with SBA without sway.   4. Sit<>stand with modA with RW.   5. Transfer bed<>chair with modA with most appropriate technique and LRAD.   6. Perform BKA  therEx including L QS, L hip abd in supine or sidelying, L hip flexor stretch in sidelying, and R single leg bridge without assistance.                        Time Tracking:     PT Received On: 10/05/23  PT Start Time: 1010     PT Stop Time: 1103  PT Total Time (min): 53 min     Billable Minutes: Therapeutic Activity 30, Therapeutic Exercise 15, and Neuromuscular Re-education 8    Treatment Type: Treatment  PT/PTA: PT     Number of PTA visits since last PT visit: 0     10/05/2023

## 2023-10-05 NOTE — PROGRESS NOTES
Postop day 6.  Status post left BKA/gangrene left foot/non-reconstructible peripheral vascular disease    Subjective   More alert with medication changes    PE   Left lower extremity-dressing change, wounds clean, dry, intact.  No evidence of infection or breakdown

## 2023-10-05 NOTE — PT/OT/SLP PROGRESS
Occupational Therapy   Treatment    Name: Shannan Figueredo  MRN: [id number]  Admitting Diagnosis:  Gangrene of toe of left foot  6 Days Post-Op    Recommendations:     Discharge Recommendations: nursing facility, skilled  Discharge Equipment Recommendations:   (Drop arm BSC, Tutu height wheelchair with removable arm rests, elevating leg rests and anti tippers, wheelchair cushion; Defer to next level of care)  Barriers to discharge:  Inaccessible home environment, Decreased caregiver support (Current functional level, new BKA)    Assessment:     Shannan Figueredo is a 67 y.o. female with a medical diagnosis of Gangrene of toe of left foot.  She presents with daughter session. Performance deficits affecting function are weakness, impaired endurance, impaired self care skills, impaired functional mobility, gait instability, impaired balance, decreased coordination, decreased upper extremity function, decreased lower extremity function, decreased ROM, impaired coordination, decreased safety awareness, pain, impaired skin, impaired joint extensibility.     Patient with difficulties with participating therapy. Patient with 5% to follow commands, closing eyes majority of the session, and constant redirection to attend/perform tasks. Patient with Total A x 2 for squat pivot transfer from chair > bed. Total A to don underwear at bed level; required HHA to don/doff underwear, total assistance to lift BLE to thread underwear, utilizing hospital bed features to assistance in sitting upright posture to reach LE to don underwear, max cues to activity participate in pulling up/down crocker past hips though unable to follow commands.    Rehab Prognosis:  Fair; patient would benefit from acute skilled OT services to address these deficits and reach maximum level of function.       Plan:     Patient to be seen 5 x/week to address the above listed problems via self-care/home management, therapeutic activities, therapeutic exercises  Plan  of Care Expires: 10/15/23  Plan of Care Reviewed with: patient, daughter    Subjective     Chief Complaint: none stated   Patient/Family Comments/goals: daughter wanting pt to actively participate   Pain/Comfort:  Pain Rating 1:  (displayed discomfort and facial grimacing during functional transfer/mobility)  Location - Side 1: Left  Location - Orientation 1: generalized  Location 1: leg  Pain Addressed 1: Pre-medicate for activity, Reposition, Distraction, Cessation of Activity  Pain Rating Post-Intervention 1: 0/10 (at rest)    Objective:     Communicated with: RN prior to session.  Patient found up in chair with peripheral IV upon OT entry to room.    General Precautions: Standard, diabetic, fall    Orthopedic Precautions: (L BKA)  Braces: N/A  Respiratory Status: Room air     Occupational Performance:     Bed Mobility:    Sit > Supine: Total A x 2    Scoot HOB: Total A x 2   Rolling R <> L: Mod A - cues for BUE/BLE placement     Functional Mobility/Transfers:  Sit > Stand x 3 trial: Total A for forward trunk flexion and RLE placement in preparation into standing   1st and 2nd trial: Total A x 2, RW; block R foot and knee in attempting for more upright posture though unable to perform   3rd trail: Total A with therapist in front of pt; blocking R knee and foot, pt's arm around therapist shoulders, therapist lifting at the waist though patient unable to tolerated standing and wanting to sit in chair   Chair > Bed Transfer: Total A x 2 with squat pivot     Activities of Daily Living:  LB Dressing: Total A to don/doff underwear with hospital bed feature    required HHA to don/doff underwear, total assistance to lift BLE to thread underwear, utilizing hospital bed features to assistance in sitting upright posture to reach LE to don underwear, max cues to actively  participate in pulling up/down crocker past hips though unable to follow commands.       [facility] 6 Click ADL: 10    Treatment & Education:  OT role, plan of  care, progression of goals, importance of continued OOB activity, ADL/functional transfer and mobility retraining, discharge recommendation, call don't fall, safety precautions, fall prevention.      Patient left HOB elevated with all lines intact, call button in reach, bed alarm on, RN notified, and daughter present    GOALS:   Multidisciplinary Problems       Occupational Therapy Goals          Problem: Occupational Therapy    Goal Priority Disciplines Outcome Interventions   Occupational Therapy Goal     OT, PT/OT Ongoing, Progressing    Description: OT evaluation completed s/p LLE BKA with goals updated as appropriate.      Goals to be met by: 10/15/2023     Patient will increase functional independence with ADLs by performing:    UE Dressing with Moderate Assistance.  LE Dressing with Maximum Assistance.  Grooming while EOB with Minimum Assistance.  Toileting from bedside commode with Maximum Assistance for hygiene and clothing management.   Bathing from sitting at sink with Moderate Assistance.  Toilet transfer to bedside commode with Maximum Assistance.                             Time Tracking:     OT Date of Treatment: 10/05/23  OT Start Time: 1329  OT Stop Time: 1408  OT Total Time (min): 39 min    Billable Minutes:Self Care/Home Management 39 min    OT/ROGER: ROGER     Number of ROGER visits since last OT visit: 1    Direct supervision provided by PEE Torres ([id number]) for entire treatment session.    10/5/2023

## 2023-10-05 NOTE — PLAN OF CARE
CM met at bedside with daughter Nhi at her request. She is very upset and feels like she doesn;t have a voice in deciding her moms discharge disposition. She states her sister Stephanie will not answer her phone calls and provide her with update and they are currently feuding. There appears to be extensive family issues amongst the sisters that CM informed we are not allowed to intervene and that the two should come together and discuss. CM called Stephanie on personal cellphone on speaker with sister Nhi at bedside and they immediately begin to argue. Stephanie has decided [facility] (the only accepting facility willing to take patient out of network insurance) is not the best place for her mom. She has decided to take her mom back to [place] upon discharge.    [id number] Daughter Nhi Zuluaga requesting to speak with CM supervisor. Manager Ree Urban notified and provided with contact info.

## 2023-10-05 NOTE — SUBJECTIVE & OBJECTIVE
Interval History: No acute events overnight. Patient report she just does not feel good. When asked to describe in detail, patient could not articulate. Daughter reported she was concerned that she did not get pain medications overnight since they were held by nurse. Discussed plan of care with daughter at bedside and explained the nurse is trained to look for signs of pain in relation to other clinical indicators and was holding pain medications if there was a clinical indication. Patient was later in the afternoon, sitting up and working with therapy.    Review of Systems   Constitutional:  Negative for chills and fever.   Respiratory:  Negative for cough and shortness of breath.    Cardiovascular:  Negative for chest pain and palpitations.   Gastrointestinal:  Negative for abdominal pain, nausea and vomiting.     Objective:     Vital Signs (Most Recent):  Temp: 97.9 °F (36.6 °C) (10/05/23 1601)  Pulse: 70 (10/05/23 1601)  Resp: 20 (10/05/23 1601)  BP: 135/64 (10/05/23 1601)  SpO2: 98 % (10/05/23 1601) Vital Signs (24h Range):  Temp:  [97.9 °F (36.6 °C)-99 °F (37.2 °C)] 97.9 °F (36.6 °C)  Pulse:  [64-73] 70  Resp:  [16-20] 20  SpO2:  [92 %-99 %] 98 %  BP: (103-186)/(55-75) 135/64     Weight: 61.2 kg (134 lb 14.7 oz)  Body mass index is 22.45 kg/m².    Intake/Output Summary (Last 24 hours) at 10/5/2023 1658  Last data filed at 10/4/2023 1820  Gross per 24 hour   Intake 500 ml   Output 1500 ml   Net -1000 ml           Physical Exam  Vitals and nursing note reviewed.   Constitutional:       General: She is not in acute distress.     Appearance: She is well-developed.   HENT:      Head: Normocephalic and atraumatic.   Eyes:      General:         Right eye: No discharge.         Left eye: No discharge.      Conjunctiva/sclera: Conjunctivae normal.   Cardiovascular:      Rate and Rhythm: Normal rate.      Pulses: Normal pulses.   Pulmonary:      Effort: Pulmonary effort is normal. No respiratory distress.   Abdominal:       Palpations: Abdomen is soft.      Tenderness: There is no abdominal tenderness.   Musculoskeletal:         General: Normal range of motion.      Right lower leg: No edema.      Comments: s/p LLE BKA with dressing in place.   Skin:     General: Skin is warm and dry.   Neurological:      Comments: Slightly sleepy but more arousable and able to answer simple questions and maintain attention better than yesterday       Significant Labs:   CBC:  Recent Labs   Lab 10/03/23  0438 10/04/23  0421 10/05/23  0428   WBC 19.79* 23.62* 23.93*   HGB 8.2* 8.1* 8.6*   HCT 26.7* 27.0* 28.2*    278 291   GRAN 73.0 77.0* 78.0*   LYMPH 10.0*  CANCELED 7.0*  CANCELED 8.0*  CANCELED   MONO 14.0  CANCELED 13.0  CANCELED 10.0  CANCELED   EOS CANCELED CANCELED CANCELED   BASO CANCELED CANCELED CANCELED     BMP:  Recent Labs   Lab 10/03/23  0438 10/04/23  0421 10/05/23  0428   * 147* 140   K 4.0 4.2 3.8    107 98   CO2 26 25 30*   BUN 26* 48* 20   CREATININE 4.7* 6.2* 3.2*   * 125* 101   CALCIUM 10.4 10.3 10.0   MG 2.3 2.5 2.1   PHOS 3.6 3.5 2.7       Significant Imaging: I have reviewed and interpreted all pertinent imaging results/findings within the past 24 hours.

## 2023-10-05 NOTE — CARE UPDATE
Update given to daughter (Nhi, daughter who lives in [place]) again outside of the patient's room to address questions and concerns that she has in relation to current treatment plan and discharge planning. There is some discord between the 2 daughters in terms of discharge plan for the patient. Explained patient is not medically ready at this time, but that the patient has the final say for her care when she is more appropriate to make these types of decisions.     JOSE Colon MD

## 2023-10-05 NOTE — PLAN OF CARE
Problem: Infection  Goal: Absence of Infection Signs and Symptoms  Outcome: Ongoing, Progressing     Problem: Adult Inpatient Plan of Care  Goal: Plan of Care Review  Outcome: Ongoing, Progressing  Goal: Patient-Specific Goal (Individualized)  Outcome: Ongoing, Progressing  Goal: Optimal Comfort and Wellbeing  Outcome: Ongoing, Progressing     Problem: Diabetes Comorbidity  Goal: Blood Glucose Level Within Targeted Range  Outcome: Ongoing, Progressing     Problem: Impaired Wound Healing  Goal: Optimal Wound Healing  Outcome: Ongoing, Progressing     Problem: Skin Injury Risk Increased  Goal: Skin Health and Integrity  Outcome: Ongoing, Progressing

## 2023-10-05 NOTE — PLAN OF CARE
Problem: Physical Therapy  Goal: Physical Therapy Goal  Description: Patient will perform the following to increase strength, improve mobility, and return to prior level of function:    1. Supine<>sit with modA with appropriate use of HB features.   2. Rolling R/L with appropriate us eof HB features and Yen.   3. Sitting EOB x3 min with SBA without sway.   4. Sit<>stand with modA with RW.   5. Transfer bed<>chair with modA with most appropriate technique and LRAD.   6. Perform BKA therEx including L QS, L hip abd in supine or sidelying, L hip flexor stretch in sidelying, and R single leg bridge without assistance.   Outcome: Ongoing, Progressing      Goals revised as above to reflect pt's current functional status. Patient with difficulty with active participation in therapy directed tasks, keeping eyes closed a large portion of session and with minimal verbal acknowledgement of directions. Will continue to encourage pt and advance mobility as tolerated, rec for DC to SNF.

## 2023-10-05 NOTE — PROGRESS NOTES
"Nephrology  Progress Note    Admit Date: 9/17/2023   LOS: 18 days     SUBJECTIVE:     Follow-up For:  Gangrene of toe of left foot    History of Present Illness:  Patient is a 67 y.o. female presents with left foot gangrene, vomiting, feeling ill.  Ext med hx as outlined below including ESRD on HD MWF in [place].  Has been dealing with necrotic left toes for few months and followed by vascular in [place].  Was here visiting daughter and felt bad.  Admitted for eval/treatment.  Consulted for HD needs.  Pt seen and examined on HD.  Consents signed.  Tired from being up all night with pain.  Updated team and daughter at bedside.  W/up noted.       Interval History:     S/p L BKA.  Awaiting placement.  Very drowsy-sedated this am.  Discussed with pt/daughter/ at bedside.      Review of Systems:  Constitutional: No fever or chills  Respiratory: No cough or shortness of breath  Cardiovascular: No chest pain or palpitations  Gastrointestinal: No nausea or vomiting  Neurological: No confusion or weakness    OBJECTIVE:     Vital Signs Range (Last 24H):  BP (!) 103/55 (BP Location: Left arm, Patient Position: Lying)   Pulse 64   Temp 98.7 °F (37.1 °C) (Axillary)   Resp 16   Ht 5' 5" (1.651 m)   Wt 61.2 kg (134 lb 14.7 oz)   SpO2 97%   BMI 22.45 kg/m²     Temp:  [98.4 °F (36.9 °C)-99 °F (37.2 °C)]   Pulse:  [64-76]   Resp:  [16-18]   BP: (103-186)/(55-75)   SpO2:  [92 %-99 %]     I & O (Last 24H):  Intake/Output Summary (Last 24 hours) at 10/5/2023 0852  Last data filed at 10/4/2023 1820  Gross per 24 hour   Intake 500 ml   Output 1500 ml   Net -1000 ml         Physical Exam:  General appearance:  Frail.  Appears older than stated age.  drowsy this am.  Eyes:  Conjunctivae/corneas clear. PERRL.  Lungs: Normal respiratory effort,   clear to auscultation bilaterally.  Diminished.   Heart: Regular rate and rhythm, S1, S2 normal, no murmur, rub or nelly.  Abdomen: Soft, non-tender non-distended; bowel sounds normal; no " "masses,  no organomegaly  Extremities: No cyanosis or clubbing. No edema.    Skin: Skin color, texture, turgor normal. No rashes or lesions  Neurologic: Normal strength and tone. No focal numbness or weakness   Right arm AVF+  Left BKA wrapped.     Laboratory Data:  Recent Labs   Lab 10/05/23  0428   WBC 23.93*   RBC 2.98*   HGB 8.6*   HCT 28.2*      MCV 95   MCH 28.9   MCHC 30.5*         BMP:   Recent Labs   Lab 10/05/23  0428         K 3.8   CL 98   CO2 30*   BUN 20   CREATININE 3.2*   CALCIUM 10.0   MG 2.1   PHOS 2.7       Lab Results   Component Value Date    CALCIUM 10.0 10/05/2023    PHOS 2.7 10/05/2023       Lab Results   Component Value Date    CALCIUM 10.0 10/05/2023    PHOS 2.7 10/05/2023       No results found for: "URICACID"    BNP  No results for input(s): "BNP", "BNPTRIAGEBLO" in the last 168 hours.    Medications:  Medication list was reviewed and changes noted under Assessment/Plan.    Diagnostic Results:    reviewed    ASSESSMENT/PLAN:       ESRD on HD MWF in [place]:  -consulted for HD needs.  -consents signed  -continue HD MWF while here and if she stays after hospitalization will need temp outpt unit.   -labs noted and bath adjusted.  -R arm AVF+  -9/19: HD yesterday w/o issue.  No need today.  -9/20:  HD today after angio  -9/21:  no need for HD today.  Cont MWF if stays here.    -9/22:  seen on HD this am.   -9/25:  HD with Blood today.    -9/26:  stable from renal to [place] back to [place] w/ family.    -9/27:  seen on HD.  UF 2 L.    -9/28:  nothing today.   -9/29:  seen on HD.  -10/1: stable today; plan for next dialysis session on Monday.  -10/2:  HD today.  -10/3:  adjust bath to labs for tomorrow.  Encourage some free water intake today/ tomorrow as well.  -10/4:  HD with adjustments. Limit UF as seems dry.    -10/5:  stable lytes/volume  Renally dose meds, avoid nephrotoxins, and monitor I/O's closely.      Necrotic Left toes:  S/p BKA on 9/29  -defer to " pod/vascular/cards.  -angio noted with severe PAD  -Status post exploration femoral and popliteal arteries 9/22 with Non-reconstructible peripheral vascular disease.  Patient deciding on amputation. I think patient should have this done in [place] where she lives. At this point she just needs pain control.  Needs to discuss with OWN Team in [place] who they prefer to use.  -need answer about location of surgery.  If here will need ext rehab/HD setup.  Risks of delay explained.   -s/p BKA on 9/29.  Will need SW to work on Rehab/HD unit post op.         HTN:  -UF on HD  -increased ARB/CCB. Will follow  -UF on HD today.  Likely pain related.   -9/28:  adjust meds prn and improving.      MBD:  -binders/nephrocaps.   Changed to renvela.  Pt states that she also takes tums at home  -using calcitriol on HD days.     Anemia of CKD:  -9/25:  blood today.   -9/27:  stable.   -10/2:  FAREED with HD.     Rising WBC:  -no fever  -reactive?  -CXR stable.   -discussed with team.     As above  Will follow for renal needs   Ok to DC/transfer.   Discussed with team.

## 2023-10-05 NOTE — PROGRESS NOTES
Postop day 5.  Status post left BKA    Afebrile  Vital signs stable  Left lower extremity-incisions clean and dry

## 2023-10-05 NOTE — ASSESSMENT & PLAN NOTE
"HTN, PVD  - L 1st toe gangrene predominantly dry with some surrounding erythema/swelling; with concern for worsening infection started piperacillin-tazobactam and vancomycin IV. 2nd toe with involvement as well.  - U/S arterial BLE showed L distal SFA occlusion with distal reconstitution. Discussed with vascular surgery and cardiology consulted for consideration of angiography / potential angioplasty.  - Podiatry consulted. MRI L foot with "examination markedly degraded by patient motion artifact. Questionable marrow edema within the 1st distal phalanx, not well evaluated given aforementioned limitation but possibly related to osteomyelitis given reported history of gangrene."  - Blood cultures showed no growth.  - s/p angiogram remarkable for severe PAD with no areas amenable to angioplasty on 9/20. Elected to undergo attempt at revascularization 09/22 but unable to perform femoral and popliteal bypass on 9/22. Discussed surgical options with BKA only viable for appropriate healing given extent of vascular disease.   - Underwent BKA on 09/30. Leukocytosis improving. Therapy recommending SNF. Pursuing placement.  - Given possible decreased clearance of pain medications, stopped hydrocodone-acetaminophen 5-325mg PO q6hr PRN, oxycodone-acetaminophen 7.5-325mg PO q6hr PRN. Started PO dilaudid instead and Neurontin on 10/3  - Report increased sedation since getting Neurontin, change it to 100 mg QHS on 10/4 but still with sedation but with some improvement, will discontinue Neurontin today  - WBC increased but seems to have plateaued over the past 2 days. No fever, repeat CXR overall unrevealing and no other source of infection identified. Possible delayed leukemoid reaction. Case discussed with Dr. Cobb and he will be re-examining the surgical site  - Will continue to monitor for now and consider repeat cultures if spikes a fever   - Continue carvedilol 12.5mg PO BID, losartan 100mg PO daily, nifedipine 60mg PO " BID.  - Discharge planning with SNF vs home when ready to leave hospital after stabilization and work up complete

## 2023-10-05 NOTE — CARE UPDATE
DeirdreStephanie Daughter   [phone]     Update given to Stephanie (daughter) via phone.  All questions and concerns were addressed.     JOSE Colon MD

## 2023-10-06 PROBLEM — R53.81 DEBILITY: Status: ACTIVE | Noted: 2023-01-01

## 2023-10-06 PROBLEM — Z71.89 ADVANCE CARE PLANNING: Status: ACTIVE | Noted: 2023-01-01

## 2023-10-06 PROBLEM — G93.40 ENCEPHALOPATHY: Status: ACTIVE | Noted: 2023-01-01

## 2023-10-06 PROBLEM — E46 PROTEIN CALORIE MALNUTRITION: Status: ACTIVE | Noted: 2023-01-01

## 2023-10-06 NOTE — PROGRESS NOTES
10/06/23 0822   Pre-Hemodialysis Assessment   Treatment Initiation with Dialysis Precautions Saline line double clamped;All connections secured;Venous parameters set;Arterial parameters set;Prime given;Air foam detector engaged   Prime Amount Given (mL) 250 mL   Net UF Goal 1500   Total UF Goal 2000   Pre-Hemodialysis Comments tx started, more verbally then previous, slightly confused at times, and forgetful, speech clear, assessed per flowsheet   UF Rate 429   RO # / DI #  [id number]   RO Rejection Within Limits? Yes   DI Lights Green   Timeout Performed 0820

## 2023-10-06 NOTE — PROGRESS NOTES
10/06/23 1225   Post-Hemodialysis Assessment   Rinseback Volume (mL) 250 mL   Blood Volume Processed (Liters) 46.9 L   Dialyzer Clearance Moderately streaked   Additional Fluid Intake (mL) 500 mL   Total UF (mL) 2000 mL   Net Fluid Removal 1500   Patient Response to Treatment tx completed as ordered; tolerated well   Post-Hemodialysis Comments Blood returned per pp

## 2023-10-06 NOTE — ASSESSMENT & PLAN NOTE
"- Consult for introduction to palliative medicine and advance care planning in pt with underlying ESRD on HD, PVD initially admitted about 2 weeks ago with worsening lower extremity; while attempt was made to preserve her lower extremity, she is now s/p BKA with vascular surgery. Chart reviewed; extensively discussed pt in person with Nirmal (Nephrology NP) prior to visit.   - Along with Tori Hdez (pallliative RN), visited with pt at bedside in dialysis unit; while able to participate in conversation, she was very hard of hearing, and also intermittently confused (though openly recognized that she difficulty recalling information at times). Introduced role of palliative medicine, and explained we were asked to visit with her as she's been through a lot recently. She agreed with this, and said this has been very overwhelming week or two. She told us that she woke up in [facility] (informed her she is at [facility], and [facility] no longer exists), and had to undergo "some kind of surgery" (told her this correct information, and that she had an amputation).   - She had insight into the fact that she is not at her cognitive baseline and seemed distressed by this; emotional support provided, and will do our best to keep her informed and updated on her care plan.   - Did attempt to discuss medical power of ; however, she was tangential in her answer and did not directly tell us which family member she would prefer act as her MPOA. There is concern about disagreement among family members regarding pt's plan of care; however, ideally pt will be able to make her own medical decisions as her mental status improves.   - Let her know that PT/OT will be evaluating her to determine what level of care she requires for rehabilitation after hospital; she verbalized understanding of this  - Updated primary team after visit; our team will follow up with pt and family this upcoming week.   "

## 2023-10-06 NOTE — PROGRESS NOTES
"Nephrology  Progress Note    Admit Date: 9/17/2023   LOS: 19 days     SUBJECTIVE:     Follow-up For:  Gangrene of toe of left foot    History of Present Illness:  Patient is a 67 y.o. female presents with left foot gangrene, vomiting, feeling ill.  Ext med hx as outlined below including ESRD on HD MWF in [place].  Has been dealing with necrotic left toes for few months and followed by vascular in [place].  Was here visiting daughter and felt bad.  Admitted for eval/treatment.  Consulted for HD needs.  Pt seen and examined on HD.  Consents signed.  Tired from being up all night with pain.  Updated team and daughter at bedside.  W/up noted.       Interval History:    Events noted.  Ex- came to visit and pt now very emotionally upset.  Discussed with team/daughter at bedside.  Pt seen in HD and very confused about everything.  Explained events.  More alert today just confused about events.       Review of Systems:  Constitutional: No fever or chills  Respiratory: No cough or shortness of breath  Cardiovascular: No chest pain or palpitations  Gastrointestinal: No nausea or vomiting  Neurological: No confusion or weakness    OBJECTIVE:     Vital Signs Range (Last 24H):  BP (!) 156/70 (BP Location: Left arm, Patient Position: Lying)   Pulse 66   Temp 98.3 °F (36.8 °C) (Oral)   Resp 16   Ht 5' 5" (1.651 m)   Wt 61.2 kg (134 lb 14.7 oz)   SpO2 97%   BMI 22.45 kg/m²     Temp:  [97.7 °F (36.5 °C)-98.9 °F (37.2 °C)]   Pulse:  [65-71]   Resp:  [16-20]   BP: (134-162)/(62-73)   SpO2:  [92 %-100 %]     I & O (Last 24H):No intake or output data in the 24 hours ending 10/06/23 0903      Physical Exam:  General appearance:  Frail.  Appears older than stated age.    Eyes:  Conjunctivae/corneas clear. PERRL.  Lungs: Normal respiratory effort,   clear to auscultation bilaterally.  Diminished.   Heart: Regular rate and rhythm, S1, S2 normal, no murmur, rub or nelly.  Abdomen: Soft, non-tender non-distended; bowel sounds " "normal; no masses,  no organomegaly  Extremities: No cyanosis or clubbing. No edema.    Skin: Skin color, texture, turgor normal. No rashes or lesions  Neurologic: Normal strength and tone. No focal numbness or weakness   Right arm AVF+  Left BKA wrapped.     Laboratory Data:  Recent Labs   Lab 10/06/23  0505   WBC 21.97*   RBC 3.00*   HGB 8.7*   HCT 28.1*      MCV 94   MCH 29.0   MCHC 31.0*         BMP:   Recent Labs   Lab 10/06/23  0505   GLU 86      K 4.1   CL 97   CO2 27   BUN 38*   CREATININE 5.1*   CALCIUM 10.3   MG 2.3   PHOS 3.8       Lab Results   Component Value Date    CALCIUM 10.3 10/06/2023    PHOS 3.8 10/06/2023       Lab Results   Component Value Date    CALCIUM 10.3 10/06/2023    PHOS 3.8 10/06/2023       No results found for: "URICACID"    BNP  No results for input(s): "BNP", "BNPTRIAGEBLO" in the last 168 hours.    Medications:  Medication list was reviewed and changes noted under Assessment/Plan.    Diagnostic Results:    reviewed    ASSESSMENT/PLAN:       ESRD on HD MWF in [place]:  -consulted for HD needs.  -consents signed  -continue HD MWF while here and if she stays after hospitalization will need temp outpt unit.   -labs noted and bath adjusted.  -R arm AVF+  -9/19: HD yesterday w/o issue.  No need today.  -9/20:  HD today after angio  -9/21:  no need for HD today.  Cont MWF if stays here.    -9/22:  seen on HD this am.   -9/25:  HD with Blood today.    -9/26:  stable from renal to [place] back [place] w/ family.    -9/27:  seen on HD.  UF 2 L.    -9/28:  nothing today.   -9/29:  seen on HD.  -10/1: stable today; plan for next dialysis session on Monday.  -10/2:  HD today.  -10/3:  adjust bath to labs for tomorrow.  Encourage some free water intake today/ tomorrow as well.  -10/4:  HD with adjustments. Limit UF as seems dry.    -10/5:  stable lytes/volume    -10/6:  seen in HD.  Renally dose meds, avoid nephrotoxins, and monitor I/O's closely.      Necrotic Left toes:  S/p BKA on " 9/29  -defer to pod/vascular/cards.  -angio noted with severe PAD  -Status post exploration femoral and popliteal arteries 9/22 with Non-reconstructible peripheral vascular disease.  Patient deciding on amputation. I think patient should have this done in [place] where she lives. At this point she just needs pain control.  Needs to discuss with OWN Team in [place] who they prefer to use.  -need answer about location of surgery.  If here will need ext rehab/HD setup.  Risks of delay explained.   -s/p BKA on 9/29.  Will need SW to work on Rehab/HD unit post op.         HTN:  -UF on HD  -increased ARB/CCB. Will follow  -UF on HD today.  Likely pain related.   -9/28:  adjust meds prn and improving.      MBD:  -binders/nephrocaps.   Changed to renvela.  Pt states that she also takes tums at home  -using calcitriol on HD days.     Anemia of CKD:  -9/25:  blood today.   -9/27:  stable.   -10/2:  FAREED with HD.     > WBC:  -no fever  -reactive?  -CXR stable.   -discussed with team.   -trends improving.     As above  Will follow for renal needs   Ok to DC/transfer.   Discussed with team.

## 2023-10-06 NOTE — PROGRESS NOTES
Postop day 7.  Status post left below-knee amputation    Subjective   Alert  States pain is bet    PE  Afebrile  Vital signs stable  Left lower extremity-dressing clean, dry, intact.  Incisions clean and dry without evidence of infection

## 2023-10-06 NOTE — PROGRESS NOTES
"[facility] Surg ([address] Medicine  Progress Note    Patient Name: Shannan Figueredo  MRN: [id number]  Patient Class: IP- Inpatient   Admission Date: 9/17/2023  Length of Stay: 19 days  Attending Physician: Brook Colon MD  Primary Care Provider: Roxi, Primary Doctor        Subjective:     Principal Problem:Gangrene of toe of left foot        HPI:  Ms. Figueredo is a 67/F with PMH HTN, DMII (diet-controlled), PVD, ESRD on HD (M/W/F), anemia who presented to [facility] 09/17 with a two day progressive history of worsening L foot pain, swelling, fatigue, nausea and vomiting. History obtained from patient and daughter Stephanie ([phone]); they are from the [place], [place] area. Patient reports several weeks ago she "stubbed her toe," noting initially some pain, redness and swelling. Gradually symptoms progressed with duskiness to her toe and she informed her daughter of her pain for which she was brought to ER in [place] on 08/25 for further evaluation. With duskiness to 1st toe and surrounding erythema she was treated for cellulitis with a course of ciprofloxacin and metronidazole, but failed to improve. She followed up with her podiatrist 09/12 where she was prescribed hydrocodone-acetaminophen and a ten day course of TMP-SMX which she has been taking. She was referred to vascular surgery with plan for angiography and potential intervention  She and her family came to [place] for the weekend but her pain worsened; had been attempting to minimize use of hydrocodone-acetaminophen but developed nausea, vomiting and progressive fatigue. She subsequently presented to ED for further evaluation. ED workup was notable for L 1st toe dry-appearing gangrene with surrounding erythema and swelling, removed nail of second toe, XR L foot demonstrating diffuse osteopenia, L calcaneal and forefoot soft tissue swelling without definitive evidence of osteomyelitis in setting of osteopenia, no leukocytosis, elevated " sed rate and CRP, and elevated BUN/Cr in setting of ESRD. She notes she went without dialysis on 09/15 due to her trip. Blood cultures were ordered and she received piperacillin-tazobactam and vancomycin. Hospital medicine was subsequently contacted for admission.      Overview/Hospital Course:  Admitted with gangrene of L 1st toe.  Empirically treated with Zosyn and vancomycin.  Vascular Surgery, nephrology, podiatry consulted; U/S arterial demonstrated L SFA occlusion with distal reconstitution. Cardiology consulted, angiography with no vessels amenable for angioplasty on 9/20.  Underwent exploration of the left lower extremity vessels, femoral and popliteal bypass unable to be performed due to no adequate available vessels for bypass on 9/22. Discussed potential intervention here vs [place], patient and family opted for surgery in [place]. Underwent L BKA 09/29. PT/OT recommended SNF.      Interval History: No acute events overnight. Seen on dialysis unit. Patient report feeling better today. She is awake and more appropriate today. Briefly spoke with Nhi (daughter) in hallway just prior to her leaving to visit some facilities for SNF.    Review of Systems   Constitutional:  Negative for chills and fever.   Respiratory:  Negative for cough and shortness of breath.    Cardiovascular:  Negative for chest pain and palpitations.   Gastrointestinal:  Negative for abdominal pain, nausea and vomiting.     Objective:     Vital Signs (Most Recent):  Temp: 98.3 °F (36.8 °C) (10/06/23 0744)  Pulse: 66 (10/06/23 0744)  Resp: 16 (10/06/23 0744)  BP: (!) 156/70 (10/06/23 0744)  SpO2: 97 % (10/06/23 0800) Vital Signs (24h Range):  Temp:  [97.7 °F (36.5 °C)-98.3 °F (36.8 °C)] 98.3 °F (36.8 °C)  Pulse:  [65-71] 66  Resp:  [16-20] 16  SpO2:  [97 %-100 %] 97 %  BP: (135-162)/(64-73) 156/70     Weight: 61.2 kg (134 lb 14.7 oz)  Body mass index is 22.45 kg/m².  No intake or output data in the 24 hours ending 10/06/23 1220         Physical Exam  Vitals and nursing note reviewed.   Constitutional:       General: She is not in acute distress.     Appearance: She is well-developed.   HENT:      Head: Normocephalic and atraumatic.   Eyes:      General:         Right eye: No discharge.         Left eye: No discharge.      Conjunctiva/sclera: Conjunctivae normal.   Cardiovascular:      Rate and Rhythm: Normal rate.      Pulses: Normal pulses.   Pulmonary:      Effort: Pulmonary effort is normal. No respiratory distress.   Abdominal:      Palpations: Abdomen is soft.      Tenderness: There is no abdominal tenderness.   Musculoskeletal:         General: Normal range of motion.      Right lower leg: No edema.      Comments: s/p LLE BKA with dressing in place.   Skin:     General: Skin is warm and dry.   Neurological:      Comments: Awake and alert, able to tell me year and come to understanding of hospital course and ask appropriate questions       Significant Labs:   CBC:  Recent Labs   Lab 10/04/23  0421 10/05/23  0428 10/06/23  0505   WBC 23.62* 23.93* 21.97*   HGB 8.1* 8.6* 8.7*   HCT 27.0* 28.2* 28.1*    291 311   GRAN 77.0* 78.0* 75.0*  16.5*   LYMPH 7.0*  CANCELED 8.0*  CANCELED 8.2*  1.8   MONO 13.0  CANCELED 10.0  CANCELED 11.0  2.4*   EOS CANCELED CANCELED 0.2   BASO CANCELED CANCELED 0.11     BMP:  Recent Labs   Lab 10/04/23  0421 10/05/23  0428 10/06/23  0505   * 140 138   K 4.2 3.8 4.1    98 97   CO2 25 30* 27   BUN 48* 20 38*   CREATININE 6.2* 3.2* 5.1*   * 101 86   CALCIUM 10.3 10.0 10.3   MG 2.5 2.1 2.3   PHOS 3.5 2.7 3.8       Significant Imaging: I have reviewed and interpreted all pertinent imaging results/findings within the past 24 hours.      Assessment/Plan:      * Gangrene of toe of left foot  HTN, PVD, encephalopathy  - L 1st toe gangrene predominantly dry with some surrounding erythema/swelling; with concern for worsening infection started piperacillin-tazobactam and vancomycin IV. 2nd toe  "with involvement as well.  - U/S arterial BLE showed L distal SFA occlusion with distal reconstitution. Discussed with vascular surgery and cardiology consulted for consideration of angiography / potential angioplasty.  - Podiatry consulted. MRI L foot with "examination markedly degraded by patient motion artifact. Questionable marrow edema within the 1st distal phalanx, not well evaluated given aforementioned limitation but possibly related to osteomyelitis given reported history of gangrene."  - Blood cultures showed no growth.  - s/p angiogram remarkable for severe PAD with no areas amenable to angioplasty on 9/20. Elected to undergo attempt at revascularization 09/22 but unable to perform femoral and popliteal bypass on 9/22. Discussed surgical options with BKA only viable for appropriate healing given extent of vascular disease.   - Underwent BKA on 09/30. Leukocytosis improving. Therapy recommending SNF. Pursuing placement.  - Given possible decreased clearance of pain medications, stopped hydrocodone-acetaminophen 5-325mg PO q6hr PRN, oxycodone-acetaminophen 7.5-325mg PO q6hr PRN. Started PO dilaudid instead and Neurontin on 10/3  - Increased sedation since getting Neurontin, changed it to 100 mg QHS on 10/4 and stopped it on 10/5 and with continued improvement in mentation. Patient with likely some baseline cognitive impairment with worsening due to resolving medical issues and over sedation which is also resolving  - WBC increased but plateaued over the past 2 days and is now on downward trend starting today. No fever, repeat CXR overall unrevealing and no other source of infection identified. Likely delayed leukemoid reaction. Case discussed with Dr. Cobb yesterday and he evaluated the surgical site with no signs of infection noted  - Will continue to monitor for now and consider repeat cultures if spikes a fever   - Continue carvedilol 12.5mg PO BID, losartan 100mg PO daily, nifedipine 60mg PO BID  - " Discharge planning with SNF     Type 2 diabetes mellitus with chronic kidney disease on chronic dialysis  - Reports diet-controlled; no current medications.  - HgbA1c 5.7.  - Continue low-dose sliding scale insulin aspart 0-5U subQ TIDWM PRN, monitor requirements.    ESRD (end stage renal disease)  Anemia, hyperparathyroidism  - ESRD on HD, reports missed session Friday prior to presentation due to travel.  - Mild anemia without evidence of bleeding.  - Continue allopurinol 300mg PO daily, calcium acetate 2001mg PO TIDWM.  - Transfused 1U pRBCs 09/25 with appropriate response.  - Nephrology consulted for HD assistance.      VTE Risk Mitigation (From admission, onward)         Ordered     IP VTE HIGH RISK PATIENT  Once         09/28/23 1906                    Brook Colon MD  Department of Hospital Medicine   [facility] ([address])

## 2023-10-06 NOTE — PT/OT/SLP PROGRESS
Physical Therapy Treatment    Patient Name:  Shannan Figueredo   MRN:  [id number]    Recommendations:     Discharge Recommendations: nursing facility, skilled  Discharge Equipment Recommendations:  (Drop arm BSC, Tutu height wheelchair with removable arm rests, elevating leg rests and anti tippers, wheelchair cushion; Defer to next level of care)  Barriers to discharge: Inaccessible home and Decreased caregiver support    Assessment:     Shannan Figueredo is a 67 y.o. female admitted with a medical diagnosis of Gangrene of toe of left foot.  She presents with the following impairments/functional limitations: weakness, impaired endurance, impaired self care skills, impaired functional mobility, gait instability, impaired balance, impaired cognition, visual deficits, decreased coordination, decreased upper extremity function, decreased lower extremity function, decreased safety awareness, pain, decreased ROM, impaired coordination, impaired joint extensibility.    Patient with recent pain medication administration roughly 1 hr prior to attempt with pt with minimal command following. Discussion of importance of working with therapy to maximize pt's recovery despite pain or having had HD - HO of therEx provided to dtr to complete when pt more alert.    Rehab Prognosis:  Guarded ; patient would benefit from acute skilled PT services to address these deficits and reach maximum level of function.    Recent Surgery: Procedure(s) (LRB):  AMPUTATION, BELOW KNEE (Left) 7 Days Post-Op    Plan:     During this hospitalization, patient to be seen 5 x/week to address the identified rehab impairments via therapeutic activities, therapeutic exercises, neuromuscular re-education, wheelchair management/training and progress toward the following goals:    Plan of Care Expires:  10/30/23    Subjective     Chief Complaint: Pt's dtr concerned about her having not had pain medications earlier in day  Patient/Family Comments/goals: Pt's dtr  "with goal to have her pain better managed  Pain/Comfort:  Pain Rating 1:  (unrated)  Pain Rating Post-Intervention 1:  (urnated)      Objective:     Communicated with RN ANIA prior to session. Patient found HOB elevated with peripheral IV upon PT entry to room.     General Precautions: Standard, diabetic, fall (RUE AV fistual)  Orthopedic Precautions:  (L BKA)  Braces: N/A  Respiratory Status: Room air    Functional Mobility:  None performed this date      AM-PAC 6 CLICK MOBILITY  Turning over in bed (including adjusting bedclothes, sheets and blankets)?: 1  Sitting down on and standing up from a chair with arms (e.g., wheelchair, bedside commode, etc.): 1  Moving from lying on back to sitting on the side of the bed?: 1  Moving to and from a bed to a chair (including a wheelchair)?: 1  Need to walk in hospital room?: 1  Climbing 3-5 steps with a railing?: 1  Basic Mobility Total Score: 6       Treatment & Education:  Attempted to engage pt in therEx, pt without command following and states "no" when asked if she will do therEx today  HO of AAROM of UE and LE provided to dtr, discussion of appropriate therEx for LLE with recent BKA, enforced importance of triceps and lat strengthening of BUE for standing on RW (pushing muscles)    Patient left supine with all lines intact, call button in reach, and dtr present..    GOALS:   Multidisciplinary Problems       Physical Therapy Goals          Problem: Physical Therapy    Goal Priority Disciplines Outcome Goal Variances Interventions   Physical Therapy Goal     PT, PT/OT Ongoing, Progressing     Description: Patient will perform the following to increase strength, improve mobility, and return to prior level of function:    1. Supine<>sit with modA with appropriate use of HB features.   2. Rolling R/L with appropriate us eof HB features and Yen.   3. Sitting EOB x3 min with SBA without sway.   4. Sit<>stand with modA with RW.   5. Transfer bed<>chair with modA with most " appropriate technique and LRAD.   6. Perform BKA therEx including L QS, L hip abd in supine or sidelying, L hip flexor stretch in sidelying, and R single leg bridge without assistance.                        Time Tracking:     PT Received On: 10/06/23  PT Start Time: 1433     PT Stop Time: 1441  PT Total Time (min): 8 min     Billable Minutes: Therapeutic Exercise 8    Treatment Type: Treatment  PT/PTA: PT     Number of PTA visits since last PT visit: 0     10/06/2023

## 2023-10-06 NOTE — ASSESSMENT & PLAN NOTE
"HTN, PVD, encephalopathy  - L 1st toe gangrene predominantly dry with some surrounding erythema/swelling; with concern for worsening infection started piperacillin-tazobactam and vancomycin IV. 2nd toe with involvement as well.  - U/S arterial BLE showed L distal SFA occlusion with distal reconstitution. Discussed with vascular surgery and cardiology consulted for consideration of angiography / potential angioplasty.  - Podiatry consulted. MRI L foot with "examination markedly degraded by patient motion artifact. Questionable marrow edema within the 1st distal phalanx, not well evaluated given aforementioned limitation but possibly related to osteomyelitis given reported history of gangrene."  - Blood cultures showed no growth.  - s/p angiogram remarkable for severe PAD with no areas amenable to angioplasty on 9/20. Elected to undergo attempt at revascularization 09/22 but unable to perform femoral and popliteal bypass on 9/22. Discussed surgical options with BKA only viable for appropriate healing given extent of vascular disease.   - Underwent BKA on 09/30. Leukocytosis improving. Therapy recommending SNF. Pursuing placement.  - Given possible decreased clearance of pain medications, stopped hydrocodone-acetaminophen 5-325mg PO q6hr PRN, oxycodone-acetaminophen 7.5-325mg PO q6hr PRN. Started PO dilaudid instead and Neurontin on 10/3  - Increased sedation since getting Neurontin, changed it to 100 mg QHS on 10/4 and stopped it on 10/5 and with continued improvement in mentation. Patient with likely some baseline cognitive impairment with worsening due to resolving medical issues and over sedation which is also resolving  - WBC increased but plateaued over the past 2 days and is now on downward trend starting today. No fever, repeat CXR overall unrevealing and no other source of infection identified. Likely delayed leukemoid reaction. Case discussed with Dr. Cobb yesterday and he evaluated the surgical site with no " signs of infection noted  - Will continue to monitor for now and consider repeat cultures if spikes a fever   - Continue carvedilol 12.5mg PO BID, losartan 100mg PO daily, nifedipine 60mg PO BID  - Discharge planning with SNF

## 2023-10-06 NOTE — PROGRESS NOTES
10/06/23 1225   Post-Hemodialysis Assessment   Rinseback Volume (mL) 250 mL   Blood Volume Processed (Liters) 82.9 L   Dialyzer Clearance Moderately streaked   Additional Fluid Intake (mL) 500 mL   Total UF (mL) 1500 mL   Net Fluid Removal 1000   Patient Response to Treatment tx completed as ordered; tolerated well   Post-Hemodialysis Comments Blood returned per pp

## 2023-10-06 NOTE — SUBJECTIVE & OBJECTIVE
Past Medical History:   Diagnosis Date    Coronary artery disease     Diabetes mellitus, type II     End stage renal disease     Hyperlipidemia     Hypertension     Peripheral vascular disease        Past Surgical History:   Procedure Laterality Date    ANGIOGRAPHY OF LOWER EXTREMITY Left 9/20/2023    Procedure: Angiogram Extremity Unilateral;  Surgeon: Rao Sosa MD;  Location: [facility] CATH LAB;  Service: Cardiology;  Laterality: Left;  right femoral access    BELOW KNEE AMPUTATION OF LOWER EXTREMITY Left 9/29/2023    Procedure: AMPUTATION, BELOW KNEE;  Surgeon: Franky Cobb Jr., MD;  Location: [facility] OR;  Service: Vascular;  Laterality: Left;    CAROTID ENDARTERECTOMY Left     CREATION OF FEMOROPOPLITEAL ARTERIAL BYPASS USING GRAFT  9/22/2023    Procedure: CREATION, BYPASS, ARTERIAL, FEMORAL TO POPLITEAL, USING GRAFT;  Surgeon: Franky Cobb Jr., MD;  Location: [facility] OR;  Service: General;;  EXPLORATION VESSEL FEM & POP     HYSTERECTOMY         Review of patient's allergies indicates:  No Known Allergies    Medications:  Continuous Infusions:  Scheduled Meds:   sodium chloride 0.9%   Intravenous Once    aspirin  81 mg Oral Daily    calcitRIOL  0.25 mcg Oral Every Mon, Wed, Fri    carvediloL  12.5 mg Oral BID    clopidogreL  75 mg Oral Daily    epoetin veronica-epbx  100 Units/kg Subcutaneous Every Mon, Wed, Fri    LIDOcaine  1 patch Transdermal Q24H    losartan  100 mg Oral Daily    NIFEdipine  60 mg Oral BID    sevelamer carbonate  1,600 mg Oral TID WM    vitamin renal formula (B-complex-vitamin c-folic acid)  1 capsule Oral Daily     PRN Meds:acetaminophen, cloNIDine, dextrose 10%, dextrose 10%, diphenhydrAMINE, glucagon (human recombinant), glucose, glucose, HYDROmorphone, insulin aspart U-100, loperamide, melatonin, naloxone, ondansetron, simethicone, sodium chloride 0.9%, sodium chloride 0.9%    Family History       Problem Relation (Age of Onset)    Diabetes Mother    Kidney disease Mother          Tobacco  Use    Smoking status: Never    Smokeless tobacco: Never   Substance and Sexual Activity    Alcohol use: Never    Drug use: Never    Sexual activity: Not on file       Review of Systems   Unable to perform ROS: Mental status change     Objective:     Vital Signs (Most Recent):  Temp: 98.3 °F (36.8 °C) (10/06/23 0744)  Pulse: 66 (10/06/23 0744)  Resp: 16 (10/06/23 0744)  BP: (!) 156/70 (10/06/23 0744)  SpO2: 97 % (10/06/23 0800) Vital Signs (24h Range):  Temp:  [97.7 °F (36.5 °C)-98.3 °F (36.8 °C)] 98.3 °F (36.8 °C)  Pulse:  [65-71] 66  Resp:  [16-20] 16  SpO2:  [97 %-100 %] 97 %  BP: (135-162)/(64-73) 156/70     Weight: 61.2 kg (134 lb 14.7 oz)  Body mass index is 22.45 kg/m².       Physical Exam  Constitutional:       Comments: Appears older than stated age and chronically ill, sitting up in HD bed, in no distress, conversational but hard of hearing    HENT:      Head:      Comments: Mild temporal wasting      Nose: Nose normal.      Mouth/Throat:      Mouth: Mucous membranes are moist.   Cardiovascular:      Rate and Rhythm: Normal rate.   Pulmonary:      Effort: Pulmonary effort is normal.   Neurological:      Comments: Occasionally has delayed speech due to either cognitive deficit vs difficulty with word recall    Psychiatric:      Comments: Appropriately tearful at times        Significant Labs: All pertinent labs within the past 24 hours have been reviewed.  CBC:   Recent Labs   Lab 10/06/23  0505   WBC 21.97*   HGB 8.7*   HCT 28.1*   MCV 94        BMP:  Recent Labs   Lab 10/06/23  0505   GLU 86      K 4.1   CL 97   CO2 27   BUN 38*   CREATININE 5.1*   CALCIUM 10.3   MG 2.3     LFT:  Lab Results   Component Value Date    AST 20 09/17/2023    ALKPHOS 84 09/17/2023    BILITOT 1.0 09/17/2023     Albumin:   Albumin   Date Value Ref Range Status   10/06/2023 1.9 (L) 3.5 - 5.2 g/dL Final     Protein:   Total Protein   Date Value Ref Range Status   09/17/2023 7.2 6.0 - 8.4 g/dL Final     Lactic acid:    Lab Results   Component Value Date    LACTATE 1.2 09/17/2023       Significant Imaging: I have reviewed all pertinent imaging results/findings within the past 24 hours.

## 2023-10-06 NOTE — PT/OT/SLP PROGRESS
Occupational Therapy      Patient Name:  Shannan Figueredo   MRN:  [id number]    Patient not seen today secondary to Dialysis. Will follow-up as scheduling permits and patient's availability/appropriate for therapy.    10/6/2023

## 2023-10-06 NOTE — PLAN OF CARE
Plan of care reviewed with patient.  Patient verbalized understanding and had no further questions.  Patient has completed dialysis today and tolerated well.  Patient's pain controlled with oral analgesics.  Patient now resting comfortably in bed locked in lowest position, side rails up x3, and call bell in reach.  Will continue to monitor.       Problem: Infection  Goal: Absence of Infection Signs and Symptoms  Outcome: Ongoing, Progressing     Problem: Adult Inpatient Plan of Care  Goal: Plan of Care Review  Outcome: Ongoing, Progressing  Goal: Patient-Specific Goal (Individualized)  Outcome: Ongoing, Progressing  Goal: Absence of Hospital-Acquired Illness or Injury  Outcome: Ongoing, Progressing  Goal: Optimal Comfort and Wellbeing  Outcome: Ongoing, Progressing  Goal: Readiness for Transition of Care  Outcome: Ongoing, Progressing     Problem: Diabetes Comorbidity  Goal: Blood Glucose Level Within Targeted Range  Outcome: Ongoing, Progressing     Problem: Impaired Wound Healing  Goal: Optimal Wound Healing  Outcome: Ongoing, Progressing     Problem: Skin Injury Risk Increased  Goal: Skin Health and Integrity  Outcome: Ongoing, Progressing     Problem: Device-Related Complication Risk (Hemodialysis)  Goal: Safe, Effective Therapy Delivery  Outcome: Ongoing, Progressing     Problem: Hemodynamic Instability (Hemodialysis)  Goal: Effective Tissue Perfusion  Outcome: Ongoing, Progressing     Problem: Infection (Hemodialysis)  Goal: Absence of Infection Signs and Symptoms  Outcome: Ongoing, Progressing     Problem: Fall Injury Risk  Goal: Absence of Fall and Fall-Related Injury  Outcome: Ongoing, Progressing     Problem: Oral Intake Inadequate  Goal: Improved Oral Intake  Outcome: Ongoing, Progressing     Problem: Adjustment to Surgery (Extremity Amputation)  Goal: Optimal Coping with Amputation  Outcome: Ongoing, Progressing     Problem: Bleeding (Extremity Amputation)  Goal: Absence of Bleeding  Outcome: Ongoing,  Progressing     Problem: Bowel Motility Impaired (Extremity Amputation)  Goal: Effective Bowel Elimination  Outcome: Ongoing, Progressing     Problem: Fluid and Electrolyte Imbalance (Extremity Amputation)  Goal: Fluid and Electrolyte Balance  Outcome: Ongoing, Progressing     Problem: Functional Ability Impaired (Extremity Amputation)  Goal: Optimal Functional Ability  Outcome: Ongoing, Progressing     Problem: Infection (Extremity Amputation)  Goal: Absence of Infection Signs and Symptoms  Outcome: Ongoing, Progressing     Problem: Pain (Extremity Amputation)  Goal: Acceptable Pain Control  Outcome: Ongoing, Progressing     Problem: Residual Limb Care (Extremity Amputation)  Goal: Optimal Residual Limb Healing  Outcome: Ongoing, Progressing     Problem: Gas Exchange Impaired  Goal: Optimal Gas Exchange  Outcome: Ongoing, Progressing     Problem: Coping Ineffective  Goal: Effective Coping  Outcome: Ongoing, Progressing

## 2023-10-06 NOTE — CONSULTS
"[facility] ([address])  Palliative Medicine  Consult Note    Patient Name: Shannan Figueredo  MRN: [id number]  Admission Date: 9/17/2023  Hospital Length of Stay: 19 days  Code Status: Full Code   Attending Provider: Brook Colon MD  Consulting Provider: Jaymie Grace MD  Primary Care Physician: Roxi, Primary Doctor  Principal Problem:Gangrene of toe of left foot    Patient information was obtained from patient, past medical records, ER records and primary team.      Inpatient consult to Palliative Care  Consult performed by: Jaymie Grace MD  Consult ordered by: Nirmal Betts NP  Reason for consult: Advance Care Planning         Assessment/Plan:     Advance Care Planning       10/6/23  - Consult for introduction to palliative medicine and advance care planning in pt with underlying ESRD on HD, PVD initially admitted about 2 weeks ago with worsening lower extremity; while attempt was made to preserve her lower extremity, she is now s/p BKA with vascular surgery. Chart reviewed; extensively discussed pt in person with Nirmal (Nephrology NP) prior to visit.   - Along with Tori Hdez (palliative RN), visited with pt at bedside in dialysis unit; while able to participate in conversation, she was very hard of hearing, and also intermittently confused (though openly recognized that she difficulty recalling information at times). Introduced role of palliative medicine, and explained we were asked to visit with her as she's been through a lot recently. She agreed with this, and said this has been very overwhelming week or two. She told us that she woke up in [facility] (informed her she is at [facility], and [facility] no longer exists), and had to undergo "some kind of surgery" (told her this correct information, and that she had an amputation).   - She had insight into the fact that she is not at her cognitive baseline and seemed distressed by this; emotional support provided, and will " do our best to keep her informed and updated on her care plan.   - Did attempt to discuss medical power of ; however, she was tangential in her answer and did not directly tell us which family member she would prefer act as her MPOA. There is concern about disagreement among family members regarding pt's plan of care; ideally pt will be able to make her own medical decisions as her mental status improves.   - Let her know that PT/OT will be evaluating her to determine what level of care she requires for rehabilitation after hospital; she verbalized understanding of this  - Updated primary team after visit; our team will follow up with pt and family this upcoming week.     Neuro  Encephalopathy  - Per discussion with primary team, nephrology, and HD nurse this is actually much improved. Agree that it was likely multifactorial: infection/delirium/pain medication on possible underlying dementia. Also agree with limiting pain medication whenever possible.   - Delirium precautions and further management per primary team   - Would potentially benefit from neuropsychology consult outpatient     Cardiac/Vascular  Peripheral vascular disease  - Mgmt per primary team and vascular surgery; s/p AKA     Renal/  ESRD (end stage renal disease)  - Nephrology consulted and following for routine HD needs    Endocrine  Protein calorie malnutrition  - BMI 22.45, though with visible temporal wasting on exam  - RD consult for nutritional optimization     Orthopedic  Gangrene of toe of left foot  - See PVD    Other  Debility  - PT/OT consult     Thank you for your consult. I will follow-up with patient. Please contact us if you have any additional questions.    RODERICK Williamson  Palliative Medicine Staff   [phone]    > 50% of 70 min visit spent in chart review, face to face discussion of symptom assessment, coordination of care with other specialists, goals of care, emotional support, formulating and communicating  "prognosis, exploring burden/ benefit of various approaches of treatment.     Subjective:     HPI: (From H&P) "Ms. Figueredo is a 67/F with PMH HTN, DMII (diet-controlled), PVD, ESRD on HD (M/W/F), anemia who presented to [facility] 09/17 with a two day progressive history of worsening L foot pain, swelling, fatigue, nausea and vomiting. History obtained from patient and daughter Stephanie ([phone]); they are from the [place], [place] area. Patient reports several weeks ago she "stubbed her toe," noting initially some pain, redness and swelling. Gradually symptoms progressed with duskiness to her toe and she informed her daughter of her pain for which she was brought to ER in [place] on 08/25 for further evaluation. With duskiness to 1st toe and surrounding erythema she was treated for cellulitis with a course of ciprofloxacin and metronidazole, but failed to improve. She followed up with her podiatrist 09/12 where she was prescribed hydrocodone-acetaminophen and a ten day course of TMP-SMX which she has been taking. She was referred to vascular surgery with plan for angiography and potential intervention  She and her family came to [place] for the weekend but her pain worsened; had been attempting to minimize use of hydrocodone-acetaminophen but developed nausea, vomiting and progressive fatigue. She subsequently presented to ED for further evaluation. ED workup was notable for L 1st toe dry-appearing gangrene with surrounding erythema and swelling, removed nail of second toe, XR L foot demonstrating diffuse osteopenia, L calcaneal and forefoot soft tissue swelling without definitive evidence of osteomyelitis in setting of osteopenia, no leukocytosis, elevated sed rate and CRP, and elevated BUN/Cr in setting of ESRD. She notes she went without dialysis on 09/15 due to her trip. Blood cultures were ordered and she received piperacillin-tazobactam and vancomycin. Hospital medicine was subsequently contacted for " "admission."    Palliative medicine is consulted for introduction of services and advance care planning; for details of visit with pt, see ACP section of plan.     Past Medical History:   Diagnosis Date    Coronary artery disease     Diabetes mellitus, type II     End stage renal disease     Hyperlipidemia     Hypertension     Peripheral vascular disease        Past Surgical History:   Procedure Laterality Date    ANGIOGRAPHY OF LOWER EXTREMITY Left 9/20/2023    Procedure: Angiogram Extremity Unilateral;  Surgeon: Rao Sosa MD;  Location: [facility] CATH LAB;  Service: Cardiology;  Laterality: Left;  right femoral access    BELOW KNEE AMPUTATION OF LOWER EXTREMITY Left 9/29/2023    Procedure: AMPUTATION, BELOW KNEE;  Surgeon: Franky Cobb Jr., MD;  Location: [facility] OR;  Service: Vascular;  Laterality: Left;    CAROTID ENDARTERECTOMY Left     CREATION OF FEMOROPOPLITEAL ARTERIAL BYPASS USING GRAFT  9/22/2023    Procedure: CREATION, BYPASS, ARTERIAL, FEMORAL TO POPLITEAL, USING GRAFT;  Surgeon: Franky Cobb Jr., MD;  Location: [facility];  Service: General;;  EXPLORATION VESSEL FEM & POP     HYSTERECTOMY         Review of patient's allergies indicates:  No Known Allergies    Medications:  Continuous Infusions:  Scheduled Meds:   sodium chloride 0.9%   Intravenous Once    aspirin  81 mg Oral Daily    calcitRIOL  0.25 mcg Oral Every Mon, Wed, Fri    carvediloL  12.5 mg Oral BID    clopidogreL  75 mg Oral Daily    epoetin veronica-epbx  100 Units/kg Subcutaneous Every Mon, Wed, Fri    LIDOcaine  1 patch Transdermal Q24H    losartan  100 mg Oral Daily    NIFEdipine  60 mg Oral BID    sevelamer carbonate  1,600 mg Oral TID WM    vitamin renal formula (B-complex-vitamin c-folic acid)  1 capsule Oral Daily     PRN Meds:acetaminophen, cloNIDine, dextrose 10%, dextrose 10%, diphenhydrAMINE, glucagon (human recombinant), glucose, glucose, HYDROmorphone, insulin aspart U-100, loperamide, melatonin, naloxone, ondansetron, simethicone, " sodium chloride 0.9%, sodium chloride 0.9%    Family History       Problem Relation (Age of Onset)    Diabetes Mother    Kidney disease Mother          Tobacco Use    Smoking status: Never    Smokeless tobacco: Never   Substance and Sexual Activity    Alcohol use: Never    Drug use: Never    Sexual activity: Not on file       Review of Systems   Unable to perform ROS: Mental status change     Objective:     Vital Signs (Most Recent):  Temp: 98.3 °F (36.8 °C) (10/06/23 0744)  Pulse: 66 (10/06/23 0744)  Resp: 16 (10/06/23 0744)  BP: (!) 156/70 (10/06/23 0744)  SpO2: 97 % (10/06/23 0800) Vital Signs (24h Range):  Temp:  [97.7 °F (36.5 °C)-98.3 °F (36.8 °C)] 98.3 °F (36.8 °C)  Pulse:  [65-71] 66  Resp:  [16-20] 16  SpO2:  [97 %-100 %] 97 %  BP: (135-162)/(64-73) 156/70     Weight: 61.2 kg (134 lb 14.7 oz)  Body mass index is 22.45 kg/m².       Physical Exam  Constitutional:       Comments: Appears older than stated age and chronically ill, sitting up in HD bed, in no distress, conversational but hard of hearing    HENT:      Head:      Comments: Mild temporal wasting      Nose: Nose normal.      Mouth/Throat:      Mouth: Mucous membranes are moist.   Cardiovascular:      Rate and Rhythm: Normal rate.   Pulmonary:      Effort: Pulmonary effort is normal.   Neurological:      Comments: Occasionally has delayed speech due to either cognitive deficit vs difficulty with word recall    Psychiatric:      Comments: Appropriately tearful at times        Significant Labs: All pertinent labs within the past 24 hours have been reviewed.  CBC:   Recent Labs   Lab 10/06/23  0505   WBC 21.97*   HGB 8.7*   HCT 28.1*   MCV 94        BMP:  Recent Labs   Lab 10/06/23  0505   GLU 86      K 4.1   CL 97   CO2 27   BUN 38*   CREATININE 5.1*   CALCIUM 10.3   MG 2.3     LFT:  Lab Results   Component Value Date    AST 20 09/17/2023    ALKPHOS 84 09/17/2023    BILITOT 1.0 09/17/2023     Albumin:   Albumin   Date Value Ref Range  Status   10/06/2023 1.9 (L) 3.5 - 5.2 g/dL Final     Protein:   Total Protein   Date Value Ref Range Status   09/17/2023 7.2 6.0 - 8.4 g/dL Final     Lactic acid:   Lab Results   Component Value Date    LACTATE 1.2 09/17/2023       Significant Imaging: I have reviewed all pertinent imaging results/findings within the past 24 hours.

## 2023-10-06 NOTE — ASSESSMENT & PLAN NOTE
- BMI 22.45, though with visible temporal wasting on exam  - RD consult for nutritional optimization

## 2023-10-06 NOTE — SUBJECTIVE & OBJECTIVE
Interval History: No acute events overnight. Seen on dialysis unit. Patient report feeling better today. She is awake and more appropriate today. Briefly spoke with Nhi (daughter) in hallway just prior to her leaving to visit some facilities for SNF.    Review of Systems   Constitutional:  Negative for chills and fever.   Respiratory:  Negative for cough and shortness of breath.    Cardiovascular:  Negative for chest pain and palpitations.   Gastrointestinal:  Negative for abdominal pain, nausea and vomiting.     Objective:     Vital Signs (Most Recent):  Temp: 98.3 °F (36.8 °C) (10/06/23 0744)  Pulse: 66 (10/06/23 0744)  Resp: 16 (10/06/23 0744)  BP: (!) 156/70 (10/06/23 0744)  SpO2: 97 % (10/06/23 0800) Vital Signs (24h Range):  Temp:  [97.7 °F (36.5 °C)-98.3 °F (36.8 °C)] 98.3 °F (36.8 °C)  Pulse:  [65-71] 66  Resp:  [16-20] 16  SpO2:  [97 %-100 %] 97 %  BP: (135-162)/(64-73) 156/70     Weight: 61.2 kg (134 lb 14.7 oz)  Body mass index is 22.45 kg/m².  No intake or output data in the 24 hours ending 10/06/23 1220        Physical Exam  Vitals and nursing note reviewed.   Constitutional:       General: She is not in acute distress.     Appearance: She is well-developed.   HENT:      Head: Normocephalic and atraumatic.   Eyes:      General:         Right eye: No discharge.         Left eye: No discharge.      Conjunctiva/sclera: Conjunctivae normal.   Cardiovascular:      Rate and Rhythm: Normal rate.      Pulses: Normal pulses.   Pulmonary:      Effort: Pulmonary effort is normal. No respiratory distress.   Abdominal:      Palpations: Abdomen is soft.      Tenderness: There is no abdominal tenderness.   Musculoskeletal:         General: Normal range of motion.      Right lower leg: No edema.      Comments: s/p LLE BKA with dressing in place.   Skin:     General: Skin is warm and dry.   Neurological:      Comments: Awake and alert, able to tell me year and come to understanding of hospital course and ask  appropriate questions       Significant Labs:   CBC:  Recent Labs   Lab 10/04/23  0421 10/05/23  0428 10/06/23  0505   WBC 23.62* 23.93* 21.97*   HGB 8.1* 8.6* 8.7*   HCT 27.0* 28.2* 28.1*    291 311   GRAN 77.0* 78.0* 75.0*  16.5*   LYMPH 7.0*  CANCELED 8.0*  CANCELED 8.2*  1.8   MONO 13.0  CANCELED 10.0  CANCELED 11.0  2.4*   EOS CANCELED CANCELED 0.2   BASO CANCELED CANCELED 0.11     BMP:  Recent Labs   Lab 10/04/23  0421 10/05/23  0428 10/06/23  0505   * 140 138   K 4.2 3.8 4.1    98 97   CO2 25 30* 27   BUN 48* 20 38*   CREATININE 6.2* 3.2* 5.1*   * 101 86   CALCIUM 10.3 10.0 10.3   MG 2.5 2.1 2.3   PHOS 3.5 2.7 3.8       Significant Imaging: I have reviewed and interpreted all pertinent imaging results/findings within the past 24 hours.

## 2023-10-06 NOTE — CARE UPDATE
DeirdreStephanie Daughter     [phone]      Update given to Stephanie (daughter) via phone.  All questions and concerns were addressed.     JOSE Colon MD

## 2023-10-06 NOTE — ASSESSMENT & PLAN NOTE
- Per discussion with primary team, nephrology, and HD nurse this is actually much improved. Agree that it was likely multifactorial: infection/delirium/pain medication on possible underlying dementia  - Delirium precautions and further management per primary team   - Would potentially benefit from neuropsychology consult outpatient

## 2023-10-07 PROBLEM — H91.90: Status: ACTIVE | Noted: 2023-01-01

## 2023-10-07 NOTE — PROGRESS NOTES
"[facility] ([address] Medicine  Progress Note    Patient Name: Shannan Figueredo  MRN: [id number]  Patient Class: IP- Inpatient   Admission Date: 9/17/2023  Length of Stay: 20 days  Attending Physician: Brook Colon MD  Primary Care Provider: Roxi, Primary Doctor        Subjective:     Principal Problem:Encephalopathy        HPI:  Ms. Figueredo is a 67/F with PMH HTN, DMII (diet-controlled), PVD, ESRD on HD (M/W/F), anemia who presented to [facility] 09/17 with a two day progressive history of worsening L foot pain, swelling, fatigue, nausea and vomiting. History obtained from patient and daughter Stephanie ([phone]); they are from the [place], [place] area. Patient reports several weeks ago she "stubbed her toe," noting initially some pain, redness and swelling. Gradually symptoms progressed with duskiness to her toe and she informed her daughter of her pain for which she was brought to ER in [place] on 08/25 for further evaluation. With duskiness to 1st toe and surrounding erythema she was treated for cellulitis with a course of ciprofloxacin and metronidazole, but failed to improve. She followed up with her podiatrist 09/12 where she was prescribed hydrocodone-acetaminophen and a ten day course of TMP-SMX which she has been taking. She was referred to vascular surgery with plan for angiography and potential intervention  She and her family came to [place] for the weekend but her pain worsened; had been attempting to minimize use of hydrocodone-acetaminophen but developed nausea, vomiting and progressive fatigue. She subsequently presented to ED for further evaluation. ED workup was notable for L 1st toe dry-appearing gangrene with surrounding erythema and swelling, removed nail of second toe, XR L foot demonstrating diffuse osteopenia, L calcaneal and forefoot soft tissue swelling without definitive evidence of osteomyelitis in setting of osteopenia, no leukocytosis, elevated sed rate and " CRP, and elevated BUN/Cr in setting of ESRD. She notes she went without dialysis on 09/15 due to her trip. Blood cultures were ordered and she received piperacillin-tazobactam and vancomycin. Hospital medicine was subsequently contacted for admission.      Overview/Hospital Course:  Admitted with gangrene of L 1st toe.  Empirically treated with Zosyn and vancomycin.  Vascular Surgery, nephrology, podiatry consulted; U/S arterial demonstrated L SFA occlusion with distal reconstitution. Cardiology consulted, angiography with no vessels amenable for angioplasty on 9/20.  Underwent exploration of the left lower extremity vessels, femoral and popliteal bypass unable to be performed due to no adequate available vessels for bypass on 9/22. Discussed potential intervention here vs [place], patient and family opted for surgery in [place]. Underwent L BKA 09/29. PT/OT recommended SNF.      Interval History: No acute events overnight. However, this morning, daughter (Nhi) reported to the nurse that the patient stated she is not able to hear this morning. Soon after I was able to come to the bedside and assess the patient. Patient spoke and clearly stated she is not able to hear. I wrote out questions which she did not seem to be able to read or answer.    Review of Systems   Unable to perform ROS: Mental status change     Objective:     Vital Signs (Most Recent):  Temp: 97.9 °F (36.6 °C) (10/07/23 0734)  Pulse: 70 (10/07/23 0734)  Resp: 18 (10/07/23 0734)  BP: (!) 156/71 (10/07/23 0734)  SpO2: 96 % (10/07/23 0734) Vital Signs (24h Range):  Temp:  [97.9 °F (36.6 °C)-99.2 °F (37.3 °C)] 97.9 °F (36.6 °C)  Pulse:  [68-73] 70  Resp:  [14-18] 18  SpO2:  [95 %-99 %] 96 %  BP: (136-156)/(64-71) 156/71     Weight: 61.2 kg (134 lb 14.7 oz)  Body mass index is 22.45 kg/m².    Intake/Output Summary (Last 24 hours) at 10/7/2023 1009  Last data filed at 10/6/2023 1225  Gross per 24 hour   Intake 500 ml   Output 1500 ml   Net -1000 ml            Physical Exam  Vitals and nursing note reviewed.   Constitutional:       General: She is not in acute distress.     Appearance: She is well-developed.   HENT:      Head: Normocephalic and atraumatic.      Nose: Nose normal.   Eyes:      General:         Right eye: No discharge.         Left eye: No discharge.      Conjunctiva/sclera: Conjunctivae normal.   Cardiovascular:      Rate and Rhythm: Normal rate.      Pulses: Normal pulses.   Pulmonary:      Effort: Pulmonary effort is normal. No respiratory distress.   Abdominal:      Palpations: Abdomen is soft.      Tenderness: There is no abdominal tenderness.   Musculoskeletal:         General: Normal range of motion.      Right lower leg: No edema.      Comments: s/p LLE BKA with dressing in place.   Skin:     General: Skin is warm and dry.   Neurological:      Mental Status: She is alert.      Comments: Awake and alert, clearly states she is unable to hear, no response to verbal stimuli and unable to interpret written word to answer back and appears to just stare at words (that are printed on paper and typed on phone screen in large print), moving all extremities and no other focal deficits appreciated   Psychiatric:         Behavior: Behavior is slowed.       Significant Labs:   CBC:  Recent Labs   Lab 10/05/23  0428 10/06/23  0505 10/07/23  0437   WBC 23.93* 21.97* 19.50*   HGB 8.6* 8.7* 8.6*   HCT 28.2* 28.1* 27.7*    311 317   GRAN 78.0* 75.0*  16.5* 77.1*  15.1*   LYMPH 8.0*  CANCELED 8.2*  1.8 7.5*  1.5   MONO 10.0  CANCELED 11.0  2.4* 11.1  2.2*   EOS CANCELED 0.2 0.1   BASO CANCELED 0.11 0.07     BMP:  Recent Labs   Lab 10/05/23  0428 10/06/23  0505 10/07/23  0437    138 137   K 3.8 4.1 3.8   CL 98 97 98   CO2 30* 27 28   BUN 20 38* 22   CREATININE 3.2* 5.1* 3.3*    86 95   CALCIUM 10.0 10.3 10.0   MG 2.1 2.3 2.3   PHOS 2.7 3.8 3.3       Significant Imaging: I have reviewed and interpreted all pertinent imaging  "results/findings within the past 24 hours.      Assessment/Plan:      * Encephalopathy  - Increased sedation due to pain medications and since getting Neurontin; stopped neurontin on 10/5 and pain medications changed to PO dilaudid at low dose and spaced further apart with continued improvement in mentation. Patient with likely some baseline cognitive impairment with worsening due to resolving medical issues and over sedation which was almost fully resolved to baseline yesterday   - This morning, patient with but new development of hearing loss and inability to comprehend written word sometime after waking this morning. Daughter and staff reported she was normal last night, but unclear exact timing of this new deficit  - Patient already on ASA and plavix, no hypotensive episode in the past 24 hours  - Stat head CT now and if negative, get head MRI, and will consult Teleneurology for initial workup    Gangrene of toe of left foot  HTN, PVD, encephalopathy  - L 1st toe gangrene predominantly dry with some surrounding erythema/swelling; with concern for worsening infection started piperacillin-tazobactam and vancomycin IV. 2nd toe with involvement as well.  - U/S arterial BLE showed L distal SFA occlusion with distal reconstitution. Discussed with vascular surgery and cardiology consulted for consideration of angiography / potential angioplasty.  - Podiatry consulted. MRI L foot with "examination markedly degraded by patient motion artifact. Questionable marrow edema within the 1st distal phalanx, not well evaluated given aforementioned limitation but possibly related to osteomyelitis given reported history of gangrene."  - Blood cultures showed no growth.  - s/p angiogram remarkable for severe PAD with no areas amenable to angioplasty on 9/20. Elected to undergo attempt at revascularization 09/22 but unable to perform femoral and popliteal bypass on 9/22. Discussed surgical options with BKA only viable for appropriate " healing given extent of vascular disease.   - Underwent BKA on 09/30. Leukocytosis improving. Therapy recommending SNF. Pursuing placement.  - Given possible decreased clearance of pain medications, stopped hydrocodone-acetaminophen 5-325mg PO q6hr PRN, oxycodone-acetaminophen 7.5-325mg PO q6hr PRN. Started PO dilaudid instead and Neurontin on 10/3  - Increased sedation since getting Neurontin, changed it to 100 mg QHS on 10/4 and stopped it on 10/5 and with continued improvement in mentation. Patient with likely some baseline cognitive impairment with worsening due to resolving medical issues and over sedation which was almost fully resolved to baseline yesterday but with new development today (see above)  - WBC increased but plateaued over 2 days and then started downward trend starting yesterday and continues to improve. No fever, repeat CXR overall unrevealing and no other source of infection identified. Likely delayed leukemoid reaction. Case discussed with Dr. Cobb and he evaluated the surgical site with no signs of infection noted  - Will continue to monitor for now and consider repeat cultures if spikes a fever   - Continue carvedilol 12.5mg PO BID, losartan 100mg PO daily, nifedipine 60mg PO BID  - Discharge planning with SNF     Type 2 diabetes mellitus with chronic kidney disease on chronic dialysis  - Reports diet-controlled; no current medications.  - HgbA1c 5.7.  - Continue low-dose sliding scale insulin aspart 0-5U subQ TIDWM PRN, monitor requirements.    ESRD (end stage renal disease)  Anemia, hyperparathyroidism  - ESRD on HD, reports missed session Friday prior to presentation due to travel.  - Mild anemia without evidence of bleeding.  - Continue allopurinol 300mg PO daily, calcium acetate 2001mg PO TIDWM.  - Transfused 1U pRBCs 09/25 with appropriate response.  - Nephrology consulted for HD assistance.    VTE Risk Mitigation (From admission, onward)         Ordered     IP VTE HIGH RISK PATIENT   Once         09/28/23 1906                    Brook Colon MD  Department of Hospital Medicine   [facility] ([address])

## 2023-10-07 NOTE — CARE UPDATE
MRI findings and medications reviewed with Dr. Adams via secure chat. PRN benadryl discontinued. No further plan of care or treatment changes at current. Neuro will re-eval in AM. Discussed MRI and Neuro recs with daughter Stephanie at [phone].      Shima Hussein, CHARLEE, AG-ACNP, BC  Department of Hospital Medicine  [facility]

## 2023-10-07 NOTE — ASSESSMENT & PLAN NOTE
"HTN, PVD, encephalopathy  - L 1st toe gangrene predominantly dry with some surrounding erythema/swelling; with concern for worsening infection started piperacillin-tazobactam and vancomycin IV. 2nd toe with involvement as well.  - U/S arterial BLE showed L distal SFA occlusion with distal reconstitution. Discussed with vascular surgery and cardiology consulted for consideration of angiography / potential angioplasty.  - Podiatry consulted. MRI L foot with "examination markedly degraded by patient motion artifact. Questionable marrow edema within the 1st distal phalanx, not well evaluated given aforementioned limitation but possibly related to osteomyelitis given reported history of gangrene."  - Blood cultures showed no growth.  - s/p angiogram remarkable for severe PAD with no areas amenable to angioplasty on 9/20. Elected to undergo attempt at revascularization 09/22 but unable to perform femoral and popliteal bypass on 9/22. Discussed surgical options with BKA only viable for appropriate healing given extent of vascular disease.   - Underwent BKA on 09/30. Leukocytosis improving. Therapy recommending SNF. Pursuing placement.  - Given possible decreased clearance of pain medications, stopped hydrocodone-acetaminophen 5-325mg PO q6hr PRN, oxycodone-acetaminophen 7.5-325mg PO q6hr PRN. Started PO dilaudid instead and Neurontin on 10/3  - Increased sedation since getting Neurontin, changed it to 100 mg QHS on 10/4 and stopped it on 10/5 and with continued improvement in mentation. Patient with likely some baseline cognitive impairment with worsening due to resolving medical issues and over sedation which was almost fully resolved to baseline yesterday but with new development today (see above)  - WBC increased but plateaued over 2 days and then started downward trend starting yesterday and continues to improve. No fever, repeat CXR overall unrevealing and no other source of infection identified. Likely delayed " leukemoid reaction. Case discussed with Dr. Cobb and he evaluated the surgical site with no signs of infection noted  - Will continue to monitor for now and consider repeat cultures if spikes a fever   - Continue carvedilol 12.5mg PO BID, losartan 100mg PO daily, nifedipine 60mg PO BID  - Discharge planning with SNF

## 2023-10-07 NOTE — PROGRESS NOTES
"Nephrology  Progress Note    Admit Date: 9/17/2023   LOS: 20 days     SUBJECTIVE:     Follow-up For:  Gangrene of toe of left foot    History of Present Illness:  Patient is a 67 y.o. female presents with left foot gangrene, vomiting, feeling ill.  Ext med hx as outlined below including ESRD on HD MWF in [place].  Has been dealing with necrotic left toes for few months and followed by vascular in [place].  Was here visiting daughter and felt bad.  Admitted for eval/treatment.  Consulted for HD needs.  Pt seen and examined on HD.  Consents signed.  Tired from being up all night with pain.  Updated team and daughter at bedside.  W/up noted.       Interval History:     No acute issues overnight.  Total UF on HD yesterday 1 L.  SBP's stable in 150's, Pulse 70's.  AF.    Review of Systems:  Constitutional: No fever or chills  Respiratory: No cough or shortness of breath  Cardiovascular: No chest pain or palpitations  Gastrointestinal: No nausea or vomiting  Neurological: No confusion or weakness    OBJECTIVE:     Vital Signs Range (Last 24H):  BP (!) 156/71 (BP Location: Left arm, Patient Position: Lying)   Pulse 70   Temp 97.9 °F (36.6 °C) (Oral)   Resp 18   Ht 5' 5" (1.651 m)   Wt 61.2 kg (134 lb 14.7 oz)   SpO2 96%   BMI 22.45 kg/m²     Temp:  [97.9 °F (36.6 °C)-99.2 °F (37.3 °C)]   Pulse:  [68-73]   Resp:  [14-18]   BP: (136-156)/(64-77)   SpO2:  [95 %-99 %]     I & O (Last 24H):  Intake/Output Summary (Last 24 hours) at 10/7/2023 0764  Last data filed at 10/6/2023 1225  Gross per 24 hour   Intake 500 ml   Output 1500 ml   Net -1000 ml         Physical Exam:  General appearance:  Frail.  Appears older than stated age.    Eyes:  Conjunctivae/corneas clear. PERRL.  Lungs: Normal respiratory effort,   clear to auscultation bilaterally.  Diminished.   Heart: Regular rate and rhythm, S1, S2 normal, no murmur, rub or nelly.  Abdomen: Soft, non-tender non-distended; bowel sounds normal; no masses,  no " "organomegaly  Extremities: No cyanosis or clubbing. No edema.    Skin: Skin color, texture, turgor normal. No rashes or lesions  Neurologic: Normal strength and tone. No focal numbness or weakness   Right arm AVF+  Left BKA wrapped.     Laboratory Data:  Recent Labs   Lab 10/07/23  0437   WBC 19.50*   RBC 2.95*   HGB 8.6*   HCT 27.7*      MCV 94   MCH 29.2   MCHC 31.0*         BMP:   Recent Labs   Lab 10/07/23  0437   GLU 95      K 3.8   CL 98   CO2 28   BUN 22   CREATININE 3.3*   CALCIUM 10.0   MG 2.3   PHOS 3.3       Lab Results   Component Value Date    CALCIUM 10.0 10/07/2023    PHOS 3.3 10/07/2023       Lab Results   Component Value Date    CALCIUM 10.0 10/07/2023    PHOS 3.3 10/07/2023       No results found for: "URICACID"    BNP  No results for input(s): "BNP", "BNPTRIAGEBLO" in the last 168 hours.    Medications:  Medication list was reviewed and changes noted under Assessment/Plan.    Diagnostic Results:    reviewed    ASSESSMENT/PLAN:       ESRD on HD MWF in [place]:  -consulted for HD needs.  -consents signed  -continue HD MWF while here and if she stays after hospitalization will need temp outpt unit.   -labs noted and bath adjusted.  -R arm AVF+  -9/19: HD yesterday w/o issue.  No need today.  -9/20:  HD today after angio  -9/21:  no need for HD today.  Cont MWF if stays here.    -9/22:  seen on HD this am.   -9/25:  HD with Blood today.    -9/26:  stable from renal to DC back to [place] w/ family.    -9/27:  seen on HD.  UF 2 L.    -9/28:  nothing today.   -9/29:  seen on HD.  -10/1: stable today; plan for next dialysis session on Monday.  -10/2:  HD today.  -10/3:  adjust bath to labs for tomorrow.  Encourage some free water intake today/ tomorrow as well.  -10/4:  HD with adjustments. Limit UF as seems dry.    -10/5:  stable lytes/volume    -10/6:  seen in HD.  Renally dose meds, avoid nephrotoxins, and monitor I/O's closely.  -10/7:  No HD indicated for today, will continue a MWF schedule " while here.    S/p BKA on 9/29  -defer to pod/vascular/cards.  -angio noted with severe PAD  -Status post exploration femoral and popliteal arteries 9/22 with Non-reconstructible peripheral vascular disease.      HTN:  -UF on HD  -A little over goal will titrate Coreg to 25 BID     MBD:  -binders/nephrocaps.   Changed to renvela.  Pt states that she also takes tums at home  -using calcitriol on HD days.     Anemia of CKD:  -Received transfusion 1 unit 9/29  -Hgb below goal  -FAREED with HD  -Check iron and give some IV iron if indicated.

## 2023-10-07 NOTE — SUBJECTIVE & OBJECTIVE
Interval History: No acute events overnight. However, this morning, daughter (Nhi) reported to the nurse that the patient stated she is not able to hear this morning. Soon after I was able to come to the bedside and assess the patient. Patient spoke and clearly stated she is not able to hear. I wrote out questions which she did not seem to be able to read or answer.    Review of Systems   Unable to perform ROS: Mental status change     Objective:     Vital Signs (Most Recent):  Temp: 97.9 °F (36.6 °C) (10/07/23 0734)  Pulse: 70 (10/07/23 0734)  Resp: 18 (10/07/23 0734)  BP: (!) 156/71 (10/07/23 0734)  SpO2: 96 % (10/07/23 0734) Vital Signs (24h Range):  Temp:  [97.9 °F (36.6 °C)-99.2 °F (37.3 °C)] 97.9 °F (36.6 °C)  Pulse:  [68-73] 70  Resp:  [14-18] 18  SpO2:  [95 %-99 %] 96 %  BP: (136-156)/(64-71) 156/71     Weight: 61.2 kg (134 lb 14.7 oz)  Body mass index is 22.45 kg/m².    Intake/Output Summary (Last 24 hours) at 10/7/2023 1009  Last data filed at 10/6/2023 1225  Gross per 24 hour   Intake 500 ml   Output 1500 ml   Net -1000 ml           Physical Exam  Vitals and nursing note reviewed.   Constitutional:       General: She is not in acute distress.     Appearance: She is well-developed.   HENT:      Head: Normocephalic and atraumatic.      Nose: Nose normal.   Eyes:      General:         Right eye: No discharge.         Left eye: No discharge.      Conjunctiva/sclera: Conjunctivae normal.   Cardiovascular:      Rate and Rhythm: Normal rate.      Pulses: Normal pulses.   Pulmonary:      Effort: Pulmonary effort is normal. No respiratory distress.   Abdominal:      Palpations: Abdomen is soft.      Tenderness: There is no abdominal tenderness.   Musculoskeletal:         General: Normal range of motion.      Right lower leg: No edema.      Comments: s/p LLE BKA with dressing in place.   Skin:     General: Skin is warm and dry.   Neurological:      Mental Status: She is alert.      Comments: Awake and alert,  clearly states she is unable to hear, no response to verbal stimuli and unable to interpret written word to answer back and appears to just stare at words (that are printed on paper and typed on phone screen in large print), moving all extremities and no other focal deficits appreciated   Psychiatric:         Behavior: Behavior is slowed.       Significant Labs:   CBC:  Recent Labs   Lab 10/05/23  0428 10/06/23  0505 10/07/23  0437   WBC 23.93* 21.97* 19.50*   HGB 8.6* 8.7* 8.6*   HCT 28.2* 28.1* 27.7*    311 317   GRAN 78.0* 75.0*  16.5* 77.1*  15.1*   LYMPH 8.0*  CANCELED 8.2*  1.8 7.5*  1.5   MONO 10.0  CANCELED 11.0  2.4* 11.1  2.2*   EOS CANCELED 0.2 0.1   BASO CANCELED 0.11 0.07     BMP:  Recent Labs   Lab 10/05/23  0428 10/06/23  0505 10/07/23  0437    138 137   K 3.8 4.1 3.8   CL 98 97 98   CO2 30* 27 28   BUN 20 38* 22   CREATININE 3.2* 5.1* 3.3*    86 95   CALCIUM 10.0 10.3 10.0   MG 2.1 2.3 2.3   PHOS 2.7 3.8 3.3       Significant Imaging: I have reviewed and interpreted all pertinent imaging results/findings within the past 24 hours.

## 2023-10-07 NOTE — PROGRESS NOTES
Postop day 8.  Status post left BKA    Subjective   No complaints referable to left lower extremity    PE  Afebrile  Vital signs stable  Left lower extremity-dressing dry, incisions clean and dry without evidence of infection

## 2023-10-07 NOTE — ASSESSMENT & PLAN NOTE
- Increased sedation due to pain medications and since getting Neurontin; stopped neurontin on 10/5 and pain medications changed to PO dilaudid at low dose and spaced further apart with continued improvement in mentation. Patient with likely some baseline cognitive impairment with worsening due to resolving medical issues and over sedation which was almost fully resolved to baseline yesterday   - This morning, patient with but new development of hearing loss and inability to comprehend written word sometime after waking this morning. Daughter and staff reported she was normal last night, but unclear exact timing of this new deficit  - Patient already on ASA and plavix, no hypotensive episode in the past 24 hours  - Stat head CT now and if negative, get head MRI, and will consult Teleneurology for initial workup

## 2023-10-07 NOTE — CARE UPDATE
Update give to Nhi with her cousin at the bedside concerning CT findings. Patient in MRI for further evaluation around 3pm.    Stephanie Gilmore Daughter     [phone]   Called Stephanie (daughter) via phone at 3:06pm.  Unable to connect. Left voicemail. Will attempt to call back later.     Addendum (3:10pm):  Update given to daughter Stephanie concerning CT findings. Questions answered and will plan to update again after MRI results.    Addendum (4:37pm):  Update given to Stephanie. Informed her Teleneurology was done and we are waiting on MRI results, but treatment is the same and unchanged. Checkout given to our swing NP and if MRI results sooner, will call her with results. All questions and concerns were addressed.    JOSE Colon MD

## 2023-10-07 NOTE — CONSULTS
"[facility] Tele-Consultation from Neurology    Consultation started: 10/7/2023 at 1:19 PM   The chief complaint leading to consultation is: hearing loss  This consultation was requested by:    The patient location is:[facility], [place], operated by [facility]  The patient arrived at: 1pm  Spoke nurse at bedside with patient assisting consultant. Also present with the patient at the time of the consultation:family member    Inpatient consult to Telemedicine-General Neurology  Consult performed by: Chikis Adams MD  Consult ordered by: Brook Colon MD           Subjective:     History of Present Illness:  Shannan Figueredo is a 67 y.o. female who presents with altered mental status and possible hearing loss.    Per chart review:  "67/F with PMH HTN, DMII (diet-controlled), PVD, ESRD on HD (M/W/F), anemia who presented to [facility] 09/17 with a two day progressive history of worsening L foot pain, swelling, fatigue, nausea and vomiting. History obtained from patient and daughter Stephanie ([phone]); they are from the [place], [place] area. Patient reports several weeks ago she "stubbed her toe," noting initially some pain, redness and swelling. Gradually symptoms progressed with duskiness to her toe and she informed her daughter of her pain for which she was brought to ER in [place] on 08/25 for further evaluation. With duskiness to 1st toe and surrounding erythema she was treated for cellulitis with a course of ciprofloxacin and metronidazole, but failed to improve. She followed up with her podiatrist 09/12 where she was prescribed hydrocodone-acetaminophen and a ten day course of TMP-SMX which she has been taking. She was referred to vascular surgery with plan for angiography and potential intervention  She and her family came to [place] for the weekend but her pain worsened; had been attempting to minimize use of hydrocodone-acetaminophen but developed nausea, vomiting and progressive fatigue. She subsequently presented to ED for " "further evaluation. ED workup was notable for L 1st toe dry-appearing gangrene with surrounding erythema and swelling, removed nail of second toe, XR L foot demonstrating diffuse osteopenia, L calcaneal and forefoot soft tissue swelling without definitive evidence of osteomyelitis in setting of osteopenia, no leukocytosis, elevated sed rate and CRP, and elevated BUN/Cr in setting of ESRD. She notes she went without dialysis on 09/15 due to her trip. Blood cultures were ordered and she received piperacillin-tazobactam and vancomycin. Hospital medicine was subsequently contacted for admission.  Admitted with gangrene of L 1st toe.  Empirically treated with Zosyn and vancomycin.  Vascular Surgery, nephrology, podiatry consulted; U/S arterial demonstrated L SFA occlusion with distal reconstitution. Cardiology consulted, angiography with no vessels amenable for angioplasty on 9/20.  Underwent exploration of the left lower extremity vessels, femoral and popliteal bypass unable to be performed due to no adequate available vessels for bypass on 9/22. Discussed potential intervention here vs [place], patient and family opted for surgery in [place]. Underwent L BKA 09/29. PT/OT recommended SNF."    Reportedly missed HD session Friday prior to presentation due to travel.  10/5/23, patient with report of not feeling well. Unable to articulate.    Daughter Nhi at bedside.   Shortly after coming to [place] for a visit, Nhi noticed that her mom's foot was really dark. Ms. Figueredo thought she stubbed her toe, but the story was questionable per Nhi.   The night before this admission, she states that her mother was vomiting green vomit, but she was still in her normal state of mind.  After the surgery, Nhi states that her mother was not acting right, so the pain medications were changed. After starting the gabapentin, she noticed that her mother was very sleepy and difficult to wake. This was again switched to current " pain medications. However, she still is not at baseline.  She states that on this morning, her mother said that she couldn't hear. She was trying to get out of the bed, confused, unaware of where she was and not oriented. She was initially not able to read a message the doctor wrote, but later she was able to read a letter from her daughter about 2-3 hours later.    Prior to this, she was able to drive. No longer cooks, but she didn't seem to have any confusion. It was thought that she was becoming depressed.     Per Nhi, she does wear a hearing aid, but she is not compliant with it. Unsure of when she stopped wearing it.       Patient information was obtained from patient, past medical records, ER records, and primary team.    Past Medical History:   Diagnosis Date    Coronary artery disease     Diabetes mellitus, type II     End stage renal disease     Hyperlipidemia     Hypertension     Peripheral vascular disease        Past Surgical History:   Procedure Laterality Date    ANGIOGRAPHY OF LOWER EXTREMITY Left 9/20/2023    Procedure: Angiogram Extremity Unilateral;  Surgeon: Rao Sosa MD;  Location: [facility] CATH LAB;  Service: Cardiology;  Laterality: Left;  right femoral access    BELOW KNEE AMPUTATION OF LOWER EXTREMITY Left 9/29/2023    Procedure: AMPUTATION, BELOW KNEE;  Surgeon: Franky Cobb Jr., MD;  Location: [facility] OR;  Service: Vascular;  Laterality: Left;    CAROTID ENDARTERECTOMY Left     CREATION OF FEMOROPOPLITEAL ARTERIAL BYPASS USING GRAFT  9/22/2023    Procedure: CREATION, BYPASS, ARTERIAL, FEMORAL TO POPLITEAL, USING GRAFT;  Surgeon: Franky Cobb Jr., MD;  Location: [facility] OR;  Service: General;;  EXPLORATION VESSEL FEM & POP     HYSTERECTOMY         Family History   Problem Relation Age of Onset    Diabetes Mother     Kidney disease Mother         Social History     Tobacco Use    Smoking status: Never    Smokeless tobacco: Never   Substance Use Topics    Alcohol use: Never         Medications:   Current Facility-Administered Medications:     0.9%  NaCl infusion, , Intravenous, Once, Franky Cobb Jr., MD    acetaminophen tablet 650 mg, 650 mg, Oral, Q6H PRN, Franky Cobb Jr., MD    aspirin EC tablet 81 mg, 81 mg, Oral, Daily, Franky Cobb Jr., MD, 81 mg at 10/07/23 0939    calcitRIOL capsule 0.25 mcg, 0.25 mcg, Oral, Every Mon, Wed, Fri, Franky Cobb Jr., MD, 0.25 mcg at 10/06/23 1248    carvediloL tablet 25 mg, 25 mg, Oral, BID, Valeria Mejia MD, 25 mg at 10/07/23 0939    cloNIDine tablet 0.1 mg, 0.1 mg, Oral, Q6H PRN, Franky Cobb Jr., MD    clopidogreL tablet 75 mg, 75 mg, Oral, Daily, Franky Cobb Jr., MD, 75 mg at 10/07/23 0939    dextrose 10% bolus 125 mL 125 mL, 12.5 g, Intravenous, PRN, Franky Cobb Jr., MD    dextrose 10% bolus 250 mL 250 mL, 25 g, Intravenous, PRN, Franky Cobb Jr., MD    diphenhydrAMINE capsule 25 mg, 25 mg, Oral, Q6H PRN, Franky Cobb Jr., MD, 25 mg at 09/23/23 0405    epoetin veronica-epbx injection 6,100 Units, 100 Units/kg, Subcutaneous, Every Mon, Wed, Fri, Nirmal Betts NP, 6,100 Units at 10/06/23 1248    glucagon (human recombinant) injection 1 mg, 1 mg, Intramuscular, PRN, Franky Cobb Jr., MD    glucose chewable tablet 16 g, 16 g, Oral, PRN, Franky Cobb Jr., MD, 16 g at 09/22/23 0857    glucose chewable tablet 24 g, 24 g, Oral, PRN, Franky Cobb Jr., MD, 24 g at 09/20/23 1444    HYDROmorphone tablet 2 mg, 2 mg, Oral, Q6H PRN, Brook Colon MD, 2 mg at 10/07/23 1011    insulin aspart U-100 pen 0-5 Units, 0-5 Units, Subcutaneous, QID (AC + HS) PRN, Franky Cobb Jr., MD, 2 Units at 10/03/23 1243    LIDOcaine 5 % patch 1 patch, 1 patch, Transdermal, Q24H, Franky Cobb Jr., MD, 1 patch at 10/07/23 0940    loperamide capsule 2 mg, 2 mg, Oral, Daily PRN, Franky Cobb Jr., MD, 2 mg at 09/19/23 1749    losartan tablet 100 mg, 100 mg, Oral, Daily, Franky Cobb Jr., MD, 100 mg at 10/07/23 0939    melatonin tablet 6 mg, 6 mg,  "Oral, Nightly PRN, Franky Cobb Jr., MD, 6 mg at 10/05/23 2124    naloxone 0.4 mg/mL injection 0.4 mg, 0.4 mg, Intravenous, PRN, Frakny Cobb Jr., MD    NIFEdipine 24 hr tablet 60 mg, 60 mg, Oral, BID, Franky Cobb Jr., MD, 60 mg at 10/07/23 0939    ondansetron disintegrating tablet 4 mg, 4 mg, Oral, Q6H PRN, Franky Cobb Jr., MD    sevelamer carbonate tablet 1,600 mg, 1,600 mg, Oral, TID WM, Franky Cobb Jr., MD, 1,600 mg at 10/07/23 0939    simethicone chewable tablet 80 mg, 1 tablet, Oral, TID PRN, Franky Cobb Jr., MD, 80 mg at 09/30/23 1759    sodium chloride 0.9% bolus 250 mL 250 mL, 250 mL, Intravenous, PRN, Franky Cobb Jr., MD    sodium chloride 0.9% flush 10 mL, 10 mL, Intravenous, PRN, Franky Cobb Jr., MD    vitamin renal formula (B-complex-vitamin c-folic acid) 1 mg per capsule 1 capsule, 1 capsule, Oral, Daily, Franky Cobb Jr., MD, 1 capsule at 10/07/23 0939    Allergies: Review of patient's allergies indicates:  No Known Allergies    Review Of Systems: ROS      Objective:     Vitals: Blood pressure 127/60, pulse 67, temperature 98.6 °F (37 °C), temperature source Oral, resp. rate 18, height 5' 5" (1.651 m), weight 61.2 kg (134 lb 14.7 oz), SpO2 97 %. Reviewed for date of service     MENTAL STATUS: alert, will mimic actions/commands but will not perform actions to spoken command. Looking around, confused in response to verbal questions/commands. Unclear of her orientation.  CRANIAL NERVE EXAM: Extraocular muscles appear intact as she looks around the room.  No facial asymmetry. Shoulder shrug normal b/l. Will localize loud sounds - clapping, yelling to either side, but not answering questions appropriately  MOTOR EXAM: antigravity to UE and LLE but only grimaces to pain with RLE.     Labs:Reviewed for date of service  Radiology (i.e. PET Scan, X-ray, CT, MRI): Reviewed for date of service    Assessment - Diagnosis - Goals:     Impression: Shannan Figueredo 67 y.o. female with AMS and " questionable hearing loss. Appears more aphasic vs encephalopathic during exam. If encephalopathy, likely multifactorial given polypharmacy, gangrene and subsequent recent surgery, etc  Supposed to wear a hearing aid at baseline, unclear if one or both, but not compliant. Not clearly with acute hearing loss on exam. She does respond to sound appropriately.    Recommendations:   Agree with MRI to eval for possible stroke.  Renally dose medications and minimize sedating/psychoactive medications, including pain medications if patient is in not in distress.  Delirium precautions.  Will continue to monitor and reassess after MRI. If there is a stroke, she will need stroke factor optimization and stroke w/u.    Consultation ended:  2:02 PM     Total time spent with patient: > 50 Minutes      The attending portion of this evaluation, treatment, and documentation was performed per Chikis Adams MD via audiovisual.        Thank you for this consult. Please do not hesitate to contact us with questions.

## 2023-10-08 NOTE — NURSING
POC reviewed with patient and daughter.  Due medications given. Blood Glucose monitored.  VSS on room air. Upon assessment skin tear noticed to patients buttocks, Mepilex in place, patient turned 2qhr, Wound care consult place, Dr. Colon notified.   L BKA dressing CDI.  PRN PO dilaudid given 1x. Safety maintained, bed in lowest position, call bell within reach, bed alarm set.

## 2023-10-08 NOTE — ASSESSMENT & PLAN NOTE
- Increased sedation due to pain medications and since getting Neurontin; stopped neurontin on 10/5 and pain medications changed to PO dilaudid at low dose and spaced further apart with continued improvement in mentation. Patient with likely some baseline cognitive impairment with worsening due to resolving medical issues and over sedation which was almost fully resolved to baseline yesterday   - 10/07 patient developed worsening hearing loss and inability to comprehend written word sometime after waking. Daughter and staff reported she was normal the prior night, without clear timing of deficit.  - Patient already on ASA and plavix, no hypotensive episode prior to explain symptoms.  - CT Head motion-limited but with sequela of chronic microvascular ischemic changes/senescent changes. MRI Brain with continued motion-limited artifact with small focus of diffusion signal abnormality in R frontal lobe with acute infarct not excluded.  - Given already on appropriate vascular treatment with her underlying PVD, little to adjust at this time. Appreciate neurology assistance.

## 2023-10-08 NOTE — SUBJECTIVE & OBJECTIVE
Interval History: No acute events overnight. Discussed with patient at bedside and daughter Stephanie via phone. Mentation improved and better able to hear but still need to repeat some phrases. No other concerns at this time.    Review of Systems   Constitutional:  Negative for chills and fever.   Respiratory:  Negative for cough and shortness of breath.    Cardiovascular:  Negative for chest pain and palpitations.   Gastrointestinal:  Negative for abdominal pain, nausea and vomiting.     Objective:     Vital Signs (Most Recent):  Temp: 98.3 °F (36.8 °C) (10/08/23 1226)  Pulse: 68 (10/08/23 1226)  Resp: 18 (10/08/23 1226)  BP: 134/63 (10/08/23 1226)  SpO2: 95 % (10/08/23 1226) Vital Signs (24h Range):  Temp:  [98 °F (36.7 °C)-98.7 °F (37.1 °C)] 98.3 °F (36.8 °C)  Pulse:  [68-73] 68  Resp:  [16-20] 18  SpO2:  [95 %-100 %] 95 %  BP: (134-154)/(61-70) 134/63     Weight: 61.2 kg (134 lb 14.7 oz)  Body mass index is 22.45 kg/m².  No intake or output data in the 24 hours ending 10/08/23 1347      Physical Exam  Vitals and nursing note reviewed.   Constitutional:       General: She is not in acute distress.     Appearance: She is well-developed.   HENT:      Head: Normocephalic and atraumatic.   Eyes:      General:         Right eye: No discharge.         Left eye: No discharge.      Conjunctiva/sclera: Conjunctivae normal.   Cardiovascular:      Rate and Rhythm: Normal rate.      Pulses: Normal pulses.   Pulmonary:      Effort: Pulmonary effort is normal. No respiratory distress.   Abdominal:      Palpations: Abdomen is soft.      Tenderness: There is no abdominal tenderness.   Musculoskeletal:         General: Normal range of motion.      Right lower leg: No edema.      Comments: s/p L BKA with dressing in place.   Skin:     General: Skin is warm and dry.   Neurological:      Mental Status: She is alert and oriented to person, place, and time.      Comments: Hard of hearing but able to interpret spoken language,  occasionally with repetition.       Significant Labs:   CBC:  Recent Labs   Lab 10/06/23  0505 10/07/23  0437 10/08/23  0617   WBC 21.97* 19.50* 20.88*   HGB 8.7* 8.6* 9.0*   HCT 28.1* 27.7* 29.1*    317 387   GRAN 75.0*  16.5* 77.1*  15.1* 79.0*  16.5*   LYMPH 8.2*  1.8 7.5*  1.5 7.3*  1.5   MONO 11.0  2.4* 11.1  2.2* 9.3  1.9*   EOS 0.2 0.1 0.1   BASO 0.11 0.07 0.08   BMP:  Recent Labs   Lab 10/06/23  0505 10/07/23  0437 10/08/23  0617    137 138   K 4.1 3.8 4.1   CL 97 98 96   CO2 27 28 28   BUN 38* 22 39*   CREATININE 5.1* 3.3* 5.2*   GLU 86 95 91   CALCIUM 10.3 10.0 11.1*   MG 2.3 2.3 2.4   PHOS 3.8 3.3 4.1   ALBUMIN 1.9* 1.9* 2.1*   ANIONGAP 14 11 14      Significant Imaging: I have reviewed and interpreted all pertinent imaging results/findings within the past 24 hours.  EXAMINATION:  MRI BRAIN WITHOUT CONTRAST     CLINICAL HISTORY:  Mental status change, unknown cause;R/o CVA and abnormal CT;     TECHNIQUE:  Multiplanar multisequence MR imaging of the brain was performed, no contrast administered.     COMPARISON:  None     FINDINGS:  Severe motion artifacts.     Confluent areas of abnormal T2 and FLAIR signal seen within the periventricular white matter of the bilateral cerebral hemispheres.     Cortical and subcortical area of signal change in the posterior left frontal lobe suggestive of a small area of remote infarction.     Prominent ventricles likely due to severe central involutional changes.  The ventricular system is midline.     There is a subtle area of increased diffusion signal deep white matter of the right frontal lobe with possible cord ending diffusion restriction, possibly a small area of acute infarction (03:18)     No subdural fluid collection.  No intracranial mass or hemorrhage seen.     The sella, parasellar region and craniocervical junction appear normal.     Bone marrow signal intensity unremarkable.     The paranasal sinuses are clear.     Impression:     Very  limited study due to severe motion artifacts.     There is a very small focus of diffusion signal abnormality deep white matter of the right frontal lobe, a small area of acute infarction cannot be excluded, no hemorrhagic transformation.     Severe involutional changes with extensive periventricular white matter and central involutional changes associated with prominent ventricles, greater than often seen at this age.     Remote small area of infarction posterior left frontal lobe region with small area of encephalomalacia/gliosis.     Fluid filled left mastoid air cells.      Electronically signed by: Juanita Nix MD  Date:                                            10/07/2023  Time:                                           16:50

## 2023-10-08 NOTE — NURSING
Pt Alert and oriented to self and place. Difficulty understanding verbal communication. Was able to comprehend communication through writing.     Pt's daughter states concern of pt developing cough and congested breathing during sleep. Breath sounds diminished.     No acute events overnight. Side rails up x3,bed in lowest position, wheels locked. Addressed and met needs. Cont to monitor.

## 2023-10-08 NOTE — PROGRESS NOTES
Status post left below-knee amputation     Subjective   Some incisional pain     PE  Afebrile   Vital signs stable   Left lower extremity-incisions clean, dry, intact

## 2023-10-08 NOTE — ASSESSMENT & PLAN NOTE
"HTN, PVD, encephalopathy  - L 1st toe gangrene predominantly dry with some surrounding erythema/swelling; with concern for worsening infection started piperacillin-tazobactam and vancomycin IV. 2nd toe with involvement as well.  - U/S arterial BLE showed L distal SFA occlusion with distal reconstitution. Discussed with vascular surgery and cardiology consulted for consideration of angiography / potential angioplasty.  - Podiatry consulted. MRI L foot with "examination markedly degraded by patient motion artifact. Questionable marrow edema within the 1st distal phalanx, not well evaluated given aforementioned limitation but possibly related to osteomyelitis given reported history of gangrene."  - Blood cultures showed no growth.  - s/p angiogram remarkable for severe PAD with no areas amenable to angioplasty on 9/20. Elected to undergo attempt at revascularization 09/22 but unable to perform femoral and popliteal bypass on 9/22. Discussed surgical options with BKA only viable for appropriate healing given extent of vascular disease.   - Underwent BKA on 09/30. Leukocytosis improving. Therapy recommending SNF. Pursuing placement.  - Given possible decreased clearance of pain medications, stopped hydrocodone-acetaminophen 5-325mg PO q6hr PRN, oxycodone-acetaminophen 7.5-325mg PO q6hr PRN. Started PO dilaudid instead and Neurontin on 10/3  - Increased sedation since getting Neurontin, changed it to 100 mg QHS on 10/4 and stopped it on 10/5 and with continued improvement in mentation. Patient with likely some baseline cognitive impairment with worsening due to resolving medical issues and over sedation which was almost fully resolved to baseline yesterday but with new development today (see above)  - WBC increased but plateaued. No fever, repeat CXR overall unrevealing and no other source of infection identified. Likely delayed leukemoid reaction. Case discussed with Dr. Cobb and he evaluated the surgical site with no " signs of infection noted  - Will continue to monitor for now and consider repeat cultures if spikes a fever   - Continue carvedilol 12.5mg PO BID, losartan 100mg PO daily, nifedipine 60mg PO BID  - Discharge planning with SNF

## 2023-10-08 NOTE — PROGRESS NOTES
"[facility]urg ([address] Medicine  Progress Note    Patient Name: Shannan Figueredo  MRN: [id number]  Patient Class: IP- Inpatient   Admission Date: 9/17/2023  Length of Stay: 21 days  Attending Physician: JOSE Cristina MD  Primary Care Provider: Roxi, Primary Doctor        Subjective:     Principal Problem:Encephalopathy        HPI:  Ms. Figueredo is a 67/F with PMH HTN, DMII (diet-controlled), PVD, ESRD on HD (M/W/F), anemia who presented to [facility] 09/17 with a two day progressive history of worsening L foot pain, swelling, fatigue, nausea and vomiting. History obtained from patient and daughter Stephanie ([phone]); they are from the [place], [place] area. Patient reports several weeks ago she "stubbed her toe," noting initially some pain, redness and swelling. Gradually symptoms progressed with duskiness to her toe and she informed her daughter of her pain for which she was brought to ER in [place] on 08/25 for further evaluation. With duskiness to 1st toe and surrounding erythema she was treated for cellulitis with a course of ciprofloxacin and metronidazole, but failed to improve. She followed up with her podiatrist 09/12 where she was prescribed hydrocodone-acetaminophen and a ten day course of TMP-SMX which she has been taking. She was referred to vascular surgery with plan for angiography and potential intervention  She and her family came to [place] for the weekend but her pain worsened; had been attempting to minimize use of hydrocodone-acetaminophen but developed nausea, vomiting and progressive fatigue. She subsequently presented to ED for further evaluation. ED workup was notable for L 1st toe dry-appearing gangrene with surrounding erythema and swelling, removed nail of second toe, XR L foot demonstrating diffuse osteopenia, L calcaneal and forefoot soft tissue swelling without definitive evidence of osteomyelitis in setting of osteopenia, no leukocytosis, elevated sed rate and " CRP, and elevated BUN/Cr in setting of ESRD. She notes she went without dialysis on 09/15 due to her trip. Blood cultures were ordered and she received piperacillin-tazobactam and vancomycin. Hospital medicine was subsequently contacted for admission.      Overview/Hospital Course:  Admitted with gangrene of L 1st toe.  Empirically treated with Zosyn and vancomycin.  Vascular Surgery, nephrology, podiatry consulted; U/S arterial demonstrated L SFA occlusion with distal reconstitution. Cardiology consulted, angiography with no vessels amenable for angioplasty on 9/20.  Underwent exploration of the left lower extremity vessels, femoral and popliteal bypass unable to be performed due to no adequate available vessels for bypass on 9/22. Discussed potential intervention here vs [place], patient and family opted for surgery in [place]. Underwent L BKA 09/29. PT/OT recommended SNF.      Interval History: No acute events overnight. Discussed with patient at bedside and daughter Stephanie via phone. Mentation improved and better able to hear but still need to repeat some phrases. No other concerns at this time.    Review of Systems   Constitutional:  Negative for chills and fever.   Respiratory:  Negative for cough and shortness of breath.    Cardiovascular:  Negative for chest pain and palpitations.   Gastrointestinal:  Negative for abdominal pain, nausea and vomiting.     Objective:     Vital Signs (Most Recent):  Temp: 98.3 °F (36.8 °C) (10/08/23 1226)  Pulse: 68 (10/08/23 1226)  Resp: 18 (10/08/23 1226)  BP: 134/63 (10/08/23 1226)  SpO2: 95 % (10/08/23 1226) Vital Signs (24h Range):  Temp:  [98 °F (36.7 °C)-98.7 °F (37.1 °C)] 98.3 °F (36.8 °C)  Pulse:  [68-73] 68  Resp:  [16-20] 18  SpO2:  [95 %-100 %] 95 %  BP: (134-154)/(61-70) 134/63     Weight: 61.2 kg (134 lb 14.7 oz)  Body mass index is 22.45 kg/m².  No intake or output data in the 24 hours ending 10/08/23 1347      Physical Exam  Vitals and nursing note reviewed.    Constitutional:       General: She is not in acute distress.     Appearance: She is well-developed.   HENT:      Head: Normocephalic and atraumatic.   Eyes:      General:         Right eye: No discharge.         Left eye: No discharge.      Conjunctiva/sclera: Conjunctivae normal.   Cardiovascular:      Rate and Rhythm: Normal rate.      Pulses: Normal pulses.   Pulmonary:      Effort: Pulmonary effort is normal. No respiratory distress.   Abdominal:      Palpations: Abdomen is soft.      Tenderness: There is no abdominal tenderness.   Musculoskeletal:         General: Normal range of motion.      Right lower leg: No edema.      Comments: s/p L BKA with dressing in place.   Skin:     General: Skin is warm and dry.   Neurological:      Mental Status: She is alert and oriented to person, place, and time.      Comments: Hard of hearing but able to interpret spoken language, occasionally with repetition.       Significant Labs:   CBC:  Recent Labs   Lab 10/06/23  0505 10/07/23  0437 10/08/23  0617   WBC 21.97* 19.50* 20.88*   HGB 8.7* 8.6* 9.0*   HCT 28.1* 27.7* 29.1*    317 387   GRAN 75.0*  16.5* 77.1*  15.1* 79.0*  16.5*   LYMPH 8.2*  1.8 7.5*  1.5 7.3*  1.5   MONO 11.0  2.4* 11.1  2.2* 9.3  1.9*   EOS 0.2 0.1 0.1   BASO 0.11 0.07 0.08   BMP:  Recent Labs   Lab 10/06/23  0505 10/07/23  0437 10/08/23  0617    137 138   K 4.1 3.8 4.1   CL 97 98 96   CO2 27 28 28   BUN 38* 22 39*   CREATININE 5.1* 3.3* 5.2*   GLU 86 95 91   CALCIUM 10.3 10.0 11.1*   MG 2.3 2.3 2.4   PHOS 3.8 3.3 4.1   ALBUMIN 1.9* 1.9* 2.1*   ANIONGAP 14 11 14      Significant Imaging: I have reviewed and interpreted all pertinent imaging results/findings within the past 24 hours.  EXAMINATION:  MRI BRAIN WITHOUT CONTRAST     CLINICAL HISTORY:  Mental status change, unknown cause;R/o CVA and abnormal CT;     TECHNIQUE:  Multiplanar multisequence MR imaging of the brain was performed, no contrast administered.      COMPARISON:  None     FINDINGS:  Severe motion artifacts.     Confluent areas of abnormal T2 and FLAIR signal seen within the periventricular white matter of the bilateral cerebral hemispheres.     Cortical and subcortical area of signal change in the posterior left frontal lobe suggestive of a small area of remote infarction.     Prominent ventricles likely due to severe central involutional changes.  The ventricular system is midline.     There is a subtle area of increased diffusion signal deep white matter of the right frontal lobe with possible cord ending diffusion restriction, possibly a small area of acute infarction (03:18)     No subdural fluid collection.  No intracranial mass or hemorrhage seen.     The sella, parasellar region and craniocervical junction appear normal.     Bone marrow signal intensity unremarkable.     The paranasal sinuses are clear.     Impression:     Very limited study due to severe motion artifacts.     There is a very small focus of diffusion signal abnormality deep white matter of the right frontal lobe, a small area of acute infarction cannot be excluded, no hemorrhagic transformation.     Severe involutional changes with extensive periventricular white matter and central involutional changes associated with prominent ventricles, greater than often seen at this age.     Remote small area of infarction posterior left frontal lobe region with small area of encephalomalacia/gliosis.     Fluid filled left mastoid air cells.      Electronically signed by: Juanita Nix MD  Date:                                            10/07/2023  Time:                                           16:50      Assessment/Plan:      * Encephalopathy  - Increased sedation due to pain medications and since getting Neurontin; stopped neurontin on 10/5 and pain medications changed to PO dilaudid at low dose and spaced further apart with continued improvement in mentation. Patient with likely some  "baseline cognitive impairment with worsening due to resolving medical issues and over sedation which was almost fully resolved to baseline yesterday   - 10/07 patient developed worsening hearing loss and inability to comprehend written word sometime after waking. Daughter and staff reported she was normal the prior night, without clear timing of deficit.  - Patient already on ASA and plavix, no hypotensive episode prior to explain symptoms.  - CT Head motion-limited but with sequela of chronic microvascular ischemic changes/senescent changes. MRI Brain with continued motion-limited artifact with small focus of diffusion signal abnormality in R frontal lobe with acute infarct not excluded.  - Given already on appropriate vascular treatment with her underlying PVD, little to adjust at this time. Appreciate neurology assistance.    Gangrene of toe  HTN, PVD, encephalopathy  - L 1st toe gangrene predominantly dry with some surrounding erythema/swelling; with concern for worsening infection started piperacillin-tazobactam and vancomycin IV. 2nd toe with involvement as well.  - U/S arterial BLE showed L distal SFA occlusion with distal reconstitution. Discussed with vascular surgery and cardiology consulted for consideration of angiography / potential angioplasty.  - Podiatry consulted. MRI L foot with "examination markedly degraded by patient motion artifact. Questionable marrow edema within the 1st distal phalanx, not well evaluated given aforementioned limitation but possibly related to osteomyelitis given reported history of gangrene."  - Blood cultures showed no growth.  - s/p angiogram remarkable for severe PAD with no areas amenable to angioplasty on 9/20. Elected to undergo attempt at revascularization 09/22 but unable to perform femoral and popliteal bypass on 9/22. Discussed surgical options with BKA only viable for appropriate healing given extent of vascular disease.   - Underwent BKA on 09/30. Leukocytosis " improving. Therapy recommending SNF. Pursuing placement.  - Given possible decreased clearance of pain medications, stopped hydrocodone-acetaminophen 5-325mg PO q6hr PRN, oxycodone-acetaminophen 7.5-325mg PO q6hr PRN. Started PO dilaudid instead and Neurontin on 10/3  - Increased sedation since getting Neurontin, changed it to 100 mg QHS on 10/4 and stopped it on 10/5 and with continued improvement in mentation. Patient with likely some baseline cognitive impairment with worsening due to resolving medical issues and over sedation which was almost fully resolved to baseline yesterday but with new development today (see above)  - WBC increased but plateaued. No fever, repeat CXR overall unrevealing and no other source of infection identified. Likely delayed leukemoid reaction. Case discussed with Dr. Cobb and he evaluated the surgical site with no signs of infection noted  - Will continue to monitor for now and consider repeat cultures if spikes a fever   - Continue carvedilol 12.5mg PO BID, losartan 100mg PO daily, nifedipine 60mg PO BID  - Discharge planning with SNF    Type 2 diabetes mellitus with chronic kidney disease on chronic dialysis  - Reports diet-controlled; no current medications.  - HgbA1c 5.7.  - Continue low-dose sliding scale insulin aspart 0-5U subQ TIDWM PRN, monitor requirements.    ESRD (end stage renal disease)  Anemia, hyperparathyroidism  - ESRD on HD, reports missed session Friday prior to presentation due to travel.  - Mild anemia without evidence of bleeding.  - Continue allopurinol 300mg PO daily, calcium acetate 2001mg PO TIDWM.  - Transfused 1U pRBCs 09/25 with appropriate response.  - Nephrology consulted for HD assistance.    VTE Risk Mitigation (From admission, onward)         Ordered     IP VTE HIGH RISK PATIENT  Once         09/28/23 1906                Discharge Planning   LUBNA:      Code Status: Full Code   Is the patient medically ready for discharge?:     Reason for patient  still in hospital (select all that apply): Treatment  Discharge Plan A: Skilled Nursing Facility   Discharge Delays: (!) Post-Acute Set-up              D Angus Cristina MD  Department of Hospital Medicine   [facility] ([address])

## 2023-10-08 NOTE — PROGRESS NOTES
"Nephrology  Progress Note    Admit Date: 9/17/2023   LOS: 21 days     SUBJECTIVE:     Follow-up For:  Encephalopathy    History of Present Illness:  Patient is a 67 y.o. female presents with left foot gangrene, vomiting, feeling ill.  Ext med hx as outlined below including ESRD on HD MWF in [place].  Has been dealing with necrotic left toes for few months and followed by vascular in [place].  Was here visiting daughter and felt bad.  Admitted for eval/treatment.  Consulted for HD needs.  Pt seen and examined on HD.  Consents signed.  Tired from being up all night with pain.  Updated team and daughter at bedside.  W/up noted.       Interval History:     Total UF on HD Friday 1 L.  Complains of  congestion with cough.  VSS, afebrile.    Review of Systems:  Constitutional: No fever or chills  Respiratory: No cough or shortness of breath  Cardiovascular: No chest pain or palpitations  Gastrointestinal: No nausea or vomiting  Neurological: No confusion or weakness    OBJECTIVE:     Vital Signs Range (Last 24H):  /63 (BP Location: Left arm, Patient Position: Lying)   Pulse 70   Temp 98.4 °F (36.9 °C) (Oral)   Resp 18   Ht 5' 5" (1.651 m)   Wt 61.2 kg (134 lb 14.7 oz)   SpO2 97%   BMI 22.45 kg/m²     Temp:  [98 °F (36.7 °C)-98.7 °F (37.1 °C)]   Pulse:  [67-73]   Resp:  [16-18]   BP: (127-154)/(60-70)   SpO2:  [96 %-100 %]     I & O (Last 24H):No intake or output data in the 24 hours ending 10/08/23 0823      Physical Exam:  General appearance:  Frail.  Appears older than stated age.    Eyes:  Conjunctivae/corneas clear. PERRL.  Lungs: Normal respiratory effort,   clear to auscultation bilaterally.  Diminished.   Heart: Regular rate and rhythm, S1, S2 normal, no murmur, rub or nelly.  Abdomen: Soft, non-tender non-distended; bowel sounds normal; no masses,  no organomegaly  Extremities: No cyanosis or clubbing. No edema.    Skin: Skin color, texture, turgor normal. No rashes or lesions  Neurologic: Normal strength " "and tone. No focal numbness or weakness   Right arm AVF+  Left BKA wrapped.     Laboratory Data:  Recent Labs   Lab 10/08/23  0617   WBC 20.88*   RBC 3.13*   HGB 9.0*   HCT 29.1*      MCV 93   MCH 28.8   MCHC 30.9*         BMP:   Recent Labs   Lab 10/08/23  0617   GLU 91      K 4.1   CL 96   CO2 28   BUN 39*   CREATININE 5.2*   CALCIUM 11.1*   MG 2.4   PHOS 4.1       Lab Results   Component Value Date    CALCIUM 11.1 (H) 10/08/2023    PHOS 4.1 10/08/2023       Lab Results   Component Value Date    CALCIUM 11.1 (H) 10/08/2023    PHOS 4.1 10/08/2023       No results found for: "URICACID"    BNP  No results for input(s): "BNP", "BNPTRIAGEBLO" in the last 168 hours.    Medications:  Medication list was reviewed and changes noted under Assessment/Plan.    Diagnostic Results:    reviewed    ASSESSMENT/PLAN:       ESRD on HD MWF in [place]:  -consulted for HD needs.  -consents signed  -continue HD MWF while here and if she stays after hospitalization will need temp outpt unit.   -labs noted and bath adjusted.  -R arm AVF+  -9/19: HD yesterday w/o issue.  No need today.  -9/20:  HD today after angio  -9/21:  no need for HD today.  Cont MWF if stays here.    -9/22:  seen on HD this am.   -9/25:  HD with Blood today.    -9/26:  stable from renal to DC back to [place] w/ family.    -9/27:  seen on HD.  UF 2 L.    -9/28:  nothing today.   -9/29:  seen on HD.  -10/1: stable today; plan for next dialysis session on Monday.  -10/2:  HD today.  -10/3:  adjust bath to labs for tomorrow.  Encourage some free water intake today/ tomorrow as well.  -10/4:  HD with adjustments. Limit UF as seems dry.    -10/5:  stable lytes/volume    -10/6:  seen in HD.  Renally dose meds, avoid nephrotoxins, and monitor I/O's closely.  -10/7:  No HD indicated for today, will continue a MWF schedule while here.  -10/8:  Continue HD MWF reviewed orders for tomorrow.  Given cough and congestion, will increase UF efforts.    S/p BKA on 9/29  -defer " to pod/vascular/cards.  -angio noted with severe PAD  -Status post exploration femoral and popliteal arteries 9/22 with Non-reconstructible peripheral vascular disease.      HTN:  -UF on HD  -A little over goal titrated Coreg to 25 BID 10/7     MBD/hypercalcemia  -binders/nephrocaps.   Changed to renvela.  Pt states that she also takes tums at home  -using calcitriol on HD days  -No longer on calcium supplements and binder switched to non-calcium  -Low calcium bath ordered.  -Suspect both cough and hypercalcemia related to bedbound status, needs more activity     Anemia of CKD:  -Received transfusion 1 unit 9/29  -Hgb below goal  -FAREED with HD  -Iron stores low but ^^ferritin precludes use of Venofer

## 2023-10-09 NOTE — PHYSICIAN QUERY
PT Name: Shannan Figueredo  MR #: [id number]    DOCUMENTATION CLARIFICATION     CDS/: Stacie Miner RN BSN              Contact information: [email or web address]    This form is a permanent document in the medical record.     Query Date: October 9, 2023    By submitting this query, we are merely seeking further clarification of documentation.. Please utilize your independent clinical judgment when addressing the question(s) below.    The medical record contains the following:   Indicators  Supporting Clinical Findings Location in Medical Record   x Registered Dietician Diagnosis 9/18 Nutrition Consult  Nutrition Problem  Increased nutrient needs  Related to (etiology):   Diagnosis r/symptoms      10/3 Nutrition PN  Nutrition Problem  Increased nutrient needs   Related to (etiology):   Wound healing 9/18/2023 Nutrition Consult              10/3/2023 Nutrition progress note   x Energy Intake % Intake of Estimated Energy Needs: 25 - 50 %  % Meal Intake: 25 - 50 % 10/3/2023 Nutrition Progress note    Weight Loss     x Fat Loss Subcutaneous Fat (Malnutrition): mild depletion  Orbital Region (Subcutaneous Fat Loss): mild depletion  10/3/2023 Nutrition Progress note   x Muscle Loss Congregation Region (Muscle Loss): mild depletion  Clavicle Bone Region (Muscle Loss): mild depletion    Muscle Mass (Malnutrition): mild depletion   Congregation Region (Muscle Loss): mild depletion  Clavicle Bone Region (Muscle Loss): mild depletion  Clavicle and Acromion Bone Region (Muscle Loss): mild depletion 9/18/2023 Nutrition Consult      10/3/2023 Nutrition Progress note    Edema/Fluid Accumulation      Reduced  Strength (by dynamometer)     x Weight, BMI, Usual Body Weight Weight: 61.2 kg (134 lb 14.7 oz)  Weight (lb): 134.92 lb  Ideal Body Weight (IBW), Female: 125 lb  % Ideal Body Weight, Female (lb): 107.94 %  BMI (Calculated): 22.5    Amputation %: 5.9  Total Amputation %: 5.9  Amputation Ideal Body Weight (IBW), Female (lb):  119.1 lb  Amputee BMI (kg/m2): 23.92 kg/m2 10/3/2023 Nutrition Progress note    Delayed Wound Healing     x Acute or Chronic Illness 9/17 H&P  Gangrene of toe of left foot  HTN, PVD  Type 2 diabetes mellitus with chronic kidney disease on chronic dialysis  ESRD (end stage renal disease)  Anemia, hyperparathyroidism 9/17/2023 H&P    Social or Environmental Circumstances     x Treatment Recommendations  1) Recommend John BID to promote wound healing    2) Continue renal diet as tolerated    3) Encourage intake of meals + nutrition supplements   4) RD to follow to monitor nutrition status 10/3/2023 Nutrition Progress note   x Other Signs and Symptoms (as evidenced by):   Pt s/p L BKA; minimal oral intake x 16 days; increased needs 2/2 ESRD on HD     Micronutrient Evaluation Summary: suspected deficiency  10/3/2023 Nutrition Progress note     Academy of Nutrition and Dietetics (Academy) and the American Society for Parenteral and Enteral Nutrition (A.S.P.E.N.) Clinical Characteristics to support Malnutrition      Criteria for mild malnutrition is defined as 1 characteristic outlined above within the established moderate or severe parameters.  A minimum of 2 out of the 6 characteristics noted above are recommended for a diagnosis of moderate or severe malnutrition.  Chronic illness/injury is a disease/condition lasting 3 months or longer.    The noted clinical guidelines are only system guidelines and do not replace the providers clinical judgment.    Provider, please specify diagnosis  associated with above clinical findings.    [ X ] Moderate Malnutrition - a minimum of 2 of the 6 moderate malnutrition characteristics noted above    [  ] Other Nutritional Diagnosis (please specify): _______   Present on admission (POA) status:  [ X ] Yes (Y)   [  ] No (N)   [  ] Documentation insufficient to determine if condition is POA (U)   [  ] Clinically Undetermined (W)     Please document in your progress notes daily for the  duration of treatment until resolved and  include in your discharge summary.      References:    JOSE Wills, & HUGO Triana (2022, April). Assessment and management of anorexia and cachexia in palliative care. Retrieved May 23, 2022, from https://www.[employer]/contents/assessment-and-management-of-anorexia-[email or web address]-in-palliative-care?[email or web address]=see_link     PAGE Ortiz, PhD, RD, Fracisco THOMPSON P., PhD, RN, JENNIFER Taylor MD, PhD, Deirdre MAGUIRE A., MS, RD, [facility], AG Franks, MS, RD, The Academy Malnutrition Work Group, The A.S.P.E.N. Board of Directors. (2012). Consensus Statement: Academy of Nutrition and Dietetics and American Society for Parenteral and Enteral Nutrition: Characteristics Recommended for the Identification and Documentation of Adult Malnutrition (Undernutrition). Journal of Parenteral and Enteral Nutrition, 36(3), 275-283. doi:10.1177/7896449095943161     Form No. 19476

## 2023-10-09 NOTE — SUBJECTIVE & OBJECTIVE
Interval History: No acute events overnight. Discussed with patient and daughter Nhi at bedside. Mentation improved and better able to hear but still need to repeat some phrases. No other concerns at this time.    Review of Systems   Constitutional:  Negative for chills and fever.   Respiratory:  Negative for cough and shortness of breath.    Cardiovascular:  Negative for chest pain and palpitations.   Gastrointestinal:  Negative for abdominal pain, nausea and vomiting.     Objective:     Vital Signs (Most Recent):  Temp: 97.8 °F (36.6 °C) (10/09/23 0755)  Pulse: 66 (10/09/23 0755)  Resp: 20 (10/09/23 0755)  BP: (!) 120/58 (10/09/23 0755)  SpO2: 97 % (10/09/23 0755) Vital Signs (24h Range):  Temp:  [96.1 °F (35.6 °C)-98.6 °F (37 °C)] 97.8 °F (36.6 °C)  Pulse:  [64-66] 66  Resp:  [15-20] 20  SpO2:  [95 %-97 %] 97 %  BP: (117-140)/(58-63) 120/58     Weight: 57 kg (125 lb 10.6 oz)  Body mass index is 20.91 kg/m².    Intake/Output Summary (Last 24 hours) at 10/9/2023 1436  Last data filed at 10/8/2023 2000  Gross per 24 hour   Intake 100 ml   Output 0 ml   Net 100 ml         Physical Exam  Vitals and nursing note reviewed.   Constitutional:       General: She is not in acute distress.     Appearance: She is well-developed.   HENT:      Head: Normocephalic and atraumatic.   Eyes:      General:         Right eye: No discharge.         Left eye: No discharge.      Conjunctiva/sclera: Conjunctivae normal.   Cardiovascular:      Rate and Rhythm: Normal rate.      Pulses: Normal pulses.   Pulmonary:      Effort: Pulmonary effort is normal. No respiratory distress.   Abdominal:      Palpations: Abdomen is soft.      Tenderness: There is no abdominal tenderness.   Musculoskeletal:         General: Normal range of motion.      Right lower leg: No edema.      Comments: s/p L BKA with dressing in place.   Skin:     General: Skin is warm and dry.   Neurological:      Mental Status: She is alert and oriented to person, place, and  time.      Comments: Hard of hearing but able to interpret spoken language, occasionally with repetition.       Significant Labs:   CBC:  Recent Labs   Lab 10/07/23  0437 10/08/23  0617 10/09/23  0512   WBC 19.50* 20.88* 19.54*   HGB 8.6* 9.0* 9.3*   HCT 27.7* 29.1* 29.3*    387 372   GRAN 77.1*  15.1* 79.0*  16.5* 81.5*  15.9*   LYMPH 7.5*  1.5 7.3*  1.5 6.2*  1.2   MONO 11.1  2.2* 9.3  1.9* 8.8  1.7*   EOS 0.1 0.1 0.2   BASO 0.07 0.08 0.10     BMP:  Recent Labs   Lab 10/07/23  0437 10/08/23  0617 10/09/23  0512    138 137   K 3.8 4.1 4.4   CL 98 96 97   CO2 28 28 26   BUN 22 39* 55*   CREATININE 3.3* 5.2* 6.7*   GLU 95 91 77   CALCIUM 10.0 11.1* 10.4   MG 2.3 2.4 2.5   PHOS 3.3 4.1 4.5   ALBUMIN 1.9* 2.1* 1.9*   ANIONGAP 11 14 14        Significant Imaging: I have reviewed and interpreted all pertinent imaging results/findings within the past 24 hours.

## 2023-10-09 NOTE — PROGRESS NOTES
10/09/23 1441   Post-Hemodialysis Assessment   Rinseback Volume (mL) 250 mL   Blood Volume Processed (Liters) 65.9 L   Dialyzer Clearance Moderately streaked   Additional Fluid Intake (mL) 500 mL   Total UF (mL) 1440 mL   Net Fluid Removal 940   Patient Response to Treatment tx completed as ordered, blood returned per pp   Arterial bleeding stop time (min) 5 min   Venous bleeding stop time (min) 5 min   Post-Hemodialysis Comments Blood returned per pp

## 2023-10-09 NOTE — SUBJECTIVE & OBJECTIVE
Medications:  Continuous Infusions:  Scheduled Meds:   sodium chloride 0.9%   Intravenous Once    ascorbic acid (vitamin C)  500 mg Oral Daily    aspirin  81 mg Oral Daily    atorvastatin  40 mg Oral Daily    calcitRIOL  0.25 mcg Oral Every Mon, Wed, Fri    carvediloL  25 mg Oral BID    clopidogreL  75 mg Oral Daily    epoetin veronica-epbx  100 Units/kg Subcutaneous Every Mon, Wed, Fri    LIDOcaine  1 patch Transdermal Q24H    losartan  100 mg Oral Daily    NIFEdipine  60 mg Oral BID    sevelamer carbonate  1,600 mg Oral TID WM    vitamin renal formula (B-complex-vitamin c-folic acid)  1 capsule Oral Daily     PRN Meds:acetaminophen, cloNIDine, dextrose 10%, dextrose 10%, glucagon (human recombinant), glucose, glucose, HYDROmorphone, insulin aspart U-100, loperamide, naloxone, ondansetron, simethicone, sodium chloride 0.9%, sodium chloride 0.9%    Objective:     Vital Signs (Most Recent):  Temp: 97.8 °F (36.6 °C) (10/09/23 0755)  Pulse: 66 (10/09/23 0755)  Resp: 20 (10/09/23 0755)  BP: (!) 120/58 (10/09/23 0755)  SpO2: 97 % (10/09/23 0755) Vital Signs (24h Range):  Temp:  [96.1 °F (35.6 °C)-98.6 °F (37 °C)] 97.8 °F (36.6 °C)  Pulse:  [64-66] 66  Resp:  [15-20] 20  SpO2:  [95 %-97 %] 97 %  BP: (117-140)/(58-63) 120/58     Weight: 57 kg (125 lb 10.6 oz)  Body mass index is 20.91 kg/m².       Physical Exam  Vitals and nursing note reviewed.   Constitutional:       General: She is not in acute distress.  HENT:      Ears:      Comments: Difficulty hearing L>R ear  Eyes:      Extraocular Movements: Extraocular movements intact.   Pulmonary:      Effort: Pulmonary effort is normal.   Skin:     General: Skin is warm.   Neurological:      Comments: Awake, alert, oriented only to self            Review of Symptoms        Living Arrangements:  Lives with family    Psychosocial/Cultural:   See Palliative Psychosocial Note: Yes  - was living in [place]  - 2 daughters  -   **Primary  to  Follow**  Palliative Care  Consult: No        Advance Care Planning   Advance Directives:     Decision Making:  Patient unable to communicate due to disease severity/cognitive impairment         Significant Labs: reviewed  CBC:   Recent Labs   Lab 10/09/23  0512   WBC 19.54*   HGB 9.3*   HCT 29.3*   MCV 92        BMP:  Recent Labs   Lab 10/09/23  0512   GLU 77      K 4.4   CL 97   CO2 26   BUN 55*   CREATININE 6.7*   CALCIUM 10.4   MG 2.5     LFT:  Lab Results   Component Value Date    AST 20 09/17/2023    ALKPHOS 84 09/17/2023    BILITOT 1.0 09/17/2023     Albumin:   Albumin   Date Value Ref Range Status   10/09/2023 1.9 (L) 3.5 - 5.2 g/dL Final     Protein:   Total Protein   Date Value Ref Range Status   09/17/2023 7.2 6.0 - 8.4 g/dL Final     Lactic acid:   Lab Results   Component Value Date    LACTATE 1.2 09/17/2023       Significant Imaging: reviewed

## 2023-10-09 NOTE — PLAN OF CARE
Problem: Infection  Goal: Absence of Infection Signs and Symptoms  Outcome: Ongoing, Progressing     Problem: Adult Inpatient Plan of Care  Goal: Plan of Care Review  Outcome: Ongoing, Progressing  Goal: Patient-Specific Goal (Individualized)  Outcome: Ongoing, Progressing  Goal: Absence of Hospital-Acquired Illness or Injury  Outcome: Ongoing, Progressing  Goal: Optimal Comfort and Wellbeing  Outcome: Ongoing, Progressing  Goal: Readiness for Transition of Care  Outcome: Ongoing, Progressing     Problem: Diabetes Comorbidity  Goal: Blood Glucose Level Within Targeted Range  Outcome: Ongoing, Progressing     Problem: Impaired Wound Healing  Goal: Optimal Wound Healing  Outcome: Ongoing, Progressing     Problem: Skin Injury Risk Increased  Goal: Skin Health and Integrity  Outcome: Ongoing, Progressing     Problem: Device-Related Complication Risk (Hemodialysis)  Goal: Safe, Effective Therapy Delivery  Outcome: Ongoing, Progressing     Problem: Hemodynamic Instability (Hemodialysis)  Goal: Effective Tissue Perfusion  Outcome: Ongoing, Progressing     Problem: Infection (Hemodialysis)  Goal: Absence of Infection Signs and Symptoms  Outcome: Ongoing, Progressing     Problem: Fall Injury Risk  Goal: Absence of Fall and Fall-Related Injury  Outcome: Ongoing, Progressing     Problem: Oral Intake Inadequate  Goal: Improved Oral Intake  Outcome: Ongoing, Progressing     Problem: Adjustment to Surgery (Extremity Amputation)  Goal: Optimal Coping with Amputation  Outcome: Ongoing, Progressing     Problem: Bleeding (Extremity Amputation)  Goal: Absence of Bleeding  Outcome: Ongoing, Progressing     Problem: Bowel Motility Impaired (Extremity Amputation)  Goal: Effective Bowel Elimination  Outcome: Ongoing, Progressing     Problem: Fluid and Electrolyte Imbalance (Extremity Amputation)  Goal: Fluid and Electrolyte Balance  Outcome: Ongoing, Progressing     Problem: Functional Ability Impaired (Extremity Amputation)  Goal:  Optimal Functional Ability  Outcome: Ongoing, Progressing     Problem: Infection (Extremity Amputation)  Goal: Absence of Infection Signs and Symptoms  Outcome: Ongoing, Progressing     Problem: Pain (Extremity Amputation)  Goal: Acceptable Pain Control  Outcome: Ongoing, Progressing     Problem: Residual Limb Care (Extremity Amputation)  Goal: Optimal Residual Limb Healing  Outcome: Ongoing, Progressing     Problem: Gas Exchange Impaired  Goal: Optimal Gas Exchange  Outcome: Ongoing, Progressing     Problem: Coping Ineffective  Goal: Effective Coping  Outcome: Ongoing, Progressing

## 2023-10-09 NOTE — PROGRESS NOTES
"[facility] Surg ([address] Medicine  Progress Note    Patient Name: Shannan Figueredo  MRN: [id number]  Patient Class: IP- Inpatient   Admission Date: 9/17/2023  Length of Stay: 22 days  Attending Physician: JOSE Cristina MD  Primary Care Provider: Roxi, Primary Doctor        Subjective:     Principal Problem:Encephalopathy        HPI:  Ms. Figueredo is a 67/F with PMH HTN, DMII (diet-controlled), PVD, ESRD on HD (M/W/F), anemia who presented to [facility] 09/17 with a two day progressive history of worsening L foot pain, swelling, fatigue, nausea and vomiting. History obtained from patient and daughter Stephanie ([phone]); they are from the [place], [place] area. Patient reports several weeks ago she "stubbed her toe," noting initially some pain, redness and swelling. Gradually symptoms progressed with duskiness to her toe and she informed her daughter of her pain for which she was brought to ER in [place] on 08/25 for further evaluation. With duskiness to 1st toe and surrounding erythema she was treated for cellulitis with a course of ciprofloxacin and metronidazole, but failed to improve. She followed up with her podiatrist 09/12 where she was prescribed hydrocodone-acetaminophen and a ten day course of TMP-SMX which she has been taking. She was referred to vascular surgery with plan for angiography and potential intervention  She and her family came to [place] for the weekend but her pain worsened; had been attempting to minimize use of hydrocodone-acetaminophen but developed nausea, vomiting and progressive fatigue. She subsequently presented to ED for further evaluation. ED workup was notable for L 1st toe dry-appearing gangrene with surrounding erythema and swelling, removed nail of second toe, XR L foot demonstrating diffuse osteopenia, L calcaneal and forefoot soft tissue swelling without definitive evidence of osteomyelitis in setting of osteopenia, no leukocytosis, elevated sed rate and " CRP, and elevated BUN/Cr in setting of ESRD. She notes she went without dialysis on 09/15 due to her trip. Blood cultures were ordered and she received piperacillin-tazobactam and vancomycin. Hospital medicine was subsequently contacted for admission.      Overview/Hospital Course:  Admitted with gangrene of L 1st toe.  Empirically treated with Zosyn and vancomycin.  Vascular Surgery, nephrology, podiatry consulted; U/S arterial demonstrated L SFA occlusion with distal reconstitution. Cardiology consulted, angiography with no vessels amenable for angioplasty on 9/20.  Underwent exploration of the left lower extremity vessels, femoral and popliteal bypass unable to be performed due to no adequate available vessels for bypass on 9/22. Discussed potential intervention here vs [place], patient and family opted for surgery in [place]. Underwent L BKA 09/29. PT/OT recommended SNF.      Interval History: No acute events overnight. Discussed with patient and daughter Nhi at bedside. Mentation improved and better able to hear but still need to repeat some phrases. No other concerns at this time.    Review of Systems   Constitutional:  Negative for chills and fever.   Respiratory:  Negative for cough and shortness of breath.    Cardiovascular:  Negative for chest pain and palpitations.   Gastrointestinal:  Negative for abdominal pain, nausea and vomiting.     Objective:     Vital Signs (Most Recent):  Temp: 97.8 °F (36.6 °C) (10/09/23 0755)  Pulse: 66 (10/09/23 0755)  Resp: 20 (10/09/23 0755)  BP: (!) 120/58 (10/09/23 0755)  SpO2: 97 % (10/09/23 0755) Vital Signs (24h Range):  Temp:  [96.1 °F (35.6 °C)-98.6 °F (37 °C)] 97.8 °F (36.6 °C)  Pulse:  [64-66] 66  Resp:  [15-20] 20  SpO2:  [95 %-97 %] 97 %  BP: (117-140)/(58-63) 120/58     Weight: 57 kg (125 lb 10.6 oz)  Body mass index is 20.91 kg/m².    Intake/Output Summary (Last 24 hours) at 10/9/2023 8204  Last data filed at 10/8/2023 2000  Gross per 24 hour   Intake 100 ml    Output 0 ml   Net 100 ml         Physical Exam  Vitals and nursing note reviewed.   Constitutional:       General: She is not in acute distress.     Appearance: She is well-developed.   HENT:      Head: Normocephalic and atraumatic.   Eyes:      General:         Right eye: No discharge.         Left eye: No discharge.      Conjunctiva/sclera: Conjunctivae normal.   Cardiovascular:      Rate and Rhythm: Normal rate.      Pulses: Normal pulses.   Pulmonary:      Effort: Pulmonary effort is normal. No respiratory distress.   Abdominal:      Palpations: Abdomen is soft.      Tenderness: There is no abdominal tenderness.   Musculoskeletal:         General: Normal range of motion.      Right lower leg: No edema.      Comments: s/p L BKA with dressing in place.   Skin:     General: Skin is warm and dry.   Neurological:      Mental Status: She is alert and oriented to person, place, and time.      Comments: Hard of hearing but able to interpret spoken language, occasionally with repetition.       Significant Labs:   CBC:  Recent Labs   Lab 10/07/23  0437 10/08/23  0617 10/09/23  0512   WBC 19.50* 20.88* 19.54*   HGB 8.6* 9.0* 9.3*   HCT 27.7* 29.1* 29.3*    387 372   GRAN 77.1*  15.1* 79.0*  16.5* 81.5*  15.9*   LYMPH 7.5*  1.5 7.3*  1.5 6.2*  1.2   MONO 11.1  2.2* 9.3  1.9* 8.8  1.7*   EOS 0.1 0.1 0.2   BASO 0.07 0.08 0.10     BMP:  Recent Labs   Lab 10/07/23  0437 10/08/23  0617 10/09/23  0512    138 137   K 3.8 4.1 4.4   CL 98 96 97   CO2 28 28 26   BUN 22 39* 55*   CREATININE 3.3* 5.2* 6.7*   GLU 95 91 77   CALCIUM 10.0 11.1* 10.4   MG 2.3 2.4 2.5   PHOS 3.3 4.1 4.5   ALBUMIN 1.9* 2.1* 1.9*   ANIONGAP 11 14 14        Significant Imaging: I have reviewed and interpreted all pertinent imaging results/findings within the past 24 hours.      Assessment/Plan:      * Encephalopathy  - Likely multifactorial encephalopathy with components of delirium, toxic encephalopathy, metabolic encephalopathy, and  "potential baseline cognitive impairment all contributing.  - Suspected increased sedation due to pain medications / gabapentin; stopped gabapentin on 10/5 and adjusted pain control to PO dilaudid at low dose / decreased frequency with improvement in mentation. Likely some baseline cognitive impairment with worsening due to medical issues.  - 10/07 patient developed worsening hearing loss and inability to comprehend written word sometime after waking. Daughter and staff reported she was normal the prior night, without clear timing of deficit.  - Patient already on ASA and plavix, no hypotensive episode prior to explain symptoms.  - CT Head motion-limited but with sequela of chronic microvascular ischemic changes/senescent changes. MRI Brain with continued motion-limited artifact with small focus of diffusion signal abnormality in R frontal lobe with acute infarct not excluded.  - Given already on appropriate vascular treatment with her underlying PVD, little to adjust at this time. Appreciate neurology assistance.  - Improving and more stable. Daughters report hard of hearing at baseline and had hearing aids but has not used consistently in recent past. Encouraged locating / active use of hearing aids.    Gangrene of toe  HTN, PVD, encephalopathy  - L 1st toe gangrene predominantly dry with some surrounding erythema/swelling; with concern for worsening infection started piperacillin-tazobactam and vancomycin IV. 2nd toe with involvement as well.  - U/S arterial BLE showed L distal SFA occlusion with distal reconstitution. Discussed with vascular surgery and cardiology consulted for consideration of angiography / potential angioplasty.  - Podiatry consulted. MRI L foot with "examination markedly degraded by patient motion artifact. Questionable marrow edema within the 1st distal phalanx, not well evaluated given aforementioned limitation but possibly related to osteomyelitis given reported history of gangrene."  - " Blood cultures showed no growth.  - s/p angiogram remarkable for severe PAD with no areas amenable to angioplasty on 9/20. Elected to undergo attempt at revascularization 09/22 but unable to perform femoral and popliteal bypass on 9/22. Discussed surgical options with BKA only viable for appropriate healing given extent of vascular disease.   - Underwent BKA on 09/30. Leukocytosis improving. Therapy recommending SNF. Pursuing placement.  - Given possible decreased clearance of pain medications, stopped hydrocodone-acetaminophen 5-325mg PO q6hr PRN, oxycodone-acetaminophen 7.5-325mg PO q6hr PRN. Started PO dilaudid instead and Neurontin on 10/3  - Increased sedation since getting Neurontin, changed it to 100 mg QHS on 10/4 and stopped it on 10/5 and with continued improvement in mentation. Patient with likely some baseline cognitive impairment with worsening due to resolving medical issues and over sedation which was almost fully resolved to baseline yesterday but with new development today (see above)  - WBC increased but plateaued. No fever, repeat CXR overall unrevealing and no other source of infection identified. Likely delayed leukemoid reaction. Case discussed with Dr. Cobb and he evaluated the surgical site with no signs of infection noted  - Will continue to monitor for now and consider repeat cultures if spikes a fever   - Continue carvedilol 12.5mg PO BID, losartan 100mg PO daily, nifedipine 60mg PO BID  - Discharge planning with SNF    Type 2 diabetes mellitus with chronic kidney disease on chronic dialysis  - Reports diet-controlled; no current medications.  - HgbA1c 5.7.  - Continue low-dose sliding scale insulin aspart 0-5U subQ TIDWM PRN, monitor requirements.    ESRD (end stage renal disease)  Anemia, hyperparathyroidism  - ESRD on HD, reports missed session Friday prior to presentation due to travel.  - Mild anemia without evidence of bleeding.  - Continue allopurinol 300mg PO daily, calcium  acetate 2001mg PO TIDWM.  - Transfused 1U pRBCs 09/25 with appropriate response.  - Nephrology consulted for HD assistance.      VTE Risk Mitigation (From admission, onward)           Ordered     IP VTE HIGH RISK PATIENT  Once         09/28/23 1906                    Discharge Planning   LUBNA:      Code Status: Full Code   Is the patient medically ready for discharge?:     Reason for patient still in hospital (select all that apply): Pending disposition  Discharge Plan A: Skilled Nursing Facility   Discharge Delays: (!) Post-Acute Set-up              D Angus Cristina MD  Department of Hospital Medicine   [facility] ([address])

## 2023-10-09 NOTE — PT/OT/SLP PROGRESS
Occupational Therapy      Patient Name:  Shannan Figueredo   MRN:  [id number]    Patient not seen today secondary to eating breakfast and daughter stating pt will be going to Dialysis soon after. Will follow-up as scheduling permits.    10/9/2023

## 2023-10-09 NOTE — PT/OT/SLP PROGRESS
Physical Therapy      Patient Name:  Shannan Figueredo   MRN:  [id number]    Patient not seen today secondary to patient eating breakfast, daughter reporting that transport is supposed to come by soon to take patient to Dialysis. Will follow-up after Dialysis today, as scheduling permits.

## 2023-10-09 NOTE — PHYSICIAN QUERY
PT Name: Shannan Figueredo  MR #: [id number]    DOCUMENTATION CLARIFICATION     CDS/: Stacie Miner RN BSN             Contact information:[email or web address]  This form is a permanent document in the medical record.     Query Date: October 9, 2023    By submitting this query, we are merely seeking further clarification of documentation. Please utilize your independent clinical judgment when addressing the question(s) below.    The Medical Record contains the following:   Indicators   Supporting Clinical Findings Location in Medical Record   x AMS, Confusion,  LOC, etc.  10/6 Hospital Medicine PN  Encephalopathy 10/6/23 Progress note   x Acute/Chronic Illness 9/17 H&P  Gangrene of toe of left foot  HTN, PVD  Type 2 diabetes mellitus with chronic kidney disease on chronic dialysis  ESRD (end stage renal disease)  Anemia, hyperparathyroidism 9/17/23 H&P   x Radiology Findings 10/7 MRI Brain  Very limited study due to severe motion artifacts.     There is a very small focus of diffusion signal abnormality deep white matter of the right frontal lobe, a small area of acute infarction cannot be excluded, no hemorrhagic transformation.     Severe involutional changes with extensive periventricular white matter and central involutional changes associated with prominent ventricles, greater than often seen at this age.     Remote small area of infarction posterior left frontal lobe region with small area of encephalomalacia/gliosis.     Fluid filled left mastoid air cells.  10/7/23 Radiology    Electrolyte Imbalance      Medication     x Treatment         stopped neurontin on 10/5 and pain medications changed to PO dilaudid at low dose and spaced further apart 10/7/23 Progress note   x Other 10/6 Hospital Medicine PN    Encephalopathy  Increased sedation since getting Neurontin, changed it to 100 mg QHS on 10/4 and stopped it on 10/5 and with continued improvement in mentation. Patient with likely some baseline  cognitive impairment with worsening due to resolving medical issues and over sedation which is also resolving    10/7 Hospital Medicine PN  Encephalopathy  - Increased sedation due to pain medications and since getting Neurontin; stopped neurontin on 10/5 and pain medications changed to PO dilaudid at low dose and spaced further apart with continued improvement in mentation. Patient with likely some baseline cognitive impairment with worsening due to resolving medical issues and over sedation which was almost fully resolved to baseline yesterday  10/6/23 Progress note                10/7/23 Progress note     The noted clinical guidelines are only system guidelines and do not replace the providers clinical judgment.    The National Mercer of Neurologic Disorders and Stroke (NINDS) of the NIH describes encephalopathy as any diffuse disease of the brain that alters brain function or structure.    Provider, please clarify the Encephalopathy diagnosis:  [   ] Toxic Encephalopathy - Due to drugs, chemicals, or other toxic substances   [ X ] Other Encephalopathy (please specify): Multifactorial   [   ] Other neurological condition- Includes Post-ictal altered mental status (please specify condition): __________         Please document in your progress notes daily for the duration of treatment until resolved, and include in your discharge summary.    References:  JAMAR Ferguson RN, CCDS. (2018, June 9). Notes from the Instructor: Encephalopathy tips. Retrieved October 22, 2020, from [email or web address]/articles/[email or web address]-instructor-encephalopathy-tips    ICD-9-CM Coding Clinic First Quarter 2013, Effective with discharges: October 21, 2013 [facility] § Seizure with encephalopathy due to postictal state (2013).    ICD-10-CM/PCS MOD[employer].COM Integrated Codebook (Version V.20.8.10.0) [Computer software]. (2020). Retrieved October 21, 2020.    National Mercer of Neurological Disorders and Stroke. (2019, March 27).  Retrieved October 22, 2020, from https://www.ninds.nih.gov/Disorders/All-Disorders/Nmbxluqwvhaybv-Jducmseyohb-Upvn    Form No. 52262

## 2023-10-09 NOTE — ASSESSMENT & PLAN NOTE
- s/p mri and ct head and neurology input  - management per primary team  - she was aware of self, but not place/time. She thought she was in [place]. She knew it was her daughter at bedside after having to ask a few times, but she could not say her name. Still having issues with hearing.   - Would potentially benefit from neuropsychology consult outpatient

## 2023-10-09 NOTE — PROGRESS NOTES
[facility] Med Surg ([address])  Palliative Medicine  Progress Note    Patient Name: Shannan Figueredo  MRN: [id number]  Admission Date: 9/17/2023  Hospital Length of Stay: 22 days  Code Status: Full Code   Attending Provider: JOSE Cristina MD  Consulting Provider: Butch Gold MD  Primary Care Physician: Roxi, Primary Doctor  Principal Problem:Encephalopathy    Patient information was obtained from patient, relative(s), past medical records and primary team.      Assessment/Plan:     Neuro  * Encephalopathy  - s/p mri and ct head and neurology input  - management per primary team  - she was aware of self, but not place/time. She thought she was in [place]. She knew it was her daughter at bedside after having to ask a few times, but she could not say her name. Still having issues with hearing.   - Would potentially benefit from neuropsychology consult outpatient       ENT  Hearing impaired person, unspecified laterality  - management per primary team/neurology    Cardiac/Vascular  Peripheral vascular disease  - Mgmt per primary team and vascular surgery; s/p AKA     Renal/  ESRD (end stage renal disease)  - Nephrology following for HD needs    Orthopedic  Gangrene of toe  - See PVD    Palliative Care  Advance care planning  10/9/2023:  - chart reviewed in depth  - discussed with primary team  - patient seen at bedside with palliative RN Tori   - patients daughter Nhi also present at bedside  - introduced ourselves and role of palliative medicine   - it was clear patient had more difficulty hearing from left ear than right, but was inconsistent with responding at times regardless  - it took patient quite some time to identify the person at bedside as her daughter and could not share her name despite asking a few times and providing her with some time to respond  - patient thought she was in [place] and did not have a clear idea of her medical on uziel, thereby she is not able to make her own medical decisions at  this time   - talked with Nhi at bedside and it was clear there have been some disagreements between her and her sister  - patient is indeed , thereby making her  Ana her legal medical decision maker. When asked, patient confirmed that she was still  for what it is worth  - Nhi shared some of the difficulty she has faced throughout all of this  - it is clear it has been a lot  - made Nhi aware of the legal hierarchy of medical decision makers with patients  Ana as her legal medical decision maker unless patient is able to have an improvement of her mental status to the point that she gains better insight into her medical on uziel and becomes able to complete HCPOA documentation stating otherwise  - provided Nhi with emotional support and listening ear   - There is concern about disagreement among family members regarding pt's plan of care; however, ideally pt will be able to make her own medical decisions as her mental status improves and perhaps complete HCPOA documentation at that time as she wishes.   - updated primary team regarding above conversation    Other  Debility  - PT/OT following, recommended snf        I will follow-up with patient. Please contact us if you have any additional questions.    Subjective:     Chief Complaint:   Chief Complaint   Patient presents with    Vomiting     Vomiting and fatigue x 1 day.        HPI:   No notes on file    Hospital Course:  No notes on file      Medications:  Continuous Infusions:  Scheduled Meds:   sodium chloride 0.9%   Intravenous Once    ascorbic acid (vitamin C)  500 mg Oral Daily    aspirin  81 mg Oral Daily    atorvastatin  40 mg Oral Daily    calcitRIOL  0.25 mcg Oral Every Mon, Wed, Fri    carvediloL  25 mg Oral BID    clopidogreL  75 mg Oral Daily    epoetin veronica-epbx  100 Units/kg Subcutaneous Every Mon, Wed, Fri    LIDOcaine  1 patch Transdermal Q24H    losartan  100 mg Oral Daily    NIFEdipine  60 mg Oral BID     sevelamer carbonate  1,600 mg Oral TID WM    vitamin renal formula (B-complex-vitamin c-folic acid)  1 capsule Oral Daily     PRN Meds:acetaminophen, cloNIDine, dextrose 10%, dextrose 10%, glucagon (human recombinant), glucose, glucose, HYDROmorphone, insulin aspart U-100, loperamide, naloxone, ondansetron, simethicone, sodium chloride 0.9%, sodium chloride 0.9%    Objective:     Vital Signs (Most Recent):  Temp: 97.8 °F (36.6 °C) (10/09/23 0755)  Pulse: 66 (10/09/23 0755)  Resp: 20 (10/09/23 0755)  BP: (!) 120/58 (10/09/23 0755)  SpO2: 97 % (10/09/23 0755) Vital Signs (24h Range):  Temp:  [96.1 °F (35.6 °C)-98.6 °F (37 °C)] 97.8 °F (36.6 °C)  Pulse:  [64-66] 66  Resp:  [15-20] 20  SpO2:  [95 %-97 %] 97 %  BP: (117-140)/(58-63) 120/58     Weight: 57 kg (125 lb 10.6 oz)  Body mass index is 20.91 kg/m².       Physical Exam  Vitals and nursing note reviewed.   Constitutional:       General: She is not in acute distress.  HENT:      Ears:      Comments: Difficulty hearing L>R ear  Eyes:      Extraocular Movements: Extraocular movements intact.   Pulmonary:      Effort: Pulmonary effort is normal.   Skin:     General: Skin is warm.   Neurological:      Comments: Awake, alert, oriented only to self            Review of Symptoms        Living Arrangements:  Lives with family    Psychosocial/Cultural:   See Palliative Psychosocial Note: Yes  - was living in [place]  - 2 daughters  -   **Primary  to Follow**  Palliative Care  Consult: No        Advance Care Planning  Advance Directives:     Decision Making:  Patient unable to communicate due to disease severity/cognitive impairment         Significant Labs: reviewed  CBC:   Recent Labs   Lab 10/09/23  0512   WBC 19.54*   HGB 9.3*   HCT 29.3*   MCV 92        BMP:  Recent Labs   Lab 10/09/23  0512   GLU 77      K 4.4   CL 97   CO2 26   BUN 55*   CREATININE 6.7*   CALCIUM 10.4   MG 2.5     LFT:  Lab  Results   Component Value Date    AST 20 09/17/2023    ALKPHOS 84 09/17/2023    BILITOT 1.0 09/17/2023     Albumin:   Albumin   Date Value Ref Range Status   10/09/2023 1.9 (L) 3.5 - 5.2 g/dL Final     Protein:   Total Protein   Date Value Ref Range Status   09/17/2023 7.2 6.0 - 8.4 g/dL Final     Lactic acid:   Lab Results   Component Value Date    LACTATE 1.2 09/17/2023       Significant Imaging: reviewed    > 50% of 35 min visit spent in chart review, face to face discussion of symptom assessment, coordination of care with other      Butch Gold MD  Palliative Medicine  [facility] [place] [facility] ([address])

## 2023-10-09 NOTE — ASSESSMENT & PLAN NOTE
- Likely multifactorial encephalopathy with components of delirium, toxic encephalopathy, metabolic encephalopathy, and potential baseline cognitive impairment all contributing.  - Suspected increased sedation due to pain medications / gabapentin; stopped gabapentin on 10/5 and adjusted pain control to PO dilaudid at low dose / decreased frequency with improvement in mentation. Likely some baseline cognitive impairment with worsening due to medical issues.  - 10/07 patient developed worsening hearing loss and inability to comprehend written word sometime after waking. Daughter and staff reported she was normal the prior night, without clear timing of deficit.  - Patient already on ASA and plavix, no hypotensive episode prior to explain symptoms.  - CT Head motion-limited but with sequela of chronic microvascular ischemic changes/senescent changes. MRI Brain with continued motion-limited artifact with small focus of diffusion signal abnormality in R frontal lobe with acute infarct not excluded.  - Given already on appropriate vascular treatment with her underlying PVD, little to adjust at this time. Appreciate neurology assistance.  - Improving and more stable. Daughters report hard of hearing at baseline and had hearing aids but has not used consistently in recent past. Encouraged locating / active use of hearing aids.

## 2023-10-09 NOTE — PLAN OF CARE
Problem: Infection  Goal: Absence of Infection Signs and Symptoms  Outcome: Ongoing, Progressing     Problem: Adult Inpatient Plan of Care  Goal: Plan of Care Review  Outcome: Ongoing, Progressing  Goal: Patient-Specific Goal (Individualized)  Outcome: Ongoing, Progressing  Goal: Absence of Hospital-Acquired Illness or Injury  Outcome: Ongoing, Progressing  Goal: Optimal Comfort and Wellbeing  Outcome: Ongoing, Progressing     Problem: Diabetes Comorbidity  Goal: Blood Glucose Level Within Targeted Range  Outcome: Ongoing, Progressing     Problem: Skin Injury Risk Increased  Goal: Skin Health and Integrity  Outcome: Ongoing, Progressing     POC reviewed with patient and daughter. All questions and concerns addressed with daughter. Fall/safety precautions implemented and maintained. Patient on HD/anuric. Dialysis in am. Blood glucose monitored. No acute events noted this shift. Please see flowsheet for full assessment and vitals. Bed locked in lowest position. Side rails up x2. Call bell within reach. Will continue to monitor.

## 2023-10-09 NOTE — PROGRESS NOTES
"Nephrology  Progress Note    Admit Date: 9/17/2023   LOS: 22 days     SUBJECTIVE:     Follow-up For:  Encephalopathy    History of Present Illness:  Patient is a 67 y.o. female presents with left foot gangrene, vomiting, feeling ill.  Ext med hx as outlined below including ESRD on HD MWF in [place].  Has been dealing with necrotic left toes for few months and followed by vascular in [place].  Was here visiting daughter and felt bad.  Admitted for eval/treatment.  Consulted for HD needs.  Pt seen and examined on HD.  Consents signed.  Tired from being up all night with pain.  Updated team and daughter at bedside.  W/up noted.     S/P L BKA (9/29)      Interval History:   Very drowsy this am.  Discussed with RN/Daughter at bedside.  HD this am.      Review of Systems:  Constitutional: No fever or chills  Respiratory: No cough or shortness of breath  Cardiovascular: No chest pain or palpitations  Gastrointestinal: No nausea or vomiting  Neurological: No confusion or weakness    OBJECTIVE:     Vital Signs Range (Last 24H):  BP (!) 120/58   Pulse 66   Temp 97.8 °F (36.6 °C) (Oral)   Resp 20   Ht 5' 5" (1.651 m)   Wt 57 kg (125 lb 10.6 oz)   SpO2 97%   BMI 20.91 kg/m²     Temp:  [96.1 °F (35.6 °C)-98.6 °F (37 °C)]   Pulse:  [64-68]   Resp:  [15-20]   BP: (117-140)/(58-63)   SpO2:  [95 %-97 %]     I & O (Last 24H):  Intake/Output Summary (Last 24 hours) at 10/9/2023 0829  Last data filed at 10/8/2023 2000  Gross per 24 hour   Intake 100 ml   Output 0 ml   Net 100 ml         Physical Exam:  General appearance:  Frail.  Appears older than stated age.  pleasantly confused this am.   Eyes:  Conjunctivae/corneas clear. PERRL.  Lungs: Normal respiratory effort,   clear to auscultation bilaterally.  Diminished.   Heart: Regular rate and rhythm, S1, S2 normal, no murmur, rub or nelly.  Abdomen: Soft, non-tender non-distended; bowel sounds normal; no masses,  no organomegaly  Extremities: No cyanosis or clubbing. No edema.  " "  Skin: Skin color, texture, turgor normal. No rashes or lesions  Neurologic: Normal strength and tone. No focal numbness or weakness   Right arm AVF+  Left BKA wrapped.     Laboratory Data:  Recent Labs   Lab 10/09/23  0512   WBC 19.54*   RBC 3.19*   HGB 9.3*   HCT 29.3*      MCV 92   MCH 29.2   MCHC 31.7*         BMP:   Recent Labs   Lab 10/09/23  0512   GLU 77      K 4.4   CL 97   CO2 26   BUN 55*   CREATININE 6.7*   CALCIUM 10.4   MG 2.5   PHOS 4.5       Lab Results   Component Value Date    CALCIUM 10.4 10/09/2023    PHOS 4.5 10/09/2023       Lab Results   Component Value Date    CALCIUM 10.4 10/09/2023    PHOS 4.5 10/09/2023       No results found for: "URICACID"    BNP  No results for input(s): "BNP", "BNPTRIAGEBLO" in the last 168 hours.    Medications:  Medication list was reviewed and changes noted under Assessment/Plan.    Diagnostic Results:    reviewed    ASSESSMENT/PLAN:       ESRD on HD MWF in [place]:  -consulted for HD needs.  -consents signed  -continue HD MWF while here and if she stays after hospitalization will need temp outpt unit.   -labs noted and bath adjusted.  -R arm AVF+  -9/19: HD yesterday w/o issue.  No need today.  -9/20:  HD today after angio  -9/21:  no need for HD today.  Cont MWF if stays here.    -9/22:  seen on HD this am.   -9/25:  HD with Blood today.    -9/26:  stable from renal to DC back to [place] w/ family.    -9/27:  seen on HD.  UF 2 L.    -9/28:  nothing today.   -9/29:  seen on HD.  -10/1: stable today; plan for next dialysis session on Monday.  -10/2:  HD today.  -10/3:  adjust bath to labs for tomorrow.  Encourage some free water intake today/ tomorrow as well.  -10/4:  HD with adjustments. Limit UF as seems dry.    -10/5:  stable lytes/volume    -10/6:  seen in HD.  Renally dose meds, avoid nephrotoxins, and monitor I/O's closely.  -10/7:  No HD indicated for today, will continue a MWF schedule while here.  -10/8:  Continue HD MWF reviewed orders for " tomorrow.  Given cough and congestion, will increase UF efforts.  -10/9:  HD today.  Renally dose meds, avoid nephrotoxins, and monitor I/O's closely.      S/p BKA on 9/29  -defer to pod/vascular/cards.  -angio noted with severe PAD  -Status post exploration femoral and popliteal arteries 9/22 with Non-reconstructible peripheral vascular disease.      HTN:  -UF on HD  -A little over goal titrated Coreg to 25 BID 10/7  -labile so hold parameters.      MBD/hypercalcemia  -binders/nephrocaps.   Changed to renvela.  Pt states that she also takes tums at home  -using calcitriol on HD days  -No longer on calcium supplements and binder switched to non-calcium  -Low calcium bath ordered.  -Suspect both cough and hypercalcemia related to bedbound status, needs more activity     Anemia of CKD:  -Received transfusion 1 unit 9/29  -Hgb below goal  -FAREED with HD  -Iron stores low but ^^ferritin precludes use of Venofer  -Vit C.     Suspected CVA:  -defer to neuro.      Dispo:  -awaiting family dynamics to play out.  -defer to palliative care.       Stable for DC/Transfer to facility from Renal.

## 2023-10-09 NOTE — ASSESSMENT & PLAN NOTE
10/9/2023:  - chart reviewed in depth  - discussed with primary team  - patient seen at bedside with palliative RN Tori   - patients daughter Nhi also present at bedside  - introduced ourselves and role of palliative medicine   - it was clear patient had more difficulty hearing from left ear than right, but was inconsistent with responding at times regardless  - it took patient quite some time to identify the person at bedside as her daughter and could not share her name despite asking a few times and providing her with some time to respond  - patient thought she was in [place] and did not have a clear idea of her medical on uziel, thereby she is not able to make her own medical decisions at this time   - talked with Nhi at bedside and it was clear there have been some disagreements between her and her sister  - patient is indeed , thereby making her  Ana her legal medical decision maker. When asked, patient confirmed that she was still  for what it is worth  - Nhi shared some of the difficulty she has faced throughout all of this  - it is clear it has been a lot  - made Nhi aware of the legal hierarchy of medical decision makers with patients  Ana as her legal medical decision maker unless patient is able to have an improvement of her mental status to the point that she gains better insight into her medical on uziel and becomes able to complete HCPOA documentation stating otherwise  - provided Nhi with emotional support and listening ear   - There is concern about disagreement among family members regarding pt's plan of care; however, ideally pt will be able to make her own medical decisions as her mental status improves and perhaps complete HCPOA documentation at that time as she wishes.   - updated primary team regarding above conversation

## 2023-10-09 NOTE — PROGRESS NOTES
[facility] - Med Surg ([address])  Wound Care    Patient Name:  Shannan Figueredo   MRN:  [id number]  Date: 10/9/2023  Diagnosis: Encephalopathy    History:     Past Medical History:   Diagnosis Date    Coronary artery disease     Diabetes mellitus, type II     End stage renal disease     Hyperlipidemia     Hypertension     Peripheral vascular disease        Social History     Socioeconomic History    Marital status:    Tobacco Use    Smoking status: Never    Smokeless tobacco: Never   Substance and Sexual Activity    Alcohol use: Never    Drug use: Never     Social Determinants of Health     Financial Resource Strain: Low Risk  (9/17/2023)    Overall Financial Resource Strain (CARDIA)     Difficulty of Paying Living Expenses: Not hard at all   Food Insecurity: No Food Insecurity (9/17/2023)    Hunger Vital Sign     Worried About Running Out of Food in the Last Year: Never true     Ran Out of Food in the Last Year: Never true   Transportation Needs: No Transportation Needs (9/17/2023)    PRAPARE - Transportation     Lack of Transportation (Medical): No     Lack of Transportation (Non-Medical): No   Physical Activity: Inactive (9/17/2023)    Exercise Vital Sign     Days of Exercise per Week: 0 days     Minutes of Exercise per Session: 0 min   Stress: Stress Concern Present (9/17/2023)    [employer] [place] [employer] - Occupational Stress Questionnaire     Feeling of Stress : Very much   Social Connections: Socially Isolated (9/17/2023)    Social Connection and Isolation Panel [NHANES]     Frequency of Communication with Friends and Family: Twice a week     Frequency of Social Gatherings with Friends and Family: Never     Attends Shinto Services: 1 to 4 times per year     Active Member of Clubs or Organizations: No     Attends Club or Organization Meetings: Never     Marital Status:    Housing Stability: Low Risk  (9/17/2023)    Housing Stability Vital Sign     Unable to Pay for Housing in  "the Last Year: No     Number of Places Lived in the Last Year: 1     Unstable Housing in the Last Year: No       Precautions:     Allergies as of 09/17/2023    (No Known Allergies)       [facility] Assessment Details/Treatment   67/F with PMH HTN, DMII (diet-controlled), PVD, ESRD on HD and anemia who presented to [facility] 09/17 with a two day progressive history of worsening L foot pain, swelling, fatigue, nausea and vomiting. Admitted on 9/17/2023 with encephalopathy.    Wound care consulted for buttocks.  Minimal documentation of wound noted on 10/7/2023 stating a "skin tear"wound was documented as full thickness pink and red in color , no exudate and a foam dressing was applied.   Attempted to assess patient but she is off of the floor in dialysis since this morning. Will continue to follow for recommendations.      10/09/2023    "

## 2023-10-09 NOTE — PLAN OF CARE
CM met at bedside with daughter Nhi Zuluaga CM informed daughter Nhi that I had spoke to daughter in [place] where mom had been residing for the six years prior, due to [place] insurance and no out of network benefits we have been unable to find an accepting facility. Although we have received insurance auth sisters does not want mom to go to [facility] which was the only accepting facility. CM still have not received update from [facility] which is the facility both sisters have agreed on. Daughter Nhi at bedside is very upset as there remains lack of communication between the two sisters. They are at odds and it is significant family issues. Nhi is stating she was not aware that sister is coming to take mom back to [place] to a SNF up until I informed her.     CM director provided with update.

## 2023-10-09 NOTE — PLAN OF CARE
Outpatient HD chair had been set up with [facility] when patient was to go to [facility]. However this has changed and pending SNF placement chair assignment will need to be changed.

## 2023-10-09 NOTE — PROGRESS NOTES
Status post left BKA for non-reconstructible peripheral vascular disease   Left lower extremity-incisions healing without evidence of infection or ischemia

## 2023-10-10 NOTE — PROGRESS NOTES
[facility] - Med Surg ([address])  Adult Nutrition  Progress Note    SUMMARY       Recommendations  1) Honor pt's food preference to encourage intake of meals    2) Rec an appetite stimulant if appropriate    3) EN may be warranted d/t pt's chronic inability to meet pro-kcal needs orally   4) RD to follow and monitor nutrition status    Goals:   Pt will maintain 50-75% intake of meals by RD follow up  Nutrition Goal Status: goal not met, progressing towards goal  Communication of RD Recs:  (POC)    Assessment and Plan    Endocrine  Protein calorie malnutrition  Malnutrition Type:  Context: chronic illness, social/environmental circumstances  Level: moderate    Related to (etiology):   Increased nutrient    Signs and Symptoms (as evidenced by):   S/p BKA, altered skin integrity to buttocks, ESRD on HD; poor intake r/t limited food acceptance     Malnutrition Characteristic Summary:  Weight Loss (Malnutrition): greater than 5% in 1 month (10%)  Energy Intake (Malnutrition): less than or equal to 50% for greater than or equal to 1 month  Subcutaneous Fat (Malnutrition): mild depletion  Muscle Mass (Malnutrition): mild depletion      Interventions (treatment strategy):  Mineral modified diet  Commercial beverages  Collaboration with other providers    Nutrition Diagnosis Status:   Continues         Malnutrition Assessment  Malnutrition Context: chronic illness, social/environmental circumstances  Malnutrition Level: moderate  Hair/Scalp (Micronutrient): none  Musculoskeletal/Lower Extremities:  (PVD s/p L BKA)   Micronutrient Evaluation Summary: suspected deficiency   Weight Loss (Malnutrition): greater than 5% in 1 month (10%)  Energy Intake (Malnutrition): less than or equal to 50% for greater than or equal to 1 month  Subcutaneous Fat (Malnutrition): mild depletion  Muscle Mass (Malnutrition): mild depletion   Orbital Region (Subcutaneous Fat Loss): mild depletion  Upper Arm Region (Subcutaneous Fat Loss): moderate  depletion   Adventism Region (Muscle Loss): moderate depletion  Clavicle Bone Region (Muscle Loss): mild depletion  Clavicle and Acromion Bone Region (Muscle Loss): moderate depletion                 Reason for Assessment    Reason For Assessment: RD follow-up  Diagnosis:  (Encephalopathy)  Relevant Medical History:   Patient Active Problem List   Diagnosis    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 Pt asleep, daughter fed pt 2 peices of sausage for breakfast. Pt unable to tolerate ONS d/t being too thick per daughter, but was able to provide gatorade. Daughter asked about other flavor of ONS, vanilla the only one in-house. Pt will provide strawberry flavor ONS per pt's preference. Encouraged daughter to bring in some of pt's favorite foods as she has continued to not meet nutritional needs. Daughter reports pt liking pot roast but has always been a picky eater and now c/o food fatigue d/t 23 days admitted. PIV. HD AVF. Yuan 14- buttocks, L leg. NFPE pt +PCM, weight loss and moderate depletion  Nutrition Discharge Planning: dc on ADA, renal diet as tolerated    Nutrition Risk Screen    Nutrition Risk Screen: large or nonhealing wound, burn or pressure injury, reduced oral intake over the last month, unintentional loss of 10 lbs or more in the past 2 months    Nutrition/Diet History    Patient Reported Diet/Restrictions/Preferences: diabetic diet  Spiritual,  "Cultural Beliefs, Caodaism Practices, Values that Affect Care: no  Factors Affecting Nutritional Intake: impaired cognitive status/motor control, decreased appetite, inability to feed self, pain    Anthropometrics    Temp: 97.3 °F (36.3 °C)  Height: 5' 5" (165.1 cm)  Height (inches): 65 in  Weight Method: Bed Scale  Weight: 57 kg (125 lb 10.6 oz)  Weight (lb): 125.66 lb  Ideal Body Weight (IBW), Female: 125 lb  % Ideal Body Weight, Female (lb): 100.53 %  BMI (Calculated): 20.9  BMI Grade: 18.5-24.9 - normal  Weight Loss: unintentional  Usual Body Weight (UBW), k.6 kg (23 on admit)  % Usual Body Weight: 89.81  % Weight Change From Usual Weight: -10.38 %  Amputation %: 5.9  Total Amputation %: 5.9  Amputation Ideal Body Weight (IBW), Female (lb): 119.1 lb  Amputee BMI (kg/m2): 22.28 kg/m2         Lab/Procedures/Meds    Pertinent Labs Reviewed: reviewed  CBC:  Recent Labs   Lab 10/10/23  0457   WBC 23.11*   HGB 8.8*   HCT 28.4*        CMP:  Recent Labs   Lab 10/10/23  0457   CALCIUM 9.8   ALBUMIN 1.9*      K 4.0   CO2 30*   CL 96   BUN 29*   CREATININE 4.0*     Pertinent Medications Reviewed: reviewed  Scheduled Meds:   sodium chloride 0.9%   Intravenous Once    ascorbic acid (vitamin C)  500 mg Oral Daily    aspirin  81 mg Oral Daily    atorvastatin  40 mg Oral Daily    calcitRIOL  0.25 mcg Oral Every Mon, Wed, Fri    carvediloL  25 mg Oral BID    clopidogreL  75 mg Oral Daily    epoetin veronica-epbx  100 Units/kg Subcutaneous Every Mon, Wed, Fri    LIDOcaine  1 patch Transdermal Q24H    losartan  100 mg Oral Daily    NIFEdipine  60 mg Oral BID    sevelamer carbonate  1,600 mg Oral TID WM    vitamin renal formula (B-complex-vitamin c-folic acid)  1 capsule Oral Daily     Continuous Infusions:  PRN Meds:.acetaminophen, cloNIDine, dextrose 10%, dextrose 10%, glucagon (human recombinant), glucose, glucose, HYDROmorphone, insulin aspart U-100, loperamide, naloxone, ondansetron, simethicone, sodium " chloride 0.9%, sodium chloride 0.9%      Estimated/Assessed Needs    Weight Used For Calorie Calculations: 57 kg (125 lb 10.6 oz)  Energy Calorie Requirements (kcal): 1995  Energy Need Method: Kcal/kg (35)  Protein Requirements: 80g (1.4 g/kg)  Weight Used For Protein Calculations: 57 kg (125 lb 10.6 oz)  Fluid Requirements (mL): 1 mL/kcal  Estimated Fluid Requirement Method:  (or per MD)  RDA Method (mL): 1995  CHO Requirement: 250 (50%dv)      Nutrition Prescription Ordered    Current Diet Order: Renal  Nutrition Order Comments: no fish  [facility]    Evaluation of Received Nutrient/Fluid Intake    I/O: 500/1440  Energy Calories Required: not meeting needs  Protein Required: not meeting needs  Fluid Required: not meeting needs  Comments: LBM: 9/28/23  Tolerance: not tolerating  % Intake of Estimated Energy Needs: 0 - 25 %  % Meal Intake: 0 - 25 %    Intake/Output - Last 3 Shifts         10/08 0700  10/09 0659 10/09 0700  10/10 0659 10/10 0700  10/11 0659    P.O. 100      Other  500     Total Intake(mL/kg) 100 (1.8) 500 (8.8)     Urine (mL/kg/hr) 0 (0)      Other  1440     Total Output 0 1440     Net +100 -940                    Nutrition Risk    Level of Risk/Frequency of Follow-up:  (1-2 x/week)     Monitor and Evaluation    Food and Nutrient Intake: energy intake, food and beverage intake  Food and Nutrient Adminstration: diet order  Knowledge/Beliefs/Attitudes: beliefs and attitudes  Physical Activity and Function: nutrition-related ADLs and IADLs  Anthropometric Measurements: weight, weight change  Biochemical Data, Medical Tests and Procedures: electrolyte and renal panel, gastrointestinal profile, glucose/endocrine profile, inflammatory profile, lipid profile  Nutrition-Focused Physical Findings: overall appearance     Nutrition Follow-Up    RD Follow-up?: Yes    Tonie Lucero RDN, MARBELLA

## 2023-10-10 NOTE — PLAN OF CARE
Recommendations  1) Honor pt's food preference to encourage intake of meals    2) Rec an appetite stimulant if appropriate    3) EN may be warranted d/t pt's chronic inability to meet pro-kcal needs orally   4) RD to follow and monitor nutrition status    Goals:   Pt will maintain 50-75% intake of meals by RD follow up  Nutrition Goal Status: goal not met, progressing towards goal  Communication of RD Recs:  (POC)

## 2023-10-10 NOTE — PROGRESS NOTES
"[facility] Surg ([address] Medicine  Progress Note    Patient Name: Shannan Figueredo  MRN: [id number]  Patient Class: IP- Inpatient   Admission Date: 9/17/2023  Length of Stay: 23 days  Attending Physician: JOSE Cristina MD  Primary Care Provider: Roxi, Primary Doctor        Subjective:     Principal Problem:Encephalopathy        HPI:  Ms. Figueredo is a 67/F with PMH HTN, DMII (diet-controlled), PVD, ESRD on HD (M/W/F), anemia who presented to [facility] 09/17 with a two day progressive history of worsening L foot pain, swelling, fatigue, nausea and vomiting. History obtained from patient and daughter Stephanie ([phone]); they are from the [place], [place] area. Patient reports several weeks ago she "stubbed her toe," noting initially some pain, redness and swelling. Gradually symptoms progressed with duskiness to her toe and she informed her daughter of her pain for which she was brought to ER in [place] on 08/25 for further evaluation. With duskiness to 1st toe and surrounding erythema she was treated for cellulitis with a course of ciprofloxacin and metronidazole, but failed to improve. She followed up with her podiatrist 09/12 where she was prescribed hydrocodone-acetaminophen and a ten day course of TMP-SMX which she has been taking. She was referred to vascular surgery with plan for angiography and potential intervention  She and her family came to [place] for the weekend but her pain worsened; had been attempting to minimize use of hydrocodone-acetaminophen but developed nausea, vomiting and progressive fatigue. She subsequently presented to ED for further evaluation. ED workup was notable for L 1st toe dry-appearing gangrene with surrounding erythema and swelling, removed nail of second toe, XR L foot demonstrating diffuse osteopenia, L calcaneal and forefoot soft tissue swelling without definitive evidence of osteomyelitis in setting of osteopenia, no leukocytosis, elevated sed rate and " CRP, and elevated BUN/Cr in setting of ESRD. She notes she went without dialysis on 09/15 due to her trip. Blood cultures were ordered and she received piperacillin-tazobactam and vancomycin. Hospital medicine was subsequently contacted for admission.      Overview/Hospital Course:  Admitted with gangrene of L 1st toe.  Empirically treated with Zosyn and vancomycin.  Vascular Surgery, nephrology, podiatry consulted; U/S arterial demonstrated L SFA occlusion with distal reconstitution. Cardiology consulted, angiography with no vessels amenable for angioplasty on 9/20.  Underwent exploration of the left lower extremity vessels, femoral and popliteal bypass unable to be performed due to no adequate available vessels for bypass on 9/22. Discussed potential intervention here vs [place], patient and family opted for surgery in [place]. Underwent L BKA 09/29. PT/OT recommended SNF.      Interval History: No acute events overnight. Discussed with patient and daughter Nhi at bedside. Nhi notes fistula dressing with increased evidence of bleeding; no pain noted. No other concerns at this time.    Review of Systems   Constitutional:  Negative for chills and fever.   Respiratory:  Negative for cough and shortness of breath.    Cardiovascular:  Negative for chest pain and palpitations.   Gastrointestinal:  Negative for abdominal pain, nausea and vomiting.     Objective:     Vital Signs (Most Recent):  Temp: 98 °F (36.7 °C) (10/10/23 1659)  Pulse: 64 (10/10/23 1659)  Resp: 16 (10/10/23 1659)  BP: (!) 142/67 (10/10/23 1659)  SpO2: 97 % (10/10/23 1659) Vital Signs (24h Range):  Temp:  [97.3 °F (36.3 °C)-99.4 °F (37.4 °C)] 98 °F (36.7 °C)  Pulse:  [64-75] 64  Resp:  [16-18] 16  SpO2:  [93 %-99 %] 97 %  BP: (120-142)/(57-67) 142/67     Weight: 57 kg (125 lb 10.6 oz)  Body mass index is 20.91 kg/m².  No intake or output data in the 24 hours ending 10/10/23 1842        Physical Exam  Vitals and nursing note reviewed.    Constitutional:       General: She is not in acute distress.     Appearance: She is well-developed.   HENT:      Head: Normocephalic and atraumatic.   Eyes:      General:         Right eye: No discharge.         Left eye: No discharge.      Conjunctiva/sclera: Conjunctivae normal.   Cardiovascular:      Rate and Rhythm: Normal rate.      Pulses: Normal pulses.   Pulmonary:      Effort: Pulmonary effort is normal. No respiratory distress.   Abdominal:      Palpations: Abdomen is soft.      Tenderness: There is no abdominal tenderness.   Musculoskeletal:         General: Normal range of motion.      Right lower leg: No edema.      Comments: s/p L BKA with dressing in place.   Skin:     General: Skin is warm and dry.   Neurological:      Mental Status: She is alert and oriented to person, place, and time.      Comments: Hard of hearing but able to interpret spoken language, occasionally with repetition.       Significant Labs:   CBC:  Recent Labs   Lab 10/08/23  0617 10/09/23  0512 10/10/23  0457   WBC 20.88* 19.54* 23.11*   HGB 9.0* 9.3* 8.8*   HCT 29.1* 29.3* 28.4*    372 376   GRAN 79.0*  16.5* 81.5*  15.9* 80.0*   LYMPH 7.3*  1.5 6.2*  1.2 7.0*  CANCELED   MONO 9.3  1.9* 8.8  1.7* 9.0  CANCELED   EOS 0.1 0.2 CANCELED   BASO 0.08 0.10 CANCELED     BMP:  Recent Labs   Lab 10/08/23  0617 10/09/23  0512 10/10/23  0457    137 136   K 4.1 4.4 4.0   CL 96 97 96   CO2 28 26 30*   BUN 39* 55* 29*   CREATININE 5.2* 6.7* 4.0*   GLU 91 77 122*   CALCIUM 11.1* 10.4 9.8   MG 2.4 2.5 2.2   PHOS 4.1 4.5 3.0   ALBUMIN 2.1* 1.9* 1.9*   ANIONGAP 14 14 10        Significant Imaging: I have reviewed and interpreted all pertinent imaging results/findings within the past 24 hours.      Assessment/Plan:      * Encephalopathy  - Likely multifactorial encephalopathy with components of delirium, toxic encephalopathy, metabolic encephalopathy, and potential baseline cognitive impairment all contributing.  - Suspected  "increased sedation due to pain medications / gabapentin; stopped gabapentin on 10/5 and adjusted pain control to PO dilaudid at low dose / decreased frequency with improvement in mentation. Likely some baseline cognitive impairment with worsening due to medical issues.  - 10/07 patient developed worsening hearing loss and inability to comprehend written word sometime after waking. Daughter and staff reported she was normal the prior night, without clear timing of deficit.  - Patient already on ASA and plavix, no hypotensive episode prior to explain symptoms.  - CT Head motion-limited but with sequela of chronic microvascular ischemic changes/senescent changes. MRI Brain with continued motion-limited artifact with small focus of diffusion signal abnormality in R frontal lobe with acute infarct not excluded.  - Given already on appropriate vascular treatment with her underlying PVD, little to adjust at this time. Appreciate neurology assistance.  - Improving and more stable. Daughters report hard of hearing at baseline and had hearing aids but has not used consistently in recent past. Encouraged locating / active use of hearing aids.    Gangrene of toe  HTN, PVD, encephalopathy  - L 1st toe gangrene predominantly dry with some surrounding erythema/swelling; with concern for worsening infection started piperacillin-tazobactam and vancomycin IV. 2nd toe with involvement as well.  - U/S arterial BLE showed L distal SFA occlusion with distal reconstitution. Discussed with vascular surgery and cardiology consulted for consideration of angiography / potential angioplasty.  - Podiatry consulted. MRI L foot with "examination markedly degraded by patient motion artifact. Questionable marrow edema within the 1st distal phalanx, not well evaluated given aforementioned limitation but possibly related to osteomyelitis given reported history of gangrene."  - Blood cultures showed no growth.  - s/p angiogram remarkable for severe " PAD with no areas amenable to angioplasty on 9/20. Elected to undergo attempt at revascularization 09/22 but unable to perform femoral and popliteal bypass on 9/22. Discussed surgical options with BKA only viable for appropriate healing given extent of vascular disease.   - Underwent BKA on 09/30. Leukocytosis improving. Therapy recommending SNF. Pursuing placement.  - Given possible decreased clearance of pain medications, stopped hydrocodone-acetaminophen 5-325mg PO q6hr PRN, oxycodone-acetaminophen 7.5-325mg PO q6hr PRN. Started PO dilaudid instead and Neurontin on 10/3  - Increased sedation since getting Neurontin, changed it to 100 mg QHS on 10/4 and stopped it on 10/5 and with continued improvement in mentation. Patient with likely some baseline cognitive impairment with worsening due to resolving medical issues and over sedation which was almost fully resolved to baseline yesterday but with new development today (see above)  - WBC increased but plateaued. No fever, repeat CXR overall unrevealing and no other source of infection identified. Likely delayed leukemoid reaction. Case discussed with Dr. Cobb and he evaluated the surgical site with no signs of infection noted  - Will continue to monitor for now and consider repeat cultures if spikes a fever   - Continue carvedilol 12.5mg PO BID, losartan 100mg PO daily, nifedipine 60mg PO BID  - Discharge planning with SNF    Type 2 diabetes mellitus with chronic kidney disease on chronic dialysis  - Reports diet-controlled; no current medications.  - HgbA1c 5.7.  - Continue low-dose sliding scale insulin aspart 0-5U subQ TIDWM PRN, monitor requirements.    ESRD (end stage renal disease)  Anemia, hyperparathyroidism  - ESRD on HD, reports missed session Friday prior to presentation due to travel.  - Mild anemia without evidence of bleeding.  - Continue allopurinol 300mg PO daily, calcium acetate 2001mg PO TIDWM.  - Transfused 1U pRBCs 09/25 with appropriate  response.  - Nephrology consulted for HD assistance.    VTE Risk Mitigation (From admission, onward)         Ordered     IP VTE HIGH RISK PATIENT  Once         09/28/23 5380                Discharge Planning   LUBNA:      Code Status: Full Code   Is the patient medically ready for discharge?:     Reason for patient still in hospital (select all that apply): Pending disposition  Discharge Plan A: Skilled Nursing Facility   Discharge Delays: (!) Post-Acute Set-up              D Angus Cristina MD  Department of Hospital Medicine   [facility] - Med Surg ([address])

## 2023-10-10 NOTE — SUBJECTIVE & OBJECTIVE
Interval History: No acute events overnight. Discussed with patient and daughter Nhi at bedside. Nhi notes fistula dressing with increased evidence of bleeding; no pain noted. No other concerns at this time.    Review of Systems   Constitutional:  Negative for chills and fever.   Respiratory:  Negative for cough and shortness of breath.    Cardiovascular:  Negative for chest pain and palpitations.   Gastrointestinal:  Negative for abdominal pain, nausea and vomiting.     Objective:     Vital Signs (Most Recent):  Temp: 98 °F (36.7 °C) (10/10/23 1659)  Pulse: 64 (10/10/23 1659)  Resp: 16 (10/10/23 1659)  BP: (!) 142/67 (10/10/23 1659)  SpO2: 97 % (10/10/23 1659) Vital Signs (24h Range):  Temp:  [97.3 °F (36.3 °C)-99.4 °F (37.4 °C)] 98 °F (36.7 °C)  Pulse:  [64-75] 64  Resp:  [16-18] 16  SpO2:  [93 %-99 %] 97 %  BP: (120-142)/(57-67) 142/67     Weight: 57 kg (125 lb 10.6 oz)  Body mass index is 20.91 kg/m².  No intake or output data in the 24 hours ending 10/10/23 1842        Physical Exam  Vitals and nursing note reviewed.   Constitutional:       General: She is not in acute distress.     Appearance: She is well-developed.   HENT:      Head: Normocephalic and atraumatic.   Eyes:      General:         Right eye: No discharge.         Left eye: No discharge.      Conjunctiva/sclera: Conjunctivae normal.   Cardiovascular:      Rate and Rhythm: Normal rate.      Pulses: Normal pulses.   Pulmonary:      Effort: Pulmonary effort is normal. No respiratory distress.   Abdominal:      Palpations: Abdomen is soft.      Tenderness: There is no abdominal tenderness.   Musculoskeletal:         General: Normal range of motion.      Right lower leg: No edema.      Comments: s/p L BKA with dressing in place.   Skin:     General: Skin is warm and dry.   Neurological:      Mental Status: She is alert and oriented to person, place, and time.      Comments: Hard of hearing but able to interpret spoken language, occasionally with  repetition.       Significant Labs:   CBC:  Recent Labs   Lab 10/08/23  0617 10/09/23  0512 10/10/23  0457   WBC 20.88* 19.54* 23.11*   HGB 9.0* 9.3* 8.8*   HCT 29.1* 29.3* 28.4*    372 376   GRAN 79.0*  16.5* 81.5*  15.9* 80.0*   LYMPH 7.3*  1.5 6.2*  1.2 7.0*  CANCELED   MONO 9.3  1.9* 8.8  1.7* 9.0  CANCELED   EOS 0.1 0.2 CANCELED   BASO 0.08 0.10 CANCELED     BMP:  Recent Labs   Lab 10/08/23  0617 10/09/23  0512 10/10/23  0457    137 136   K 4.1 4.4 4.0   CL 96 97 96   CO2 28 26 30*   BUN 39* 55* 29*   CREATININE 5.2* 6.7* 4.0*   GLU 91 77 122*   CALCIUM 11.1* 10.4 9.8   MG 2.4 2.5 2.2   PHOS 4.1 4.5 3.0   ALBUMIN 2.1* 1.9* 1.9*   ANIONGAP 14 14 10        Significant Imaging: I have reviewed and interpreted all pertinent imaging results/findings within the past 24 hours.

## 2023-10-10 NOTE — PROGRESS NOTES
[facility] - Med Surg ([address])  Wound Care    Patient Name:  Shannan Figueredo   MRN:  [id number]  Date: 10/10/2023  Diagnosis: Encephalopathy    History:     Past Medical History:   Diagnosis Date    Coronary artery disease     Diabetes mellitus, type II     End stage renal disease     Hyperlipidemia     Hypertension     Peripheral vascular disease        Social History     Socioeconomic History    Marital status:    Tobacco Use    Smoking status: Never    Smokeless tobacco: Never   Substance and Sexual Activity    Alcohol use: Never    Drug use: Never     Social Determinants of Health     Financial Resource Strain: Low Risk  (9/17/2023)    Overall Financial Resource Strain (CARDIA)     Difficulty of Paying Living Expenses: Not hard at all   Food Insecurity: No Food Insecurity (9/17/2023)    Hunger Vital Sign     Worried About Running Out of Food in the Last Year: Never true     Ran Out of Food in the Last Year: Never true   Transportation Needs: No Transportation Needs (9/17/2023)    PRAPARE - Transportation     Lack of Transportation (Medical): No     Lack of Transportation (Non-Medical): No   Physical Activity: Inactive (9/17/2023)    Exercise Vital Sign     Days of Exercise per Week: 0 days     Minutes of Exercise per Session: 0 min   Stress: Stress Concern Present (9/17/2023)    [employer] [place] [employer] - Occupational Stress Questionnaire     Feeling of Stress : Very much   Social Connections: Socially Isolated (9/17/2023)    Social Connection and Isolation Panel [NHANES]     Frequency of Communication with Friends and Family: Twice a week     Frequency of Social Gatherings with Friends and Family: Never     Attends Evangelical Services: 1 to 4 times per year     Active Member of Clubs or Organizations: No     Attends Club or Organization Meetings: Never     Marital Status:    Housing Stability: Low Risk  (9/17/2023)    Housing Stability Vital Sign     Unable to Pay for Housing  in the Last Year: No     Number of Places Lived in the Last Year: 1     Unstable Housing in the Last Year: No       Precautions:     Allergies as of 09/17/2023    (No Known Allergies)       WOC Assessment Details/Treatment     Attempted to see for skin assessment as nursing consulted wound care for a buttocks skin tear.  Pt was off of the floor in dialysis yesterday and unavailable.  This morning she was in therapy gym and is presently sitting up in chair eating her lunch. Will attempt to assess later today when transferred back to bed.     10/10/2023

## 2023-10-10 NOTE — PLAN OF CARE
10/10/23 1501   Post-Acute Status   Post-Acute Authorization Placement   Post-Acute Placement Status Patient List Provided   Discharge Delays (!) Post-Acute Set-up   Discharge Plan   Discharge Plan A Skilled Nursing Facility   Discharge Plan B Home Health     Patient conducted a family meeting with patient daughter at bedside & on the phone. Patient daughters (Nhi & Stephanie) both agreed  on mom going back to [place] for skilled nursing services.   SW provided a list to both daughters. SW informed patient daughters that if we can agree on a facility the patient  would pick a placement.

## 2023-10-10 NOTE — ASSESSMENT & PLAN NOTE
Malnutrition Type:  Context: chronic illness, social/environmental circumstances  Level: moderate    Related to (etiology):   Increased nutrient    Signs and Symptoms (as evidenced by):   S/p BKA, altered skin integrity to buttocks, ESRD on HD; poor intake r/t limited food acceptance     Malnutrition Characteristic Summary:  Weight Loss (Malnutrition): greater than 5% in 1 month (10%)  Energy Intake (Malnutrition): less than or equal to 50% for greater than or equal to 1 month  Subcutaneous Fat (Malnutrition): mild depletion  Muscle Mass (Malnutrition): mild depletion      Interventions/Recommendations (treatment strategy):  1) Honor pt's food preference to encourage intake of meals  2) Rec an appetite stimulant if appropriate  3) EN may be warranted d/t pt's chronic inability to meet pro-kcal needs orally 4) RD to follow and monitor nutrition status    Nutrition Diagnosis Status:   Continues

## 2023-10-10 NOTE — PLAN OF CARE
Problem: Infection  Goal: Absence of Infection Signs and Symptoms  Outcome: Ongoing, Progressing     Problem: Diabetes Comorbidity  Goal: Blood Glucose Level Within Targeted Range  Outcome: Ongoing, Progressing     Problem: Adult Inpatient Plan of Care  Goal: Plan of Care Review  Outcome: Ongoing, Progressing  Goal: Patient-Specific Goal (Individualized)  Outcome: Ongoing, Progressing  Goal: Absence of Hospital-Acquired Illness or Injury  Outcome: Ongoing, Progressing  Goal: Optimal Comfort and Wellbeing  Outcome: Ongoing, Progressing  Goal: Readiness for Transition of Care  Outcome: Ongoing, Progressing     Problem: Device-Related Complication Risk (Hemodialysis)  Goal: Safe, Effective Therapy Delivery  Outcome: Ongoing, Progressing     Problem: Skin Injury Risk Increased  Goal: Skin Health and Integrity  Outcome: Ongoing, Progressing

## 2023-10-10 NOTE — PROGRESS NOTES
Patient had reported bleeding from right upper extremity AV fistula.  Dressing removed and fistula examined.  No evidence of skin breakdown, ulceration or infection.  Positive thrill.  Normal needle punctures without evidence of hemorrhage

## 2023-10-10 NOTE — PT/OT/SLP PROGRESS
Occupational Therapy   Treatment    Name: Shannan Figueredo  MRN: [id number]  Admitting Diagnosis:  Encephalopathy  11 Days Post-Op    Recommendations:     Discharge Recommendations: nursing facility, skilled  Discharge Equipment Recommendations:   (Drop arm BSC, Tutu height lightweight w/c,removable arm rests, elevating leg rests, anti tippers, wheelchair cushion, left residual limb support for wheelchair; ;IF PT IS NOT A CANDIDATE FOR A LE PROSTHESIS, WILL NEED A CUSTOM WHEELCHAIR SEATING SYSTEM)  Barriers to discharge:  Inaccessible home environment, Decreased caregiver support (Current functional and cognitive status)    Assessment:     Shannan Figueredo is a 67 y.o. female with a medical diagnosis of Encephalopathy.  She presents with daughter in room.  Pt with limited participation needing Max cues and encouragement. Pt performed grooming tasks sitting in wheelchair at sink requiring Min A to wash face and Mod A to brush teeth after set up with Max encouragement.  Total A for rolling in bed to prepare for ADL mobility and Total A x 2 for sliding board transfer.  Caregiver and pt education throughout tx session.. Performance deficits affecting function are weakness, impaired endurance, impaired sensation, impaired self care skills, impaired functional mobility, gait instability, impaired balance, impaired cognition, decreased coordination, edema, impaired skin, decreased safety awareness, pain, decreased lower extremity function, decreased upper extremity function, decreased ROM, impaired cardiopulmonary response to activity, impaired fine motor, impaired joint extensibility, orthopedic precautions.     Rehab Prognosis:  Guarded; patient would benefit from acute skilled OT services to address these deficits and reach maximum level of function.       Plan:     Patient to be seen 5 x/week to address the above listed problems via self-care/home management, therapeutic activities, therapeutic exercises, neuromuscular  re-education, sensory integration  Plan of Care Expires: 10/15/23  Plan of Care Reviewed with: patient, daughter    Subjective     Chief Complaint: Pt resistive to left LE being moved at first but then after PTA performed PROM and gentle stretching, pt tolerated moving left LE during functional mobility without complaints.  Patient/Family Comments/goals: Pt's daughter wanting to take pt out of room and let pt sit by large window on third floor.   Pain/Comfort:  Pain Rating 1:  (Did not rate pain, residual limb pain with movement, education on pt needing to participate in therapeutic exercises and functional task performance to increase Indep with ADL and ADL mobility.)    Objective:     Communicated with: nurse prior to session.  Patient found HOB elevated with peripheral IV upon OT entry to room.  Pablo, BRANDYN and pt's daughter in room.     General Precautions: Standard, diabetic, fall    Orthopedic Precautions: (New Left BKA)  Braces: N/A  Respiratory Status: Room air     Occupational Performance:     Bed Mobility:    Rolling: Total A   Supine to Sit: Total A x 2       Functional Mobility/Transfers:  Education of pt and daughter on safety and set up of wheelchair and sliding board for ADL transfer  Functional Mobility: Bed to wheelchair sliding board transfer: Total A x 2, assist for pt to place right foot and hands during transfer     Activities of Daily Living:  Grooming: Brushing teeth: Mod A after set up; pt needing hand over hand assistance for initiation and thoroughness of task, Washing face: Min A; Pt needing Max encouragement and cueing for task performance        [facility] 6 Click ADL: 10    Treatment & Education:  Role of OT, POC, importance of pt participating in functional task performance to increase Indep level with ADL and ADL mobility, ADL and ADL mobility training, safety to decrease fall risk, discharge recs    Patient left  seated in wheelchair with daughter pushing her  with all lines  intact    GOALS:   Multidisciplinary Problems       Occupational Therapy Goals          Problem: Occupational Therapy    Goal Priority Disciplines Outcome Interventions   Occupational Therapy Goal     OT, PT/OT Ongoing, Progressing    Description: OT evaluation completed s/p LLE BKA with goals updated as appropriate.      Goals to be met by: 10/15/2023     Patient will increase functional independence with ADLs by performing:    UE Dressing with Moderate Assistance.  LE Dressing with Maximum Assistance.  Grooming while EOB with Minimum Assistance.  Toileting from bedside commode with Maximum Assistance for hygiene and clothing management.   Bathing from sitting at sink with Moderate Assistance.  Toilet transfer to bedside commode with Maximum Assistance.                             Time Tracking:     OT Date of Treatment: 10/10/23  OT Start Time: 1037  OT Stop Time: 1110  OT Total Time (min): 33 min    Billable Minutes:Self Care/Home Management 33    OT/ROGER: OT     Number of ROGER visits since last OT visit: 1    10/10/2023

## 2023-10-10 NOTE — PROGRESS NOTES
[facility] - Med Surg ([address])  Wound Care    Patient Name:  Shannan Figueredo   MRN:  [id number]  Date: 10/10/2023  Diagnosis: Encephalopathy    History:     Past Medical History:   Diagnosis Date    Coronary artery disease     Diabetes mellitus, type II     End stage renal disease     Hyperlipidemia     Hypertension     Peripheral vascular disease        Social History     Socioeconomic History    Marital status:    Tobacco Use    Smoking status: Never    Smokeless tobacco: Never   Substance and Sexual Activity    Alcohol use: Never    Drug use: Never     Social Determinants of Health     Financial Resource Strain: Low Risk  (9/17/2023)    Overall Financial Resource Strain (CARDIA)     Difficulty of Paying Living Expenses: Not hard at all   Food Insecurity: No Food Insecurity (9/17/2023)    Hunger Vital Sign     Worried About Running Out of Food in the Last Year: Never true     Ran Out of Food in the Last Year: Never true   Transportation Needs: No Transportation Needs (9/17/2023)    PRAPARE - Transportation     Lack of Transportation (Medical): No     Lack of Transportation (Non-Medical): No   Physical Activity: Inactive (9/17/2023)    Exercise Vital Sign     Days of Exercise per Week: 0 days     Minutes of Exercise per Session: 0 min   Stress: Stress Concern Present (9/17/2023)    [employer] [place] [employer] - Occupational Stress Questionnaire     Feeling of Stress : Very much   Social Connections: Socially Isolated (9/17/2023)    Social Connection and Isolation Panel [NHANES]     Frequency of Communication with Friends and Family: Twice a week     Frequency of Social Gatherings with Friends and Family: Never     Attends Jehovah's witness Services: 1 to 4 times per year     Active Member of Clubs or Organizations: No     Attends Club or Organization Meetings: Never     Marital Status:    Housing Stability: Low Risk  (9/17/2023)    Housing Stability Vital Sign     Unable to Pay for Housing  in the Last Year: No     Number of Places Lived in the Last Year: 1     Unstable Housing in the Last Year: No       Precautions:     Allergies as of 09/17/2023    (No Known Allergies)       [facility] Assessment Details/Treatment     67/F with PMH HTN, DMII (diet-controlled), PVD, ESRD on HD and anemia who presented to [facility] 09/17 with a two day progressive history of worsening L foot pain, swelling, fatigue, nausea and vomiting. Admitted on 9/17/2023 with encephalopathy.  Sacral wound      Patient back in bed and agreeable for skin assessment of buttocks.  She is S/p BKA, altered skin integrity to buttocks, ESRD on HD; poor intake r/t limited food acceptance. Albumin 1.9,   At the beginning of removal of mepilex border silicone dressing noted skin beginning to tear. Obtained sensicare adhesive remover to try and separate blistered skin from silicone bordered foam dressing. 8x5x0.1cm Sacral injury appears to be a purple discoloration with thin blistering of the tissues. Some of the blister has ruptured leaving the wound open and edges remain purple and fragile. Feel edges will lift and desquamate.    Note adjacent 1x1cm partial thickness wound to right buttock .  Unsure if further purple discoloration will present itself.   Patient has been on pressure injury prevention, a low air loss bed, has nutritional supplements offered but may only eat 3 bites of a meal. Dietitian is currently following . Feel nutrition is a large factor in the decline of her skin integrity.Applied triad hydrophilic wound dressing to entire area.  Re-educated family we need to turn from right to left every 2 hours and pad bony prominences with pillows.     10/10/23 1630        Altered Skin Integrity 10/07/23 1000 Sacral spine Purple or maroon localized area of discolored intact skin or non-intact skin or a blood-filled blister.   Date First Assessed/Time First Assessed: 10/07/23 1000   Altered Skin Integrity Present on Admission - Did Patient  arrive to the hospital with altered skin?: suspected hospital acquired  Location: Sacral spine  Description of Altered Skin Integrity: P...   Wound Image     Description of Altered Skin Integrity Purple or maroon localized area of discolored intact skin or non-intact skin or a blood-filled blister.   Dressing Appearance Moist drainage;Intact  (removed sacral mepilex : ordered triad)   Drainage Amount Scant   Drainage Characteristics/Odor Serous;No odor   Appearance Purple;Blistered;Pink;Moist   Periwound Area Blistered;Purple;Redness   Wound Edges Irregular;Open   Wound Length (cm) 8 cm   Wound Width (cm) 5 cm   Wound Depth (cm) 0.1 cm   Wound Volume (cm^3) 4 cm^3   Wound Surface Area (cm^2) 40 cm^2   Care Cleansed with:;Wound cleanser;Applied:;Skin Barrier  (Triad applied)     10/10/2023

## 2023-10-10 NOTE — PT/OT/SLP PROGRESS
"Physical Therapy Treatment    Patient Name:  Shannan Figueredo   MRN:  [id number]    Recommendations:     Discharge Recommendations: nursing facility, skilled  Discharge Equipment Recommendations:  (Drop arm BSC, Tutu height wheelchair with removable arm rests, elevating leg rests and anti tippers, wheelchair cushion; Defer to next level of care)  Barriers to discharge: Inaccessible home and Decreased caregiver support    Assessment:     Shannan Figueredo is a 67 y.o. female admitted with a medical diagnosis of Encephalopathy.  She presents with the following impairments/functional limitations: weakness, gait instability, pain, impaired balance, impaired coordination, impaired endurance, impaired joint extensibility, impaired skin, impaired functional mobility, decreased coordination, decreased lower extremity function, decreased ROM, decreased safety awareness, decreased upper extremity function.    Roll R with totalA  Supine>sit with totalA x 2  Bed>WC with totalA x 2, slide board  WC x 20' with totalA  Pt a passive participant in session, all mobility performed with totalA  Rec SNF    Rehab Prognosis:  Guarded ; patient would benefit from acute skilled PT services to address these deficits and reach maximum level of function.    Recent Surgery: Procedure(s) (LRB):  AMPUTATION, BELOW KNEE (Left) 11 Days Post-Op    Plan:     During this hospitalization, patient to be seen 5 x/week to address the identified rehab impairments via gait training, therapeutic activities, therapeutic exercises, neuromuscular re-education, wheelchair management/training and progress toward the following goals:    Plan of Care Expires:  10/30/23    Subjective     Chief Complaint: pt yelling out "no" when L LE is moved  Patient/Family Comments/goals: pt agreeable to be transferred to chair  Pain/Comfort:  Pain Rating 1: other (see comments) (pain not rated)  Location - Side 1: Left  Location - Orientation 1: generalized  Location 1: leg " (residual limb)  Pain Addressed 1: Reposition, Distraction, Cessation of Activity  Pain Rating Post-Intervention 1: other (see comments) (unrated)      Objective:     Communicated with nurse David prior to session.  Patient found HOB elevated with peripheral IV upon PT entry to room.     General Precautions: Standard, diabetic, fall (RUE AV fistual)  Orthopedic Precautions:  (L BKA)  Braces: N/A  Respiratory Status: Room air     Functional Mobility:  Bed Mobility:  Rolling Right: total assistance  Supine to Sit: total assistance and of 2 persons  Transfers:     Bed>WC: total assistance and of 2 persons with  slide board  using  Slide Board  Wheelchair Propulsion:  Pt propelled Standard wheelchair x 20 feet on Level tile with Total Assistance.       AM-PAC 6 CLICK MOBILITY  Turning over in bed (including adjusting bedclothes, sheets and blankets)?: 1  Sitting down on and standing up from a chair with arms (e.g., wheelchair, bedside commode, etc.): 2  Moving from lying on back to sitting on the side of the bed?: 2  Moving to and from a bed to a chair (including a wheelchair)?: 2  Need to walk in hospital room?: 1  Climbing 3-5 steps with a railing?: 1  Basic Mobility Total Score: 9       Treatment & Education:  Supine therex for L LE (all therex performed with PROM): ALR, hip abd/ad x 10  R sidelying therex for L LE: hip abd/add,  hip extension x 10  Transfer as noted  Pt sat up in WC at bathroom sink to perform self care with OT assist    Patient left up in chair with all lines intact, daughter present, and daughter taking patient for a ride in the hospital ..    GOALS:   Multidisciplinary Problems       Physical Therapy Goals          Problem: Physical Therapy    Goal Priority Disciplines Outcome Goal Variances Interventions   Physical Therapy Goal     PT, PT/OT Ongoing, Not Progressing     Description: Patient will perform the following to increase strength, improve mobility, and return to prior level of  function:    1. Supine<>sit with modA with appropriate use of HB features.   2. Rolling R/L with appropriate us eof HB features and Yen.   3. Sitting EOB x3 min with SBA without sway.   4. Sit<>stand with modA with RW.   5. Transfer bed<>chair with modA with most appropriate technique and LRAD.   6. Perform BKA therEx including L QS, L hip abd in supine or sidelying, L hip flexor stretch in sidelying, and R single leg bridge without assistance.                        Time Tracking:     PT Received On: 10/10/23  PT Start Time: 1024     PT Stop Time: 1108  PT Total Time (min): 44 min     Billable Minutes: Therapeutic Activity 15, Therapeutic Exercise 15, and Train/Wheelchair Management 14  Co-treat with OT due to complex nature of patient, to insure patient safety, and to prevent injury to patient and caregivers, requiring intervention of two skilled therapists.      Treatment Type: Treatment  PT/PTA: PTA     Number of PTA visits since last PT visit: 1     10/10/2023

## 2023-10-10 NOTE — PROGRESS NOTES
"Nephrology  Progress Note    Admit Date: 9/17/2023   LOS: 23 days     SUBJECTIVE:     Follow-up For:  Encephalopathy    History of Present Illness:  Patient is a 67 y.o. female presents with left foot gangrene, vomiting, feeling ill.  Ext med hx as outlined below including ESRD on HD MWF in [place].  Has been dealing with necrotic left toes for few months and followed by vascular in [place].  Was here visiting daughter and felt bad.  Admitted for eval/treatment.  Consulted for HD needs.  Pt seen and examined on HD.  Consents signed.  Tired from being up all night with pain.  Updated team and daughter at bedside.  W/up noted.     S/P L BKA (9/29)      Interval History:   Very altered/confused this am.  Discussed with daughter at bedside.  Did receive pain meds earlier this am.  [place] and not wearing aides.        Review of Systems:  Constitutional: No fever or chills  Respiratory: No cough or shortness of breath  Cardiovascular: No chest pain or palpitations  Gastrointestinal: No nausea or vomiting  Neurological: No confusion or weakness    OBJECTIVE:     Vital Signs Range (Last 24H):  /64   Pulse 70   Temp 97.9 °F (36.6 °C) (Axillary)   Resp 18   Ht 5' 5" (1.651 m)   Wt 57 kg (125 lb 10.6 oz)   SpO2 98%   BMI 20.91 kg/m²     Temp:  [97.6 °F (36.4 °C)-99.4 °F (37.4 °C)]   Pulse:  [65-80]   Resp:  [18-20]   BP: (120-137)/(59-68)   SpO2:  [93 %-98 %]     I & O (Last 24H):  Intake/Output Summary (Last 24 hours) at 10/10/2023 0829  Last data filed at 10/9/2023 1441  Gross per 24 hour   Intake 500 ml   Output 1440 ml   Net -940 ml         Physical Exam:  General appearance:  Frail.  Appears older than stated age.  pleasantly confused this am.   Eyes:  Conjunctivae/corneas clear. PERRL.  Lungs: Normal respiratory effort,   clear to auscultation bilaterally.  Diminished.   Heart: Regular rate and rhythm, S1, S2 normal, no murmur, rub or nelly.  Abdomen: Soft, non-tender non-distended; bowel sounds normal; no masses, " " no organomegaly  Extremities: No cyanosis or clubbing. No edema.    Skin: Skin color, texture, turgor normal. No rashes or lesions  Neurologic: Normal strength and tone. No focal numbness or weakness   Right arm AVF+  Left BKA wrapped.     Laboratory Data:  Recent Labs   Lab 10/10/23  0457   WBC 23.11*   RBC 3.09*   HGB 8.8*   HCT 28.4*      MCV 92   MCH 28.5   MCHC 31.0*         BMP:   Recent Labs   Lab 10/10/23  0457   *      K 4.0   CL 96   CO2 30*   BUN 29*   CREATININE 4.0*   CALCIUM 9.8   MG 2.2   PHOS 3.0       Lab Results   Component Value Date    CALCIUM 9.8 10/10/2023    PHOS 3.0 10/10/2023       Lab Results   Component Value Date    CALCIUM 9.8 10/10/2023    PHOS 3.0 10/10/2023       No results found for: "URICACID"    BNP  No results for input(s): "BNP", "BNPTRIAGEBLO" in the last 168 hours.    Medications:  Medication list was reviewed and changes noted under Assessment/Plan.    Diagnostic Results:    reviewed    ASSESSMENT/PLAN:       ESRD on HD MWF in [place]:  -consulted for HD needs.  -consents signed  -continue HD MWF while here and if she stays after hospitalization will need temp outpt unit.   -labs noted and bath adjusted.  -R arm AVF+  -9/19: HD yesterday w/o issue.  No need today.  -9/20:  HD today after angio  -9/21:  no need for HD today.  Cont MWF if stays here.    -9/22:  seen on HD this am.   -9/25:  HD with Blood today.    -9/26:  stable from renal to DC back to [place] w/ family.    -9/27:  seen on HD.  UF 2 L.    -9/28:  nothing today.   -9/29:  seen on HD.  -10/1: stable today; plan for next dialysis session on Monday.  -10/2:  HD today.  -10/3:  adjust bath to labs for tomorrow.  Encourage some free water intake today/ tomorrow as well.  -10/4:  HD with adjustments. Limit UF as seems dry.    -10/5:  stable lytes/volume    -10/6:  seen in HD.  Renally dose meds, avoid nephrotoxins, and monitor I/O's closely.  -10/7:  No HD indicated for today, will continue a MWF " schedule while here.  -10/8:  Continue HD MWF reviewed orders for tomorrow.  Given cough and congestion, will increase UF efforts.  -10/9:  HD today.    10/10:  stable. Renally dose meds, avoid nephrotoxins, and monitor I/O's closely.      S/p BKA on 9/29  -defer to pod/vascular/cards.  -angio noted with severe PAD  -Status post exploration femoral and popliteal arteries 9/22 with Non-reconstructible peripheral vascular disease.      HTN:  -UF on HD  -A little over goal titrated Coreg to 25 BID 10/7  -labile so hold parameters.      MBD/hypercalcemia  -binders/nephrocaps.   Changed to renvela.  Pt states that she also takes tums at home  -using calcitriol on HD days  -No longer on calcium supplements and binder switched to non-calcium  -Low calcium bath ordered.  -Suspect both cough and hypercalcemia related to bedbound status, needs more activity     Anemia of CKD:  -Received transfusion 1 unit 9/29  -Hgb below goal  -FAREED with HD  -Iron stores low but ^^ferritin precludes use of Venofer  -Vit C.     Suspected CVA:  -defer to neuro.  -mental status seems to be worse.       Dispo:  -awaiting family dynamics to play out.  -defer to palliative care.       Stable for DC/Transfer to facility from Renal.

## 2023-10-10 NOTE — NURSING
Called to patients's room.  Pt moaning.  Medicated for pain.  Noticed left arm av fistula site bleeding.  Redressed site with gauze and paper tape.  Gown and linen changed.

## 2023-10-10 NOTE — PLAN OF CARE
SW contacted patient daughter Stephanie, Stephanie stated she want her mother to return [place],but is open to her going to a facility that is in network with her insurance.. ATA spoke with patient daughter Nhi at bedside. Nhi stated she would prefer her mother to stay in [place], [place].

## 2023-10-10 NOTE — PLAN OF CARE
[facility] is not willing to accept pt due to being out of network with her insurance.  There is no guarantee of payment.

## 2023-10-11 NOTE — PLAN OF CARE
This CM Manager spoke with Franky with [employer]. Reference #[id number]. Per Franky, the patient has transportation services through [employer]. Phone number is [phone].Information provided to ATA Cason and ANTONIA Montoya via secure chat.

## 2023-10-11 NOTE — PT/OT/SLP PROGRESS
Physical Therapy      Patient Name:  Shannan Figueredo   MRN:  [id number]    Patient not seen today secondary to Dialysis. Will follow-up later today as scheduling permits.

## 2023-10-11 NOTE — PT/OT/SLP PROGRESS
Occupational Therapy   Treatment    Name: Shannan Figueredo  MRN: [id number]  Admitting Diagnosis:  Encephalopathy  12 Days Post-Op    Recommendations:     Discharge Recommendations: nursing facility, skilled  Discharge Equipment Recommendations:   (Drop arm BSC, Tutu height lightweight w/c,removable arm rests, elevating leg rests, anti tippers, wheelchair cushion, left residual limb support for wheelchair; ;IF PT IS NOT A CANDIDATE FOR A LE PROSTHESIS, WILL NEED A CUSTOM WHEELCHAIR SEATING SYSTEM)  Barriers to discharge:  Inaccessible home environment, Decreased caregiver support (Current functional and mental status)    Assessment:     Shannan Figueredo is a 67 y.o. female with a medical diagnosis of Encephalopathy.  She presents with daughter in room.  Daughter expressing concerns about pt possibly going to SNF in [place] due to very limited support system in [place]. Performance deficits affecting function are weakness, impaired endurance, impaired self care skills, impaired functional mobility, gait instability, impaired balance, impaired cognition, decreased coordination, decreased upper extremity function, decreased lower extremity function, decreased safety awareness, pain, impaired skin, decreased ROM, impaired joint extensibility, orthopedic precautions, impaired cardiopulmonary response to activity.     Rehab Prognosis:   Guarded ; patient would benefit from acute skilled OT services to address these deficits and reach maximum level of function.       Plan:     Patient to be seen 5 x/week to address the above listed problems via self-care/home management, therapeutic activities, therapeutic exercises  Plan of Care Expires: 10/15/23  Plan of Care Reviewed with: patient, daughter    Subjective     Chief Complaint: None stated.   Patient/Family Comments/goals: Pt's daughter reporting that pt will have much more family support in [place] than if she returns to [place] where she will have limited support.    Pain/Comfort:  Pain Rating 1:  (Pt not expressing pain or demonstrating objective signs/symptoms of pain.  Pt not rating pain.)    Objective:     Communicated with: nurse prior to session.  Patient found HOB elevated with peripheral IV upon OT entry to room.  Daughter in room    General Precautions: Standard, fall, diabetic (New left BKA)    Orthopedic Precautions: (New Left BKA)  Braces: N/A  Respiratory Status: Room air     Occupational Performance:     Bed Mobility:    Rolling: Max A   Scooting to HOB: Mild attempts by pt to bridge and pull up with bilateral hands gripped on upper bed rails.  Pt able to scoot herself about 2 inches up towards HOB but then gave less effort to perform task.  Ultimately, pt needing Total A x 2 persons.      Functional Mobility/Transfers:  Functional Mobility: Plan was to get pt sitting in wheelchair but after wheelchair prepared, transporter arrive to take pt to hemodialysis.    Activities of Daily Living:  Needing Max A <> Total A grossly overall with ADL.  Pt doesn't make urine any longer. Daughter has been feeding pt but reporting that pt is not wanting to eat so she brought pt some strawberry flavored protein shakes to hopefully boost pt's intake.       [facility] 6 Click ADL: 10    Treatment & Education:  Role of OT, POC, need for mutliple people for support when pt transitions to other levels of care, bed mobility, pressure relief and positioning in bed    Patient left HOB elevated with all lines intact and Pablo sadler,  present    GOALS:   Multidisciplinary Problems       Occupational Therapy Goals          Problem: Occupational Therapy    Goal Priority Disciplines Outcome Interventions   Occupational Therapy Goal     OT, PT/OT Ongoing, Progressing    Description: OT evaluation completed s/p LLE BKA with goals updated as appropriate.      Goals to be met by: 10/15/2023     Patient will increase functional independence with ADLs by performing:    UE Dressing with  Moderate Assistance.  LE Dressing with Maximum Assistance.  Grooming while EOB with Minimum Assistance.  Toileting from bedside commode with Maximum Assistance for hygiene and clothing management.   Bathing from sitting at sink with Moderate Assistance.  Toilet transfer to bedside commode with Maximum Assistance.                             Time Tracking:     OT Date of Treatment: 10/11/23  OT Start Time: 1016  OT Stop Time: 1035  OT Total Time (min): 19 min    Billable Minutes:Self Care/Home Management 19    OT/ROGER: OT     Number of ROGER visits since last OT visit: 1    10/11/2023

## 2023-10-11 NOTE — PLAN OF CARE
10/11/23 0836   Post-Acute Status   Post-Acute Authorization Placement   Post-Acute Placement Status Referrals Sent   Discharge Delays (!) Post-Acute Set-up   Discharge Plan   Discharge Plan A Skilled Nursing Facility   Discharge Plan B Home Health         Sw meet with patient daughter (Nhi) at bedside. Nhi gave the SW a list of facilities. 1. [facility] [employer] [facility] 2. [facility] skilled nursing & rehab. Patient daughter Stephanie was contacted by phone, she stated she would for her to attend [facility].     SW left a message for Norma in admission at [facility].      SW left a voice message for admission at [facility]

## 2023-10-11 NOTE — PROGRESS NOTES
10/11/23 1524   Vital Signs   Temp 97.1 °F (36.2 °C)   Temp Source Temporal   Pulse 60   Heart Rate Source Monitor   Resp 16   Device (Oxygen Therapy) room air   /67   BP Location Left arm   BP Method Automatic   Patient Position Lying        Hemodialysis AV Fistula 09/17/23 Right upper arm   Placement Date: 09/17/23   Location: Right upper arm   Needle Size 15ga   Site Assessment Clean;Dry;Intact;No redness;No swelling   Patency Thrill;Bruit;Present   Status Deaccessed   Flows Good   Dressing Intervention Sterile dressing change   Dressing Status Clean;Dry;Intact   Site Condition Bleeding  (required 30 minutes for hemostasis @ venous site;MD notified)   Dressing Gauze   Post-Hemodialysis Assessment   Rinseback Volume (mL) 250 mL   Blood Volume Processed (Liters) 83.3 L   Dialyzer Clearance Lightly streaked   Duration of Treatment 210 minutes   Additional Fluid Intake (mL) 250 mL   Total UF (mL) 2500 mL   Net Fluid Removal 2000   Patient Response to Treatment tolerated well.   Arterial bleeding stop time (min) 10 min   Venous bleeding stop time (min) 30 min   Post-Hemodialysis Comments Lines dc'd. Needles pulled. Manual pressure held. Hemostasis achieved x2.VSS. Nephrology messaged regaurding access.

## 2023-10-11 NOTE — PLAN OF CARE
Plan of care reviewed with patient and family.  Family verbalized understanding and had no further questions.  Patient had dialysis completed this shift.  Patient being turned every two hours with wedge and wound care completed per MD orders.  Patient now resting comfortably in bed locked in lowest position, side rails up x3, and call bell in reach.  Will continue to monitor.       Problem: Infection  Goal: Absence of Infection Signs and Symptoms  Outcome: Ongoing, Progressing     Problem: Adult Inpatient Plan of Care  Goal: Plan of Care Review  Outcome: Ongoing, Progressing  Goal: Patient-Specific Goal (Individualized)  Outcome: Ongoing, Progressing  Goal: Absence of Hospital-Acquired Illness or Injury  Outcome: Ongoing, Progressing  Goal: Optimal Comfort and Wellbeing  Outcome: Ongoing, Progressing  Goal: Readiness for Transition of Care  Outcome: Ongoing, Progressing     Problem: Diabetes Comorbidity  Goal: Blood Glucose Level Within Targeted Range  Outcome: Ongoing, Progressing     Problem: Impaired Wound Healing  Goal: Optimal Wound Healing  Outcome: Ongoing, Progressing     Problem: Skin Injury Risk Increased  Goal: Skin Health and Integrity  Outcome: Ongoing, Progressing     Problem: Device-Related Complication Risk (Hemodialysis)  Goal: Safe, Effective Therapy Delivery  Outcome: Ongoing, Progressing     Problem: Hemodynamic Instability (Hemodialysis)  Goal: Effective Tissue Perfusion  Outcome: Ongoing, Progressing     Problem: Infection (Hemodialysis)  Goal: Absence of Infection Signs and Symptoms  Outcome: Ongoing, Progressing     Problem: Fall Injury Risk  Goal: Absence of Fall and Fall-Related Injury  Outcome: Ongoing, Progressing     Problem: Oral Intake Inadequate  Goal: Improved Oral Intake  Outcome: Ongoing, Progressing     Problem: Adjustment to Surgery (Extremity Amputation)  Goal: Optimal Coping with Amputation  Outcome: Ongoing, Progressing     Problem: Bleeding (Extremity Amputation)  Goal:  Absence of Bleeding  Outcome: Ongoing, Progressing     Problem: Bowel Motility Impaired (Extremity Amputation)  Goal: Effective Bowel Elimination  Outcome: Ongoing, Progressing     Problem: Fluid and Electrolyte Imbalance (Extremity Amputation)  Goal: Fluid and Electrolyte Balance  Outcome: Ongoing, Progressing     Problem: Functional Ability Impaired (Extremity Amputation)  Goal: Optimal Functional Ability  Outcome: Ongoing, Progressing     Problem: Infection (Extremity Amputation)  Goal: Absence of Infection Signs and Symptoms  Outcome: Ongoing, Progressing     Problem: Pain (Extremity Amputation)  Goal: Acceptable Pain Control  Outcome: Ongoing, Progressing     Problem: Residual Limb Care (Extremity Amputation)  Goal: Optimal Residual Limb Healing  Outcome: Ongoing, Progressing     Problem: Gas Exchange Impaired  Goal: Optimal Gas Exchange  Outcome: Ongoing, Progressing     Problem: Coping Ineffective  Goal: Effective Coping  Outcome: Ongoing, Progressing

## 2023-10-11 NOTE — PROGRESS NOTES
"Nephrology  Progress Note    Admit Date: 9/17/2023   LOS: 24 days     SUBJECTIVE:     Follow-up For:  Encephalopathy    History of Present Illness:  Patient is a 67 y.o. female presents with left foot gangrene, vomiting, feeling ill.  Ext med hx as outlined below including ESRD on HD MWF in [place].  Has been dealing with necrotic left toes for few months and followed by vascular in [place].  Was here visiting daughter and felt bad.  Admitted for eval/treatment.  Consulted for HD needs.  Pt seen and examined on HD.  Consents signed.  Tired from being up all night with pain.  Updated team and daughter at bedside.  W/up noted.     S/P L BKA (9/29)      Interval History:   Remains slow and altered.  Discussed with daughter at bedside.  Still with family dynamics about plan of care moving forward.  Routine HD today.         Review of Systems:    Unable to fully access.     OBJECTIVE:     Vital Signs Range (Last 24H):  BP (!) 141/66   Pulse 64   Temp 98.4 °F (36.9 °C)   Resp 14   Ht 5' 5" (1.651 m)   Wt 57 kg (125 lb 10.6 oz)   SpO2 97%   BMI 20.91 kg/m²     Temp:  [97.3 °F (36.3 °C)-99.5 °F (37.5 °C)]   Pulse:  [64-72]   Resp:  [14-18]   BP: (120-169)/(57-73)   SpO2:  [97 %-99 %]     I & O (Last 24H):No intake or output data in the 24 hours ending 10/11/23 0507      Physical Exam:  General appearance:  Frail.  Appears older than stated age.  NAD this am.   Eyes:  Conjunctivae/corneas clear. PERRL.  Lungs: Normal respiratory effort,   clear to auscultation bilaterally.  Diminished.   Heart: Regular rate and rhythm, S1, S2 normal, no murmur, rub or nelly.  Abdomen: Soft, non-tender non-distended; bowel sounds normal; no masses,  no organomegaly  Extremities: No cyanosis or clubbing. No edema.    Skin: Skin color, texture, turgor normal. No rashes or lesions  Neurologic: Normal strength and tone. No focal numbness or weakness   Right arm AVF+  Left BKA wrapped.     Laboratory Data:  Recent Labs   Lab 10/11/23  1171 " "  WBC 22.24*   RBC 3.00*   HGB 8.5*   HCT 27.5*      MCV 92   MCH 28.3   MCHC 30.9*         BMP:   Recent Labs   Lab 10/11/23  0411   *      K 4.1   CL 94*   CO2 26   BUN 40*   CREATININE 5.4*   CALCIUM 10.4   MG 2.5   PHOS 3.6       Lab Results   Component Value Date    CALCIUM 10.4 10/11/2023    PHOS 3.6 10/11/2023       Lab Results   Component Value Date    CALCIUM 10.4 10/11/2023    PHOS 3.6 10/11/2023       No results found for: "URICACID"    BNP  No results for input(s): "BNP", "BNPTRIAGEBLO" in the last 168 hours.    Medications:  Medication list was reviewed and changes noted under Assessment/Plan.    Diagnostic Results:    reviewed    ASSESSMENT/PLAN:       ESRD on HD MWF in [place]:  -consulted for HD needs.  -consents signed  -continue HD MWF while here and if she stays after hospitalization will need temp outpt unit.   -labs noted and bath adjusted.  -R arm AVF+  -9/19: HD yesterday w/o issue.  No need today.  -9/20:  HD today after angio  -9/21:  no need for HD today.  Cont MWF if stays here.    -9/22:  seen on HD this am.   -9/25:  HD with Blood today.    -9/26:  stable from renal to DC back to [place] w/ family.    -9/27:  seen on HD.  UF 2 L.    -9/28:  nothing today.   -9/29:  seen on HD.  -10/1: stable today; plan for next dialysis session on Monday.  -10/2:  HD today.  -10/3:  adjust bath to labs for tomorrow.  Encourage some free water intake today/ tomorrow as well.  -10/4:  HD with adjustments. Limit UF as seems dry.    -10/5:  stable lytes/volume    -10/6:  seen in HD.  Renally dose meds, avoid nephrotoxins, and monitor I/O's closely.  -10/7:  No HD indicated for today, will continue a MWF schedule while here.  -10/8:  Continue HD MWF reviewed orders for tomorrow.  Given cough and congestion, will increase UF efforts.  -10/9:  HD today.    10/10:  stable.   -10/11:  routine HD today.  Renally dose meds, avoid nephrotoxins, and monitor I/O's closely.      S/p BKA on 9/29  -defer to " pod/vascular/cards.  -angio noted with severe PAD  -Status post exploration femoral and popliteal arteries 9/22 with Non-reconstructible peripheral vascular disease.      HTN:  -UF on HD  -A little over goal titrated Coreg to 25 BID 10/7  -labile so hold parameters.      MBD/hypercalcemia  -binders/nephrocaps.   Changed to renvela.  Pt states that she also takes tums at home  -using calcitriol on HD days  -No longer on calcium supplements and binder switched to non-calcium  -Low calcium bath ordered.  -Suspect both cough and hypercalcemia related to bedbound status, needs more activity     Anemia of CKD:  -Received transfusion 1 unit 9/29  -Hgb below goal  -FAREED with HD  -Iron stores low but ^^ferritin precludes use of Venofer  -Vit C.     Suspected CVA:  -defer to neuro.  -mental status seems to be worse.       Dispo:  -awaiting family dynamics to play out.  -defer to palliative care.       Stable for DC/Transfer to facility from Renal.   Will follow for renal needs.

## 2023-10-11 NOTE — PLAN OF CARE
10/11/23 1402   Post-Acute Status   Post-Acute Authorization Placement   Post-Acute Placement Status Pending payor review/awaiting authorization (if required)   Discharge Delays (!) Post-Acute Set-up   Discharge Plan   Discharge Plan A Skilled Nursing Facility   Discharge Plan B Rehab     Patient will travel by ADT 30 ambulance to [facility]. It will cost 3193.04, SW informed daughter of the cost.      [facility] submitted for authorization from patient insurance    Stephanie stated she do not have the money to cover her mother medical transportation cost

## 2023-10-11 NOTE — PLAN OF CARE
ANTONIA called [employer], [phone], to discuss assistance with stretcher transport from [facility] to facility in [place], [place].    CM informed that [employer] only provides transportation assistance for in state transport. They are not able to assist with out of state transportation.    CM notified CM Supervisor, Giselle Urban.

## 2023-10-11 NOTE — CONSULTS
"[facility]  Psychiatry  Telepsychiatry Consult Note    Please see previous notes:    Patient agreeable to consultation via telepsychiatry.    Tele-Consultation from Psychiatry started: 10/11/2023 at 5:20 PM  The chief complaint leading to psychiatric consultation is: Delirium/Depression  This consultation was requested by Dr. Cristina, the Emergency Department attending physician.  The location of the consulting psychiatrist is [place], [place].  The patient location is  [facility]   Also present with the patient at the time of the consultation: Nursing staff    Patient Identification:   Shannan Figueredo is a 67 y.o. female.    Patient information was obtained from relative(s).    Inpatient consult to Telemedicine - Psych  Consult performed by: Johnathan Orosco,   Consult ordered by: JOSE Cristina MD        Teleconsult Time Documentation  Subjective:     History of Present Illness:  Per Primary Team 9/17/2023  Ms. Figueredo is a 67/F with PMH HTN, DMII (diet-controlled), PVD, ESRD on HD (M/W/F), anemia who presented to [facility] 09/17 with a two day progressive history of worsening L foot pain, swelling, fatigue, nausea and vomiting. History obtained from patient and daughter Stephanie ([phone]); they are from the [place], [place] area. Patient reports several weeks ago she "stubbed her toe," noting initially some pain, redness and swelling. Gradually symptoms progressed with duskiness to her toe and she informed her daughter of her pain for which she was brought to ER in [place] on 08/25 for further evaluation. With duskiness to 1st toe and surrounding erythema she was treated for cellulitis with a course of ciprofloxacin and metronidazole, but failed to improve. She followed up with her podiatrist 09/12 where she was prescribed hydrocodone-acetaminophen and a ten day course of TMP-SMX which she has been taking. She was referred to vascular surgery with plan for " angiography and potential intervention  She and her family came to [place] for the weekend but her pain worsened; had been attempting to minimize use of hydrocodone-acetaminophen but developed nausea, vomiting and progressive fatigue. She subsequently presented to ED for further evaluation. ED workup was notable for L 1st toe dry-appearing gangrene with surrounding erythema and swelling, removed nail of second toe, XR L foot demonstrating diffuse osteopenia, L calcaneal and forefoot soft tissue swelling without definitive evidence of osteomyelitis in setting of osteopenia, no leukocytosis, elevated sed rate and CRP, and elevated BUN/Cr in setting of ESRD. She notes she went without dialysis on 09/15 due to her trip. Blood cultures were ordered and she received piperacillin-tazobactam and vancomycin. Hospital medicine was subsequently contacted for admission.    On Interview:  Patient seen through teleconference this evening on my approach. The patient was confused and could not participate with interview. Her daughter at bedside helped to provide history. The patient initially came to the hospital because she stubbed her toe and she was having foot pain. This resulted in the patient receiving a below the knee amputation 9/27/2023. Prior to coming to the hospital the patient was living with her other daughter in [place]. The patient's daughter mentions that she had been depressed prior to the surgery and she had been losing a lot of weight. The patient has been sleeping at night without difficulty but she has not been eating much.     Psychiatric History:   Previous Psychiatric Hospitalizations: No   Previous Medication Trials: No   Previous Suicide Attempts: no   History of Violence: No  History of Depression: LANNY  History of Vickie: LANNY  History of Auditory/Visual Hallucination LANNY  History of Delusions: LANNY  Outpatient psychiatrist (current & past): No    Substance Abuse History:  Tobacco:No  Alcohol: No  Illicit  Substances:No  Detox/Rehab: No    Legal History: Past charges/incarcerations: No     Family Psychiatric History: No      Social History:  Developmental/Childhood:Achieved all developmental milestones timely  Education: Some college   Employment Status/Finances:Retired   Relationship Status/Sexual Orientation:   Children: 2  Housing Status: Home with daughter    history:  NO  Access to gun: NO  Worship:Actively participates in organized Zoroastrianism  Recreational activities: Time with family  Patient was born and raised in [place]     Psychiatric Mental Status Exam:  Arousal: lethargic  Sensorium/Orientation: LANNY  Behavior/Cooperation: psychomotor retardation   Speech: LANNY  Language: LANNY  Mood: LANNY  Affect: blunted  Thought Process: LANNY  Thought Content:   Auditory hallucinations: LANNY  Visual hallucinations: LANNY  Paranoia: LANNY  Delusions:  LANNY  Suicidal ideation: LANNY  Homicidal ideation: LANNY  Attention/Concentration:  LANNY  Memory:    Recent:  LANNY   Remote: LANNY  Fund of Knowledge: [facility]   Abstract reasoning: LANNY  Insight: LANNY  Judgment: LANNY      Past Medical History:   Past Medical History:   Diagnosis Date    Coronary artery disease     Diabetes mellitus, type II     End stage renal disease     Hyperlipidemia     Hypertension     Peripheral vascular disease       Laboratory Data:   Labs Reviewed   CBC W/ AUTO DIFFERENTIAL - Abnormal; Notable for the following components:       Result Value    Hemoglobin 11.7 (*)     Hematocrit 36.5 (*)     RDW 14.7 (*)     Immature Granulocytes 0.7 (*)     Gran # (ANC) 8.2 (*)     Immature Grans (Abs) 0.07 (*)     Lymph # 0.9 (*)     Gran % 79.1 (*)     Lymph % 8.4 (*)     All other components within normal limits    Narrative:     For upper or mid abdominal pain.  Release to patient->Immediate   COMPREHENSIVE METABOLIC PANEL - Abnormal; Notable for the following components:    Glucose 135 (*)     BUN 40 (*)     Creatinine 10.9 (*)     ALT 8 (*)     eGFR 3 (*)      Anion Gap 17 (*)     All other components within normal limits    Narrative:     For upper or mid abdominal pain.  Release to patient->Immediate   C-REACTIVE PROTEIN - Abnormal; Notable for the following components:    CRP 26.4 (*)     All other components within normal limits    Narrative:     For upper or mid abdominal pain.  Release to patient->Immediate   SEDIMENTATION RATE - Abnormal; Notable for the following components:    Sed Rate 60 (*)     All other components within normal limits    Narrative:     For upper or mid abdominal pain.  Release to patient->Immediate   LIPASE    Narrative:     For upper or mid abdominal pain.  Release to patient->Immediate   HIV 1 / 2 ANTIBODY    Narrative:     For upper or mid abdominal pain.  Release to patient->Immediate   HEPATITIS C ANTIBODY    Narrative:     For upper or mid abdominal pain.  Release to patient->Immediate   LACTIC ACID, PLASMA   C-REACTIVE PROTEIN   SEDIMENTATION RATE   HEMOGLOBIN A1C   POCT GLUCOSE MONITORING CONTINUOUS       Neurological History:  Seizures: No  Head trauma: No    Allergies:   Review of patient's allergies indicates:  No Known Allergies    Medications in ER:   Medications   acetaminophen tablet 650 mg (has no administration in time range)   ondansetron disintegrating tablet 4 mg (has no administration in time range)   sodium chloride 0.9% flush 10 mL (has no administration in time range)   glucose chewable tablet 16 g (16 g Oral Given 9/22/23 0857)   glucose chewable tablet 24 g (24 g Oral Given 9/20/23 1444)   glucagon (human recombinant) injection 1 mg (has no administration in time range)   insulin aspart U-100 pen 0-5 Units (2 Units Subcutaneous Given 10/3/23 1243)   dextrose 10% bolus 125 mL 125 mL (has no administration in time range)   dextrose 10% bolus 250 mL 250 mL (has no administration in time range)   mupirocin 2 % ointment ( Nasal Given 9/21/23 2100)   0.9%  NaCl infusion (has no administration in time range)   sodium  chloride 0.9% bolus 250 mL 250 mL (has no administration in time range)   vitamin renal formula (B-complex-vitamin c-folic acid) 1 mg per capsule 1 capsule (1 capsule Oral Given 10/11/23 0900)   calcitRIOL capsule 0.25 mcg (0.25 mcg Oral Given 10/11/23 0900)   sevelamer carbonate tablet 1,600 mg (1,600 mg Oral Given 10/11/23 1730)   aspirin EC tablet 81 mg (81 mg Oral Given 10/11/23 0859)   clopidogreL tablet 75 mg (75 mg Oral Given 10/11/23 0900)   losartan tablet 100 mg (100 mg Oral Given 10/11/23 0859)   simethicone chewable tablet 80 mg (80 mg Oral Given 9/30/23 1759)   LIDOcaine 5 % patch 1 patch (1 patch Transdermal Patch Applied 10/11/23 0900)   naloxone 0.4 mg/mL injection 0.4 mg (has no administration in time range)   NIFEdipine 24 hr tablet 60 mg (60 mg Oral Given 10/11/23 0900)   cloNIDine tablet 0.1 mg (0.1 mg Oral Not Given 10/3/23 1725)   meperidine (PF) injection 12.5 mg (has no administration in time range)   epoetin veronica-epbx injection 6,100 Units (6,100 Units Subcutaneous Given 10/11/23 1242)   HYDROmorphone tablet 2 mg (2 mg Oral Given 10/11/23 1128)   carvediloL tablet 25 mg (25 mg Oral Given 10/11/23 0900)   atorvastatin tablet 40 mg (40 mg Oral Given 10/11/23 0900)   ascorbic acid (vitamin C) tablet 500 mg (500 mg Oral Given 10/11/23 0901)   senna-docusate 8.6-50 mg per tablet 2 tablet (2 tablets Oral Given 10/11/23 1555)   polyethylene glycol packet 17 g (17 g Oral Given 10/11/23 1555)   ondansetron injection 4 mg (4 mg Intravenous Given 9/17/23 1010)   promethazine (PHENERGAN) 25 mg in dextrose 5 % (D5W) 50 mL IVPB (0 mg Intravenous Stopped 9/17/23 1150)   vancomycin 1,500 mg in dextrose 5 % (D5W) 250 mL IVPB (Vial-Mate) (0 mg Intravenous Stopped 9/17/23 1849)   piperacillin-tazobactam (ZOSYN) 4.5 g in dextrose 5 % in water (D5W) 100 mL IVPB (MB+) (0 g Intravenous Stopped 9/17/23 1417)   heparin (porcine) injection 5,000 Units (5,000 Units Subcutaneous Given 9/19/23 2108)   vancomycin  (VANCOCIN) 500 mg in dextrose 5 % in water (D5W) 100 mL IVPB (MB+) (0 mg Intravenous Stopped 9/18/23 2137)   gadobutroL (GADAVIST) injection 6 mL (6 mLs Intravenous Given 9/19/23 0838)   vancomycin (VANCOCIN) 500 mg in dextrose 5 % in water (D5W) 100 mL IVPB (MB+) (0 mg Intravenous Stopped 9/20/23 2129)   vancomycin (VANCOCIN) 500 mg in dextrose 5 % in water (D5W) 100 mL IVPB (MB+) (0 mg Intravenous Stopped 9/22/23 2104)   HYDROcodone-acetaminophen 5-325 mg per tablet 2 tablet (2 tablets Oral Given 9/23/23 2006)   vancomycin (VANCOCIN) 500 mg in dextrose 5 % in water (D5W) 100 mL IVPB (MB+) (0 mg Intravenous Stopped 9/25/23 1928)   acetaminophen 1,000 mg/100 mL (10 mg/mL) injection 1,000 mg (0 mg Intravenous Stopped 9/29/23 1525)   tuberculin injection 5 Units (5 Units Intradermal Given 9/30/23 1515)   bisacodyL EC tablet 10 mg (10 mg Oral Given 10/11/23 1555)       Medications at home:     No new subjective & objective note has been filed under this hospital service since the last note was generated.      Assessment - Diagnosis - Goals:     Diagnosis/Impression: Patient is a 67 year old woman with no past documented psychiatric history currently admitted to hospital medicine for encephalopathy S/P below the knee amputation. Psychiatry was consulted to assist with delirium and depression. Of note the patient has not been eating much for roughly a month and she has lost significant weight. The patient's family has been concerned that she is depressed.    Rec:   -Recommend Remeron 15 mg PO QHS to assist with appetite and depression  Patient does not meet criteria for PEC as the patient is not a danger to self, others, or gravely disabled. Patient is clear from a psychiatric standpoint and can be discharged once medically stable.      Time with patient: 20 minutes      More than 50% of the time was spent counseling/coordinating care    Consulting clinician was informed of the encounter and consult note.    Consultation  ended: 10/11/2023 at 5:40 PM    Johnathan Orosco, DO   Psychiatry  [facility]

## 2023-10-12 NOTE — SUBJECTIVE & OBJECTIVE
Interval History: No acute events overnight. Drowsy in morning but much improved later in the day. Discussed plan of care.    Review of Systems   Constitutional:  Negative for chills and fever.   Respiratory:  Negative for cough and shortness of breath.    Cardiovascular:  Negative for chest pain and palpitations.   Gastrointestinal:  Negative for abdominal pain, nausea and vomiting.     Objective:     Vital Signs (Most Recent):  Temp: 97.9 °F (36.6 °C) (10/12/23 1628)  Pulse: 80 (10/12/23 1628)  Resp: 18 (10/12/23 1628)  BP: 135/64 (10/12/23 1628)  SpO2: 96 % (10/12/23 1628) Vital Signs (24h Range):  Temp:  [97.9 °F (36.6 °C)-99.4 °F (37.4 °C)] 97.9 °F (36.6 °C)  Pulse:  [68-80] 80  Resp:  [16-18] 18  SpO2:  [96 %-99 %] 96 %  BP: (135-170)/(64-91) 135/64     Weight: 57 kg (125 lb 10.6 oz)  Body mass index is 20.91 kg/m².  No intake or output data in the 24 hours ending 10/12/23 2002        Physical Exam  Vitals and nursing note reviewed.   Constitutional:       General: She is not in acute distress.     Appearance: She is well-developed.   HENT:      Head: Normocephalic and atraumatic.   Eyes:      General:         Right eye: No discharge.         Left eye: No discharge.      Conjunctiva/sclera: Conjunctivae normal.   Cardiovascular:      Rate and Rhythm: Normal rate.      Pulses: Normal pulses.   Pulmonary:      Effort: Pulmonary effort is normal. No respiratory distress.   Abdominal:      Palpations: Abdomen is soft.      Tenderness: There is no abdominal tenderness.   Musculoskeletal:         General: Normal range of motion.      Right lower leg: No edema.      Comments: s/p L BKA with dressing in place.   Skin:     General: Skin is warm and dry.   Neurological:      Mental Status: She is alert and oriented to person, place, and time.      Comments: Hard of hearing but able to interpret spoken language, occasionally with repetition.       Significant Labs:   CBC:  Recent Labs   Lab 10/10/23  0457 10/11/23  2036  10/12/23  0546   WBC 23.11* 22.24* 21.48*   HGB 8.8* 8.5* 9.6*   HCT 28.4* 27.5* 29.9*    374 466*   GRAN 80.0* 76.8*  17.1* 75.4*  16.2*   LYMPH 7.0*  CANCELED 8.0*  1.8 9.4*  2.0   MONO 9.0  CANCELED 12.2  2.7* 11.3  2.4*   EOS CANCELED 0.2 0.1   BASO CANCELED 0.09 0.12     BMP:  Recent Labs   Lab 10/10/23  0457 10/11/23  0411 10/12/23  0546    136 137   K 4.0 4.1 4.3   CL 96 94* 93*   CO2 30* 26 25   BUN 29* 40* 22   CREATININE 4.0* 5.4* 3.5*   * 115* 78   CALCIUM 9.8 10.4 10.4   MG 2.2 2.5 2.3   PHOS 3.0 3.6 3.6   ALBUMIN 1.9* 1.8* 2.0*   ANIONGAP 10 16 19*        Significant Imaging: I have reviewed and interpreted all pertinent imaging results/findings within the past 24 hours.

## 2023-10-12 NOTE — PROGRESS NOTES
"Nephrology  Progress Note    Admit Date: 9/17/2023   LOS: 25 days     SUBJECTIVE:     Follow-up For:  Encephalopathy    History of Present Illness:  Patient is a 67 y.o. female presents with left foot gangrene, vomiting, feeling ill.  Ext med hx as outlined below including ESRD on HD MWF in [place].  Has been dealing with necrotic left toes for few months and followed by vascular in [place].  Was here visiting daughter and felt bad.  Admitted for eval/treatment.  Consulted for HD needs.  Pt seen and examined on HD.  Consents signed.  Tired from being up all night with pain.  Updated team and daughter at bedside.  W/up noted.     S/P L BKA (9/29)      Interval History:     seen by tele psych last night.   Remains altered but NAD.  Discussed with daughter at bedside.   Awaiting transport options to [place].     Review of Systems:    Unable to fully access.     OBJECTIVE:     Vital Signs Range (Last 24H):  BP (!) 165/83   Pulse 68   Temp 98.1 °F (36.7 °C) (Oral)   Resp 18   Ht 5' 5" (1.651 m)   Wt 57 kg (125 lb 10.6 oz)   SpO2 97%   BMI 20.91 kg/m²     Temp:  [97.1 °F (36.2 °C)-98.7 °F (37.1 °C)]   Pulse:  [60-74]   Resp:  [14-18]   BP: ()/(55-91)   SpO2:  [96 %-100 %]     I & O (Last 24H):  Intake/Output Summary (Last 24 hours) at 10/12/2023 0734  Last data filed at 10/11/2023 1524  Gross per 24 hour   Intake 250 ml   Output 2500 ml   Net -2250 ml         Physical Exam:  General appearance:  Frail.  Appears older than stated age.  NAD this am.  Confused.  Eyes:  Conjunctivae/corneas clear. PERRL.  Lungs: Normal respiratory effort,   clear to auscultation bilaterally.  Diminished.   Heart: Regular rate and rhythm, S1, S2 normal, no murmur, rub or nelly.  Abdomen: Soft, non-tender non-distended; bowel sounds normal; no masses,  no organomegaly  Extremities: No cyanosis or clubbing. No edema.    Skin: Skin color, texture, turgor normal. No rashes or lesions  Neurologic: Normal strength and tone. No focal numbness " "or weakness   Right arm AVF+  Left BKA wrapped.     Laboratory Data:  Recent Labs   Lab 10/12/23  0546   WBC 21.48*   RBC 3.33*   HGB 9.6*   HCT 29.9*   *   MCV 90   MCH 28.8   MCHC 32.1         BMP:   Recent Labs   Lab 10/12/23  0546   GLU 78      K 4.3   CL 93*   CO2 25   BUN 22   CREATININE 3.5*   CALCIUM 10.4   MG 2.3   PHOS 3.6       Lab Results   Component Value Date    CALCIUM 10.4 10/12/2023    PHOS 3.6 10/12/2023       Lab Results   Component Value Date    CALCIUM 10.4 10/12/2023    PHOS 3.6 10/12/2023       No results found for: "URICACID"    BNP  No results for input(s): "BNP", "BNPTRIAGEBLO" in the last 168 hours.    Medications:  Medication list was reviewed and changes noted under Assessment/Plan.    Diagnostic Results:    reviewed    ASSESSMENT/PLAN:       ESRD on HD MWF in [place]:  -consulted for HD needs.  -consents signed  -continue HD MWF while here and if she stays after hospitalization will need temp outpt unit.   -labs noted and bath adjusted.  -R arm AVF+  no need today. See on HD tomorrow      S/p BKA on 9/29  -defer to pod/vascular/cards.  -angio noted with severe PAD  -Status post exploration femoral and popliteal arteries 9/22 with Non-reconstructible peripheral vascular disease.      HTN:  -UF on HD  -A little over goal titrated Coreg to 25 BID 10/7  -labile so hold parameters.      MBD/hypercalcemia  -binders/nephrocaps.   Changed to renvela.  Pt states that she also takes tums at home  -using calcitriol on HD days  -No longer on calcium supplements and binder switched to non-calcium  -Low calcium bath ordered.  -Suspect both cough and hypercalcemia related to bedbound status, needs more activity     Anemia of CKD:  -Received transfusion 1 unit 9/29  -Hgb below goal  -FAREED with HD  -Iron stores low but ^^ferritin precludes use of Venofer  -Vit C.     Suspected CVA:  -defer to neuro.  -mental status seems to be worse.       Dispo:  -awaiting family dynamics to play out.  -defer " to palliative care.       Stable for DC/Transfer to facility from Renal.   Will follow for renal needs.

## 2023-10-12 NOTE — PLAN OF CARE
Problem: Infection  Goal: Absence of Infection Signs and Symptoms  Outcome: Ongoing, Progressing     Problem: Adult Inpatient Plan of Care  Goal: Plan of Care Review  Outcome: Ongoing, Progressing  Goal: Patient-Specific Goal (Individualized)  Outcome: Ongoing, Progressing  Goal: Absence of Hospital-Acquired Illness or Injury  Outcome: Ongoing, Progressing  Goal: Optimal Comfort and Wellbeing  Outcome: Ongoing, Progressing  Goal: Readiness for Transition of Care  Outcome: Ongoing, Progressing     Problem: Diabetes Comorbidity  Goal: Blood Glucose Level Within Targeted Range  Outcome: Ongoing, Progressing     Problem: Impaired Wound Healing  Goal: Optimal Wound Healing  Outcome: Ongoing, Progressing     Problem: Skin Injury Risk Increased  Goal: Skin Health and Integrity  Outcome: Ongoing, Progressing     Problem: Device-Related Complication Risk (Hemodialysis)  Goal: Safe, Effective Therapy Delivery  Outcome: Ongoing, Progressing     Problem: Infection (Extremity Amputation)  Goal: Absence of Infection Signs and Symptoms  Outcome: Ongoing, Progressing     Problem: Pain (Extremity Amputation)  Goal: Acceptable Pain Control  Outcome: Ongoing, Progressing     Problem: Coping Ineffective  Goal: Effective Coping  Outcome: Ongoing, Progressing

## 2023-10-12 NOTE — SUBJECTIVE & OBJECTIVE
"Interval History: No acute events overnight. Await placement options. Reports "just not that interested" in eating this AM; discussed having symptoms of anhedonia and poor appetite prior to her travel to [place].    Review of Systems   Constitutional:  Negative for chills and fever.   Respiratory:  Negative for cough and shortness of breath.    Cardiovascular:  Negative for chest pain and palpitations.   Gastrointestinal:  Negative for abdominal pain, nausea and vomiting.     Objective:     Vital Signs (Most Recent):  Temp: 98.1 °F (36.7 °C) (10/11/23 1925)  Pulse: 68 (10/11/23 1925)  Resp: 16 (10/11/23 1925)  BP: 139/64 (10/11/23 1925)  SpO2: 98 % (10/11/23 1925) Vital Signs (24h Range):  Temp:  [97.1 °F (36.2 °C)-99.5 °F (37.5 °C)] 98.1 °F (36.7 °C)  Pulse:  [60-72] 68  Resp:  [14-18] 16  SpO2:  [97 %-100 %] 98 %  BP: ()/(55-80) 139/64     Weight: 57 kg (125 lb 10.6 oz)  Body mass index is 20.91 kg/m².    Intake/Output Summary (Last 24 hours) at 10/11/2023 2005  Last data filed at 10/11/2023 1524  Gross per 24 hour   Intake 250 ml   Output 2500 ml   Net -2250 ml           Physical Exam  Vitals and nursing note reviewed.   Constitutional:       General: She is not in acute distress.     Appearance: She is well-developed.   HENT:      Head: Normocephalic and atraumatic.   Eyes:      General:         Right eye: No discharge.         Left eye: No discharge.      Conjunctiva/sclera: Conjunctivae normal.   Cardiovascular:      Rate and Rhythm: Normal rate.      Pulses: Normal pulses.   Pulmonary:      Effort: Pulmonary effort is normal. No respiratory distress.   Abdominal:      Palpations: Abdomen is soft.      Tenderness: There is no abdominal tenderness.   Musculoskeletal:         General: Normal range of motion.      Right lower leg: No edema.      Comments: s/p L BKA with dressing in place.   Skin:     General: Skin is warm and dry.   Neurological:      Mental Status: She is alert and oriented to person, " place, and time.      Comments: Hard of hearing but able to interpret spoken language, occasionally with repetition.       Significant Labs:   CBC:  Recent Labs   Lab 10/09/23  0512 10/10/23  0457 10/11/23  0411   WBC 19.54* 23.11* 22.24*   HGB 9.3* 8.8* 8.5*   HCT 29.3* 28.4* 27.5*    376 374   GRAN 81.5*  15.9* 80.0* 76.8*  17.1*   LYMPH 6.2*  1.2 7.0*  CANCELED 8.0*  1.8   MONO 8.8  1.7* 9.0  CANCELED 12.2  2.7*   EOS 0.2 CANCELED 0.2   BASO 0.10 CANCELED 0.09     BMP:  Recent Labs   Lab 10/09/23  0512 10/10/23  0457 10/11/23  0411    136 136   K 4.4 4.0 4.1   CL 97 96 94*   CO2 26 30* 26   BUN 55* 29* 40*   CREATININE 6.7* 4.0* 5.4*   GLU 77 122* 115*   CALCIUM 10.4 9.8 10.4   MG 2.5 2.2 2.5   PHOS 4.5 3.0 3.6   ALBUMIN 1.9* 1.9* 1.8*   ANIONGAP 14 10 16        Significant Imaging: I have reviewed and interpreted all pertinent imaging results/findings within the past 24 hours.

## 2023-10-12 NOTE — PROGRESS NOTES
"[facility] Surg ([address] Medicine  Progress Note    Patient Name: Shannan Figueredo  MRN: [id number]  Patient Class: IP- Inpatient   Admission Date: 9/17/2023  Length of Stay: 24 days  Attending Physician: JOSE Cristina MD  Primary Care Provider: Roxi, Primary Doctor        Subjective:     Principal Problem:Encephalopathy        HPI:  Ms. Figueredo is a 67/F with PMH HTN, DMII (diet-controlled), PVD, ESRD on HD (M/W/F), anemia who presented to [facility] 09/17 with a two day progressive history of worsening L foot pain, swelling, fatigue, nausea and vomiting. History obtained from patient and daughter Stephanie ([phone]); they are from the [place], [place] area. Patient reports several weeks ago she "stubbed her toe," noting initially some pain, redness and swelling. Gradually symptoms progressed with duskiness to her toe and she informed her daughter of her pain for which she was brought to ER in [place] on 08/25 for further evaluation. With duskiness to 1st toe and surrounding erythema she was treated for cellulitis with a course of ciprofloxacin and metronidazole, but failed to improve. She followed up with her podiatrist 09/12 where she was prescribed hydrocodone-acetaminophen and a ten day course of TMP-SMX which she has been taking. She was referred to vascular surgery with plan for angiography and potential intervention  She and her family came to [place] for the weekend but her pain worsened; had been attempting to minimize use of hydrocodone-acetaminophen but developed nausea, vomiting and progressive fatigue. She subsequently presented to ED for further evaluation. ED workup was notable for L 1st toe dry-appearing gangrene with surrounding erythema and swelling, removed nail of second toe, XR L foot demonstrating diffuse osteopenia, L calcaneal and forefoot soft tissue swelling without definitive evidence of osteomyelitis in setting of osteopenia, no leukocytosis, elevated sed rate and " "CRP, and elevated BUN/Cr in setting of ESRD. She notes she went without dialysis on 09/15 due to her trip. Blood cultures were ordered and she received piperacillin-tazobactam and vancomycin. Hospital medicine was subsequently contacted for admission.      Overview/Hospital Course:  Admitted with gangrene of L 1st toe.  Empirically treated with Zosyn and vancomycin.  Vascular Surgery, nephrology, podiatry consulted; U/S arterial demonstrated L SFA occlusion with distal reconstitution. Cardiology consulted, angiography with no vessels amenable for angioplasty on 9/20.  Underwent exploration of the left lower extremity vessels, femoral and popliteal bypass unable to be performed due to no adequate available vessels for bypass on 9/22. Discussed potential intervention here vs [place], patient and family opted for surgery in [place]. Underwent L BKA 09/29. PT/OT recommended SNF.      Interval History: No acute events overnight. Await placement options. Reports "just not that interested" in eating this AM; discussed having symptoms of anhedonia and poor appetite prior to her travel to [place].    Review of Systems   Constitutional:  Negative for chills and fever.   Respiratory:  Negative for cough and shortness of breath.    Cardiovascular:  Negative for chest pain and palpitations.   Gastrointestinal:  Negative for abdominal pain, nausea and vomiting.     Objective:     Vital Signs (Most Recent):  Temp: 98.1 °F (36.7 °C) (10/11/23 1925)  Pulse: 68 (10/11/23 1925)  Resp: 16 (10/11/23 1925)  BP: 139/64 (10/11/23 1925)  SpO2: 98 % (10/11/23 1925) Vital Signs (24h Range):  Temp:  [97.1 °F (36.2 °C)-99.5 °F (37.5 °C)] 98.1 °F (36.7 °C)  Pulse:  [60-72] 68  Resp:  [14-18] 16  SpO2:  [97 %-100 %] 98 %  BP: ()/(55-80) 139/64     Weight: 57 kg (125 lb 10.6 oz)  Body mass index is 20.91 kg/m².    Intake/Output Summary (Last 24 hours) at 10/11/2023 2005  Last data filed at 10/11/2023 1524  Gross per 24 hour   Intake " 250 ml   Output 2500 ml   Net -2250 ml           Physical Exam  Vitals and nursing note reviewed.   Constitutional:       General: She is not in acute distress.     Appearance: She is well-developed.   HENT:      Head: Normocephalic and atraumatic.   Eyes:      General:         Right eye: No discharge.         Left eye: No discharge.      Conjunctiva/sclera: Conjunctivae normal.   Cardiovascular:      Rate and Rhythm: Normal rate.      Pulses: Normal pulses.   Pulmonary:      Effort: Pulmonary effort is normal. No respiratory distress.   Abdominal:      Palpations: Abdomen is soft.      Tenderness: There is no abdominal tenderness.   Musculoskeletal:         General: Normal range of motion.      Right lower leg: No edema.      Comments: s/p L BKA with dressing in place.   Skin:     General: Skin is warm and dry.   Neurological:      Mental Status: She is alert and oriented to person, place, and time.      Comments: Hard of hearing but able to interpret spoken language, occasionally with repetition.       Significant Labs:   CBC:  Recent Labs   Lab 10/09/23  0512 10/10/23  0457 10/11/23  0411   WBC 19.54* 23.11* 22.24*   HGB 9.3* 8.8* 8.5*   HCT 29.3* 28.4* 27.5*    376 374   GRAN 81.5*  15.9* 80.0* 76.8*  17.1*   LYMPH 6.2*  1.2 7.0*  CANCELED 8.0*  1.8   MONO 8.8  1.7* 9.0  CANCELED 12.2  2.7*   EOS 0.2 CANCELED 0.2   BASO 0.10 CANCELED 0.09     BMP:  Recent Labs   Lab 10/09/23  0512 10/10/23  0457 10/11/23  0411    136 136   K 4.4 4.0 4.1   CL 97 96 94*   CO2 26 30* 26   BUN 55* 29* 40*   CREATININE 6.7* 4.0* 5.4*   GLU 77 122* 115*   CALCIUM 10.4 9.8 10.4   MG 2.5 2.2 2.5   PHOS 4.5 3.0 3.6   ALBUMIN 1.9* 1.9* 1.8*   ANIONGAP 14 10 16        Significant Imaging: I have reviewed and interpreted all pertinent imaging results/findings within the past 24 hours.      Assessment/Plan:      * Encephalopathy  - Likely multifactorial encephalopathy with components of delirium, toxic encephalopathy,  "metabolic encephalopathy, and potential baseline cognitive impairment all contributing.  - Suspected increased sedation due to pain medications / gabapentin; stopped gabapentin on 10/5 and adjusted pain control to PO dilaudid at low dose / decreased frequency with improvement in mentation. Likely some baseline cognitive impairment with worsening due to medical issues.  - 10/07 patient developed worsening hearing loss and inability to comprehend written word sometime after waking. Daughter and staff reported she was normal the prior night, without clear timing of deficit.  - Patient already on ASA and plavix, no hypotensive episode prior to explain symptoms.  - CT Head motion-limited but with sequela of chronic microvascular ischemic changes/senescent changes. MRI Brain with continued motion-limited artifact with small focus of diffusion signal abnormality in R frontal lobe with acute infarct not excluded.  - Given already on appropriate vascular treatment with her underlying PVD, little to adjust at this time. Appreciate neurology assistance.  - Improving and more stable. Daughters report hard of hearing at baseline and had hearing aids but has not used consistently in recent past. Encouraged locating / active use of hearing aids.    Gangrene of toe  HTN, PVD, encephalopathy  - L 1st toe gangrene predominantly dry with some surrounding erythema/swelling; with concern for worsening infection started piperacillin-tazobactam and vancomycin IV. 2nd toe with involvement as well.  - U/S arterial BLE showed L distal SFA occlusion with distal reconstitution. Discussed with vascular surgery and cardiology consulted for consideration of angiography / potential angioplasty.  - Podiatry consulted. MRI L foot with "examination markedly degraded by patient motion artifact. Questionable marrow edema within the 1st distal phalanx, not well evaluated given aforementioned limitation but possibly related to osteomyelitis given " "reported history of gangrene."  - Blood cultures showed no growth.  - s/p angiogram remarkable for severe PAD with no areas amenable to angioplasty on 9/20. Elected to undergo attempt at revascularization 09/22 but unable to perform femoral and popliteal bypass on 9/22. Discussed surgical options with BKA only viable for appropriate healing given extent of vascular disease.   - Underwent BKA on 09/30. Leukocytosis improving. Therapy recommending SNF. Pursuing placement.  - Given possible decreased clearance of pain medications, stopped hydrocodone-acetaminophen 5-325mg PO q6hr PRN, oxycodone-acetaminophen 7.5-325mg PO q6hr PRN. Started PO dilaudid instead and Neurontin on 10/3  - Increased sedation since getting Neurontin, changed it to 100 mg QHS on 10/4 and stopped it on 10/5 and with continued improvement in mentation. Patient with likely some baseline cognitive impairment with worsening due to resolving medical issues and over sedation which was almost fully resolved to baseline yesterday but with new development today (see above)  - WBC increased but plateaued. No fever, repeat CXR overall unrevealing and no other source of infection identified. Likely delayed leukemoid reaction. Case discussed with Dr. Cobb and he evaluated the surgical site with no signs of infection noted  - Will continue to monitor for now and consider repeat cultures if spikes a fever   - Continue carvedilol 12.5mg PO BID, losartan 100mg PO daily, nifedipine 60mg PO BID  - Discharge planning with SNF    Type 2 diabetes mellitus with chronic kidney disease on chronic dialysis  - Reports diet-controlled; no current medications.  - HgbA1c 5.7.  - Continue low-dose sliding scale insulin aspart 0-5U subQ TIDWM PRN, monitor requirements.    ESRD (end stage renal disease)  Anemia, hyperparathyroidism  - ESRD on HD, reports missed session Friday prior to presentation due to travel.  - Mild anemia without evidence of bleeding.  - Continue " allopurinol 300mg PO daily, calcium acetate 2001mg PO TIDWM.  - Transfused 1U pRBCs 09/25 with appropriate response.  - Nephrology consulted for HD assistance.      VTE Risk Mitigation (From admission, onward)         Ordered     IP VTE HIGH RISK PATIENT  Once         09/28/23 1906                Discharge Planning   LUBNA:      Code Status: Full Code   Is the patient medically ready for discharge?:     Reason for patient still in hospital (select all that apply): Pending disposition  Discharge Plan A: Skilled Nursing Facility   Discharge Delays: (!) Post-Acute Set-up              D Angus Cristina MD  Department of Hospital Medicine   [facility] - Med Surg ([address])

## 2023-10-12 NOTE — PROGRESS NOTES
"[facility] Surg ([address] Medicine  Progress Note    Patient Name: Shanann Figueredo  MRN: [id number]  Patient Class: IP- Inpatient   Admission Date: 9/17/2023  Length of Stay: 25 days  Attending Physician: JOSE Cristina MD  Primary Care Provider: Roxi, Primary Doctor        Subjective:     Principal Problem:Encephalopathy        HPI:  Ms. Figueredo is a 67/F with PMH HTN, DMII (diet-controlled), PVD, ESRD on HD (M/W/F), anemia who presented to [facility] 09/17 with a two day progressive history of worsening L foot pain, swelling, fatigue, nausea and vomiting. History obtained from patient and daughter Stephanie ([phone]); they are from the [place], [place] area. Patient reports several weeks ago she "stubbed her toe," noting initially some pain, redness and swelling. Gradually symptoms progressed with duskiness to her toe and she informed her daughter of her pain for which she was brought to ER in [place] on 08/25 for further evaluation. With duskiness to 1st toe and surrounding erythema she was treated for cellulitis with a course of ciprofloxacin and metronidazole, but failed to improve. She followed up with her podiatrist 09/12 where she was prescribed hydrocodone-acetaminophen and a ten day course of TMP-SMX which she has been taking. She was referred to vascular surgery with plan for angiography and potential intervention  She and her family came to [place] for the weekend but her pain worsened; had been attempting to minimize use of hydrocodone-acetaminophen but developed nausea, vomiting and progressive fatigue. She subsequently presented to ED for further evaluation. ED workup was notable for L 1st toe dry-appearing gangrene with surrounding erythema and swelling, removed nail of second toe, XR L foot demonstrating diffuse osteopenia, L calcaneal and forefoot soft tissue swelling without definitive evidence of osteomyelitis in setting of osteopenia, no leukocytosis, elevated sed rate and " CRP, and elevated BUN/Cr in setting of ESRD. She notes she went without dialysis on 09/15 due to her trip. Blood cultures were ordered and she received piperacillin-tazobactam and vancomycin. Hospital medicine was subsequently contacted for admission.      Overview/Hospital Course:  Admitted with gangrene of L 1st toe.  Empirically treated with Zosyn and vancomycin.  Vascular Surgery, nephrology, podiatry consulted; U/S arterial demonstrated L SFA occlusion with distal reconstitution. Cardiology consulted, angiography with no vessels amenable for angioplasty on 9/20.  Underwent exploration of the left lower extremity vessels, femoral and popliteal bypass unable to be performed due to no adequate available vessels for bypass on 9/22. Discussed potential intervention here vs [place], patient and family opted for surgery in [place]. Underwent L BKA 09/29. PT/OT recommended SNF.      Interval History: No acute events overnight. Drowsy in morning but much improved later in the day. Discussed plan of care.    Review of Systems   Constitutional:  Negative for chills and fever.   Respiratory:  Negative for cough and shortness of breath.    Cardiovascular:  Negative for chest pain and palpitations.   Gastrointestinal:  Negative for abdominal pain, nausea and vomiting.     Objective:     Vital Signs (Most Recent):  Temp: 97.9 °F (36.6 °C) (10/12/23 1628)  Pulse: 80 (10/12/23 1628)  Resp: 18 (10/12/23 1628)  BP: 135/64 (10/12/23 1628)  SpO2: 96 % (10/12/23 1628) Vital Signs (24h Range):  Temp:  [97.9 °F (36.6 °C)-99.4 °F (37.4 °C)] 97.9 °F (36.6 °C)  Pulse:  [68-80] 80  Resp:  [16-18] 18  SpO2:  [96 %-99 %] 96 %  BP: (135-170)/(64-91) 135/64     Weight: 57 kg (125 lb 10.6 oz)  Body mass index is 20.91 kg/m².  No intake or output data in the 24 hours ending 10/12/23 2002        Physical Exam  Vitals and nursing note reviewed.   Constitutional:       General: She is not in acute distress.     Appearance: She is well-developed.    HENT:      Head: Normocephalic and atraumatic.   Eyes:      General:         Right eye: No discharge.         Left eye: No discharge.      Conjunctiva/sclera: Conjunctivae normal.   Cardiovascular:      Rate and Rhythm: Normal rate.      Pulses: Normal pulses.   Pulmonary:      Effort: Pulmonary effort is normal. No respiratory distress.   Abdominal:      Palpations: Abdomen is soft.      Tenderness: There is no abdominal tenderness.   Musculoskeletal:         General: Normal range of motion.      Right lower leg: No edema.      Comments: s/p L BKA with dressing in place.   Skin:     General: Skin is warm and dry.   Neurological:      Mental Status: She is alert and oriented to person, place, and time.      Comments: Hard of hearing but able to interpret spoken language, occasionally with repetition.       Significant Labs:   CBC:  Recent Labs   Lab 10/10/23  0457 10/11/23  0411 10/12/23  0546   WBC 23.11* 22.24* 21.48*   HGB 8.8* 8.5* 9.6*   HCT 28.4* 27.5* 29.9*    374 466*   GRAN 80.0* 76.8*  17.1* 75.4*  16.2*   LYMPH 7.0*  CANCELED 8.0*  1.8 9.4*  2.0   MONO 9.0  CANCELED 12.2  2.7* 11.3  2.4*   EOS CANCELED 0.2 0.1   BASO CANCELED 0.09 0.12     BMP:  Recent Labs   Lab 10/10/23  0457 10/11/23  0411 10/12/23  0546    136 137   K 4.0 4.1 4.3   CL 96 94* 93*   CO2 30* 26 25   BUN 29* 40* 22   CREATININE 4.0* 5.4* 3.5*   * 115* 78   CALCIUM 9.8 10.4 10.4   MG 2.2 2.5 2.3   PHOS 3.0 3.6 3.6   ALBUMIN 1.9* 1.8* 2.0*   ANIONGAP 10 16 19*        Significant Imaging: I have reviewed and interpreted all pertinent imaging results/findings within the past 24 hours.      Assessment/Plan:      * Encephalopathy  - Likely multifactorial encephalopathy with components of delirium, toxic encephalopathy, metabolic encephalopathy, and potential baseline cognitive impairment all contributing.  - Suspected increased sedation due to pain medications / gabapentin; stopped gabapentin on 10/5 and adjusted  "pain control to PO dilaudid at low dose / decreased frequency with improvement in mentation. Likely some baseline cognitive impairment with worsening due to medical issues.  - 10/07 patient developed worsening hearing loss and inability to comprehend written word sometime after waking. Daughter and staff reported she was normal the prior night, without clear timing of deficit.  - Patient already on ASA and plavix, no hypotensive episode prior to explain symptoms.  - CT Head motion-limited but with sequela of chronic microvascular ischemic changes/senescent changes. MRI Brain with continued motion-limited artifact with small focus of diffusion signal abnormality in R frontal lobe with acute infarct not excluded.  - Given already on appropriate vascular treatment with her underlying PVD, little to adjust at this time. Appreciate neurology assistance.  - Improving and more stable. Daughters report hard of hearing at baseline and had hearing aids but has not used consistently in recent past. Encouraged locating / active use of hearing aids.    Gangrene of toe  HTN, PVD, encephalopathy  - L 1st toe gangrene predominantly dry with some surrounding erythema/swelling; with concern for worsening infection started piperacillin-tazobactam and vancomycin IV. 2nd toe with involvement as well.  - U/S arterial BLE showed L distal SFA occlusion with distal reconstitution. Discussed with vascular surgery and cardiology consulted for consideration of angiography / potential angioplasty.  - Podiatry consulted. MRI L foot with "examination markedly degraded by patient motion artifact. Questionable marrow edema within the 1st distal phalanx, not well evaluated given aforementioned limitation but possibly related to osteomyelitis given reported history of gangrene."  - Blood cultures showed no growth.  - s/p angiogram remarkable for severe PAD with no areas amenable to angioplasty on 9/20. Elected to undergo attempt at revascularization " 09/22 but unable to perform femoral and popliteal bypass on 9/22. Discussed surgical options with BKA only viable for appropriate healing given extent of vascular disease.   - Underwent BKA on 09/30. Leukocytosis improving. Therapy recommending SNF. Pursuing placement.  - Given possible decreased clearance of pain medications, stopped hydrocodone-acetaminophen 5-325mg PO q6hr PRN, oxycodone-acetaminophen 7.5-325mg PO q6hr PRN. Started PO dilaudid instead and Neurontin on 10/3  - Increased sedation since getting Neurontin, changed it to 100 mg QHS on 10/4 and stopped it on 10/5 and with continued improvement in mentation. Patient with likely some baseline cognitive impairment with worsening due to resolving medical issues and over sedation which was almost fully resolved to baseline yesterday but with new development today (see above)  - WBC increased but plateaued. No fever, repeat CXR overall unrevealing and no other source of infection identified. Likely delayed leukemoid reaction. Case discussed with Dr. Cobb and he evaluated the surgical site with no signs of infection noted  - Will continue to monitor for now and consider repeat cultures if spikes a fever   - Continue carvedilol 12.5mg PO BID, losartan 100mg PO daily, nifedipine 60mg PO BID  - Discharge planning with SNF    Type 2 diabetes mellitus with chronic kidney disease on chronic dialysis  - Reports diet-controlled; no current medications.  - HgbA1c 5.7.  - Continue low-dose sliding scale insulin aspart 0-5U subQ TIDWM PRN, monitor requirements.    ESRD (end stage renal disease)  Anemia, hyperparathyroidism  - ESRD on HD, reports missed session Friday prior to presentation due to travel.  - Mild anemia without evidence of bleeding.  - Continue allopurinol 300mg PO daily, calcium acetate 2001mg PO TIDWM.  - Transfused 1U pRBCs 09/25 with appropriate response.  - Nephrology consulted for HD assistance.    VTE Risk Mitigation (From admission, onward)          Ordered     IP VTE HIGH RISK PATIENT  Once         09/28/23 1906                Discharge Planning   LUBNA:      Code Status: Full Code   Is the patient medically ready for discharge?:     Reason for patient still in hospital (select all that apply): Pending disposition  Discharge Plan A: Skilled Nursing Facility   Discharge Delays: (!) Post-Acute Set-up              D Angus Cristina MD  Department of Hospital Medicine   [facility] - [facility] Surg ([address])

## 2023-10-12 NOTE — PLAN OF CARE
Problem: Occupational Therapy  Goal: Occupational Therapy Goal  Description: OT evaluation completed s/p LLE BKA with goals updated as appropriate.      Goals to be met by: 10/15/2023     Patient will increase functional independence with ADLs by performing:    UE Dressing with Moderate Assistance.  LE Dressing with Maximum Assistance.  Grooming while EOB with Minimum Assistance.  Toileting from bedside commode with Maximum Assistance for hygiene and clothing management.   Bathing from sitting at sink with Moderate Assistance.  Toilet transfer to bedside commode with Maximum Assistance.        Outcome: Ongoing, Not Progressing  Pt is Total A with all ADL and ADL mobility.

## 2023-10-12 NOTE — PROGRESS NOTES
[facility], [place], operated by [facility] - Med Surg ([address])  Wound Care    Patient Name:  Shannan Figueredo   MRN:  [id number]  Date: 10/12/2023  Diagnosis: Encephalopathy    History:     Past Medical History:   Diagnosis Date    Coronary artery disease     Diabetes mellitus, type II     End stage renal disease     Hyperlipidemia     Hypertension     Peripheral vascular disease        Social History     Socioeconomic History    Marital status:    Tobacco Use    Smoking status: Never    Smokeless tobacco: Never   Substance and Sexual Activity    Alcohol use: Never    Drug use: Never     Social Determinants of Health     Financial Resource Strain: Low Risk  (9/17/2023)    Overall Financial Resource Strain (CARDIA)     Difficulty of Paying Living Expenses: Not hard at all   Food Insecurity: No Food Insecurity (9/17/2023)    Hunger Vital Sign     Worried About Running Out of Food in the Last Year: Never true     Ran Out of Food in the Last Year: Never true   Transportation Needs: No Transportation Needs (9/17/2023)    PRAPARE - Transportation     Lack of Transportation (Medical): No     Lack of Transportation (Non-Medical): No   Physical Activity: Inactive (9/17/2023)    Exercise Vital Sign     Days of Exercise per Week: 0 days     Minutes of Exercise per Session: 0 min   Stress: Stress Concern Present (9/17/2023)    [employer] [place] [employer] - Occupational Stress Questionnaire     Feeling of Stress : Very much   Social Connections: Socially Isolated (9/17/2023)    Social Connection and Isolation Panel [NHANES]     Frequency of Communication with Friends and Family: Twice a week     Frequency of Social Gatherings with Friends and Family: Never     Attends Sabianism Services: 1 to 4 times per year     Active Member of Clubs or Organizations: No     Attends Club or Organization Meetings: Never     Marital Status:    Housing Stability: Low Risk  (9/17/2023)    Housing Stability Vital Sign     Unable to Pay for Housing  in the Last Year: No     Number of Places Lived in the Last Year: 1     Unstable Housing in the Last Year: No       Precautions:     Allergies as of 09/17/2023    (No Known Allergies)       [facility] Assessment Details/Treatment     Wound care follow up:     Met with patient this morning. Her daughter at bedside during assessment. Did not remove all triad paste to prevent additional tearing of the skin. Upon assessment to sacral injury noted thin tissue blistering with small amount of slough formation, dried exudate to open areas of skin and decrease in purple discoloration to wound edges. R buttock wound with dried fibrinous exudate to wound bed. Scant serous drainage noted to bed pad.     Recommend switching to santyl ointment to provide enzymatic debridement. Nursing and MD team notified. Orders modified. Pressure injury preventions measures in place. Will order immerse support surface. Wound care to follow pt.         10/12/23 0909        Altered Skin Integrity 10/07/23 1000 Sacral spine Purple or maroon localized area of discolored intact skin or non-intact skin or a blood-filled blister.   Date First Assessed/Time First Assessed: 10/07/23 1000   Altered Skin Integrity Present on Admission - Did Patient arrive to the hospital with altered skin?: suspected hospital acquired  Location: Sacral spine  Description of Altered Skin Integrity: P...   Wound Image    Description of Altered Skin Integrity Purple or maroon localized area of discolored intact skin or non-intact skin or a blood-filled blister.   Dressing Appearance Open to air   Drainage Amount Scant   Drainage Characteristics/Odor Serous;No odor   Appearance Purple;Blistered;Red;Slough;Moist   Periwound Area Blistered;Purple   Wound Edges Irregular;Jagged;Open   Care Cleansed with:;Antimicrobial agent;Applied:;Skin Barrier  (Triad paste)   Dressing Other (comment)  (Open to air)   Dressing Change Due 10/13/23           10/12/2023

## 2023-10-12 NOTE — PHYSICIAN QUERY
PT Name: Shannan Figueredo  MR #: [id number]     DOCUMENTATION CLARIFICATION     CDS/: Stacie Miner RN BSN              Contact information:[email or web address]  This form is a permanent document in the medical record.     Query Date: October 12, 2023    By submitting this query, we are merely seeking further clarification of documentation.  Please utilize your independent clinical judgment when addressing the question(s) below.  Provider, please provide the integumentary diagnosis related to the documentation of Right buttock  The Medical Record contains the following:    10/10/2023  Wound Care Progress note  Right buttocks  Note adjacent 1x1cm partial thickness wound to right buttock .  Unsure if further purple discoloration will present itself.       Patient has been on pressure injury prevention, a low air loss bed, has nutritional supplements offered but may only eat 3 bites of a meal. Dietitian is currently following . Feel nutrition is a large factor in the decline of her skin integrity.Applied triad hydrophilic wound dressing to entire area.  Re-educated family we need to turn from right to left every 2 hours and pad bony prominences with pillows       The clinical guidelines noted are only a system guideline. It does not replace the providers clinical judgment.    Per the National Pressure Injury Advisory Panel:   A pressure injury is localized damage to the skin and underlying soft tissue usually over a bony prominence or related to a medical or other device. The injury can present as intact skin or an open ulcer and may be painful. The injury occurs as a result of intense and/or prolonged pressure or pressure in combination with shear. The tolerance of soft tissue for pressure and shear may also be affected by microclimate, nutrition, perfusion, co-morbidities and condition of the soft tissue.       Stage 1 Pressure Injury:  Intact skin with a localized area of non-blanchable erythema, which  may appear differently in darkly pigmented skin. Color changes do not include purple or maroon discoloration; these may indicate deep tissue pressure injury.    Stage 2 Pressure Injury:  Partial-thickness loss of skin with exposed dermis. The wound bed is viable, pink or red, moist, and may also present as an intact or ruptured serum-filled blister.    Stage 3 Pressure Injury:  Full-thickness loss of skin, in which adipose (fat) is visible in the ulcer and granulation tissue and epibole (rolled wound edges) are often present. Slough and/or eschar may be visible. Undermining and tunneling may occur.    Stage 4 Pressure Injury:  Full-thickness skin and tissue loss with exposed or directly palpable fascia, muscle, tendon, ligament, cartilage or bone in the ulcer. Slough and/or eschar may be visible. Epibole (rolled edges), undermining and/or tunneling often occur.    Unstageable Pressure Injury:  Full-thickness skin and tissue loss in which the extent of tissue damage within the ulcer cannot be confirmed because it is obscured by slough or eschar. If slough or eschar is removed, a Stage 3 or Stage 4 pressure injury will be revealed.    Deep Tissue Pressure Injury:  Intact or non-intact skin with localized area of persistent non-blanchable deep red, maroon, purple discoloration or epidermal separation revealing a dark wound bed or blood filled blister. This injury results from intense and/or prolonged pressure and shear forces at the bone-muscle interface. The wound may evolve rapidly to reveal the actual extent of tissue injury, or may resolve without tissue loss. If necrotic tissue, subcutaneous tissue, granulation tissue, fascia, muscle or other underlying structures are visible, this indicates a full thickness pressure injury (Unstageable, Stage 3 or Stage 4). Do not use DTPI to describe vascular, traumatic, neuropathic, or dermatologic conditions.   Medical Device Related Pressure Injury: This describes an  etiology. Medical device related pressure injuries result from the use of devices designed and applied for diagnostic or therapeutic purposes. The resultant pressure injury generally conforms to the pattern or shape of the device. The injury should be staged using the staging system.    Mucosal Membrane Pressure Injury: Mucosal membrane pressure injury is found on mucous membranes with a history of a medical device in use at the location of the injury. Due to the anatomy of the tissue these ulcers cannot be staged.       Provider, please provide the integumentary diagnosis related to the documentation of Right Buttock:     [ X ] Pressure Injury/Decubitus Ulcer, Stage 2   [   ] Other Integumentary Diagnosis (please specify):______________       Please document in your progress notes daily for the duration of treatment until resolved and include in your discharge summary.    Reference:    ALEXIA Villar., Jesus, J. M., Goldberg, M., JUANITA Winter., JUANITA Fletcher., & LUIS Dumont. (2016). Revised National Pressure Ulcer Advisory Panel Pressure Injury Staging System: Revised Pressure Injury Staging System. J Wound Ostomy Continence Nurs, 43(6), 585-597. doi:10.1097/won.6334699310221758    Form No.[id number]

## 2023-10-12 NOTE — PLAN OF CARE
ANTONIA spoke to Pt's daughter, Stephanie, [phone], to inquire about final choice for SNF. Stephanie states she has chosen [facility]in [place], [place], [phone], and provided the name of Admissions Coordinator, Perla Espinal.     ANTONIA called [facility], left a message for Perla.

## 2023-10-12 NOTE — NURSING
Plan of care reviewed with patient and family.  Patients daughter remains at the bedside.  Patient refused PO medication throughout shift , Dr. Cristina notified.  Soap suds enema complete per orders, No BM noted, patient is passing flatus.  Blood Glucose monitored.  VSS on room air.  Patient turned every two hours with wedge and pillows in place.  Wound care complete per orders.  Safety maintained, bed in lowest position,call bell within reach, bed alarm set.  Will continue to monitor.

## 2023-10-12 NOTE — PT/OT/SLP PROGRESS
Occupational Therapy   Treatment    Name: Shannan Figueredo  MRN: [id number]  Admitting Diagnosis:  Encephalopathy  13 Days Post-Op    Recommendations:     Discharge Recommendations: nursing facility, skilled  Discharge Equipment Recommendations:   (Drop arm BSC, Tutu height lightweight w/c,removable arm rests, elevating leg rests, anti tippers, wheelchair cushion, left residual limb support for wheelchair; ;IF PT IS NOT A CANDIDATE FOR A LE PROSTHESIS, WILL NEED A CUSTOM WHEELCHAIR SEATING SYSTEM)  Barriers to discharge:  Inaccessible home environment, Decreased caregiver support (Current mental and functional level)    Assessment:     Shannan Figueredo is a 67 y.o. female with a medical diagnosis of Encephalopathy.  She presents with daughter and sister in room.  Pt requiring Total A with all  ADL and ADL mobility.  Performance deficits affecting function are weakness, impaired endurance, impaired self care skills, impaired functional mobility, gait instability, impaired balance, impaired cognition, decreased coordination, impaired skin, orthopedic precautions, decreased safety awareness, decreased lower extremity function, decreased ROM, decreased upper extremity function, impaired cardiopulmonary response to activity, impaired muscle length, impaired joint extensibility.     Rehab Prognosis:   Guarded ; patient would benefit from acute skilled OT services to address these deficits and reach maximum level of function.       Plan:     Patient to be seen 3 x/week to address the above listed problems via self-care/home management, therapeutic activities, therapeutic exercises, neuromuscular re-education  Plan of Care Expires: 10/21/23  Plan of Care Reviewed with: patient, daughter, sibling    Subjective     Chief Complaint: None from pt except expressing pain with movement of left BKA.  Patient/Family Comments/goals: Pt's daughter reporting that pt is hopefully going to discharge to SNF tomorrow.  Pain/Comfort:  Pain  Rating 1:  (Expressing discomfort/pain when left LE is moved.)  Location - Side 1: Left  Location 1: leg (New BKA)  Pain Addressed 1: Pre-medicate for activity, Reposition, Distraction, Cessation of Activity  Pain Rating Post-Intervention 1:  (No objective signs of pain at rest.)    Objective:     Communicated with: nurse and MD prior to session.  Patient found HOB elevated with peripheral IV (left LE residual limb wrapped in ACE wrap) upon OT entry to room.    General Precautions: Standard, diabetic, fall (New left BKA)    Orthopedic Precautions: (New Left BKA)  Braces: N/A  Respiratory Status: Room air     Occupational Performance:     Bed Mobility:    Supine to Sit: Total A  Sit to Supine: Total A  Scooting to HOB: Total A x 2      Functional Mobility/Transfers:  Bed <> BSC transfer: Total A stand pivot  Functional Mobility: Pt not attempting to assist.     Activities of Daily Living:  Toileting: Total A x 2; After sitting on BSC for about 15 minutes, pt not able to have BM (pt had enema earlier); Second person needed for hygiene and clothing management  Sponge bathing: Total A      AMPAC 6 Click ADL: 6    Treatment & Education:  Role of OT, POC, ADL and ADL mobility training, sitting upright on BSC to assist with bowel movement,education on pt particiaption, discharge recs    Patient left HOB elevated with all lines intact, call button in reach, nurse notified, and family present    GOALS:   Multidisciplinary Problems       Occupational Therapy Goals          Problem: Occupational Therapy    Goal Priority Disciplines Outcome Interventions   Occupational Therapy Goal     OT, PT/OT Ongoing, Not Progressing    Description: OT evaluation completed s/p LLE BKA with goals updated as appropriate.      Goals to be met by: 10/15/2023     Patient will increase functional independence with ADLs by performing:    UE Dressing with Moderate Assistance.  LE Dressing with Maximum Assistance.  Grooming while EOB with Minimum  Assistance.  Toileting from bedside commode with Maximum Assistance for hygiene and clothing management.   Bathing from sitting at sink with Moderate Assistance.  Toilet transfer to bedside commode with Maximum Assistance.                             Time Tracking:     OT Date of Treatment: 10/12/23  OT Start Time: 1545  OT Stop Time: 1618  OT Total Time (min): 33 min    Billable Minutes:Self Care/Home Management 33    OT/ORGER: OT     Number of ROGER visits since last OT visit: 1    10/12/2023

## 2023-10-12 NOTE — PT/OT/SLP PROGRESS
Physical Therapy      Patient Name:  Shannan Figueredo   MRN:  [id number]    Patient not seen today secondary to nursing care, patient receiving enema. Will follow-up later today as scheduling permits.    Addendum 2:00pm - patient not declining therapy but not agreeing to participate, with usual flat affect and disengaged demeanor.  Will follow up next treatment day.

## 2023-10-12 NOTE — PHYSICIAN QUERY
PT Name: Shannan Figueredo  MR #: [id number]     DOCUMENTATION CLARIFICATION     CDS/: Stacie Miner RN BSN           Contact information:[email or web address]  This form is a permanent document in the medical record.     Query Date: October 12, 2023    By submitting this query, we are merely seeking further clarification of documentation.  Please utilize your independent clinical judgment when addressing the question(s) below.  Provider, please provide the integumentary diagnosis related to the documentation of Sacrum:  The Medical Record contains the following:    10/10/2023  Wound Care Progress note  Sacral wound  Did Patient arrive to the hospital with altered skin?: suspected hospital acquired     Sacral injury appears to be a purple discoloration with thin blistering of the tissues. Some of the blister has ruptured leaving the wound open and edges remain purple and fragile. Feel edges will lift and desquamate    Patient has been on pressure injury prevention, a low air loss bed, has nutritional supplements offered but may only eat 3 bites of a meal. Dietitian is currently following . Feel nutrition is a large factor in the decline of her skin integrity.Applied triad hydrophilic wound dressing to entire area.  Re-educated family we need to turn from right to left every 2 hours and pad bony prominences with pillows       The clinical guidelines noted are only a system guideline. It does not replace the providers clinical judgment.    Per the National Pressure Injury Advisory Panel:   A pressure injury is localized damage to the skin and underlying soft tissue usually over a bony prominence or related to a medical or other device. The injury can present as intact skin or an open ulcer and may be painful. The injury occurs as a result of intense and/or prolonged pressure or pressure in combination with shear. The tolerance of soft tissue for pressure and shear may also be affected by microclimate,  nutrition, perfusion, co-morbidities and condition of the soft tissue.       Stage 1 Pressure Injury:  Intact skin with a localized area of non-blanchable erythema, which may appear differently in darkly pigmented skin. Color changes do not include purple or maroon discoloration; these may indicate deep tissue pressure injury.    Stage 2 Pressure Injury:  Partial-thickness loss of skin with exposed dermis. The wound bed is viable, pink or red, moist, and may also present as an intact or ruptured serum-filled blister.    Stage 3 Pressure Injury:  Full-thickness loss of skin, in which adipose (fat) is visible in the ulcer and granulation tissue and epibole (rolled wound edges) are often present. Slough and/or eschar may be visible. Undermining and tunneling may occur.    Stage 4 Pressure Injury:  Full-thickness skin and tissue loss with exposed or directly palpable fascia, muscle, tendon, ligament, cartilage or bone in the ulcer. Slough and/or eschar may be visible. Epibole (rolled edges), undermining and/or tunneling often occur.    Unstageable Pressure Injury:  Full-thickness skin and tissue loss in which the extent of tissue damage within the ulcer cannot be confirmed because it is obscured by slough or eschar. If slough or eschar is removed, a Stage 3 or Stage 4 pressure injury will be revealed.    Deep Tissue Pressure Injury:  Intact or non-intact skin with localized area of persistent non-blanchable deep red, maroon, purple discoloration or epidermal separation revealing a dark wound bed or blood filled blister. This injury results from intense and/or prolonged pressure and shear forces at the bone-muscle interface. The wound may evolve rapidly to reveal the actual extent of tissue injury, or may resolve without tissue loss. If necrotic tissue, subcutaneous tissue, granulation tissue, fascia, muscle or other underlying structures are visible, this indicates a full thickness pressure injury (Unstageable, Stage 3  or Stage 4). Do not use DTPI to describe vascular, traumatic, neuropathic, or dermatologic conditions.   Medical Device Related Pressure Injury: This describes an etiology. Medical device related pressure injuries result from the use of devices designed and applied for diagnostic or therapeutic purposes. The resultant pressure injury generally conforms to the pattern or shape of the device. The injury should be staged using the staging system.    Mucosal Membrane Pressure Injury: Mucosal membrane pressure injury is found on mucous membranes with a history of a medical device in use at the location of the injury. Due to the anatomy of the tissue these ulcers cannot be staged.       Provider, please provide the integumentary diagnosis related to the documentation of Sacrum:     [ X ] Deep Tissue Pressure Injury not POA (not present on admission)   [   ] Other Integumentary Diagnosis (please specify):______________       Please document in your progress notes daily for the duration of treatment until resolved and include in your discharge summary.    Reference:    ALEXIA Villar., PAGE Lee., Goldberg, M., JUANITA Winter., ALEXIA Fletcher, & LUIS Dumont. (2016). Revised National Pressure Ulcer Advisory Panel Pressure Injury Staging System: Revised Pressure Injury Staging System. J Wound Ostomy Continence Nurs, 43(6), 585-597. doi:10.1097/won.2177306285254450    Form No.[id number]

## 2023-10-13 NOTE — PLAN OF CARE
Problem: Adult Inpatient Plan of Care  Goal: Plan of Care Review  Outcome: Ongoing, Progressing  Goal: Absence of Hospital-Acquired Illness or Injury  Outcome: Ongoing, Progressing  Goal: Optimal Comfort and Wellbeing  Outcome: Ongoing, Progressing     Problem: Infection  Goal: Absence of Infection Signs and Symptoms  Outcome: Ongoing, Progressing     Problem: Diabetes Comorbidity  Goal: Blood Glucose Level Within Targeted Range  Outcome: Ongoing, Progressing     Problem: Impaired Wound Healing  Goal: Optimal Wound Healing  Outcome: Ongoing, Progressing     Problem: Skin Injury Risk Increased  Goal: Skin Health and Integrity  Outcome: Ongoing, Progressing     Problem: Fall Injury Risk  Goal: Absence of Fall and Fall-Related Injury  Outcome: Ongoing, Progressing     Problem: Oral Intake Inadequate  Goal: Improved Oral Intake  Outcome: Ongoing, Progressing     Problem: Bowel Motility Impaired (Extremity Amputation)  Goal: Effective Bowel Elimination  Outcome: Ongoing, Progressing     Problem: Functional Ability Impaired (Extremity Amputation)  Goal: Optimal Functional Ability  Outcome: Ongoing, Progressing     Problem: Pain (Extremity Amputation)  Goal: Acceptable Pain Control  Outcome: Ongoing, Progressing     Problem: Coping Ineffective  Goal: Effective Coping  Outcome: Ongoing, Progressing   POC reviewed with pt's daughter who verbalized understanding. VSS on RA. Remains free of falls and injury. Left BKA dressing intact. Pain treated with PRN meds. BG monitored at bedtime with no coverage needed per MAR. Turned with assist. Resting between care. Call light in reach and rounds made for safety.

## 2023-10-13 NOTE — PROGRESS NOTES
"Nephrology  Progress Note    Admit Date: 9/17/2023   LOS: 26 days     SUBJECTIVE:     Follow-up For:  Encephalopathy    History of Present Illness:  Patient is a 67 y.o. female presents with left foot gangrene, vomiting, feeling ill.  Ext med hx as outlined below including ESRD on HD MWF in [place].  Has been dealing with necrotic left toes for few months and followed by vascular in [place].  Was here visiting daughter and felt bad.  Admitted for eval/treatment.  Consulted for HD needs.  Pt seen and examined on HD.  Consents signed.  Tired from being up all night with pain.  Updated team and daughter at bedside.  W/up noted.     S/P L BKA (9/29)      Interval History:     awaiting HD/BM and then transfer to [place].  Discussed with pt/daughter/SW at bedside.  No c/o this am.     Review of Systems:    Unable to fully access.     OBJECTIVE:     Vital Signs Range (Last 24H):  /64 (BP Location: Left arm, Patient Position: Lying)   Pulse 69   Temp 97.9 °F (36.6 °C) (Axillary)   Resp 18   Ht 5' 5" (1.651 m)   Wt 57 kg (125 lb 10.6 oz)   SpO2 98%   BMI 20.91 kg/m²     Temp:  [97.3 °F (36.3 °C)-99.4 °F (37.4 °C)]   Pulse:  [69-80]   Resp:  [16-18]   BP: (108-162)/(56-72)   SpO2:  [96 %-99 %]     I & O (Last 24H):No intake or output data in the 24 hours ending 10/13/23 0836      Physical Exam:  General appearance:  Frail.  Appears older than stated age.  NAD this am.  Confused at times  Eyes:  Conjunctivae/corneas clear. PERRL.  Lungs: Normal respiratory effort,   clear to auscultation bilaterally.  Diminished.   Heart: Regular rate and rhythm, S1, S2 normal, no murmur, rub or nelly.  Abdomen: Soft, non-tender non-distended; bowel sounds normal; no masses,  no organomegaly  Extremities: No cyanosis or clubbing. No edema.    Skin: Skin color, texture, turgor normal. No rashes or lesions  Neurologic: Normal strength and tone. No focal numbness or weakness   Right arm AVF+  Left BKA wrapped.     Laboratory Data:  Recent " "Labs   Lab 10/13/23  0413   WBC 23.94*   RBC 3.20*   HGB 9.0*   HCT 29.3*      MCV 92   MCH 28.1   MCHC 30.7*         BMP:   Recent Labs   Lab 10/13/23  0412      *   K 4.4   CL 93*   CO2 24   BUN 33*   CREATININE 5.1*   CALCIUM 10.5   MG 2.4   PHOS 4.5       Lab Results   Component Value Date    CALCIUM 10.5 10/13/2023    PHOS 4.5 10/13/2023       Lab Results   Component Value Date    CALCIUM 10.5 10/13/2023    PHOS 4.5 10/13/2023       No results found for: "URICACID"    BNP  No results for input(s): "BNP", "BNPTRIAGEBLO" in the last 168 hours.    Medications:  Medication list was reviewed and changes noted under Assessment/Plan.    Diagnostic Results:    reviewed    ASSESSMENT/PLAN:       ESRD on HD MWF in [place]:  -consulted for HD needs.  -consents signed  -continue HD MWF   -labs noted and bath adjusted.  -R arm AVF+  -HD today and then DC.   -Seen on HD today      S/p BKA on 9/29  -defer to pod/vascular/cards.  -angio noted with severe PAD  -Status post exploration femoral and popliteal arteries 9/22 with Non-reconstructible peripheral vascular disease.      HTN:  -UF on HD  -A little over goal titrated Coreg to 25 BID 10/7  -labile so hold parameters.      MBD/hypercalcemia  -binders/nephrocaps.   Changed to renvela.  Pt states that she also takes tums at home  -using calcitriol on HD days  -No longer on calcium supplements and binder switched to non-calcium  -Low calcium bath ordered.       Anemia of CKD:  -Received transfusion 1 unit 9/29  -Hgb below goal  -FAREED with HD  -Iron stores low but ^^ferritin precludes use of Venofer  -Vit C.     Suspected CVA:  -defer to neuro.  -mental status labile      Dispo:  -awaiting family dynamics to play out.  -defer to palliative care.       Stable for DC/Transfer to facility from Renal.   Will follow for renal needs.         "

## 2023-10-13 NOTE — PLAN OF CARE
Perla ([facility]) reports SNF auth will  tomorrow at midnight - updated clinicals forwarded via [facility] - she will submit for extension

## 2023-10-13 NOTE — PT/OT/SLP PROGRESS
Physical Therapy      Patient Name:  Shannan Figueredo   MRN:  [id number]    Patient not seen today secondary to Dialysis. Will follow-up later today as scheduling permits

## 2023-10-13 NOTE — PLAN OF CARE
Plan to transfer to SNF today after HD as long as patient has bowel movement - meds given yesterday not effective - MD aware

## 2023-10-13 NOTE — PROGRESS NOTES
"[facility] Surg ([address] Medicine  Progress Note    Patient Name: Shannan Figueredo  MRN: [id number]  Patient Class: IP- Inpatient   Admission Date: 9/17/2023  Length of Stay: 26 days  Attending Physician: JOSE Cristina MD  Primary Care Provider: Roxi, Primary Doctor        Subjective:     Principal Problem:Encephalopathy        HPI:  Ms. Figueredo is a 67/F with PMH HTN, DMII (diet-controlled), PVD, ESRD on HD (M/W/F), anemia who presented to [facility] 09/17 with a two day progressive history of worsening L foot pain, swelling, fatigue, nausea and vomiting. History obtained from patient and daughter Stephanie ([phone]); they are from the [place], [place] area. Patient reports several weeks ago she "stubbed her toe," noting initially some pain, redness and swelling. Gradually symptoms progressed with duskiness to her toe and she informed her daughter of her pain for which she was brought to ER in [place] on 08/25 for further evaluation. With duskiness to 1st toe and surrounding erythema she was treated for cellulitis with a course of ciprofloxacin and metronidazole, but failed to improve. She followed up with her podiatrist 09/12 where she was prescribed hydrocodone-acetaminophen and a ten day course of TMP-SMX which she has been taking. She was referred to vascular surgery with plan for angiography and potential intervention  She and her family came to [place] for the weekend but her pain worsened; had been attempting to minimize use of hydrocodone-acetaminophen but developed nausea, vomiting and progressive fatigue. She subsequently presented to ED for further evaluation. ED workup was notable for L 1st toe dry-appearing gangrene with surrounding erythema and swelling, removed nail of second toe, XR L foot demonstrating diffuse osteopenia, L calcaneal and forefoot soft tissue swelling without definitive evidence of osteomyelitis in setting of osteopenia, no leukocytosis, elevated sed rate and " CRP, and elevated BUN/Cr in setting of ESRD. She notes she went without dialysis on 09/15 due to her trip. Blood cultures were ordered and she received piperacillin-tazobactam and vancomycin. Hospital medicine was subsequently contacted for admission.      Overview/Hospital Course:  Admitted with gangrene of L 1st toe.  Empirically treated with Zosyn and vancomycin.  Vascular Surgery, nephrology, podiatry consulted; U/S arterial demonstrated L SFA occlusion with distal reconstitution. Cardiology consulted, angiography with no vessels amenable for angioplasty on 9/20.  Underwent exploration of the left lower extremity vessels, femoral and popliteal bypass unable to be performed due to no adequate available vessels for bypass on 9/22. Discussed potential intervention here vs [place], patient and family opted for surgery in [place]. Underwent L BKA 09/29. PT/OT recommended SNF.      Interval History: No acute events overnight. Seen in HD - denies current complaints.    Review of Systems   Constitutional:  Negative for chills and fever.   Respiratory:  Negative for cough and shortness of breath.    Cardiovascular:  Negative for chest pain and palpitations.   Gastrointestinal:  Negative for abdominal pain, nausea and vomiting.     Objective:     Vital Signs (Most Recent):  Temp: 97.9 °F (36.6 °C) (10/13/23 0741)  Pulse: 69 (10/13/23 0741)  Resp: 18 (10/13/23 0430)  BP: 138/64 (10/13/23 0741)  SpO2: 98 % (10/13/23 0741) Vital Signs (24h Range):  Temp:  [97.3 °F (36.3 °C)-98.5 °F (36.9 °C)] 97.9 °F (36.6 °C)  Pulse:  [69-80] 69  Resp:  [16-18] 18  SpO2:  [96 %-98 %] 98 %  BP: (108-162)/(56-72) 138/64     Weight: 57 kg (125 lb 10.6 oz)  Body mass index is 20.91 kg/m².    Intake/Output Summary (Last 24 hours) at 10/13/2023 3256  Last data filed at 10/13/2023 1415  Gross per 24 hour   Intake 750 ml   Output 1917 ml   Net -1167 ml           Physical Exam  Vitals and nursing note reviewed.   Constitutional:       General: She  is not in acute distress.     Appearance: She is well-developed.   HENT:      Head: Normocephalic and atraumatic.   Eyes:      General:         Right eye: No discharge.         Left eye: No discharge.      Conjunctiva/sclera: Conjunctivae normal.   Cardiovascular:      Rate and Rhythm: Normal rate.      Pulses: Normal pulses.   Pulmonary:      Effort: Pulmonary effort is normal. No respiratory distress.   Abdominal:      Palpations: Abdomen is soft.      Tenderness: There is no abdominal tenderness.   Musculoskeletal:         General: Normal range of motion.      Right lower leg: No edema.      Comments: s/p L BKA with dressing in place.   Skin:     General: Skin is warm and dry.   Neurological:      Mental Status: She is oriented to person, place, and time. She is lethargic.      Comments: Hard of hearing but able to interpret spoken language, occasionally with repetition.       Significant Labs:   CBC:  Recent Labs   Lab 10/11/23  0411 10/12/23  0546 10/13/23  0413   WBC 22.24* 21.48* 23.94*   HGB 8.5* 9.6* 9.0*   HCT 27.5* 29.9* 29.3*    466* 413   GRAN 76.8*  17.1* 75.4*  16.2* 79.4*  19.0*   LYMPH 8.0*  1.8 9.4*  2.0 7.4*  1.8   MONO 12.2  2.7* 11.3  2.4* 10.6  2.5*   EOS 0.2 0.1 0.1   BASO 0.09 0.12 0.07     BMP:  Recent Labs   Lab 10/11/23  0411 10/12/23  0546 10/13/23  0412    137 134*   K 4.1 4.3 4.4   CL 94* 93* 93*   CO2 26 25 24   BUN 40* 22 33*   CREATININE 5.4* 3.5* 5.1*   * 78 102   CALCIUM 10.4 10.4 10.5   MG 2.5 2.3 2.4   PHOS 3.6 3.6 4.5   ALBUMIN 1.8* 2.0* 1.9*   ANIONGAP 16 19* 17*        Significant Imaging: I have reviewed and interpreted all pertinent imaging results/findings within the past 24 hours.      Assessment/Plan:      * Encephalopathy  - Likely multifactorial encephalopathy with components of delirium, toxic encephalopathy, metabolic encephalopathy, and potential baseline cognitive impairment all contributing.  - Suspected increased sedation due to pain  "medications / gabapentin; stopped gabapentin on 10/5 and adjusted pain control to PO dilaudid at low dose / decreased frequency with improvement in mentation. Likely some baseline cognitive impairment with worsening due to medical issues.  - 10/07 patient developed worsening hearing loss and inability to comprehend written word sometime after waking. Daughter and staff reported she was normal the prior night, without clear timing of deficit.  - Patient already on ASA and plavix, no hypotensive episode prior to explain symptoms.  - CT Head motion-limited but with sequela of chronic microvascular ischemic changes/senescent changes. MRI Brain with continued motion-limited artifact with small focus of diffusion signal abnormality in R frontal lobe with acute infarct not excluded.  - Given already on appropriate vascular treatment with her underlying PVD, little to adjust at this time. Appreciate neurology assistance.  - Remaining stable. Daughters report hard of hearing at baseline and had hearing aids but has not used consistently in recent past. Encouraged locating / active use of hearing aids.    Gangrene of toe  HTN, PVD, encephalopathy  - L 1st toe gangrene predominantly dry with some surrounding erythema/swelling; with concern for worsening infection started piperacillin-tazobactam and vancomycin IV. 2nd toe with involvement as well.  - U/S arterial BLE showed L distal SFA occlusion with distal reconstitution. Discussed with vascular surgery and cardiology consulted for consideration of angiography / potential angioplasty.  - Podiatry consulted. MRI L foot with "examination markedly degraded by patient motion artifact. Questionable marrow edema within the 1st distal phalanx, not well evaluated given aforementioned limitation but possibly related to osteomyelitis given reported history of gangrene."  - Blood cultures showed no growth.  - s/p angiogram remarkable for severe PAD with no areas amenable to angioplasty " on 9/20. Elected to undergo attempt at revascularization 09/22 but unable to perform femoral and popliteal bypass on 9/22. Discussed surgical options with BKA only viable for appropriate healing given extent of vascular disease.   - Underwent BKA on 09/30. Leukocytosis improving. Therapy recommending SNF. Pursuing placement.  - Given possible decreased clearance of pain medications, stopped hydrocodone-acetaminophen 5-325mg PO q6hr PRN, oxycodone-acetaminophen 7.5-325mg PO q6hr PRN. Started PO dilaudid instead and Neurontin on 10/3  - Increased sedation since getting Neurontin, changed it to 100 mg QHS on 10/4 and stopped it on 10/5 and with continued improvement in mentation. Patient with likely some baseline cognitive impairment with worsening due to resolving medical issues and over sedation which was almost fully resolved to baseline yesterday but with new development today (see above)  - WBC increased but plateaued. No fever, repeat CXR overall unrevealing and no other source of infection identified. Likely delayed leukemoid reaction.  - Discussed with Dr. Cobb 10/13; wound with area of blistering that was not present before. Does not appear frankly infectious but would like to monitor wound status prior to potential discharge.  - Continue carvedilol 12.5mg PO BID, losartan 100mg PO daily, nifedipine 60mg PO BID.  - Discharge planning with SNF    Type 2 diabetes mellitus with chronic kidney disease on chronic dialysis  - Reports diet-controlled; no current medications.  - HgbA1c 5.7.  - Continue low-dose sliding scale insulin aspart 0-5U subQ TIDWM PRN, monitor requirements.    ESRD (end stage renal disease)  Anemia, hyperparathyroidism  - ESRD on HD, reports missed session Friday prior to presentation due to travel.  - Mild anemia without evidence of bleeding.  - Continue allopurinol 300mg PO daily, calcium acetate 2001mg PO TIDWM.  - Transfused 1U pRBCs 09/25 with appropriate response.  - Nephrology  consulted for HD assistance.    VTE Risk Mitigation (From admission, onward)         Ordered     IP VTE HIGH RISK PATIENT  Once         09/28/23 1906                Discharge Planning   LUBNA:      Code Status: Full Code   Is the patient medically ready for discharge?:     Reason for patient still in hospital (select all that apply): Treatment  Discharge Plan A: Skilled Nursing Facility   Discharge Delays: (!) Post-Acute Set-up              D Angus Cristina MD  Department of Hospital Medicine   [facility] - Med Surg ([address])

## 2023-10-13 NOTE — PHYSICIAN QUERY
PT Name: Shannan Figueredo  MR #: [id number]  DOCUMENTATION CLARIFICATION     CDS/: Stacie Miner RN BSN            Contact information: [email or web address]    This form is a permanent document in the medical record.    Query Date: October 13, 2023    By submitting this query, we are merely seeking further clarification of documentation. Please utilize your independent clinical judgment when addressing the question(s) below.    The medical record contains the following:   Indicators  Supporting Clinical Findings Location in Medical Record    Occlusion or Stenosis documented     x TIA or Stroke documented 10/9/23 Nephrology PN    Suspected CVA  Neurological: No confusion or weakness  Neurologic: Normal strength and tone. No focal numbness or weakness   Right arm AVF+  Left BKA wrapped  General appearance:  Frail.  Appears older than stated age.  pleasantly confused this am.  10/9/23 Progress note   x NIH Stroke Score (NIHSS)/neurological symptoms/ GCS 9/17/23 ED  Neurological: She is alert and oriented to person, place, and time. She has normal strength. GCS score is 15. GCS eye subscore is 4. GCS verbal subscore is 5. GCS motor subscore is 6.  9/17/23 ED   x Radiology findings 10/7/23 MRI Brain  Very limited study due to severe motion artifacts.     There is a very small focus of diffusion signal abnormality deep white matter of the right frontal lobe, a small area of acute infarction cannot be excluded, no hemorrhagic transformation.     Severe involutional changes with extensive periventricular white matter and central involutional changes associated with prominent ventricles, greater than often seen at this age.     Remote small area of infarction posterior left frontal lobe region with small area of encephalomalacia/gliosis.     Fluid filled left mastoid air cells.    10/7/23 MRI Brain    Medication      Treatment     x Other 10/11 HM PN  Encephalopathy  - Likely multifactorial encephalopathy with  components of delirium, toxic encephalopathy, metabolic encephalopathy, and potential baseline cognitive impairment all contributing. 10/11 Progress note   Provider, please clarify CVA diagnosis:    [   ] Ruled out   [   ] Stroke/Cerebrovascular Accident (CVA) (please specify type, site and cause)  Type: Site: (specify from each column) Cause:   [   ] Aborted  [   ] Hemorrhagic  [   ] Other (please specify): _______  [   ] Unspecified [   ] Right  [   ] Left  [   ] Bilateral  [   ] Unspecified   [   ] Middle  [   ] Anterior  [   ] Posterior  [   ] Other (please specify): ______   [   ] Unspecified   [   ] Precerebral artery  [   ] Vertebral artery  [   ] Basilar artery  [   ] Carotid artery  [   ] Other (please specify): ___________  [   ] Unspecified [   ] Aneurysm           [   ] Embolism            [   ] Hemorrhage           [   ] Ischemic            [   ] Occlusion/Stenosis                [   ] Thrombosis       [   ] Other (please specify): ___  [   ] Unspecified         [ X ] Other Neurological Diagnosis (please specify): Clinically undetermined___       Present on admission (POA) status:   [   ] Yes (Y)                          [  ] Clinically Undetermined (W)           [ X ] No (N)                            [   ] Documentation insufficient to determine if condition is POA (U)     Please document in your progress notes daily for the duration of treatment, until resolved, and include in your discharge summary.

## 2023-10-13 NOTE — PLAN OF CARE
Perla ([facility]) reports auth extended until Sunday night at midnight - if patient not medically ready for SNF by then they will need to submit for new auth Monday - will need updated clinicals     Followed up with Karen ([facility]) - clinicals haven't made it to her yet to forward to [facility] for final acceptance - clinicals faxed

## 2023-10-13 NOTE — SUBJECTIVE & OBJECTIVE
Interval History: No acute events overnight. Seen in HD - denies current complaints.    Review of Systems   Constitutional:  Negative for chills and fever.   Respiratory:  Negative for cough and shortness of breath.    Cardiovascular:  Negative for chest pain and palpitations.   Gastrointestinal:  Negative for abdominal pain, nausea and vomiting.     Objective:     Vital Signs (Most Recent):  Temp: 97.9 °F (36.6 °C) (10/13/23 0741)  Pulse: 69 (10/13/23 0741)  Resp: 18 (10/13/23 0430)  BP: 138/64 (10/13/23 0741)  SpO2: 98 % (10/13/23 0741) Vital Signs (24h Range):  Temp:  [97.3 °F (36.3 °C)-98.5 °F (36.9 °C)] 97.9 °F (36.6 °C)  Pulse:  [69-80] 69  Resp:  [16-18] 18  SpO2:  [96 %-98 %] 98 %  BP: (108-162)/(56-72) 138/64     Weight: 57 kg (125 lb 10.6 oz)  Body mass index is 20.91 kg/m².    Intake/Output Summary (Last 24 hours) at 10/13/2023 1459  Last data filed at 10/13/2023 1415  Gross per 24 hour   Intake 750 ml   Output 1917 ml   Net -1167 ml           Physical Exam  Vitals and nursing note reviewed.   Constitutional:       General: She is not in acute distress.     Appearance: She is well-developed.   HENT:      Head: Normocephalic and atraumatic.   Eyes:      General:         Right eye: No discharge.         Left eye: No discharge.      Conjunctiva/sclera: Conjunctivae normal.   Cardiovascular:      Rate and Rhythm: Normal rate.      Pulses: Normal pulses.   Pulmonary:      Effort: Pulmonary effort is normal. No respiratory distress.   Abdominal:      Palpations: Abdomen is soft.      Tenderness: There is no abdominal tenderness.   Musculoskeletal:         General: Normal range of motion.      Right lower leg: No edema.      Comments: s/p L BKA with dressing in place.   Skin:     General: Skin is warm and dry.   Neurological:      Mental Status: She is oriented to person, place, and time. She is lethargic.      Comments: Hard of hearing but able to interpret spoken language, occasionally with repetition.        Significant Labs:   CBC:  Recent Labs   Lab 10/11/23  0411 10/12/23  0546 10/13/23  0413   WBC 22.24* 21.48* 23.94*   HGB 8.5* 9.6* 9.0*   HCT 27.5* 29.9* 29.3*    466* 413   GRAN 76.8*  17.1* 75.4*  16.2* 79.4*  19.0*   LYMPH 8.0*  1.8 9.4*  2.0 7.4*  1.8   MONO 12.2  2.7* 11.3  2.4* 10.6  2.5*   EOS 0.2 0.1 0.1   BASO 0.09 0.12 0.07     BMP:  Recent Labs   Lab 10/11/23  0411 10/12/23  0546 10/13/23  0412    137 134*   K 4.1 4.3 4.4   CL 94* 93* 93*   CO2 26 25 24   BUN 40* 22 33*   CREATININE 5.4* 3.5* 5.1*   * 78 102   CALCIUM 10.4 10.4 10.5   MG 2.5 2.3 2.4   PHOS 3.6 3.6 4.5   ALBUMIN 1.8* 2.0* 1.9*   ANIONGAP 16 19* 17*        Significant Imaging: I have reviewed and interpreted all pertinent imaging results/findings within the past 24 hours.

## 2023-10-13 NOTE — PLAN OF CARE
10/13/23 0753   Medicare Message   Important Message from Medicare regarding Discharge Appeal Rights Given to patient/caregiver;Explained to patient/caregiver;Signed/date by patient/caregiver   Date IMM was signed 10/13/23   Time IMM was signed 3858

## 2023-10-13 NOTE — PLAN OF CARE
Karen ([facility]) [phone] ext [phone] assigned to case # [id number] - contacting [facility] to discuss acceptance     Large results from enema     Surgery concerned regarding new blister to wound - wants to continue to observe     Daughters & Perla ([facility]) updated

## 2023-10-13 NOTE — ASSESSMENT & PLAN NOTE
"HTN, PVD, encephalopathy  - L 1st toe gangrene predominantly dry with some surrounding erythema/swelling; with concern for worsening infection started piperacillin-tazobactam and vancomycin IV. 2nd toe with involvement as well.  - U/S arterial BLE showed L distal SFA occlusion with distal reconstitution. Discussed with vascular surgery and cardiology consulted for consideration of angiography / potential angioplasty.  - Podiatry consulted. MRI L foot with "examination markedly degraded by patient motion artifact. Questionable marrow edema within the 1st distal phalanx, not well evaluated given aforementioned limitation but possibly related to osteomyelitis given reported history of gangrene."  - Blood cultures showed no growth.  - s/p angiogram remarkable for severe PAD with no areas amenable to angioplasty on 9/20. Elected to undergo attempt at revascularization 09/22 but unable to perform femoral and popliteal bypass on 9/22. Discussed surgical options with BKA only viable for appropriate healing given extent of vascular disease.   - Underwent BKA on 09/30. Leukocytosis improving. Therapy recommending SNF. Pursuing placement.  - Given possible decreased clearance of pain medications, stopped hydrocodone-acetaminophen 5-325mg PO q6hr PRN, oxycodone-acetaminophen 7.5-325mg PO q6hr PRN. Started PO dilaudid instead and Neurontin on 10/3  - Increased sedation since getting Neurontin, changed it to 100 mg QHS on 10/4 and stopped it on 10/5 and with continued improvement in mentation. Patient with likely some baseline cognitive impairment with worsening due to resolving medical issues and over sedation which was almost fully resolved to baseline yesterday but with new development today (see above)  - WBC increased but plateaued. No fever, repeat CXR overall unrevealing and no other source of infection identified. Likely delayed leukemoid reaction.  - Discussed with Dr. Cobb 10/13; wound with area of blistering that was " not present before. Does not appear frankly infectious but would like to monitor wound status prior to potential discharge.  - Continue carvedilol 12.5mg PO BID, losartan 100mg PO daily, nifedipine 60mg PO BID.  - Discharge planning with SNF

## 2023-10-13 NOTE — PROGRESS NOTES
10/13/23 1415        Hemodialysis AV Fistula 09/17/23 Right upper arm   Placement Date: 09/17/23   Location: Right upper arm   Site Assessment Clean;Dry;Intact;No redness;No swelling   Patency Present;Thrill;Bruit   Status Deaccessed   Site Condition No complications   Dressing Gauze   Post-Hemodialysis Assessment   Blood Volume Processed (Liters) 78.7 L   Dialyzer Clearance Lightly streaked   Duration of Treatment 180 minutes   Additional Fluid Intake (mL) 750 mL   Total UF (mL) 1917 mL   Net Fluid Removal 1167   Patient Response to Treatment Tx completed, tolerated well, lines flushed and then needles pulled and pressure held till hemostasis achieved, no bleeding or hematoma noted   Post-Treatment Weight 49.4 kg (108 lb 14.5 oz)   Treatment Weight Change 49.4   Arterial bleeding stop time (min) 10 min   Venous bleeding stop time (min) 10 min   Post-Hemodialysis Comments no distress noted

## 2023-10-13 NOTE — PROGRESS NOTES
Patient at HD. Overlay is activated positioned on Right side lying with wedge in use for offloading.

## 2023-10-13 NOTE — PT/OT/SLP PROGRESS
Physical Therapy      Patient Name:  Shannan Figueredo   MRN:  [id number]    Patient not seen today secondary to Dialysis. Will follow-up next scheduled session.

## 2023-10-13 NOTE — PLAN OF CARE
Perla ([facility]) reports speaking with [facility] Boyd & since patient has not received treatment at their facility in >30 days will need updated clinicals to be considered for readmission - referral forwarded via [employer] - spoke with [facility] Centralized Admissions [phone] - will process request

## 2023-10-13 NOTE — ASSESSMENT & PLAN NOTE
- Likely multifactorial encephalopathy with components of delirium, toxic encephalopathy, metabolic encephalopathy, and potential baseline cognitive impairment all contributing.  - Suspected increased sedation due to pain medications / gabapentin; stopped gabapentin on 10/5 and adjusted pain control to PO dilaudid at low dose / decreased frequency with improvement in mentation. Likely some baseline cognitive impairment with worsening due to medical issues.  - 10/07 patient developed worsening hearing loss and inability to comprehend written word sometime after waking. Daughter and staff reported she was normal the prior night, without clear timing of deficit.  - Patient already on ASA and plavix, no hypotensive episode prior to explain symptoms.  - CT Head motion-limited but with sequela of chronic microvascular ischemic changes/senescent changes. MRI Brain with continued motion-limited artifact with small focus of diffusion signal abnormality in R frontal lobe with acute infarct not excluded.  - Given already on appropriate vascular treatment with her underlying PVD, little to adjust at this time. Appreciate neurology assistance.  - Remaining stable. Daughters report hard of hearing at baseline and had hearing aids but has not used consistently in recent past. Encouraged locating / active use of hearing aids.

## 2023-10-14 NOTE — PLAN OF CARE
Problem: Adult Inpatient Plan of Care  Goal: Plan of Care Review  Outcome: Ongoing, Progressing  Goal: Patient-Specific Goal (Individualized)  Outcome: Ongoing, Progressing  Goal: Absence of Hospital-Acquired Illness or Injury  Outcome: Ongoing, Progressing  Goal: Optimal Comfort and Wellbeing  Outcome: Ongoing, Progressing  Goal: Readiness for Transition of Care  Outcome: Ongoing, Progressing     Problem: Infection  Goal: Absence of Infection Signs and Symptoms  Outcome: Ongoing, Progressing     Problem: Diabetes Comorbidity  Goal: Blood Glucose Level Within Targeted Range  Outcome: Ongoing, Progressing     Problem: Skin Injury Risk Increased  Goal: Skin Health and Integrity  Outcome: Ongoing, Progressing     Problem: Impaired Wound Healing  Goal: Optimal Wound Healing  Outcome: Ongoing, Progressing     Problem: Fall Injury Risk  Goal: Absence of Fall and Fall-Related Injury  Outcome: Ongoing, Progressing     Problem: Bowel Motility Impaired (Extremity Amputation)  Goal: Effective Bowel Elimination  Outcome: Ongoing, Progressing    Pt remained free from falls or injuries this shift. Pt turned q2hrs. Pt rested well through the night.  Pt opens eyes and makes eye contact, but does not follow commands or speak. Daughter was able to give pt her coreg and mirtazapine crushed in strawberry lemonade. One BM at beginning of shift. Otherwise slept well, uneventful night

## 2023-10-14 NOTE — PROGRESS NOTES
"[facility] Surg ([address] Medicine  Progress Note    Patient Name: Shannan Figueredo  MRN: [id number]  Patient Class: IP- Inpatient   Admission Date: 9/17/2023  Length of Stay: 27 days  Attending Physician: JOSE Cristina MD  Primary Care Provider: Roxi, Primary Doctor        Subjective:     Principal Problem:Encephalopathy        HPI:  Ms. Figueredo is a 67/F with PMH HTN, DMII (diet-controlled), PVD, ESRD on HD (M/W/F), anemia who presented to [facility] 09/17 with a two day progressive history of worsening L foot pain, swelling, fatigue, nausea and vomiting. History obtained from patient and daughter Stephanie ([phone]); they are from the [place], [place] area. Patient reports several weeks ago she "stubbed her toe," noting initially some pain, redness and swelling. Gradually symptoms progressed with duskiness to her toe and she informed her daughter of her pain for which she was brought to ER in [place] on 08/25 for further evaluation. With duskiness to 1st toe and surrounding erythema she was treated for cellulitis with a course of ciprofloxacin and metronidazole, but failed to improve. She followed up with her podiatrist 09/12 where she was prescribed hydrocodone-acetaminophen and a ten day course of TMP-SMX which she has been taking. She was referred to vascular surgery with plan for angiography and potential intervention  She and her family came to [place] for the weekend but her pain worsened; had been attempting to minimize use of hydrocodone-acetaminophen but developed nausea, vomiting and progressive fatigue. She subsequently presented to ED for further evaluation. ED workup was notable for L 1st toe dry-appearing gangrene with surrounding erythema and swelling, removed nail of second toe, XR L foot demonstrating diffuse osteopenia, L calcaneal and forefoot soft tissue swelling without definitive evidence of osteomyelitis in setting of osteopenia, no leukocytosis, elevated sed rate and " CRP, and elevated BUN/Cr in setting of ESRD. She notes she went without dialysis on 09/15 due to her trip. Blood cultures were ordered and she received piperacillin-tazobactam and vancomycin. Hospital medicine was subsequently contacted for admission.      Overview/Hospital Course:  Admitted with gangrene of L 1st toe.  Empirically treated with Zosyn and vancomycin.  Vascular Surgery, nephrology, podiatry consulted; U/S arterial demonstrated L SFA occlusion with distal reconstitution. Cardiology consulted, angiography with no vessels amenable for angioplasty on 9/20.  Underwent exploration of the left lower extremity vessels, femoral and popliteal bypass unable to be performed due to no adequate available vessels for bypass on 9/22. Discussed potential intervention here vs [place], patient and family opted for surgery in [place]. Underwent L BKA 09/29. PT/OT recommended SNF.      Interval History: No acute events overnight. Poor mentation this morning.    Review of Systems   Unable to perform ROS: Mental status change     Objective:     Vital Signs (Most Recent):  Temp: 98.7 °F (37.1 °C) (10/14/23 1202)  Pulse: 62 (10/14/23 1202)  Resp: 20 (10/14/23 1202)  BP: (!) 113/56 (10/14/23 1202)  SpO2: 95 % (10/14/23 1202) Vital Signs (24h Range):  Temp:  [97.7 °F (36.5 °C)-98.8 °F (37.1 °C)] 98.7 °F (37.1 °C)  Pulse:  [62-78] 62  Resp:  [16-20] 20  SpO2:  [94 %-98 %] 95 %  BP: (111-127)/(53-62) 113/56     Weight: 57 kg (125 lb 10.6 oz)  Body mass index is 20.91 kg/m².    Intake/Output Summary (Last 24 hours) at 10/14/2023 1430  Last data filed at 10/13/2023 2200  Gross per 24 hour   Intake 120 ml   Output --   Net 120 ml           Physical Exam  Vitals and nursing note reviewed.   Constitutional:       General: She is not in acute distress.     Appearance: She is well-developed.   HENT:      Head: Normocephalic and atraumatic.   Eyes:      General:         Right eye: No discharge.         Left eye: No discharge.       Conjunctiva/sclera: Conjunctivae normal.   Cardiovascular:      Rate and Rhythm: Normal rate.      Pulses: Normal pulses.   Pulmonary:      Effort: Pulmonary effort is normal. No respiratory distress.   Abdominal:      Palpations: Abdomen is soft.      Tenderness: There is no abdominal tenderness.   Musculoskeletal:         General: Normal range of motion.      Right lower leg: No edema.      Comments: s/p L BKA with dressing in place.   Skin:     General: Skin is warm and dry.   Neurological:      Mental Status: She is oriented to person, place, and time. She is lethargic.      Comments: Hard of hearing but able to interpret spoken language, occasionally with repetition.       Significant Labs:   CBC:  Recent Labs   Lab 10/12/23  0546 10/13/23  0413 10/14/23  0452   WBC 21.48* 23.94* 22.17*   HGB 9.6* 9.0* 9.8*   HCT 29.9* 29.3* 32.0*   * 413 455*   GRAN 75.4*  16.2* 79.4*  19.0* 76.1*  16.9*   LYMPH 9.4*  2.0 7.4*  1.8 8.5*  1.9   MONO 11.3  2.4* 10.6  2.5* 12.3  2.7*   EOS 0.1 0.1 0.1   BASO 0.12 0.07 0.11     BMP:  Recent Labs   Lab 10/12/23  0546 10/13/23  0412 10/14/23  0452    134* 145   K 4.3 4.4 4.9   CL 93* 93* 101   CO2 25 24 21*   BUN 22 33* 25*   CREATININE 3.5* 5.1* 3.7*   GLU 78 102 116*   CALCIUM 10.4 10.5 10.7*   MG 2.3 2.4 2.5   PHOS 3.6 4.5 4.9*   ALBUMIN 2.0* 1.9* 2.1*   ANIONGAP 19* 17* 23*        Significant Imaging: I have reviewed and interpreted all pertinent imaging results/findings within the past 24 hours.      Assessment/Plan:      * Encephalopathy  - Likely multifactorial encephalopathy with components of delirium, toxic encephalopathy, metabolic encephalopathy, and potential baseline cognitive impairment all contributing.  - Suspected increased sedation due to pain medications / gabapentin; stopped gabapentin on 10/5 and adjusted pain control to PO dilaudid at low dose / decreased frequency with improvement in mentation. Likely some baseline cognitive impairment  "with worsening due to medical issues.  - 10/07 patient developed worsening hearing loss and inability to comprehend written word sometime after waking. Daughter and staff reported she was normal the prior night, without clear timing of deficit.  - Patient already on ASA and plavix, no hypotensive episode prior to explain symptoms.  - CT Head motion-limited but with sequela of chronic microvascular ischemic changes/senescent changes. MRI Brain with continued motion-limited artifact with small focus of diffusion signal abnormality in R frontal lobe with acute infarct not excluded.  - Given already on appropriate vascular treatment with her underlying PVD, little to adjust at this time. Appreciate neurology assistance.  - Remaining stable. Daughters report hard of hearing at baseline and had hearing aids but has not used consistently in recent past. Encouraged locating / active use of hearing aids.  - Psychiatry consulted and started on mirtazapine 15mg PO qHS initially; decreased to 7.5mg PO qHS but appears to have worsened mentation persistently. Will hold further mirtazapine.    Gangrene of toe  HTN, PVD, encephalopathy  - L 1st toe gangrene predominantly dry with some surrounding erythema/swelling; with concern for worsening infection started piperacillin-tazobactam and vancomycin IV. 2nd toe with involvement as well.  - U/S arterial BLE showed L distal SFA occlusion with distal reconstitution. Discussed with vascular surgery and cardiology consulted for consideration of angiography / potential angioplasty.  - Podiatry consulted. MRI L foot with "examination markedly degraded by patient motion artifact. Questionable marrow edema within the 1st distal phalanx, not well evaluated given aforementioned limitation but possibly related to osteomyelitis given reported history of gangrene."  - Blood cultures showed no growth.  - s/p angiogram remarkable for severe PAD with no areas amenable to angioplasty on 9/20. Elected " to undergo attempt at revascularization 09/22 but unable to perform femoral and popliteal bypass on 9/22. Discussed surgical options with BKA only viable for appropriate healing given extent of vascular disease.   - Underwent BKA on 09/30. Leukocytosis improving. Therapy recommending SNF. Pursuing placement.  - Given possible decreased clearance of pain medications, stopped hydrocodone-acetaminophen 5-325mg PO q6hr PRN, oxycodone-acetaminophen 7.5-325mg PO q6hr PRN. Started PO dilaudid instead and Neurontin on 10/3  - Increased sedation since getting Neurontin, changed it to 100 mg QHS on 10/4 and stopped it on 10/5 and with continued improvement in mentation. Patient with likely some baseline cognitive impairment with worsening due to resolving medical issues and over sedation which was almost fully resolved to baseline yesterday but with new development today (see above)  - WBC increased but plateaued. No fever, repeat CXR overall unrevealing and no other source of infection identified. Likely delayed leukemoid reaction.  - Discussed with Dr. Cobb 10/13; wound with area of blistering that was not present before. Does not appear frankly infectious but would like to monitor wound status prior to potential discharge.  - Continue carvedilol 12.5mg PO BID, losartan 100mg PO daily, nifedipine 60mg PO BID.  - Discharge planning with SNF    Type 2 diabetes mellitus with chronic kidney disease on chronic dialysis  - Reports diet-controlled; no current medications.  - HgbA1c 5.7.  - Continue low-dose sliding scale insulin aspart 0-5U subQ TIDWM PRN, monitor requirements.    ESRD (end stage renal disease)  Anemia, hyperparathyroidism  - ESRD on HD, reports missed session Friday prior to presentation due to travel.  - Mild anemia without evidence of bleeding.  - Continue allopurinol 300mg PO daily, calcium acetate 2001mg PO TIDWM.  - Transfused 1U pRBCs 09/25 with appropriate response.  - Nephrology consulted for HD  assistance.    VTE Risk Mitigation (From admission, onward)         Ordered     IP VTE HIGH RISK PATIENT  Once         09/28/23 1054                Discharge Planning   LUBNA:      Code Status: Full Code   Is the patient medically ready for discharge?:     Reason for patient still in hospital (select all that apply): Treatment  Discharge Plan A: Skilled Nursing Facility   Discharge Delays: (!) Post-Acute Set-up              D Angus Cristina MD  Department of Hospital Medicine   [facility] Surg ([address])

## 2023-10-14 NOTE — SUBJECTIVE & OBJECTIVE
Interval History: No acute events overnight. Poor mentation this morning.    Review of Systems   Unable to perform ROS: Mental status change     Objective:     Vital Signs (Most Recent):  Temp: 98.7 °F (37.1 °C) (10/14/23 1202)  Pulse: 62 (10/14/23 1202)  Resp: 20 (10/14/23 1202)  BP: (!) 113/56 (10/14/23 1202)  SpO2: 95 % (10/14/23 1202) Vital Signs (24h Range):  Temp:  [97.7 °F (36.5 °C)-98.8 °F (37.1 °C)] 98.7 °F (37.1 °C)  Pulse:  [62-78] 62  Resp:  [16-20] 20  SpO2:  [94 %-98 %] 95 %  BP: (111-127)/(53-62) 113/56     Weight: 57 kg (125 lb 10.6 oz)  Body mass index is 20.91 kg/m².    Intake/Output Summary (Last 24 hours) at 10/14/2023 1430  Last data filed at 10/13/2023 2200  Gross per 24 hour   Intake 120 ml   Output --   Net 120 ml           Physical Exam  Vitals and nursing note reviewed.   Constitutional:       General: She is not in acute distress.     Appearance: She is well-developed.   HENT:      Head: Normocephalic and atraumatic.   Eyes:      General:         Right eye: No discharge.         Left eye: No discharge.      Conjunctiva/sclera: Conjunctivae normal.   Cardiovascular:      Rate and Rhythm: Normal rate.      Pulses: Normal pulses.   Pulmonary:      Effort: Pulmonary effort is normal. No respiratory distress.   Abdominal:      Palpations: Abdomen is soft.      Tenderness: There is no abdominal tenderness.   Musculoskeletal:         General: Normal range of motion.      Right lower leg: No edema.      Comments: s/p L BKA with dressing in place.   Skin:     General: Skin is warm and dry.   Neurological:      Mental Status: She is oriented to person, place, and time. She is lethargic.      Comments: Hard of hearing but able to interpret spoken language, occasionally with repetition.       Significant Labs:   CBC:  Recent Labs   Lab 10/12/23  0546 10/13/23  0413 10/14/23  0452   WBC 21.48* 23.94* 22.17*   HGB 9.6* 9.0* 9.8*   HCT 29.9* 29.3* 32.0*   * 413 455*   GRAN 75.4*  16.2* 79.4*   19.0* 76.1*  16.9*   LYMPH 9.4*  2.0 7.4*  1.8 8.5*  1.9   MONO 11.3  2.4* 10.6  2.5* 12.3  2.7*   EOS 0.1 0.1 0.1   BASO 0.12 0.07 0.11     BMP:  Recent Labs   Lab 10/12/23  0546 10/13/23  0412 10/14/23  0452    134* 145   K 4.3 4.4 4.9   CL 93* 93* 101   CO2 25 24 21*   BUN 22 33* 25*   CREATININE 3.5* 5.1* 3.7*   GLU 78 102 116*   CALCIUM 10.4 10.5 10.7*   MG 2.3 2.4 2.5   PHOS 3.6 4.5 4.9*   ALBUMIN 2.0* 1.9* 2.1*   ANIONGAP 19* 17* 23*        Significant Imaging: I have reviewed and interpreted all pertinent imaging results/findings within the past 24 hours.

## 2023-10-14 NOTE — PROGRESS NOTES
"Nephrology  Progress Note    Admit Date: 9/17/2023   LOS: 27 days     SUBJECTIVE:     Follow-up For:  Encephalopathy    History of Present Illness:  Patient is a 67 y.o. female presents with left foot gangrene, vomiting, feeling ill.  Ext med hx as outlined below including ESRD on HD MWF in [place].  Has been dealing with necrotic left toes for few months and followed by vascular in [place].  Was here visiting daughter and felt bad.  Admitted for eval/treatment.  Consulted for HD needs.  Pt seen and examined on HD.  Consents signed.  Tired from being up all night with pain.  Updated team and daughter at bedside.  W/up noted.     S/P L BKA (9/29)      Interval History:     awaiting HD/BM and then transfer to [place].  Discussed with family at bedside.  Patient more somnolent this am.  Patient not follow ing commands. Eyes open. Moves all ext.    Review of Systems:    Unable to fully access.     OBJECTIVE:     Vital Signs Range (Last 24H):  /62 (BP Location: Left arm, Patient Position: Lying)   Pulse 69   Temp 98.4 °F (36.9 °C) (Axillary)   Resp 18   Ht 5' 5" (1.651 m)   Wt 57 kg (125 lb 10.6 oz)   SpO2 96%   BMI 20.91 kg/m²     Temp:  [97.7 °F (36.5 °C)-98.8 °F (37.1 °C)]   Pulse:  [69-78]   Resp:  [16-18]   BP: (111-127)/(53-62)   SpO2:  [94 %-98 %]     I & O (Last 24H):  Intake/Output Summary (Last 24 hours) at 10/14/2023 1151  Last data filed at 10/13/2023 2200  Gross per 24 hour   Intake 870 ml   Output 1917 ml   Net -1047 ml         Physical Exam:  General appearance:  Frail.  Appears older than stated age.  NAD this am.  lethargic  Eyes:  Conjunctivae/corneas clear. PERRL.  Lungs: Normal respiratory effort,   clear to auscultation bilaterally.  Diminished.   Heart: Regular rate and rhythm, S1, S2 normal, no murmur, rub or nelly.  Abdomen: Soft, non-tender non-distended; bowel sounds normal; no masses,  no organomegaly  Extremities: No cyanosis or clubbing. No edema.    Skin: Skin color, texture, turgor " normal. No rashes or lesions  Neurologic: Normal strength and tone. No focal numbness or weakness   Right arm AVF+  Left BKA wrapped.       Medications:  Medication list was reviewed and changes noted under Assessment/Plan.    Diagnostic Results:    reviewed    ASSESSMENT/PLAN:       Lethargy  -discussed with attending. May be due to Remeron vs other  Doubt this is uremic in nature  Defer    ESRD on HD MWF in [place]:  -consulted for HD needs.  -consents signed  -continue HD MWF   -R arm AVF+  -See on HD Monday      S/p BKA on 9/29  -defer to pod/vascular/cards.  -angio noted with severe PAD  -Status post exploration femoral and popliteal arteries 9/22 with Non-reconstructible peripheral vascular disease.      HTN:  -At goal. UF on HD      MBD/hypercalcemia  -binders/nephrocaps.   Changed to renvela.  Pt states that she also takes tums at home  -using calcitriol on HD days  -No longer on calcium supplements and binder switched to non-calcium  -Low calcium bath ordered.       Anemia of CKD:  -Received transfusion 1 unit 9/29  -Hgb below goal  -FAREED with HD  -Iron stores low but ^^ferritin precludes use of Venofer  -Vit C.     Suspected CVA:  -defer to neuro.  -mental status labile      Dispo:  -awaiting family dynamics to play out.  -defer to palliative care.       Stable for DC/Transfer to facility from Renal.   Will follow for renal needs.

## 2023-10-14 NOTE — ASSESSMENT & PLAN NOTE
- Likely multifactorial encephalopathy with components of delirium, toxic encephalopathy, metabolic encephalopathy, and potential baseline cognitive impairment all contributing.  - Suspected increased sedation due to pain medications / gabapentin; stopped gabapentin on 10/5 and adjusted pain control to PO dilaudid at low dose / decreased frequency with improvement in mentation. Likely some baseline cognitive impairment with worsening due to medical issues.  - 10/07 patient developed worsening hearing loss and inability to comprehend written word sometime after waking. Daughter and staff reported she was normal the prior night, without clear timing of deficit.  - Patient already on ASA and plavix, no hypotensive episode prior to explain symptoms.  - CT Head motion-limited but with sequela of chronic microvascular ischemic changes/senescent changes. MRI Brain with continued motion-limited artifact with small focus of diffusion signal abnormality in R frontal lobe with acute infarct not excluded.  - Given already on appropriate vascular treatment with her underlying PVD, little to adjust at this time. Appreciate neurology assistance.  - Remaining stable. Daughters report hard of hearing at baseline and had hearing aids but has not used consistently in recent past. Encouraged locating / active use of hearing aids.  - Psychiatry consulted and started on mirtazapine 15mg PO qHS initially; decreased to 7.5mg PO qHS but appears to have worsened mentation persistently. Will hold further mirtazapine.

## 2023-10-15 NOTE — ASSESSMENT & PLAN NOTE
- Likely multifactorial encephalopathy with components of delirium, toxic encephalopathy, metabolic encephalopathy, and potential baseline cognitive impairment all contributing.  - Suspected increased sedation due to pain medications / gabapentin; stopped gabapentin on 10/5 and adjusted pain control to PO dilaudid at low dose / decreased frequency with improvement in mentation. Likely some baseline cognitive impairment with worsening due to medical issues.  - 10/07 patient developed worsening hearing loss and inability to comprehend written word sometime after waking. Daughter and staff reported she was normal the prior night, without clear timing of deficit.  - Patient already on ASA and plavix, no hypotensive episode prior to explain symptoms.  - CT Head motion-limited but with sequela of chronic microvascular ischemic changes/senescent changes. MRI Brain with continued motion-limited artifact with small focus of diffusion signal abnormality in R frontal lobe with acute infarct not excluded.  - Given already on appropriate vascular treatment with her underlying PVD, little to adjust at this time. Appreciate neurology assistance.  - Remaining stable. Daughters report hard of hearing at baseline and had hearing aids but has not used consistently in recent past. Encouraged locating / active use of hearing aids.  - Psychiatry consulted and started on mirtazapine 15mg PO qHS initially; decreased to 7.5mg PO qHS but appears to have worsened mentation. Mirtazapine subsequently held with improvement in mentation.

## 2023-10-15 NOTE — ASSESSMENT & PLAN NOTE
"HTN, PVD  - L 1st toe gangrene predominantly dry with some surrounding erythema/swelling; with concern for worsening infection started piperacillin-tazobactam and vancomycin IV. 2nd toe with involvement as well.  - U/S arterial BLE showed L distal SFA occlusion with distal reconstitution. Discussed with vascular surgery and cardiology consulted for consideration of angiography / potential angioplasty.  - Podiatry consulted. MRI L foot with "examination markedly degraded by patient motion artifact. Questionable marrow edema within the 1st distal phalanx, not well evaluated given aforementioned limitation but possibly related to osteomyelitis given reported history of gangrene."  - Blood cultures showed no growth.  - s/p angiogram remarkable for severe PAD with no areas amenable to angioplasty on 9/20. Elected to undergo attempt at revascularization 09/22 but unable to perform femoral and popliteal bypass on 9/22. Discussed surgical options with BKA only viable for appropriate healing given extent of vascular disease.   - Continue carvedilol 12.5mg PO BID, losartan 100mg PO daily, nifedipine 60mg PO BID.  - Underwent BKA on 09/30. Therapy recommending SNF. Pursuing placement.  - Given possible decreased clearance of pain medications, stopped hydrocodone-acetaminophen 5-325mg PO q6hr PRN, oxycodone-acetaminophen 7.5-325mg PO q6hr PRN. Started PO dilaudid instead and Neurontin on 10/3  - Increased sedation since getting Neurontin, changed it to 100 mg QHS on 10/4 and stopped it on 10/5 and with continued improvement in mentation. Patient with likely some baseline cognitive impairment with worsening due to other medical issues.  - WBC increased but plateaued. No fever, repeat CXR overall unrevealing and no other source of infection identified. Likely delayed leukemoid reaction.  - Discussed with Dr. Cobb 10/13; wound with area of blistering that was not present before. Does not appear frankly infectious but would like " to monitor wound status prior to potential discharge.   Yes, get tested

## 2023-10-15 NOTE — SUBJECTIVE & OBJECTIVE
Interval History: No acute events overnight. Much more alert and interactive this morning. Feeling well. Discussed with patient / family at bedside.    Review of Systems   Constitutional:  Negative for chills and fever.   Respiratory:  Negative for cough and shortness of breath.    Cardiovascular:  Negative for chest pain and palpitations.   Gastrointestinal:  Negative for abdominal pain, nausea and vomiting.     Objective:     Vital Signs (Most Recent):  Temp: 98.2 °F (36.8 °C) (10/15/23 1129)  Pulse: 73 (10/15/23 1129)  Resp: 16 (10/15/23 1129)  BP: 131/63 (10/15/23 1129)  SpO2: 96 % (10/15/23 1129) Vital Signs (24h Range):  Temp:  [97.5 °F (36.4 °C)-98.8 °F (37.1 °C)] 98.2 °F (36.8 °C)  Pulse:  [58-73] 73  Resp:  [16-20] 16  SpO2:  [93 %-97 %] 96 %  BP: (109-143)/(53-63) 131/63     Weight: 57 kg (125 lb 10.6 oz)  Body mass index is 20.91 kg/m².    Intake/Output Summary (Last 24 hours) at 10/15/2023 1605  Last data filed at 10/14/2023 1819  Gross per 24 hour   Intake --   Output 2 ml   Net -2 ml           Physical Exam  Vitals and nursing note reviewed.   Constitutional:       General: She is not in acute distress.     Appearance: She is well-developed.   HENT:      Head: Normocephalic and atraumatic.   Eyes:      General:         Right eye: No discharge.         Left eye: No discharge.      Conjunctiva/sclera: Conjunctivae normal.   Cardiovascular:      Rate and Rhythm: Normal rate.      Pulses: Normal pulses.   Pulmonary:      Effort: Pulmonary effort is normal. No respiratory distress.   Abdominal:      Palpations: Abdomen is soft.      Tenderness: There is no abdominal tenderness.   Musculoskeletal:         General: Normal range of motion.      Right lower leg: No edema.      Comments: s/p L BKA with dressing in place.   Skin:     General: Skin is warm and dry.   Neurological:      Mental Status: She is alert and oriented to person, place, and time.      Comments: Hard of hearing but able to interpret spoken  language, occasionally with repetition.       Significant Labs:   CBC:  Recent Labs   Lab 10/13/23  0413 10/14/23  0452 10/15/23  0610   WBC 23.94* 22.17* 19.16*   HGB 9.0* 9.8* 8.7*   HCT 29.3* 32.0* 28.5*    455* 362   GRAN 79.4*  19.0* 76.1*  16.9* 77.9*  14.9*   LYMPH 7.4*  1.8 8.5*  1.9 7.8*  1.5   MONO 10.6  2.5* 12.3  2.7* 11.3  2.2*   EOS 0.1 0.1 0.1   BASO 0.07 0.11 0.07     BMP:  Recent Labs   Lab 10/13/23  0412 10/14/23  0452 10/15/23  0610   * 145 145   K 4.4 4.9 4.4   CL 93* 101 102   CO2 24 21* 26   BUN 33* 25* 42*   CREATININE 5.1* 3.7* 5.4*    116* 116*   CALCIUM 10.5 10.7* 10.8*   MG 2.4 2.5 2.6   PHOS 4.5 4.9* 5.6*   ALBUMIN 1.9* 2.1* 2.0*   ANIONGAP 17* 23* 17*        Significant Imaging: I have reviewed and interpreted all pertinent imaging results/findings within the past 24 hours.

## 2023-10-15 NOTE — PROGRESS NOTES
"Nephrology  Progress Note    Admit Date: 9/17/2023   LOS: 28 days     SUBJECTIVE:     Follow-up For:  Encephalopathy    History of Present Illness:  Patient is a 67 y.o. female presents with left foot gangrene, vomiting, feeling ill.  Ext med hx as outlined below including ESRD on HD MWF in [place].  Has been dealing with necrotic left toes for few months and followed by vascular in [place].  Was here visiting daughter and felt bad.  Admitted for eval/treatment.  Consulted for HD needs.  Pt seen and examined on HD.  Consents signed.  Tired from being up all night with pain.  Updated team and daughter at bedside.  W/up noted.     S/P L BKA (9/29)      Interval History:     awaiting HD/BM and then transfer to [place].  Discussed with family at bedside.  Patient more alert this am.  Patient not following commands. Eyes open. Moves all ext.    Review of Systems:    Unable to fully access.     OBJECTIVE:     Vital Signs Range (Last 24H):  /63 (BP Location: Left arm, Patient Position: Lying)   Pulse 73   Temp 98.2 °F (36.8 °C) (Oral)   Resp 16   Ht 5' 5" (1.651 m)   Wt 57 kg (125 lb 10.6 oz)   SpO2 96%   BMI 20.91 kg/m²     Temp:  [97.5 °F (36.4 °C)-98.8 °F (37.1 °C)]   Pulse:  [58-73]   Resp:  [16-20]   BP: (109-143)/(53-63)   SpO2:  [93 %-97 %]     I & O (Last 24H):  Intake/Output Summary (Last 24 hours) at 10/15/2023 1150  Last data filed at 10/14/2023 1819  Gross per 24 hour   Intake --   Output 2 ml   Net -2 ml         Physical Exam:  General appearance:  Frail.  Appears older than stated age.  NAD this am.  lethargic  Eyes:  Conjunctivae/corneas clear. PERRL.  Lungs: Normal respiratory effort,   clear to auscultation bilaterally.  Diminished.   Heart: Regular rate and rhythm, S1, S2 normal, no murmur, rub or nelly.  Abdomen: Soft, non-tender non-distended; bowel sounds normal; no masses,  no organomegaly  Extremities: No cyanosis or clubbing. No edema.    Skin: Skin color, texture, turgor normal. No rashes " or lesions  Neurologic: Normal strength and tone. No focal numbness or weakness   Right arm AVF+  Left BKA wrapped.       Medications:  Medication list was reviewed and changes noted under Assessment/Plan.    Diagnostic Results:    reviewed    ASSESSMENT/PLAN:       Lethargy  -discussed with attending. May be due to Remeron vs other  Doubt this is uremic in nature. Medication held with improvement in mentation.  Defer    ESRD on HD MWF in [place]:  -consulted for HD needs.  -consents signed  -continue HD MWF   -R arm AVF+  -See on HD Monday      S/p BKA on 9/29  -defer to pod/vascular/cards.  -angio noted with severe PAD  -Status post exploration femoral and popliteal arteries 9/22 with Non-reconstructible peripheral vascular disease.      HTN:  -At goal. UF on HD      MBD/hypercalcemia  -binders/nephrocaps.   Changed to renvela.  Pt states that she also takes tums at home  -using calcitriol on HD days  -No longer on calcium supplements and binder switched to non-calcium  -Low calcium bath ordered.       Anemia of CKD:  -Received transfusion 1 unit 9/29  -Hgb below goal  -FAREED with HD  -Iron stores low but ^^ferritin precludes use of Venofer  -Vit C.     Suspected CVA:  -defer to neuro.  -mental status labile      Dispo:  -awaiting family dynamics to play out.  -defer to palliative care.       Stable for DC/Transfer to facility from Renal.   Will follow for renal needs.

## 2023-10-15 NOTE — PROGRESS NOTES
"[facility] Surg ([address] Medicine  Progress Note    Patient Name: Shannan Figueredo  MRN: [id number]  Patient Class: IP- Inpatient   Admission Date: 9/17/2023  Length of Stay: 28 days  Attending Physician: JOSE Cristina MD  Primary Care Provider: Roxi, Primary Doctor        Subjective:     Principal Problem:Encephalopathy        HPI:  Ms. Figueredo is a 67/F with PMH HTN, DMII (diet-controlled), PVD, ESRD on HD (M/W/F), anemia who presented to [facility] 09/17 with a two day progressive history of worsening L foot pain, swelling, fatigue, nausea and vomiting. History obtained from patient and daughter Stephanie ([phone]); they are from the [place], [place] area. Patient reports several weeks ago she "stubbed her toe," noting initially some pain, redness and swelling. Gradually symptoms progressed with duskiness to her toe and she informed her daughter of her pain for which she was brought to ER in [place] on 08/25 for further evaluation. With duskiness to 1st toe and surrounding erythema she was treated for cellulitis with a course of ciprofloxacin and metronidazole, but failed to improve. She followed up with her podiatrist 09/12 where she was prescribed hydrocodone-acetaminophen and a ten day course of TMP-SMX which she has been taking. She was referred to vascular surgery with plan for angiography and potential intervention  She and her family came to [place] for the weekend but her pain worsened; had been attempting to minimize use of hydrocodone-acetaminophen but developed nausea, vomiting and progressive fatigue. She subsequently presented to ED for further evaluation. ED workup was notable for L 1st toe dry-appearing gangrene with surrounding erythema and swelling, removed nail of second toe, XR L foot demonstrating diffuse osteopenia, L calcaneal and forefoot soft tissue swelling without definitive evidence of osteomyelitis in setting of osteopenia, no leukocytosis, elevated sed rate and " CRP, and elevated BUN/Cr in setting of ESRD. She notes she went without dialysis on 09/15 due to her trip. Blood cultures were ordered and she received piperacillin-tazobactam and vancomycin. Hospital medicine was subsequently contacted for admission.      Overview/Hospital Course:  Admitted with gangrene of L 1st toe.  Empirically treated with Zosyn and vancomycin.  Vascular Surgery, nephrology, podiatry consulted; U/S arterial demonstrated L SFA occlusion with distal reconstitution. Cardiology consulted, angiography with no vessels amenable for angioplasty on 9/20.  Underwent exploration of the left lower extremity vessels, femoral and popliteal bypass unable to be performed due to no adequate available vessels for bypass on 9/22. Discussed potential intervention here vs [place], patient and family opted for surgery in [place]. Underwent L BKA 09/29. PT/OT recommended SNF. Had mental status changes and pain medications adjusted with some improvement. Appetite was poor and concern for depressive symptoms due to general medical condition. Telepsychiatry consulted and started mirtazapine, but she was more somnolent and subsequently it was discontinued. Surgery noted blistering to surgical site and recommended continued observation of wound.      Interval History: No acute events overnight. Much more alert and interactive this morning. Feeling well. Discussed with patient / family at bedside.    Review of Systems   Constitutional:  Negative for chills and fever.   Respiratory:  Negative for cough and shortness of breath.    Cardiovascular:  Negative for chest pain and palpitations.   Gastrointestinal:  Negative for abdominal pain, nausea and vomiting.     Objective:     Vital Signs (Most Recent):  Temp: 98.2 °F (36.8 °C) (10/15/23 1129)  Pulse: 73 (10/15/23 1129)  Resp: 16 (10/15/23 1129)  BP: 131/63 (10/15/23 1129)  SpO2: 96 % (10/15/23 1129) Vital Signs (24h Range):  Temp:  [97.5 °F (36.4 °C)-98.8 °F (37.1 °C)]  98.2 °F (36.8 °C)  Pulse:  [58-73] 73  Resp:  [16-20] 16  SpO2:  [93 %-97 %] 96 %  BP: (109-143)/(53-63) 131/63     Weight: 57 kg (125 lb 10.6 oz)  Body mass index is 20.91 kg/m².    Intake/Output Summary (Last 24 hours) at 10/15/2023 1605  Last data filed at 10/14/2023 1819  Gross per 24 hour   Intake --   Output 2 ml   Net -2 ml           Physical Exam  Vitals and nursing note reviewed.   Constitutional:       General: She is not in acute distress.     Appearance: She is well-developed.   HENT:      Head: Normocephalic and atraumatic.   Eyes:      General:         Right eye: No discharge.         Left eye: No discharge.      Conjunctiva/sclera: Conjunctivae normal.   Cardiovascular:      Rate and Rhythm: Normal rate.      Pulses: Normal pulses.   Pulmonary:      Effort: Pulmonary effort is normal. No respiratory distress.   Abdominal:      Palpations: Abdomen is soft.      Tenderness: There is no abdominal tenderness.   Musculoskeletal:         General: Normal range of motion.      Right lower leg: No edema.      Comments: s/p L BKA with dressing in place.   Skin:     General: Skin is warm and dry.   Neurological:      Mental Status: She is alert and oriented to person, place, and time.      Comments: Hard of hearing but able to interpret spoken language, occasionally with repetition.       Significant Labs:   CBC:  Recent Labs   Lab 10/13/23  0413 10/14/23  0452 10/15/23  0610   WBC 23.94* 22.17* 19.16*   HGB 9.0* 9.8* 8.7*   HCT 29.3* 32.0* 28.5*    455* 362   GRAN 79.4*  19.0* 76.1*  16.9* 77.9*  14.9*   LYMPH 7.4*  1.8 8.5*  1.9 7.8*  1.5   MONO 10.6  2.5* 12.3  2.7* 11.3  2.2*   EOS 0.1 0.1 0.1   BASO 0.07 0.11 0.07     BMP:  Recent Labs   Lab 10/13/23  0412 10/14/23  0452 10/15/23  0610   * 145 145   K 4.4 4.9 4.4   CL 93* 101 102   CO2 24 21* 26   BUN 33* 25* 42*   CREATININE 5.1* 3.7* 5.4*    116* 116*   CALCIUM 10.5 10.7* 10.8*   MG 2.4 2.5 2.6   PHOS 4.5 4.9* 5.6*   ALBUMIN  1.9* 2.1* 2.0*   ANIONGAP 17* 23* 17*        Significant Imaging: I have reviewed and interpreted all pertinent imaging results/findings within the past 24 hours.      Assessment/Plan:      * Encephalopathy  - Likely multifactorial encephalopathy with components of delirium, toxic encephalopathy, metabolic encephalopathy, and potential baseline cognitive impairment all contributing.  - Suspected increased sedation due to pain medications / gabapentin; stopped gabapentin on 10/5 and adjusted pain control to PO dilaudid at low dose / decreased frequency with improvement in mentation. Likely some baseline cognitive impairment with worsening due to medical issues.  - 10/07 patient developed worsening hearing loss and inability to comprehend written word sometime after waking. Daughter and staff reported she was normal the prior night, without clear timing of deficit.  - Patient already on ASA and plavix, no hypotensive episode prior to explain symptoms.  - CT Head motion-limited but with sequela of chronic microvascular ischemic changes/senescent changes. MRI Brain with continued motion-limited artifact with small focus of diffusion signal abnormality in R frontal lobe with acute infarct not excluded.  - Given already on appropriate vascular treatment with her underlying PVD, little to adjust at this time. Appreciate neurology assistance.  - Remaining stable. Daughters report hard of hearing at baseline and had hearing aids but has not used consistently in recent past. Encouraged locating / active use of hearing aids.  - Psychiatry consulted and started on mirtazapine 15mg PO qHS initially; decreased to 7.5mg PO qHS but appears to have worsened mentation. Mirtazapine subsequently held with improvement in mentation.    Gangrene of toe  HTN, PVD  - L 1st toe gangrene predominantly dry with some surrounding erythema/swelling; with concern for worsening infection started piperacillin-tazobactam and vancomycin IV. 2nd toe  "with involvement as well.  - U/S arterial BLE showed L distal SFA occlusion with distal reconstitution. Discussed with vascular surgery and cardiology consulted for consideration of angiography / potential angioplasty.  - Podiatry consulted. MRI L foot with "examination markedly degraded by patient motion artifact. Questionable marrow edema within the 1st distal phalanx, not well evaluated given aforementioned limitation but possibly related to osteomyelitis given reported history of gangrene."  - Blood cultures showed no growth.  - s/p angiogram remarkable for severe PAD with no areas amenable to angioplasty on 9/20. Elected to undergo attempt at revascularization 09/22 but unable to perform femoral and popliteal bypass on 9/22. Discussed surgical options with BKA only viable for appropriate healing given extent of vascular disease.   - Continue carvedilol 12.5mg PO BID, losartan 100mg PO daily, nifedipine 60mg PO BID.  - Underwent BKA on 09/30. Therapy recommending SNF. Pursuing placement.  - Given possible decreased clearance of pain medications, stopped hydrocodone-acetaminophen 5-325mg PO q6hr PRN, oxycodone-acetaminophen 7.5-325mg PO q6hr PRN. Started PO dilaudid instead and Neurontin on 10/3  - Increased sedation since getting Neurontin, changed it to 100 mg QHS on 10/4 and stopped it on 10/5 and with continued improvement in mentation. Patient with likely some baseline cognitive impairment with worsening due to other medical issues.  - WBC increased but plateaued. No fever, repeat CXR overall unrevealing and no other source of infection identified. Likely delayed leukemoid reaction.  - Discussed with Dr. Cobb 10/13; wound with area of blistering that was not present before. Does not appear frankly infectious but would like to monitor wound status prior to potential discharge.    Type 2 diabetes mellitus with chronic kidney disease on chronic dialysis  - Reports diet-controlled; no current medications.  - " HgbA1c 5.7.  - Continue low-dose sliding scale insulin aspart 0-5U subQ TIDWM PRN, monitor requirements.    ESRD (end stage renal disease)  Anemia, hyperparathyroidism  - ESRD on HD, reports missed session Friday prior to presentation due to travel.  - Mild anemia without evidence of bleeding.  - Continue allopurinol 300mg PO daily, calcium acetate 2001mg PO TIDWM.  - Transfused 1U pRBCs 09/25 with appropriate response.  - Nephrology consulted for HD assistance.    VTE Risk Mitigation (From admission, onward)         Ordered     IP VTE HIGH RISK PATIENT  Once         09/28/23 1906                Discharge Planning   LUBNA:      Code Status: Full Code   Is the patient medically ready for discharge?:     Reason for patient still in hospital (select all that apply): Patient trending condition  Discharge Plan A: Skilled Nursing Facility   Discharge Delays: (!) Post-Acute Set-up              TANESHA Cristina MD  Department of Hospital Medicine   [facility] ([address])

## 2023-10-15 NOTE — AI DETERIORATION ALERT
Artificial Intelligence Notification  [facility]  [address] [place] [id number]  Phone: [phone]    This documentation was triggered by an Artificial Intelligence Notification:    Admit Date: 2023   LOS: 27  Code Status: Full Code  : 1955  Age: 67 y.o.  Weight:   Wt Readings from Last 1 Encounters:   10/10/23 57 kg (125 lb 10.6 oz)        Sex: female  Bed: [facility]  MRN: [id number]  Attending Physician: JOSE Cristina MD     Date of Alert: 10/14/2023  Time AI Alert Received: 8:18PM            Vitals:    10/14/23 1927   BP: (!) 109/53   Pulse: 67   Resp: 18   Temp: 97.8 °F (36.6 °C)     SpO2: 97 %      AI alert received for /53. Patient's BP have been ranging from systolic 113-140's. Patient seen and examined. Patient lethargic, per chart this is possibly due to Remeron dosage. Patient will awaken to verbal stimuli and responds to voice. Per nursing, this is unchanged from last night. Patient showing no other signs of distress. Repeat /63    Patient Condition: stable

## 2023-10-16 NOTE — PROGRESS NOTES
10/16/23 1430        Hemodialysis AV Fistula 09/17/23 Right upper arm   Placement Date: 09/17/23   Location: Right upper arm   Site Assessment Clean;Dry;Intact;No redness;No swelling;No warmth;No drainage   Patency Thrill;Bruit;Present   Status Deaccessed   Site Condition No complications   Dressing Gauze   Post-Hemodialysis Assessment   Blood Volume Processed (Liters) 91.5 L   Dialyzer Clearance Lightly streaked   Duration of Treatment 210 minutes   Additional Fluid Intake (mL) 500 mL   Total UF (mL) 1500 mL   Net Fluid Removal 1000   Patient Response to Treatment Tx completed, tolerated well, lines flushed and needles pulled and pressure held till hemostasis achieved, no bleeding or hematoma noted   Post-Treatment Weight 49.1 kg (108 lb 3.9 oz)   Treatment Weight Change 49.1   Arterial bleeding stop time (min) 10 min   Venous bleeding stop time (min) 10 min   Post-Hemodialysis Comments no distress noted        Name band;

## 2023-10-16 NOTE — PROGRESS NOTES
[facility] ([address])  Adult Nutrition  Progress Note    SUMMARY       Recommendations    1) Honor pt's food preference to encourage intake of meals    2) Rec an appetite stimulant if appropriate    3) EN may be warranted d/t pt's chronic inability to meet pro-kcal needs orally   4) RD to follow and monitor nutrition status    Goals: Pt will maintain 50-75% intake of meals by RD follow up  Nutrition Goal Status: progressing towards goal  Communication of RD Recs:  (POC)    Assessment and Plan    Endocrine  Protein calorie malnutrition  Malnutrition Type:  Context: chronic illness, social/environmental circumstances  Level: moderate    Related to (etiology):   Increased nutrient    Signs and Symptoms (as evidenced by):   S/p BKA, altered skin integrity to buttocks, ESRD on HD; poor intake r/t limited food acceptance     Malnutrition Characteristic Summary:  Weight Loss (Malnutrition): greater than 5% in 1 month (10%)  Energy Intake (Malnutrition): less than or equal to 50% for greater than or equal to 1 month  Subcutaneous Fat (Malnutrition): mild depletion  Muscle Mass (Malnutrition): mild depletion      Interventions (treatment strategy):  1) Honor pt's food preference to encourage intake of meals  2) Rec an appetite stimulant if appropriate  3) EN may be warranted d/t pt's chronic inability to meet pro-kcal needs orally 4) RD to follow and monitor nutrition status    Nutrition Diagnosis Status:   Continues         Malnutrition Assessment  Malnutrition Context: chronic illness, social/environmental circumstances  Malnutrition Level: moderate  Hair/Scalp (Micronutrient): none  Musculoskeletal/Lower Extremities:  (PVD s/p L BKA)   Micronutrient Evaluation Summary: suspected deficiency   Weight Loss (Malnutrition): greater than 5% in 1 month (10%)  Energy Intake (Malnutrition): less than or equal to 50% for greater than or equal to 1 month  Subcutaneous Fat (Malnutrition): mild depletion  Muscle Mass  (Malnutrition): mild depletion   Orbital Region (Subcutaneous Fat Loss): mild depletion  Upper Arm Region (Subcutaneous Fat Loss): moderate depletion   [place]o Region (Muscle Loss): moderate depletion  Clavicle Bone Region (Muscle Loss): mild depletion  Clavicle and Acromion Bone Region (Muscle Loss): moderate depletion                 Reason for Assessment    Reason For Assessment: RD follow-up    Diagnosis:  (Encephalopathy)  Patient Active Problem List   Diagnosis    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    Essential hypertension    Peripheral vascular disease    Secondary hyperparathyroidism of renal origin    Anemia due to end stage renal disease    ESRD (end stage renal disease)    Type 2 diabetes mellitus with chronic kidney disease on chronic dialysis    Gangrene of toe    Advance care planning    Encephalopathy    Protein calorie malnutrition    Debility    Hearing impaired person, unspecified laterality       Relevant Medical History:   Past Medical History:   Diagnosis Date    Coronary artery disease     Diabetes mellitus, type II     End stage renal disease     Hyperlipidemia     Hypertension     Peripheral vascular disease        Interdisciplinary Rounds: did not attend (RD remote)    General Information Comments:   10/16/2023: Patient continues on a renal diet with decreased po intake.  Patient had dialysis today.  Wound and malnutrition previously assessment on prior RD visit. Commercial beverages ordered and continue.  Labs reviewed.  NKFA.  LBM: 10/15/2023.  RD to continue to monitor and follow.     Nutrition Discharge Planning: dc on ADA, renal diet as tolerated    Nutrition Risk Screen    Nutrition Risk Screen: large or nonhealing wound, burn or pressure injury    Nutrition/Diet History    Patient Reported Diet/Restrictions/Preferences: diabetic diet  Spiritual, Cultural Beliefs, Cheondoism Practices, Values that Affect Care: no  Factors Affecting Nutritional Intake:  "impaired cognitive status/motor control, decreased appetite, inability to feed self, pain    Anthropometrics    Temp: 97.6 °F (36.4 °C)  Height: 5' 5" (165.1 cm)  Height (inches): 65 in  Weight Method: Bed Scale  Weight: 57 kg (125 lb 10.6 oz)  Weight (lb): 125.66 lb  Ideal Body Weight (IBW), Female: 125 lb  % Ideal Body Weight, Female (lb): 100.53 %  BMI (Calculated): 20.9  BMI Grade: 18.5-24.9 - normal  Weight Loss: unintentional  Usual Body Weight (UBW), k.6 kg (23 on admit)  % Usual Body Weight: 89.81  % Weight Change From Usual Weight: -10.38 %  Amputation %: 5.9  Total Amputation %: 5.9  Amputation Ideal Body Weight (IBW), Female (lb): 119.1 lb  Amputee BMI (kg/m2): 22.28 kg/m2       Lab/Procedures/Meds    Pertinent Labs Reviewed: reviewed  BMP  Lab Results   Component Value Date     10/16/2023    K 4.5 10/16/2023     10/16/2023    CO2 25 10/16/2023    BUN 57 (H) 10/16/2023    CREATININE 6.8 (H) 10/16/2023    CALCIUM 10.9 (H) 10/16/2023    ANIONGAP 19 (H) 10/16/2023    EGFRNORACEVR 6 (A) 10/16/2023     Lab Results   Component Value Date    HGBA1C 5.7 (H) 2023     Lab Results   Component Value Date    CALCIUM 10.9 (H) 10/16/2023    PHOS 5.7 (H) 10/16/2023     Glucose   Date Value Ref Range Status   10/16/2023 92 70 - 110 mg/dL Final       Pertinent Medications Reviewed: reviewed  Scheduled Meds:   sodium chloride 0.9%   Intravenous Once    acetaminophen  325 mg Oral Once    ascorbic acid (vitamin C)  500 mg Oral Daily    aspirin  81 mg Oral Daily    atorvastatin  40 mg Oral Daily    carvediloL  25 mg Oral BID    clopidogreL  75 mg Oral Daily    collagenase   Topical (Top) Daily    droNABinol  2.5 mg Oral BID    epoetin veronica-epbx  100 Units/kg Subcutaneous Every Mon, Wed, Fri    LIDOcaine  1 patch Transdermal Q24H    losartan  100 mg Oral Daily    lubiprostone  24 mcg Oral Daily with breakfast    metoclopramide HCl  10 mg Intravenous Once    NIFEdipine  60 mg Oral BID    polyethylene " glycol  17 g Oral BID    senna-docusate 8.6-50 mg  2 tablet Oral BID    sevelamer carbonate  1,600 mg Oral TID WM    vitamin renal formula (B-complex-vitamin c-folic acid)  1 capsule Oral Daily     Continuous Infusions:  PRN Meds:.acetaminophen, cloNIDine, dextrose 10%, dextrose 10%, glucagon (human recombinant), glucose, glucose, HYDROmorphone, insulin aspart U-100, naloxone, ondansetron, simethicone, sodium chloride 0.9%, sodium chloride 0.9%    Physical Findings/Assessment         Estimated/Assessed Needs    Weight Used For Calorie Calculations: 57 kg (125 lb 10.6 oz)  Energy Calorie Requirements (kcal): 1995  Energy Need Method: Kcal/kg (35)  Protein Requirements: 80g (1.4 g/kg)  Weight Used For Protein Calculations: 57 kg (125 lb 10.6 oz)  Fluid Requirements (mL): 1 mL/kcal  Estimated Fluid Requirement Method:  (or per MD)  RDA Method (mL): 1995  CHO Requirement: 250 (50%dv)      Nutrition Prescription Ordered    Current Diet Order: Renal  Nutrition Order Comments: no fish  Oral Nutrition Supplement: [facility]    Evaluation of Received Nutrient/Fluid Intake    % Kcal Needs: <50%  % Protein Needs: <50%  I/O: -14.9L since admit  Energy Calories Required: not meeting needs  Protein Required: not meeting needs  Fluid Required: not meeting needs  Comments: LBM: 9/15/2023  Tolerance: tolerating  % Intake of Estimated Energy Needs: 25 - 50 %  % Meal Intake: 25 - 50 %    Nutrition Risk    Level of Risk/Frequency of Follow-up: moderate     Monitor and Evaluation    Food and Nutrient Intake: energy intake, food and beverage intake  Food and Nutrient Adminstration: diet order  Knowledge/Beliefs/Attitudes: beliefs and attitudes  Physical Activity and Function: nutrition-related ADLs and IADLs  Anthropometric Measurements: weight, weight change  Biochemical Data, Medical Tests and Procedures: electrolyte and renal panel, gastrointestinal profile, glucose/endocrine profile, inflammatory profile, lipid  profile  Nutrition-Focused Physical Findings: overall appearance     Nutrition Follow-Up    RD Follow-up?: Yes  Stephanie Sanchez MS, RDN, LDN

## 2023-10-16 NOTE — PT/OT/SLP PROGRESS
Occupational Therapy      Patient Name:  Shannan Figueredo   MRN:  [id number]    Patient not seen today secondary to Dialysis. Will follow-up as scheduling permits.    10/16/2023

## 2023-10-16 NOTE — PROGRESS NOTES
"[facility] ([address] Medicine  Progress Note    Patient Name: Shannan Figueredo  MRN: [id number]  Patient Class: IP- Inpatient   Admission Date: 9/17/2023  Length of Stay: 29 days  Attending Physician: Brook Colon MD  Primary Care Provider: Roxi, Primary Doctor        Subjective:     Principal Problem:Encephalopathy        HPI:  Ms. Figueredo is a 67/F with PMH HTN, DMII (diet-controlled), PVD, ESRD on HD (M/W/F), anemia who presented to [facility] 09/17 with a two day progressive history of worsening L foot pain, swelling, fatigue, nausea and vomiting. History obtained from patient and daughter Stephanie ([phone]); they are from the [place], [place] area. Patient reports several weeks ago she "stubbed her toe," noting initially some pain, redness and swelling. Gradually symptoms progressed with duskiness to her toe and she informed her daughter of her pain for which she was brought to ER in [place] on 08/25 for further evaluation. With duskiness to 1st toe and surrounding erythema she was treated for cellulitis with a course of ciprofloxacin and metronidazole, but failed to improve. She followed up with her podiatrist 09/12 where she was prescribed hydrocodone-acetaminophen and a ten day course of TMP-SMX which she has been taking. She was referred to vascular surgery with plan for angiography and potential intervention  She and her family came to [place] for the weekend but her pain worsened; had been attempting to minimize use of hydrocodone-acetaminophen but developed nausea, vomiting and progressive fatigue. She subsequently presented to ED for further evaluation. ED workup was notable for L 1st toe dry-appearing gangrene with surrounding erythema and swelling, removed nail of second toe, XR L foot demonstrating diffuse osteopenia, L calcaneal and forefoot soft tissue swelling without definitive evidence of osteomyelitis in setting of osteopenia, no leukocytosis, elevated sed rate and " CRP, and elevated BUN/Cr in setting of ESRD. She notes she went without dialysis on 09/15 due to her trip. Blood cultures were ordered and she received piperacillin-tazobactam and vancomycin. Hospital medicine was subsequently contacted for admission.      Overview/Hospital Course:  Admitted with gangrene of L 1st toe.  Empirically treated with Zosyn and vancomycin.  Vascular Surgery, nephrology, podiatry consulted; U/S arterial demonstrated L SFA occlusion with distal reconstitution. Cardiology consulted, angiography with no vessels amenable for angioplasty on 9/20.  Underwent exploration of the left lower extremity vessels, femoral and popliteal bypass unable to be performed due to no adequate available vessels for bypass on 9/22. Discussed potential intervention here vs [place], patient and family opted for surgery in [place]. Underwent L BKA 09/29. PT/OT recommended SNF. Had mental status changes and pain medications adjusted with some improvement. Appetite was poor and concern for depressive symptoms due to general medical condition. Telepsychiatry consulted and started mirtazapine, but she was more somnolent and subsequently it was discontinued. Surgery noted blistering to surgical site and recommended continued observation of wound.      Interval History: No acute events overnight. No new complaints. Daughter report little intake with overall poor appetite. Discussed with patient / family at bedside (daughter).    Review of Systems   Constitutional:  Negative for chills and fever.   Respiratory:  Negative for cough and shortness of breath.    Cardiovascular:  Negative for chest pain and palpitations.   Gastrointestinal:  Negative for abdominal pain, nausea and vomiting.     Objective:     Vital Signs (Most Recent):  Temp: 98.3 °F (36.8 °C) (10/16/23 0843)  Pulse: 69 (10/16/23 0843)  Resp: 18 (10/16/23 0843)  BP: 132/63 (10/16/23 0843)  SpO2: 95 % (10/16/23 0843) Vital Signs (24h Range):  Temp:  [98.1 °F  (36.7 °C)-98.9 °F (37.2 °C)] 98.3 °F (36.8 °C)  Pulse:  [65-72] 69  Resp:  [16-18] 18  SpO2:  [93 %-98 %] 95 %  BP: (109-152)/(53-67) 132/63     Weight: 57 kg (125 lb 10.6 oz)  Body mass index is 20.91 kg/m².  No intake or output data in the 24 hours ending 10/16/23 1248        Physical Exam  Vitals and nursing note reviewed.   Constitutional:       General: She is not in acute distress.     Appearance: She is well-developed.   HENT:      Head: Normocephalic and atraumatic.   Eyes:      General:         Right eye: No discharge.         Left eye: No discharge.      Conjunctiva/sclera: Conjunctivae normal.   Cardiovascular:      Rate and Rhythm: Normal rate.      Pulses: Normal pulses.   Pulmonary:      Effort: Pulmonary effort is normal. No respiratory distress.   Abdominal:      Palpations: Abdomen is soft.      Tenderness: There is no abdominal tenderness.   Musculoskeletal:         General: Normal range of motion.      Right lower leg: No edema.      Comments: s/p L BKA with dressing in place.   Skin:     General: Skin is warm and dry.   Neurological:      Mental Status: She is alert and oriented to person, place, and time.      Comments: Hard of hearing but able to interpret spoken language, occasionally with repetition.       Significant Labs:   CBC:  Recent Labs   Lab 10/14/23  0452 10/15/23  0610 10/16/23  0503   WBC 22.17* 19.16* 18.90*   HGB 9.8* 8.7* 9.1*   HCT 32.0* 28.5* 30.0*   * 362 392   GRAN 76.1*  16.9* 77.9*  14.9* 79.4*  15.0*   LYMPH 8.5*  1.9 7.8*  1.5 7.6*  1.4   MONO 12.3  2.7* 11.3  2.2* 10.3  1.9*   EOS 0.1 0.1 0.1   BASO 0.11 0.07 0.08     BMP:  Recent Labs   Lab 10/14/23  0452 10/15/23  0610 10/16/23  0503    145 144   K 4.9 4.4 4.5    102 100   CO2 21* 26 25   BUN 25* 42* 57*   CREATININE 3.7* 5.4* 6.8*   * 116* 92   CALCIUM 10.7* 10.8* 10.9*   MG 2.5 2.6 2.7*   PHOS 4.9* 5.6* 5.7*   ALBUMIN 2.1* 2.0* 1.9*   ANIONGAP 23* 17* 19*        Significant  Imaging: I have reviewed and interpreted all pertinent imaging results/findings within the past 24 hours.      Assessment/Plan:      * Encephalopathy  - Likely multifactorial encephalopathy with components of delirium, toxic encephalopathy, metabolic encephalopathy, and potential baseline cognitive impairment all contributing.  - Suspected increased sedation due to pain medications / gabapentin; stopped gabapentin on 10/5 and adjusted pain control to PO dilaudid at low dose / decreased frequency with improvement in mentation. Likely some baseline cognitive impairment with worsening due to medical issues.  - 10/07 patient developed worsening hearing loss and inability to comprehend written word sometime after waking. Daughter and staff reported she was normal the prior night, without clear timing of deficit.  - Patient already on ASA and plavix, no hypotensive episode prior to explain symptoms.  - CT Head motion-limited but with sequela of chronic microvascular ischemic changes/senescent changes. MRI Brain with continued motion-limited artifact with small focus of diffusion signal abnormality in R frontal lobe with acute infarct not excluded.  - Given already on appropriate vascular treatment with her underlying PVD, little to adjust at this time. Appreciate neurology assistance.  - Remaining stable. Daughters report hard of hearing at baseline and had hearing aids but has not used consistently in recent past. Encouraged locating / active use of hearing aids.  - Psychiatry consulted and started on mirtazapine 15mg PO qHS initially; decreased to 7.5mg PO qHS but appears to have worsened mentation. Mirtazapine subsequently held with improvement in mentation.  - Add trial of marinol 2.5 mg PO BID to stimulate appetite    Gangrene of toe  HTN, PVD  - L 1st toe gangrene predominantly dry with some surrounding erythema/swelling; with concern for worsening infection started piperacillin-tazobactam and vancomycin IV. 2nd toe  "with involvement as well.  - U/S arterial BLE showed L distal SFA occlusion with distal reconstitution. Discussed with vascular surgery and cardiology consulted for consideration of angiography / potential angioplasty.  - Podiatry consulted. MRI L foot with "examination markedly degraded by patient motion artifact. Questionable marrow edema within the 1st distal phalanx, not well evaluated given aforementioned limitation but possibly related to osteomyelitis given reported history of gangrene."  - Blood cultures showed no growth.  - s/p angiogram remarkable for severe PAD with no areas amenable to angioplasty on 9/20. Elected to undergo attempt at revascularization 09/22 but unable to perform femoral and popliteal bypass on 9/22. Discussed surgical options with BKA only viable for appropriate healing given extent of vascular disease.   - Continue carvedilol 12.5mg PO BID, losartan 100mg PO daily, nifedipine 60mg PO BID.  - Underwent BKA on 09/30. Therapy recommending SNF. Pursuing placement.  - Given possible decreased clearance of pain medications, stopped hydrocodone-acetaminophen 5-325mg PO q6hr PRN, oxycodone-acetaminophen 7.5-325mg PO q6hr PRN. Started PO dilaudid instead and Neurontin on 10/3  - Increased sedation since getting Neurontin, changed it to 100 mg QHS on 10/4 and stopped it on 10/5 and with continued improvement in mentation. Patient with likely some baseline cognitive impairment with worsening due to other medical issues.  - WBC increased but plateaued. No fever, repeat CXR overall unrevealing and no other source of infection identified. Likely delayed leukemoid reaction.  - Discussed with Dr. Cobb 10/13; wound with area of blistering that was not present before. Does not appear frankly infectious but will continue to monitor wound status prior to potential discharge.    Type 2 diabetes mellitus with chronic kidney disease on chronic dialysis  - Reports diet-controlled; no current medications.  - " HgbA1c 5.7.  - Continue low-dose sliding scale insulin aspart 0-5U subQ TIDWM PRN, monitor requirements.    ESRD (end stage renal disease)  Anemia, hyperparathyroidism  - ESRD on HD, reports missed session Friday prior to presentation due to travel.  - Mild anemia without evidence of bleeding.  - Continue allopurinol 300mg PO daily, calcium acetate 2001mg PO TIDWM.  - Transfused 1U pRBCs 09/25 with appropriate response.  - Nephrology consulted for HD assistance.    VTE Risk Mitigation (From admission, onward)         Ordered     IP VTE HIGH RISK PATIENT  Once         09/28/23 1906                      Brook Colon MD  Department of Hospital Medicine   [facility] ([address])

## 2023-10-16 NOTE — ASSESSMENT & PLAN NOTE
"HTN, PVD  - L 1st toe gangrene predominantly dry with some surrounding erythema/swelling; with concern for worsening infection started piperacillin-tazobactam and vancomycin IV. 2nd toe with involvement as well.  - U/S arterial BLE showed L distal SFA occlusion with distal reconstitution. Discussed with vascular surgery and cardiology consulted for consideration of angiography / potential angioplasty.  - Podiatry consulted. MRI L foot with "examination markedly degraded by patient motion artifact. Questionable marrow edema within the 1st distal phalanx, not well evaluated given aforementioned limitation but possibly related to osteomyelitis given reported history of gangrene."  - Blood cultures showed no growth.  - s/p angiogram remarkable for severe PAD with no areas amenable to angioplasty on 9/20. Elected to undergo attempt at revascularization 09/22 but unable to perform femoral and popliteal bypass on 9/22. Discussed surgical options with BKA only viable for appropriate healing given extent of vascular disease.   - Continue carvedilol 12.5mg PO BID, losartan 100mg PO daily, nifedipine 60mg PO BID.  - Underwent BKA on 09/30. Therapy recommending SNF. Pursuing placement.  - Given possible decreased clearance of pain medications, stopped hydrocodone-acetaminophen 5-325mg PO q6hr PRN, oxycodone-acetaminophen 7.5-325mg PO q6hr PRN. Started PO dilaudid instead and Neurontin on 10/3  - Increased sedation since getting Neurontin, changed it to 100 mg QHS on 10/4 and stopped it on 10/5 and with continued improvement in mentation. Patient with likely some baseline cognitive impairment with worsening due to other medical issues.  - WBC increased but plateaued. No fever, repeat CXR overall unrevealing and no other source of infection identified. Likely delayed leukemoid reaction.  - Discussed with Dr. Cobb 10/13; wound with area of blistering that was not present before. Does not appear frankly infectious but will " continue to monitor wound status prior to potential discharge.

## 2023-10-16 NOTE — SUBJECTIVE & OBJECTIVE
Interval History: No acute events overnight. No new complaints. Daughter report little intake with overall poor appetite. Discussed with patient / family at bedside (daughter).    Review of Systems   Constitutional:  Negative for chills and fever.   Respiratory:  Negative for cough and shortness of breath.    Cardiovascular:  Negative for chest pain and palpitations.   Gastrointestinal:  Negative for abdominal pain, nausea and vomiting.     Objective:     Vital Signs (Most Recent):  Temp: 98.3 °F (36.8 °C) (10/16/23 0843)  Pulse: 69 (10/16/23 0843)  Resp: 18 (10/16/23 0843)  BP: 132/63 (10/16/23 0843)  SpO2: 95 % (10/16/23 0843) Vital Signs (24h Range):  Temp:  [98.1 °F (36.7 °C)-98.9 °F (37.2 °C)] 98.3 °F (36.8 °C)  Pulse:  [65-72] 69  Resp:  [16-18] 18  SpO2:  [93 %-98 %] 95 %  BP: (109-152)/(53-67) 132/63     Weight: 57 kg (125 lb 10.6 oz)  Body mass index is 20.91 kg/m².  No intake or output data in the 24 hours ending 10/16/23 1248        Physical Exam  Vitals and nursing note reviewed.   Constitutional:       General: She is not in acute distress.     Appearance: She is well-developed.   HENT:      Head: Normocephalic and atraumatic.   Eyes:      General:         Right eye: No discharge.         Left eye: No discharge.      Conjunctiva/sclera: Conjunctivae normal.   Cardiovascular:      Rate and Rhythm: Normal rate.      Pulses: Normal pulses.   Pulmonary:      Effort: Pulmonary effort is normal. No respiratory distress.   Abdominal:      Palpations: Abdomen is soft.      Tenderness: There is no abdominal tenderness.   Musculoskeletal:         General: Normal range of motion.      Right lower leg: No edema.      Comments: s/p L BKA with dressing in place.   Skin:     General: Skin is warm and dry.   Neurological:      Mental Status: She is alert and oriented to person, place, and time.      Comments: Hard of hearing but able to interpret spoken language, occasionally with repetition.       Significant Labs:    CBC:  Recent Labs   Lab 10/14/23  0452 10/15/23  0610 10/16/23  0503   WBC 22.17* 19.16* 18.90*   HGB 9.8* 8.7* 9.1*   HCT 32.0* 28.5* 30.0*   * 362 392   GRAN 76.1*  16.9* 77.9*  14.9* 79.4*  15.0*   LYMPH 8.5*  1.9 7.8*  1.5 7.6*  1.4   MONO 12.3  2.7* 11.3  2.2* 10.3  1.9*   EOS 0.1 0.1 0.1   BASO 0.11 0.07 0.08     BMP:  Recent Labs   Lab 10/14/23  0452 10/15/23  0610 10/16/23  0503    145 144   K 4.9 4.4 4.5    102 100   CO2 21* 26 25   BUN 25* 42* 57*   CREATININE 3.7* 5.4* 6.8*   * 116* 92   CALCIUM 10.7* 10.8* 10.9*   MG 2.5 2.6 2.7*   PHOS 4.9* 5.6* 5.7*   ALBUMIN 2.1* 2.0* 1.9*   ANIONGAP 23* 17* 19*        Significant Imaging: I have reviewed and interpreted all pertinent imaging results/findings within the past 24 hours.

## 2023-10-16 NOTE — PT/OT/SLP PROGRESS
Physical Therapy      Patient Name:  Shannan Figueredo   MRN:  [id number]    Patient not seen today secondary to Dialysis. Will follow-up 10/17/2023.

## 2023-10-16 NOTE — PROGRESS NOTES
[facility] - Med Surg ([address])  Wound Care    Patient Name:  Shannan Figueredo   MRN:  [id number]  Date: 10/16/2023  Diagnosis: Encephalopathy    History:     Past Medical History:   Diagnosis Date    Coronary artery disease     Diabetes mellitus, type II     End stage renal disease     Hyperlipidemia     Hypertension     Peripheral vascular disease        Social History     Socioeconomic History    Marital status:    Tobacco Use    Smoking status: Never    Smokeless tobacco: Never   Substance and Sexual Activity    Alcohol use: Never    Drug use: Never     Social Determinants of Health     Financial Resource Strain: Low Risk  (9/17/2023)    Overall Financial Resource Strain (CARDIA)     Difficulty of Paying Living Expenses: Not hard at all   Food Insecurity: No Food Insecurity (9/17/2023)    Hunger Vital Sign     Worried About Running Out of Food in the Last Year: Never true     Ran Out of Food in the Last Year: Never true   Transportation Needs: No Transportation Needs (9/17/2023)    PRAPARE - Transportation     Lack of Transportation (Medical): No     Lack of Transportation (Non-Medical): No   Physical Activity: Inactive (9/17/2023)    Exercise Vital Sign     Days of Exercise per Week: 0 days     Minutes of Exercise per Session: 0 min   Stress: Stress Concern Present (9/17/2023)    [employer] - Occupational Stress Questionnaire     Feeling of Stress : Very much   Social Connections: Socially Isolated (9/17/2023)    Social Connection and Isolation Panel [NHANES]     Frequency of Communication with Friends and Family: Twice a week     Frequency of Social Gatherings with Friends and Family: Never     Attends Restoration Services: 1 to 4 times per year     Active Member of Clubs or Organizations: No     Attends Club or Organization Meetings: Never     Marital Status:    Housing Stability: Low Risk  (9/17/2023)    Housing Stability Vital Sign     Unable to Pay for Housing  in the Last Year: No     Number of Places Lived in the Last Year: 1     Unstable Housing in the Last Year: No       Precautions:     Allergies as of 09/17/2023    (No Known Allergies)       WOC Assessment Details/Treatment     Attempted to see patient for wound reassessment. Patient being transported to dialysis. Nursing to continue with orders in place for Santyl ointment daily to sacral wound. Due to scheduling, wound care will follow up with patient tomorrow.    10/16/2023

## 2023-10-16 NOTE — PLAN OF CARE
ATA submitted updated documents to [facility] through [facility]  per Perla request. Perla stated she will re-submit for auth with documents.        Perla at [facility]  [phone]

## 2023-10-16 NOTE — PLAN OF CARE
Nutrition Plan of Care:    Recommendations     1) Honor pt's food preference to encourage intake of meals    2) Rec an appetite stimulant if appropriate    3) EN may be warranted d/t pt's chronic inability to meet pro-kcal needs orally   4) RD to follow and monitor nutrition status     Goals: Pt will maintain 50-75% intake of meals by RD follow up  Nutrition Goal Status: progressing towards goal  Communication of RD Recs:  (POC)     Assessment and Plan     Endocrine  Protein calorie malnutrition  Malnutrition Type:  Context: chronic illness, social/environmental circumstances  Level: moderate     Related to (etiology):   Increased nutrient     Signs and Symptoms (as evidenced by):   S/p BKA, altered skin integrity to buttocks, ESRD on HD; poor intake r/t limited food acceptance      Malnutrition Characteristic Summary:  Weight Loss (Malnutrition): greater than 5% in 1 month (10%)  Energy Intake (Malnutrition): less than or equal to 50% for greater than or equal to 1 month  Subcutaneous Fat (Malnutrition): mild depletion  Muscle Mass (Malnutrition): mild depletion        Interventions (treatment strategy):  1) Honor pt's food preference to encourage intake of meals  2) Rec an appetite stimulant if appropriate  3) EN may be warranted d/t pt's chronic inability to meet pro-kcal needs orally 4) RD to follow and monitor nutrition status     Nutrition Diagnosis Status:   Continues           Malnutrition Assessment  Malnutrition Context: chronic illness, social/environmental circumstances  Malnutrition Level: moderate  Hair/Scalp (Micronutrient): none  Musculoskeletal/Lower Extremities:  (PVD s/p L BKA)   Micronutrient Evaluation Summary: suspected deficiency   Weight Loss (Malnutrition): greater than 5% in 1 month (10%)  Energy Intake (Malnutrition): less than or equal to 50% for greater than or equal to 1 month  Subcutaneous Fat (Malnutrition): mild depletion  Muscle Mass (Malnutrition): mild depletion   Orbital Region  (Subcutaneous Fat Loss): mild depletion  Upper Arm Region (Subcutaneous Fat Loss): moderate depletion   [place] Region (Muscle Loss): moderate depletion  Clavicle Bone Region (Muscle Loss): mild depletion  Clavicle and Acromion Bone Region (Muscle Loss): moderate depletion       Stephanie Sanchez, MS, RDN, LDN

## 2023-10-16 NOTE — PLAN OF CARE
Irena attempted to contact Perla at [facility], [place] did not get a answer. IRENA left a voice message for a return call      Perla at [facility]  [phone]

## 2023-10-16 NOTE — PROGRESS NOTES
"Nephrology  Progress Note    Admit Date: 9/17/2023   LOS: 29 days     SUBJECTIVE:     Follow-up For:  Encephalopathy    History of Present Illness:  Patient is a 67 y.o. female presents with left foot gangrene, vomiting, feeling ill.  Ext med hx as outlined below including ESRD on HD MWF in [place].  Has been dealing with necrotic left toes for few months and followed by vascular in [place].  Was here visiting daughter and felt bad.  Admitted for eval/treatment.  Consulted for HD needs.  Pt seen and examined on HD.  Consents signed.  Tired from being up all night with pain.  Updated team and daughter at bedside.  W/up noted.     S/P L BKA (9/29)      Interval History:     Awaiting HD.  Discussed with daughter at bedside and Dr. Colon.  Pleasantly confused.        Review of Systems:    Unable to fully access.     OBJECTIVE:     Vital Signs Range (Last 24H):  /63 (BP Location: Left arm, Patient Position: Lying)   Pulse 69   Temp 98.3 °F (36.8 °C) (Oral)   Resp 18   Ht 5' 5" (1.651 m)   Wt 57 kg (125 lb 10.6 oz)   SpO2 95%   BMI 20.91 kg/m²     Temp:  [98.1 °F (36.7 °C)-98.9 °F (37.2 °C)]   Pulse:  [65-73]   Resp:  [16-18]   BP: (109-152)/(53-67)   SpO2:  [93 %-98 %]     I & O (Last 24H):No intake or output data in the 24 hours ending 10/16/23 0852      Physical Exam:  General appearance:  Frail.  Appears older than stated age.  NAD this am.    Eyes:  Conjunctivae/corneas clear. PERRL.  Lungs: Normal respiratory effort,   clear to auscultation bilaterally.  Diminished.   Heart: Regular rate and rhythm, S1, S2 normal, no murmur, rub or nelly.  Abdomen: Soft, non-tender non-distended; bowel sounds normal; no masses,  no organomegaly  Extremities: No cyanosis or clubbing. No edema.    Skin: Skin color, texture, turgor normal. No rashes or lesions  Neurologic: Normal strength and tone. No focal numbness or weakness   Right arm AVF+  Left BKA wrapped.       Medications:  Medication list was reviewed and changes " noted under Assessment/Plan.    Diagnostic Results:    reviewed    ASSESSMENT/PLAN:       Lethargy  -discussed with attending. May be due to Remeron vs other  -Doubt this is uremic in nature. Medication held with improvement in mentation.  Defer    ESRD on HD MWF in [place]:  -consulted for HD needs.  -consents signed  -continue HD MWF   -R arm AVF+  -See on HD today      S/p BKA on 9/29  -defer to pod/vascular/cards/wound care.  -angio noted with severe PAD  -Status post exploration femoral and popliteal arteries 9/22 with Non-reconstructible peripheral vascular disease.      HTN:  -At goal. UF on HD      MBD/hypercalcemia  -binders/nephrocaps.   Changed to renvela.  Pt states that she also takes tums at home  -using calcitriol on HD days but hold with mild >calcium  -No longer on calcium supplements and binder switched to non-calcium  -Low calcium bath ordered.  -needs PT/OT/OOB.       Anemia of CKD:  -Received transfusion 1 unit 9/29  -Hgb below goal  -FAREED with HD  -Iron stores low but ^^ferritin precludes use of Venofer  -Vit C.     Suspected CVA:  -defer to neuro.  -mental status labile      Dispo:  -awaiting dynamics to play out.  -defer to palliative care.       Stable for DC/Transfer to facility from Renal.   Will follow for renal needs.

## 2023-10-16 NOTE — PLAN OF CARE
10/16/23 0955   Post-Acute Status   Post-Acute Authorization Placement;Dialysis   Post-Acute Placement Status Referrals Sent   Diaylsis Status Pending medical clearance/testing   Discharge Delays (!) Post-Acute Set-up   Discharge Plan   Discharge Plan A Skilled Nursing Facility   Discharge Plan B Home with family;Home Health      ATA contacted Karen with [employer] to confirm patient Hep B panel was received. Karen stated they are  the documents on the fax. Ata re-faxed & E-mailed the documents. Karen stated she did receive the documents by e-mail.     Karen with [employer] [phone] ext: [phone]   [email or web address]    Ata attempted to contact Perla at [facility], Ata was informed Perla was in a morning meeting. ATA left a message for a return call     Perla at [facility]  [phone]      Patient dialysis pending  Hep B Total Core for  completion

## 2023-10-16 NOTE — ASSESSMENT & PLAN NOTE
- Likely multifactorial encephalopathy with components of delirium, toxic encephalopathy, metabolic encephalopathy, and potential baseline cognitive impairment all contributing.  - Suspected increased sedation due to pain medications / gabapentin; stopped gabapentin on 10/5 and adjusted pain control to PO dilaudid at low dose / decreased frequency with improvement in mentation. Likely some baseline cognitive impairment with worsening due to medical issues.  - 10/07 patient developed worsening hearing loss and inability to comprehend written word sometime after waking. Daughter and staff reported she was normal the prior night, without clear timing of deficit.  - Patient already on ASA and plavix, no hypotensive episode prior to explain symptoms.  - CT Head motion-limited but with sequela of chronic microvascular ischemic changes/senescent changes. MRI Brain with continued motion-limited artifact with small focus of diffusion signal abnormality in R frontal lobe with acute infarct not excluded.  - Given already on appropriate vascular treatment with her underlying PVD, little to adjust at this time. Appreciate neurology assistance.  - Remaining stable. Daughters report hard of hearing at baseline and had hearing aids but has not used consistently in recent past. Encouraged locating / active use of hearing aids.  - Psychiatry consulted and started on mirtazapine 15mg PO qHS initially; decreased to 7.5mg PO qHS but appears to have worsened mentation. Mirtazapine subsequently held with improvement in mentation.  - Add trial of marinol 2.5 mg PO BID to stimulate appetite

## 2023-10-17 NOTE — ASSESSMENT & PLAN NOTE
- Likely multifactorial encephalopathy with components of delirium, toxic encephalopathy, metabolic encephalopathy, and potential baseline cognitive impairment all contributing.  - Suspected increased sedation due to pain medications / gabapentin; stopped gabapentin on 10/5 and adjusted pain control to PO dilaudid at low dose / decreased frequency with improvement in mentation. Likely some baseline cognitive impairment with worsening due to medical issues.  - 10/07 patient developed worsening hearing loss and inability to comprehend written word sometime after waking. Daughter and staff reported she was normal the prior night, without clear timing of deficit.  - Patient already on ASA and plavix, no hypotensive episode prior to explain symptoms.  - CT Head motion-limited but with sequela of chronic microvascular ischemic changes/senescent changes. MRI Brain with continued motion-limited artifact with small focus of diffusion signal abnormality in R frontal lobe with acute infarct not excluded.  - Given already on appropriate vascular treatment with her underlying PVD, little to adjust at this time. Appreciate neurology assistance.  - Remaining stable. Daughters report hard of hearing at baseline and had hearing aids but has not used consistently in recent past. Encouraged locating / active use of hearing aids.  - Psychiatry consulted and started on mirtazapine 15mg PO qHS initially; decreased to 7.5mg PO qHS but appears to have worsened mentation. Mirtazapine subsequently held with improvement in mentation.  - Added trial of marinol 2.5 mg PO BID to stimulate appetite on 10/16 but no significant improvement and patient more lethargic  - Stop marinol today

## 2023-10-17 NOTE — PROGRESS NOTES
"Nephrology  Progress Note    Admit Date: 9/17/2023   LOS: 30 days     SUBJECTIVE:     Follow-up For:  Encephalopathy    History of Present Illness:  Patient is a 67 y.o. female presents with left foot gangrene, vomiting, feeling ill.  Ext med hx as outlined below including ESRD on HD MWF in [place].  Has been dealing with necrotic left toes for few months and followed by vascular in [place].  Was here visiting daughter and felt bad.  Admitted for eval/treatment.  Consulted for HD needs.  Pt seen and examined on HD.  Consents signed.  Tired from being up all night with pain.  Updated team and daughter at bedside.  W/up noted.     S/P L BKA (9/29)      Interval History:     Resting this am.  Discussed with Dr. Colon.  Awaiting DC plans.      Review of Systems:    Unable to fully access.     OBJECTIVE:     Vital Signs Range (Last 24H):  /83   Pulse 72   Temp 97.5 °F (36.4 °C) (Oral)   Resp 18   Ht 5' 5" (1.651 m)   Wt 57 kg (125 lb 10.6 oz)   SpO2 95%   BMI 20.91 kg/m²     Temp:  [96.9 °F (36.1 °C)-98.3 °F (36.8 °C)]   Pulse:  [69-90]   Resp:  [18]   BP: (123-154)/(60-83)   SpO2:  [95 %-99 %]     I & O (Last 24H):  Intake/Output Summary (Last 24 hours) at 10/17/2023 0735  Last data filed at 10/16/2023 1430  Gross per 24 hour   Intake 500 ml   Output 1500 ml   Net -1000 ml         Physical Exam:  General appearance:  Frail.  Appears older than stated age.     Eyes:  Conjunctivae/corneas clear. PERRL.  Lungs: Normal respiratory effort,   clear to auscultation bilaterally.  Diminished.   Heart: Regular rate and rhythm, S1, S2 normal, no murmur, rub or nelly.  Abdomen: Soft, non-tender non-distended; bowel sounds normal; no masses,  no organomegaly  Extremities: No cyanosis or clubbing. No edema.    Skin: Skin color, texture, turgor normal. No rashes or lesions  Neurologic: Normal strength and tone. No focal numbness or weakness   Right arm AVF+  Left BKA wrapped.       Medications:  Medication list was reviewed " and changes noted under Assessment/Plan.    Diagnostic Results:    reviewed    ASSESSMENT/PLAN:     Lethargy  -discussed with attending. May be due to Remeron vs other  -Doubt this is uremic in nature. Medication held with some improvement in mentation.  -started on Marinol.     ESRD on HD MWF in [place]:  -consulted for HD needs.  -consents signed  -continue HD MWF   -R arm AVF+      S/p BKA on 9/29  -defer to pod/vascular/cards/wound care.  -angio noted with severe PAD  -Status post exploration femoral and popliteal arteries 9/22 with Non-reconstructible peripheral vascular disease.      HTN:  -At goal. UF on HD      MBD/hypercalcemia  -binders/nephrocaps.   Changed to renvela.  Pt states that she also takes tums at home  -using calcitriol on HD days but hold with mild >calcium  -No longer on calcium supplements and binder switched to non-calcium  -Low calcium bath ordered.  -needs PT/OT/OOB.  -suspect due immobilization and mild HPTH.  As above and start low dose sensipar.      Anemia of CKD:  -Received transfusion 1 unit 9/29  -Hgb below goal  -FAREED with HD  -Iron stores low but ^^ferritin precludes use of Venofer  -Vit C.     Suspected CVA:  -defer to neuro.  -mental status labile      Dispo:  -awaiting dynamics to play out.  -defer to palliative care.       Stable for DC/Transfer to facility from Renal.   Will follow for renal needs.

## 2023-10-17 NOTE — CARE UPDATE
Stephanie Gilmore Daughter     [phone]     Called Stephanie (daughter). Update given. All questions and concerns were addressed.    JOSE Colon MD

## 2023-10-17 NOTE — SUBJECTIVE & OBJECTIVE
Interval History: Patient did not sleep well last night and is now tired and sleepy this morning from being up. Daughter report no real ortiz in intake of food. Discussed with family at bedside (daughter).    Review of Systems   Constitutional:  Negative for chills and fever.   Respiratory:  Negative for cough and shortness of breath.    Cardiovascular:  Negative for chest pain and palpitations.   Gastrointestinal:  Negative for abdominal pain, nausea and vomiting.     Objective:     Vital Signs (Most Recent):  Temp: 98 °F (36.7 °C) (10/17/23 1154)  Pulse: 76 (10/17/23 1154)  Resp: 20 (10/17/23 1154)  BP: 112/75 (10/17/23 1154)  SpO2: 98 % (10/17/23 1154) Vital Signs (24h Range):  Temp:  [96.9 °F (36.1 °C)-98.3 °F (36.8 °C)] 98 °F (36.7 °C)  Pulse:  [59-90] 76  Resp:  [18-20] 20  SpO2:  [95 %-99 %] 98 %  BP: (112-154)/(59-83) 112/75     Weight: 57 kg (125 lb 10.6 oz)  Body mass index is 20.91 kg/m².  No intake or output data in the 24 hours ending 10/17/23 1529        Physical Exam  Vitals and nursing note reviewed.   Constitutional:       General: She is not in acute distress.     Appearance: She is well-developed.   HENT:      Head: Normocephalic and atraumatic.   Eyes:      General:         Right eye: No discharge.         Left eye: No discharge.      Conjunctiva/sclera: Conjunctivae normal.   Cardiovascular:      Rate and Rhythm: Normal rate.      Pulses: Normal pulses.   Pulmonary:      Effort: Pulmonary effort is normal. No respiratory distress.   Abdominal:      Palpations: Abdomen is soft.      Tenderness: There is no abdominal tenderness.   Musculoskeletal:         General: Normal range of motion.      Right lower leg: No edema.      Comments: s/p L BKA with dressing in place.   Skin:     General: Skin is warm and dry.   Neurological:      Mental Status: She is alert.      Comments: Hard of hearing. Sleepy this morning       Significant Labs:   CBC:  Recent Labs   Lab 10/15/23  0610 10/16/23  0503  10/17/23  0542   WBC 19.16* 18.90* 14.55*   HGB 8.7* 9.1* 10.2*   HCT 28.5* 30.0* 34.5*    392 388   GRAN 77.9*  14.9* 79.4*  15.0* 74.0*  10.8*   LYMPH 7.8*  1.5 7.6*  1.4 10.7*  1.6   MONO 11.3  2.2* 10.3  1.9* 10.2  1.5*   EOS 0.1 0.1 0.1   BASO 0.07 0.08 0.11     BMP:  Recent Labs   Lab 10/15/23  0610 10/16/23  0503 10/17/23  0541    144 146*   K 4.4 4.5 4.2    100 102   CO2 26 25 24   BUN 42* 57* 25*   CREATININE 5.4* 6.8* 3.5*   * 92 113*   CALCIUM 10.8* 10.9* 11.1*   MG 2.6 2.7* 2.4   PHOS 5.6* 5.7* 4.2   ALBUMIN 2.0* 1.9* 2.2*   ANIONGAP 17* 19* 20*        Significant Imaging: I have reviewed and interpreted all pertinent imaging results/findings within the past 24 hours.

## 2023-10-17 NOTE — PT/OT/SLP PROGRESS
Occupational Therapy      Patient Name:  Shannan Figueredo   MRN:  [id number]    Patient not seen today secondary to low arousal level/sleepy and lethargic. Pt was restless and did not sleep well last night per daughter.  Will follow-up next treatment day.    10/17/2023

## 2023-10-17 NOTE — PROGRESS NOTES
Afebrile   Vital signs stable   Small amount of fresh bleeding from distal incision per evening nourished.  Dressing change, no active bleeding at this time

## 2023-10-17 NOTE — PROGRESS NOTES
"[facility] ([address] Medicine  Progress Note    Patient Name: Shannan Figueredo  MRN: [id number]  Patient Class: IP- Inpatient   Admission Date: 9/17/2023  Length of Stay: 30 days  Attending Physician: Brook Colon MD  Primary Care Provider: Roxi, Primary Doctor        Subjective:     Principal Problem:Encephalopathy        HPI:  Ms. Figueredo is a 67/F with PMH HTN, DMII (diet-controlled), PVD, ESRD on HD (M/W/F), anemia who presented to [facility] 09/17 with a two day progressive history of worsening L foot pain, swelling, fatigue, nausea and vomiting. History obtained from patient and daughter Stephanie ([phone]); they are from the [place], [place] area. Patient reports several weeks ago she "stubbed her toe," noting initially some pain, redness and swelling. Gradually symptoms progressed with duskiness to her toe and she informed her daughter of her pain for which she was brought to ER in [place] on 08/25 for further evaluation. With duskiness to 1st toe and surrounding erythema she was treated for cellulitis with a course of ciprofloxacin and metronidazole, but failed to improve. She followed up with her podiatrist 09/12 where she was prescribed hydrocodone-acetaminophen and a ten day course of TMP-SMX which she has been taking. She was referred to vascular surgery with plan for angiography and potential intervention  She and her family came to [place] for the weekend but her pain worsened; had been attempting to minimize use of hydrocodone-acetaminophen but developed nausea, vomiting and progressive fatigue. She subsequently presented to ED for further evaluation. ED workup was notable for L 1st toe dry-appearing gangrene with surrounding erythema and swelling, removed nail of second toe, XR L foot demonstrating diffuse osteopenia, L calcaneal and forefoot soft tissue swelling without definitive evidence of osteomyelitis in setting of osteopenia, no leukocytosis, elevated sed rate and " CRP, and elevated BUN/Cr in setting of ESRD. She notes she went without dialysis on 09/15 due to her trip. Blood cultures were ordered and she received piperacillin-tazobactam and vancomycin. Hospital medicine was subsequently contacted for admission.      Overview/Hospital Course:  Admitted with gangrene of L 1st toe.  Empirically treated with Zosyn and vancomycin.  Vascular Surgery, nephrology, podiatry consulted; U/S arterial demonstrated L SFA occlusion with distal reconstitution. Cardiology consulted, angiography with no vessels amenable for angioplasty on 9/20.  Underwent exploration of the left lower extremity vessels, femoral and popliteal bypass unable to be performed due to no adequate available vessels for bypass on 9/22. Discussed potential intervention here vs [place], patient and family opted for surgery in [place]. Underwent L BKA 09/29. PT/OT recommended SNF. Had mental status changes and pain medications adjusted with some improvement. Appetite was poor and concern for depressive symptoms due to general medical condition. Telepsychiatry consulted and started mirtazapine, but she was more somnolent and subsequently it was discontinued. Surgery noted blistering to surgical site and recommended continued observation of wound.      Interval History: Patient did not sleep well last night and is now tired and sleepy this morning from being up. Daughter report no real ortiz in intake of food. Discussed with family at bedside (daughter).    Review of Systems   Constitutional:  Negative for chills and fever.   Respiratory:  Negative for cough and shortness of breath.    Cardiovascular:  Negative for chest pain and palpitations.   Gastrointestinal:  Negative for abdominal pain, nausea and vomiting.     Objective:     Vital Signs (Most Recent):  Temp: 98 °F (36.7 °C) (10/17/23 1154)  Pulse: 76 (10/17/23 1154)  Resp: 20 (10/17/23 1154)  BP: 112/75 (10/17/23 1154)  SpO2: 98 % (10/17/23 1154) Vital Signs (24h  Range):  Temp:  [96.9 °F (36.1 °C)-98.3 °F (36.8 °C)] 98 °F (36.7 °C)  Pulse:  [59-90] 76  Resp:  [18-20] 20  SpO2:  [95 %-99 %] 98 %  BP: (112-154)/(59-83) 112/75     Weight: 57 kg (125 lb 10.6 oz)  Body mass index is 20.91 kg/m².  No intake or output data in the 24 hours ending 10/17/23 1529        Physical Exam  Vitals and nursing note reviewed.   Constitutional:       General: She is not in acute distress.     Appearance: She is well-developed.   HENT:      Head: Normocephalic and atraumatic.   Eyes:      General:         Right eye: No discharge.         Left eye: No discharge.      Conjunctiva/sclera: Conjunctivae normal.   Cardiovascular:      Rate and Rhythm: Normal rate.      Pulses: Normal pulses.   Pulmonary:      Effort: Pulmonary effort is normal. No respiratory distress.   Abdominal:      Palpations: Abdomen is soft.      Tenderness: There is no abdominal tenderness.   Musculoskeletal:         General: Normal range of motion.      Right lower leg: No edema.      Comments: s/p L BKA with dressing in place.   Skin:     General: Skin is warm and dry.   Neurological:      Mental Status: She is alert.      Comments: Hard of hearing. Sleepy this morning       Significant Labs:   CBC:  Recent Labs   Lab 10/15/23  0610 10/16/23  0503 10/17/23  0542   WBC 19.16* 18.90* 14.55*   HGB 8.7* 9.1* 10.2*   HCT 28.5* 30.0* 34.5*    392 388   GRAN 77.9*  14.9* 79.4*  15.0* 74.0*  10.8*   LYMPH 7.8*  1.5 7.6*  1.4 10.7*  1.6   MONO 11.3  2.2* 10.3  1.9* 10.2  1.5*   EOS 0.1 0.1 0.1   BASO 0.07 0.08 0.11     BMP:  Recent Labs   Lab 10/15/23  0610 10/16/23  0503 10/17/23  0541    144 146*   K 4.4 4.5 4.2    100 102   CO2 26 25 24   BUN 42* 57* 25*   CREATININE 5.4* 6.8* 3.5*   * 92 113*   CALCIUM 10.8* 10.9* 11.1*   MG 2.6 2.7* 2.4   PHOS 5.6* 5.7* 4.2   ALBUMIN 2.0* 1.9* 2.2*   ANIONGAP 17* 19* 20*        Significant Imaging: I have reviewed and interpreted all pertinent imaging  results/findings within the past 24 hours.      Assessment/Plan:      * Encephalopathy  - Likely multifactorial encephalopathy with components of delirium, toxic encephalopathy, metabolic encephalopathy, and potential baseline cognitive impairment all contributing.  - Suspected increased sedation due to pain medications / gabapentin; stopped gabapentin on 10/5 and adjusted pain control to PO dilaudid at low dose / decreased frequency with improvement in mentation. Likely some baseline cognitive impairment with worsening due to medical issues.  - 10/07 patient developed worsening hearing loss and inability to comprehend written word sometime after waking. Daughter and staff reported she was normal the prior night, without clear timing of deficit.  - Patient already on ASA and plavix, no hypotensive episode prior to explain symptoms.  - CT Head motion-limited but with sequela of chronic microvascular ischemic changes/senescent changes. MRI Brain with continued motion-limited artifact with small focus of diffusion signal abnormality in R frontal lobe with acute infarct not excluded.  - Given already on appropriate vascular treatment with her underlying PVD, little to adjust at this time. Appreciate neurology assistance.  - Remaining stable. Daughters report hard of hearing at baseline and had hearing aids but has not used consistently in recent past. Encouraged locating / active use of hearing aids.  - Psychiatry consulted and started on mirtazapine 15mg PO qHS initially; decreased to 7.5mg PO qHS but appears to have worsened mentation. Mirtazapine subsequently held with improvement in mentation.  - Added trial of marinol 2.5 mg PO BID to stimulate appetite on 10/16 but no significant improvement and patient more lethargic  - Stop marinol today    Gangrene of toe  HTN, PVD  - L 1st toe gangrene predominantly dry with some surrounding erythema/swelling; with concern for worsening infection started  "piperacillin-tazobactam and vancomycin IV. 2nd toe with involvement as well.  - U/S arterial BLE showed L distal SFA occlusion with distal reconstitution. Discussed with vascular surgery and cardiology consulted for consideration of angiography / potential angioplasty.  - Podiatry consulted. MRI L foot with "examination markedly degraded by patient motion artifact. Questionable marrow edema within the 1st distal phalanx, not well evaluated given aforementioned limitation but possibly related to osteomyelitis given reported history of gangrene."  - Blood cultures showed no growth.  - s/p angiogram remarkable for severe PAD with no areas amenable to angioplasty on 9/20. Elected to undergo attempt at revascularization 09/22 but unable to perform femoral and popliteal bypass on 9/22. Discussed surgical options with BKA only viable for appropriate healing given extent of vascular disease.   - Continue carvedilol 12.5mg PO BID, losartan 100mg PO daily, nifedipine 60mg PO BID.  - Underwent BKA on 09/30. Therapy recommending SNF. Pursuing placement.  - Given possible decreased clearance of pain medications, stopped hydrocodone-acetaminophen 5-325mg PO q6hr PRN, oxycodone-acetaminophen 7.5-325mg PO q6hr PRN. Started PO dilaudid instead and Neurontin on 10/3  - Increased sedation since getting Neurontin, changed it to 100 mg QHS on 10/4 and stopped it on 10/5 and with continued improvement in mentation. Patient with likely some baseline cognitive impairment with worsening due to other medical issues.  - WBC increased but plateaued. No fever, repeat CXR overall unrevealing and no other source of infection identified. Likely delayed leukemoid reaction.  - Discussed with Dr. Cobb 10/13; wound with area of blistering that was not present before. Does not appear frankly infectious but will continue to monitor wound status prior to potential discharge.  - Case discussed with Dr. Cobb today, and wound status stable and WBC " trending down, stable for discharge in terms of her wound    Type 2 diabetes mellitus with chronic kidney disease on chronic dialysis  - Reports diet-controlled; no current medications.  - HgbA1c 5.7.  - Continue low-dose sliding scale insulin aspart 0-5U subQ TIDWM PRN, monitor requirements.    ESRD (end stage renal disease)  Anemia, hyperparathyroidism  - ESRD on HD, reports missed session Friday prior to presentation due to travel.  - Mild anemia without evidence of bleeding.  - Continue allopurinol 300mg PO daily, calcium acetate 2001mg PO TIDWM.  - Transfused 1U pRBCs 09/25 with appropriate response.  - Nephrology consulted for HD assistance.      VTE Risk Mitigation (From admission, onward)         Ordered     IP VTE HIGH RISK PATIENT  Once         09/28/23 1906                      Brook Colon MD  Department of Hospital Medicine   [facility] - Med Surg ([address])

## 2023-10-17 NOTE — PROGRESS NOTES
[facility] - Med Surg ([address])  Wound Care    Patient Name:  Shannan Figueredo   MRN:  [id number]  Date: 10/17/2023  Diagnosis: Encephalopathy    History:     Past Medical History:   Diagnosis Date    Coronary artery disease     Diabetes mellitus, type II     End stage renal disease     Hyperlipidemia     Hypertension     Peripheral vascular disease        Social History     Socioeconomic History    Marital status:    Tobacco Use    Smoking status: Never    Smokeless tobacco: Never   Substance and Sexual Activity    Alcohol use: Never    Drug use: Never     Social Determinants of Health     Financial Resource Strain: Low Risk  (9/17/2023)    Overall Financial Resource Strain (CARDIA)     Difficulty of Paying Living Expenses: Not hard at all   Food Insecurity: No Food Insecurity (9/17/2023)    Hunger Vital Sign     Worried About Running Out of Food in the Last Year: Never true     Ran Out of Food in the Last Year: Never true   Transportation Needs: No Transportation Needs (9/17/2023)    PRAPARE - Transportation     Lack of Transportation (Medical): No     Lack of Transportation (Non-Medical): No   Physical Activity: Inactive (9/17/2023)    Exercise Vital Sign     Days of Exercise per Week: 0 days     Minutes of Exercise per Session: 0 min   Stress: Stress Concern Present (9/17/2023)    [employer] [place] [employer] - Occupational Stress Questionnaire     Feeling of Stress : Very much   Social Connections: Socially Isolated (9/17/2023)    Social Connection and Isolation Panel [NHANES]     Frequency of Communication with Friends and Family: Twice a week     Frequency of Social Gatherings with Friends and Family: Never     Attends Je[employer] witness Services: 1 to 4 times per year     Active Member of Clubs or Organizations: No     Attends Club or Organization Meetings: Never     Marital Status:    Housing Stability: Low Risk  (9/17/2023)    Housing Stability Vital Sign     Unable to Pay for Housing  in the Last Year: No     Number of Places Lived in the Last Year: 1     Unstable Housing in the Last Year: No       Precautions:     Allergies as of 09/17/2023    (No Known Allergies)       [facility] Assessment Details/Treatment     Wound care follow up:     Met with patient this morning . Daughter at bedside. Patient had a large bowel movement. Cleansed patient and assessed sacral wound.      Upon assessment noted unstageable pressure injury with adherent slough to wound bed. Triad currently in use by nursing. Continue to recommend santyl ointment to provide enzymatic debridement of necrotic tissue and promote moist wound healing.     Nursing and MD team notified. Orders already in place. Nursing to maintain pressure injury prevention interventions. Wound care to follow pt.        10/17/23 0958        Altered Skin Integrity 10/07/23 1000 Sacral spine Purple or maroon localized area of discolored intact skin or non-intact skin or a blood-filled blister.   Date First Assessed/Time First Assessed: 10/07/23 1000   Altered Skin Integrity Present on Admission - Did Patient arrive to the hospital with altered skin?: suspected hospital acquired  Location: Sacral spine  Description of Altered Skin Integrity: P...   Wound Image    Description of Altered Skin Integrity Full thickness tissue loss. Base is covered by slough and/or eschar in the wound bed   Dressing Appearance Dry;Intact;Moist drainage   Drainage Amount Small   Drainage Characteristics/Odor Serous;No odor   Appearance Pink;Slough;Necrotic;Moist   Tissue loss description Full thickness   Periwound Area Dry   Wound Edges Defined   Wound Length (cm) 9 cm   Wound Width (cm) 6 cm   Wound Surface Area (cm^2) 54 cm^2   Care Cleansed with:;Antimicrobial agent   Dressing Change Due 10/18/23         10/17/2023

## 2023-10-17 NOTE — ASSESSMENT & PLAN NOTE
"HTN, PVD  - L 1st toe gangrene predominantly dry with some surrounding erythema/swelling; with concern for worsening infection started piperacillin-tazobactam and vancomycin IV. 2nd toe with involvement as well.  - U/S arterial BLE showed L distal SFA occlusion with distal reconstitution. Discussed with vascular surgery and cardiology consulted for consideration of angiography / potential angioplasty.  - Podiatry consulted. MRI L foot with "examination markedly degraded by patient motion artifact. Questionable marrow edema within the 1st distal phalanx, not well evaluated given aforementioned limitation but possibly related to osteomyelitis given reported history of gangrene."  - Blood cultures showed no growth.  - s/p angiogram remarkable for severe PAD with no areas amenable to angioplasty on 9/20. Elected to undergo attempt at revascularization 09/22 but unable to perform femoral and popliteal bypass on 9/22. Discussed surgical options with BKA only viable for appropriate healing given extent of vascular disease.   - Continue carvedilol 12.5mg PO BID, losartan 100mg PO daily, nifedipine 60mg PO BID.  - Underwent BKA on 09/30. Therapy recommending SNF. Pursuing placement.  - Given possible decreased clearance of pain medications, stopped hydrocodone-acetaminophen 5-325mg PO q6hr PRN, oxycodone-acetaminophen 7.5-325mg PO q6hr PRN. Started PO dilaudid instead and Neurontin on 10/3  - Increased sedation since getting Neurontin, changed it to 100 mg QHS on 10/4 and stopped it on 10/5 and with continued improvement in mentation. Patient with likely some baseline cognitive impairment with worsening due to other medical issues.  - WBC increased but plateaued. No fever, repeat CXR overall unrevealing and no other source of infection identified. Likely delayed leukemoid reaction.  - Discussed with Dr. Cobb 10/13; wound with area of blistering that was not present before. Does not appear frankly infectious but will " continue to monitor wound status prior to potential discharge.  - Case discussed with Dr. Cobb today, and wound status stable and WBC trending down, stable for discharge in terms of her wound

## 2023-10-17 NOTE — PLAN OF CARE
Problem: Infection  Goal: Absence of Infection Signs and Symptoms  Outcome: Ongoing, Progressing     Problem: Adult Inpatient Plan of Care  Goal: Plan of Care Review  Outcome: Ongoing, Progressing  Goal: Patient-Specific Goal (Individualized)  Outcome: Ongoing, Progressing  Goal: Absence of Hospital-Acquired Illness or Injury  Outcome: Ongoing, Progressing  Goal: Optimal Comfort and Wellbeing  Outcome: Ongoing, Progressing  Goal: Readiness for Transition of Care  Outcome: Ongoing, Progressing     Problem: Diabetes Comorbidity  Goal: Blood Glucose Level Within Targeted Range  Outcome: Ongoing, Progressing     Problem: Impaired Wound Healing  Goal: Optimal Wound Healing  Outcome: Ongoing, Progressing

## 2023-10-17 NOTE — PLAN OF CARE
[facility] sent [facility] updated PT/OT notes.     ATA sent [facility] patient Hep B Total Core to [email or web address]

## 2023-10-17 NOTE — PT/OT/SLP PROGRESS
Physical Therapy      Patient Name:  Shannan Figueredo   MRN:  [id number]    Patient not seen today secondary to patient sleeping/lethargic after being up all night (per daughter), not able to participate in therapy at this time. Will follow-up next treatment day.

## 2023-10-18 NOTE — PT/OT/SLP PROGRESS
Occupational Therapy   Treatment    Name: Shannan Figueredo  MRN: [id number]  Admitting Diagnosis:  Encephalopathy  19 Days Post-Op    Recommendations:     Discharge Recommendations: Moderate Intensity Therapy  Discharge Equipment Recommendations:  to be determined by next level of care (Drop arm BSC, Tutu height lightweight w/c,removable arm rests, elevating leg rests, anti tippers, wheelchair cushion, left residual limb support for wheelchair; ;IF PT IS NOT A CANDIDATE FOR A LE PROSTHESIS, WILL NEED A CUSTOM WHEELCHAIR SEATING SYSTEM)  Barriers to discharge:   (Current mental and functional level)    Assessment:     Shannan Figueredo is a 67 y.o. female with a medical diagnosis of Encephalopathy.  She presents with the following performance deficits affecting function are weakness, impaired endurance, impaired self care skills, impaired functional mobility, gait instability, impaired balance, impaired cognition, decreased lower extremity function.     Patient tolerated PROM treatment at bed level though patient very lethargic and low arousal level during session. At this time, EOB/OOB activities are not appropriate due pt's current functional and mental status. RN and daughter present during session. UB PROM was performed to avoid joint contracture and maintain function. Pt activity tolerance continues to be limited d/t pt's decreased level of alertness.     Rehab Prognosis:  Fair; patient would benefit from acute skilled OT services to address these deficits and reach maximum level of function.       Plan:     Patient to be seen 3 x/week to address the above listed problems via self-care/home management, therapeutic activities, therapeutic exercises, neuromuscular re-education  Plan of Care Expires: 10/21/23  Plan of Care Reviewed with: patient, daughter    Subjective     Chief Complaint: daughter expressing concern for pt's current mental and functional status   Patient/Family Comments/goals: daughter agreeable for  OT session   Pain/Comfort:  Pain Rating 1:  (no verbalizations of pain during PROM though aroused to pain (sternal rub))    Objective:     Communicated with: PT prior to session.  Patient found supine with peripheral IV upon OT entry to room.    General Precautions: Standard, fall, diabetic    Orthopedic Precautions: (new left BKA)  Braces: N/A  Respiratory Status: Room air     Occupational Performance:     Bed Mobility:    Scoot HOB: Total A x 2      BUE PROM stretching at bed level:  Shoulder flexion/extension 1x10 BUE   Elbow flexion/extension 1x10 BUE   Wrist flexion/extension 1x10 BUE   Horizontal Adduction/Abduction of shoulders 1x10 BUE   Supination/pronation 1x10 BUE   Finger flexion/extension 1x10 BUE       AMPAC 6 Click ADL: 6    Treatment & Education:  Role of OT, purpose of UB PROM stretching, call light for assistance     Patient left supine with all lines intact, call button in reach, bed alarm on, and RN present    GOALS:   Multidisciplinary Problems       Occupational Therapy Goals          Problem: Occupational Therapy    Goal Priority Disciplines Outcome Interventions   Occupational Therapy Goal     OT, PT/OT Ongoing, Not Progressing    Description: OT evaluation completed s/p LLE BKA with goals updated as appropriate.      Goals to be met by: 10/15/2023     Patient will increase functional independence with ADLs by performing:    UE Dressing with Moderate Assistance.  LE Dressing with Maximum Assistance.  Grooming while EOB with Minimum Assistance.  Toileting from bedside commode with Maximum Assistance for hygiene and clothing management.   Bathing from sitting at sink with Moderate Assistance.  Toilet transfer to bedside commode with Maximum Assistance.                             Time Tracking:     OT Date of Treatment: 10/18/23  OT Start Time: 1347  OT Stop Time: 1403  OT Total Time (min): 16 min    Billable Minutes:Therapeutic Activity 10 (Unbilled: 6 minutes)     OT/ROGER: ROGER     Number of  ROGER visits since last OT visit: 1    10/18/2023

## 2023-10-18 NOTE — ASSESSMENT & PLAN NOTE
CVA  - Likely multifactorial encephalopathy with components of delirium, toxic encephalopathy, metabolic encephalopathy, and potential baseline cognitive impairment all contributing.  - Suspected increased sedation due to pain medications / gabapentin; stopped gabapentin on 10/5 and adjusted pain control to PO dilaudid at low dose / decreased frequency with improvement in mentation. Likely some baseline cognitive impairment with worsening due to medical issues.  - 10/07 patient developed worsening hearing loss and inability to comprehend written word sometime after waking. Daughter and staff reported she was normal the prior night, without clear timing of deficit.  - Patient already on ASA and plavix, no hypotensive episode prior to explain symptoms.  - CT Head motion-limited but with sequela of chronic microvascular ischemic changes/senescent changes. MRI Brain with continued motion-limited artifact with small focus of diffusion signal abnormality in R frontal lobe with acute infarct not excluded.  - Given already on appropriate vascular treatment with her underlying PVD, little to adjust at this time. Appreciate neurology assistance.  - Remaining stable. Daughters report hard of hearing at baseline and had hearing aids but has not used consistently in recent past. Encouraged locating / active use of hearing aids.  - Psychiatry consulted and started on mirtazapine 15mg PO qHS initially; decreased to 7.5mg PO qHS but appears to have worsened mentation. Mirtazapine subsequently held with improvement in mentation.  - Added trial of marinol 2.5 mg PO BID to stimulate appetite on 10/16 but no significant improvement and patient more lethargic and medication stopped on 10/17   - Mentation has been waxing and waning throughout hospitalization but worse today with inability to maintain attention, will do further workup with MRI of brain and EEG

## 2023-10-18 NOTE — PT/OT/SLP PROGRESS
Physical Therapy      Patient Name:  Shannan Figueredo   MRN:  [id number]    Patient not seen today secondary to Dialysis. Will follow-up as schedule allows and as pt is available / appropriate for therapy services.

## 2023-10-18 NOTE — PLAN OF CARE
"Sw received  an e-mail reporting patient chair time & days.   "CT Offer for Shannan Figueredo eff Friday 10/20/2023 [facility] Shift 1 early arrival on Friday 10/20/2023 at 5:00am thereafter 5:30am permanent time."   "

## 2023-10-18 NOTE — PROGRESS NOTES
10/18/23 1315   Post-Hemodialysis Assessment   Blood Volume Processed (Liters) 67.4 L   Dialyzer Clearance Clear   Additional Fluid Intake (mL) 500 mL   Total UF (mL) 1000 mL   Net Fluid Removal 500   Patient Response to Treatment tx completed as ordered, pt started out lethargic, with occasional movements on bed, withdrawls to pain and eyes track at times, respiratory even and regular throughout tx, blood returned per pp,   Arterial bleeding stop time (min) 5 min   Venous bleeding stop time (min) 5 min   Post-Hemodialysis Comments blood returned per pp

## 2023-10-18 NOTE — ACP (ADVANCE CARE PLANNING)
Advance Care Planning     Date: 10/18/2023    Update given to daughter at the bedside around 4:15 pm concerning clinical decline of the patient. Further workup in progress with MRI and EEG, and addressed goals of care. She deferred to her sister in [place].    Called Stephanie at 4:23pm:  Stephanie Gilmore Daughter     [phone]   Update given to Stephanie (daughter) via phone with further workup with MRI and EEG in progress. I expressed concern of overall decline of the patient despite maximal medical management.  Addressed goals of care with recommendation for DNR status, and she stated she would need time to think and involve other family.  She believes her mother would not want to be prolonged on life support but wanted to have other family members have input.  She requested I speak to her father, Mr. Ana Figueredo.  Will reach out Mr. Figueredo as requested.    Called Mr. Delmis Figueredo ([phone]) at approximately 4:40 pm:  Clinical update given to Mr. Figueredo, at the request of Stephanie.  Discussed goals of care similar to conversations with other daughters.  He expressed understanding of the situation, and wished to confer with Stephanie and Nhi before coming to any decisions. Will Ute back with family with updates after they had time to discuss among themselves.    Addendum (6:15 pm):  Update given to Nhi at the bedside in-person concerning results of the MRI with no new findings that are acute.  Also called Ms. Brown and update given.  She will be speaking with her father again later this evening concerning goals of care.  All questions and concerns were addressed.      I spent a total of >60 minutes engaging the patient's family in this advance care planning discussion in coordination with Palliative care    JOSE Colon MD

## 2023-10-18 NOTE — PLAN OF CARE
Pt free of any injury or falls, VSS, skin intact. Scheduled and PRN meds given as ordered or when requested. Demonstrated ability to use call light when needed. Bed locked in lowest position. Care plan reviewed w/ patient and education given. Pt is not demonstrating any overt S/S of pain or distress at this time. Will continue to monitor.

## 2023-10-18 NOTE — SUBJECTIVE & OBJECTIVE
Interval History: Patient did not sleep for almost full 24 hours as reported by daughter to nursing staff and just became sleepy this morning. Noted relatively low blood pressures overnight and now getting HD. She is inattentive and not responding much.    Review of Systems   Unable to perform ROS: Mental status change     Objective:     Vital Signs (Most Recent):  Temp: 96.8 °F (36 °C) (10/18/23 1256)  Pulse: (!) 54 (10/18/23 1352)  Resp: (!) 23 (10/18/23 1256)  BP: (!) 104/53 (10/18/23 1352)  SpO2: 95 % (10/18/23 1352) Vital Signs (24h Range):  Temp:  [96.8 °F (36 °C)-98.3 °F (36.8 °C)] 96.8 °F (36 °C)  Pulse:  [51-66] 54  Resp:  [16-23] 23  SpO2:  [95 %-99 %] 95 %  BP: ()/(41-95) 104/53     Weight: 57 kg (125 lb 10.6 oz)  Body mass index is 20.91 kg/m².    Intake/Output Summary (Last 24 hours) at 10/18/2023 1603  Last data filed at 10/18/2023 1315  Gross per 24 hour   Intake 500 ml   Output 1000 ml   Net -500 ml           Physical Exam  Vitals and nursing note reviewed.   Constitutional:       General: She is not in acute distress.     Appearance: She is well-developed. She is ill-appearing.   HENT:      Head: Atraumatic.      Nose: Nose normal.   Eyes:      General:         Right eye: No discharge.         Left eye: No discharge.      Conjunctiva/sclera: Conjunctivae normal.   Cardiovascular:      Rate and Rhythm: Normal rate.      Pulses: Normal pulses.   Pulmonary:      Effort: Pulmonary effort is normal. No respiratory distress.   Abdominal:      Palpations: Abdomen is soft.      Tenderness: There is no abdominal tenderness. There is no guarding or rebound.   Musculoskeletal:         General: Normal range of motion.      Right lower leg: No edema.      Comments: s/p L BKA with dressing in place.   Skin:     General: Skin is warm and dry.   Neurological:      Mental Status: She is alert.      Comments: Hard of hearing. Not able to maintain attention this morning and not able to follow commands.    Psychiatric:         Attention and Perception: She is inattentive.       Significant Labs:   CBC:  Recent Labs   Lab 10/16/23  0503 10/17/23  0542 10/18/23  0507   WBC 18.90* 14.55* 15.55*   HGB 9.1* 10.2* 9.7*   HCT 30.0* 34.5* 33.1*    388 366   GRAN 79.4*  15.0* 74.0*  10.8* 72.4  11.3*   LYMPH 7.6*  1.4 10.7*  1.6 11.1*  1.7   MONO 10.3  1.9* 10.2  1.5* 11.3  1.8*   EOS 0.1 0.1 0.2   BASO 0.08 0.11 0.12     BMP:  Recent Labs   Lab 10/16/23  0503 10/17/23  0541 10/18/23  0507    146* 144   K 4.5 4.2 4.3    102 100   CO2 25 24 24   BUN 57* 25* 40*   CREATININE 6.8* 3.5* 5.2*   GLU 92 113* 106   CALCIUM 10.9* 11.1* 10.9*   MG 2.7* 2.4 2.7*   PHOS 5.7* 4.2 5.0*   ALBUMIN 1.9* 2.2* 2.0*   ANIONGAP 19* 20* 20*        Significant Imaging: I have reviewed and interpreted all pertinent imaging results/findings within the past 24 hours.

## 2023-10-18 NOTE — PLAN OF CARE
Pt note from this afternoon and Jhonatan chair time sent to [facility], still pending auth.    Facility to send these two notes to pt's insurance, hopefully auth will be forthcoming.

## 2023-10-18 NOTE — PT/OT/SLP PROGRESS
Occupational Therapy      Patient Name:  Shannan Figueredo   MRN:  [id number]    Patient not seen today secondary to Dialysis. Will follow-up as scheduling permits and patient availability and appropriate for therapy.    10/18/2023

## 2023-10-18 NOTE — PROGRESS NOTES
"[facility] Surg ([address] Medicine  Progress Note    Patient Name: Shannan Figueredo  MRN: [id number]  Patient Class: IP- Inpatient   Admission Date: 9/17/2023  Length of Stay: 31 days  Attending Physician: Brook Colon MD  Primary Care Provider: Roxi, Primary Doctor        Subjective:     Principal Problem:Encephalopathy        HPI:  Ms. Figueredo is a 67/F with PMH HTN, DMII (diet-controlled), PVD, ESRD on HD (M/W/F), anemia who presented to [facility] 09/17 with a two day progressive history of worsening L foot pain, swelling, fatigue, nausea and vomiting. History obtained from patient and daughter Stephanie ([phone]); they are from the [place], [place] area. Patient reports several weeks ago she "stubbed her toe," noting initially some pain, redness and swelling. Gradually symptoms progressed with duskiness to her toe and she informed her daughter of her pain for which she was brought to ER in [place] on 08/25 for further evaluation. With duskiness to 1st toe and surrounding erythema she was treated for cellulitis with a course of ciprofloxacin and metronidazole, but failed to improve. She followed up with her podiatrist 09/12 where she was prescribed hydrocodone-acetaminophen and a ten day course of TMP-SMX which she has been taking. She was referred to vascular surgery with plan for angiography and potential intervention  She and her family came to [place] for the weekend but her pain worsened; had been attempting to minimize use of hydrocodone-acetaminophen but developed nausea, vomiting and progressive fatigue. She subsequently presented to ED for further evaluation. ED workup was notable for L 1st toe dry-appearing gangrene with surrounding erythema and swelling, removed nail of second toe, XR L foot demonstrating diffuse osteopenia, L calcaneal and forefoot soft tissue swelling without definitive evidence of osteomyelitis in setting of osteopenia, no leukocytosis, elevated sed rate and " CRP, and elevated BUN/Cr in setting of ESRD. She notes she went without dialysis on 09/15 due to her trip. Blood cultures were ordered and she received piperacillin-tazobactam and vancomycin. Hospital medicine was subsequently contacted for admission.      Overview/Hospital Course:  Admitted with gangrene of L 1st toe.  Empirically treated with Zosyn and vancomycin.  Vascular Surgery, nephrology, podiatry consulted; U/S arterial demonstrated L SFA occlusion with distal reconstitution. Cardiology consulted, angiography with no vessels amenable for angioplasty on 9/20.  Underwent exploration of the left lower extremity vessels, femoral and popliteal bypass unable to be performed due to no adequate available vessels for bypass on 9/22. Discussed potential intervention here vs [place], patient and family opted for surgery in [place]. Underwent L BKA 09/29. PT/OT recommended SNF. Had mental status changes and pain medications adjusted with some improvement. Appetite was poor and concern for depressive symptoms due to general medical condition. Telepsychiatry consulted and started mirtazapine, but she was more somnolent and subsequently it was discontinued. Surgery noted blistering to surgical site and recommended continued observation of wound.      Interval History: Patient did not sleep for almost full 24 hours as reported by daughter to nursing staff and just became sleepy this morning. Noted relatively low blood pressures overnight and now getting HD. She is inattentive and not responding much.    Review of Systems   Unable to perform ROS: Mental status change     Objective:     Vital Signs (Most Recent):  Temp: 96.8 °F (36 °C) (10/18/23 1256)  Pulse: (!) 54 (10/18/23 1352)  Resp: (!) 23 (10/18/23 1256)  BP: (!) 104/53 (10/18/23 1352)  SpO2: 95 % (10/18/23 1352) Vital Signs (24h Range):  Temp:  [96.8 °F (36 °C)-98.3 °F (36.8 °C)] 96.8 °F (36 °C)  Pulse:  [51-66] 54  Resp:  [16-23] 23  SpO2:  [95 %-99 %] 95 %  BP:  ()/(41-95) 104/53     Weight: 57 kg (125 lb 10.6 oz)  Body mass index is 20.91 kg/m².    Intake/Output Summary (Last 24 hours) at 10/18/2023 1603  Last data filed at 10/18/2023 1315  Gross per 24 hour   Intake 500 ml   Output 1000 ml   Net -500 ml           Physical Exam  Vitals and nursing note reviewed.   Constitutional:       General: She is not in acute distress.     Appearance: She is well-developed. She is ill-appearing.   HENT:      Head: Atraumatic.      Nose: Nose normal.   Eyes:      General:         Right eye: No discharge.         Left eye: No discharge.      Conjunctiva/sclera: Conjunctivae normal.   Cardiovascular:      Rate and Rhythm: Normal rate.      Pulses: Normal pulses.   Pulmonary:      Effort: Pulmonary effort is normal. No respiratory distress.   Abdominal:      Palpations: Abdomen is soft.      Tenderness: There is no abdominal tenderness. There is no guarding or rebound.   Musculoskeletal:         General: Normal range of motion.      Right lower leg: No edema.      Comments: s/p L BKA with dressing in place.   Skin:     General: Skin is warm and dry.   Neurological:      Mental Status: She is alert.      Comments: Hard of hearing. Not able to maintain attention this morning and not able to follow commands.   Psychiatric:         Attention and Perception: She is inattentive.       Significant Labs:   CBC:  Recent Labs   Lab 10/16/23  0503 10/17/23  0542 10/18/23  0507   WBC 18.90* 14.55* 15.55*   HGB 9.1* 10.2* 9.7*   HCT 30.0* 34.5* 33.1*    388 366   GRAN 79.4*  15.0* 74.0*  10.8* 72.4  11.3*   LYMPH 7.6*  1.4 10.7*  1.6 11.1*  1.7   MONO 10.3  1.9* 10.2  1.5* 11.3  1.8*   EOS 0.1 0.1 0.2   BASO 0.08 0.11 0.12     BMP:  Recent Labs   Lab 10/16/23  0503 10/17/23  0541 10/18/23  0507    146* 144   K 4.5 4.2 4.3    102 100   CO2 25 24 24   BUN 57* 25* 40*   CREATININE 6.8* 3.5* 5.2*   GLU 92 113* 106   CALCIUM 10.9* 11.1* 10.9*   MG 2.7* 2.4 2.7*   PHOS 5.7*  4.2 5.0*   ALBUMIN 1.9* 2.2* 2.0*   ANIONGAP 19* 20* 20*        Significant Imaging: I have reviewed and interpreted all pertinent imaging results/findings within the past 24 hours.      Assessment/Plan:      * Encephalopathy  CVA  - Likely multifactorial encephalopathy with components of delirium, toxic encephalopathy, metabolic encephalopathy, and potential baseline cognitive impairment all contributing.  - Suspected increased sedation due to pain medications / gabapentin; stopped gabapentin on 10/5 and adjusted pain control to PO dilaudid at low dose / decreased frequency with improvement in mentation. Likely some baseline cognitive impairment with worsening due to medical issues.  - 10/07 patient developed worsening hearing loss and inability to comprehend written word sometime after waking. Daughter and staff reported she was normal the prior night, without clear timing of deficit.  - Patient already on ASA and plavix, no hypotensive episode prior to explain symptoms.  - CT Head motion-limited but with sequela of chronic microvascular ischemic changes/senescent changes. MRI Brain with continued motion-limited artifact with small focus of diffusion signal abnormality in R frontal lobe with acute infarct not excluded.  - Given already on appropriate vascular treatment with her underlying PVD, little to adjust at this time. Appreciate neurology assistance.  - Remaining stable. Daughters report hard of hearing at baseline and had hearing aids but has not used consistently in recent past. Encouraged locating / active use of hearing aids.  - Psychiatry consulted and started on mirtazapine 15mg PO qHS initially; decreased to 7.5mg PO qHS but appears to have worsened mentation. Mirtazapine subsequently held with improvement in mentation.  - Added trial of marinol 2.5 mg PO BID to stimulate appetite on 10/16 but no significant improvement and patient more lethargic and medication stopped on 10/17   - Mentation has been  "waxing and waning throughout hospitalization but worse today with inability to maintain attention, will do further workup with MRI of brain and EEG    Gangrene of toe  HTN, PVD  - L 1st toe gangrene predominantly dry with some surrounding erythema/swelling; with concern for worsening infection started piperacillin-tazobactam and vancomycin IV. 2nd toe with involvement as well.  - U/S arterial BLE showed L distal SFA occlusion with distal reconstitution. Discussed with vascular surgery and cardiology consulted for consideration of angiography / potential angioplasty.  - Podiatry consulted. MRI L foot with "examination markedly degraded by patient motion artifact. Questionable marrow edema within the 1st distal phalanx, not well evaluated given aforementioned limitation but possibly related to osteomyelitis given reported history of gangrene."  - Blood cultures showed no growth.  - s/p angiogram remarkable for severe PAD with no areas amenable to angioplasty on 9/20. Elected to undergo attempt at revascularization 09/22 but unable to perform femoral and popliteal bypass on 9/22. Discussed surgical options with BKA only viable for appropriate healing given extent of vascular disease.   - Continue carvedilol 12.5mg PO BID, losartan 100mg PO daily, nifedipine 60mg PO BID.  - Underwent BKA on 09/30. Therapy recommending SNF. Pursuing placement.  - Given possible decreased clearance of pain medications, stopped hydrocodone-acetaminophen 5-325mg PO q6hr PRN, oxycodone-acetaminophen 7.5-325mg PO q6hr PRN. Started PO dilaudid instead and Neurontin on 10/3  - Increased sedation since getting Neurontin, changed it to 100 mg QHS on 10/4 and stopped it on 10/5 and with continued improvement in mentation. Patient with likely some baseline cognitive impairment with worsening due to other medical issues.  - WBC increased but plateaued. No fever, repeat CXR overall unrevealing and no other source of infection identified. Likely " delayed leukemoid reaction.  - Discussed with Dr. Cobb 10/13; wound with area of blistering that was not present before. Does not appear frankly infectious but will continue to monitor wound status prior to potential discharge.  - Case discussed with Dr. Cobb today, and wound status stable and WBC trending down, stable for discharge in terms of her wound    Type 2 diabetes mellitus with chronic kidney disease on chronic dialysis  - Reports diet-controlled; no current medications.  - HgbA1c 5.7.  - Continue low-dose sliding scale insulin aspart 0-5U subQ TIDWM PRN, monitor requirements.    ESRD (end stage renal disease)  Anemia, hyperparathyroidism  - ESRD on HD, reports missed session Friday prior to presentation due to travel.  - Mild anemia without evidence of bleeding.  - Continue allopurinol 300mg PO daily, calcium acetate 2001mg PO TIDWM.  - Transfused 1U pRBCs 09/25 with appropriate response.  - Nephrology consulted for HD assistance.      VTE Risk Mitigation (From admission, onward)         Ordered     IP VTE HIGH RISK PATIENT  Once         09/28/23 1906                      Brook Colon MD  Department of Hospital Medicine   [facility] - Med Surg ([address])

## 2023-10-18 NOTE — PT/OT/SLP PROGRESS
Physical Therapy Treatment    Patient Name:  Shannan Figueredo   MRN:  [id number]    Recommendations:     Discharge Recommendations: Moderate Intensity Therapy  Discharge Equipment Recommendations:  (Drop arm BSC, Tutu height wheelchair with removable arm rests, elevating leg rests and anti tippers, wheelchair cushion; Defer to next level of care)  Barriers to discharge: Inaccessible home, Decreased caregiver support, and functional mobility impairments, medical treatment    Assessment:     Shannan Figueredo is a 67 y.o. female admitted with a medical diagnosis of Encephalopathy.  She presents with the following impairments/functional limitations: weakness, impaired endurance, impaired sensation, impaired self care skills, impaired functional mobility, gait instability, impaired balance, impaired cognition, decreased lower extremity function, decreased upper extremity function.    Pt tolerated PROM treatment without adverse reactions / events. Pt very lethargic / solemnent throughout session. Edge of bed / out of bed mobility not appropriate at this time. Charge RN at bedside to collect vital signs. Pt continues to be limited by decreased level of alertness. PT will continue to follow and progress goals as tolerated.     Rehab Prognosis: Fair; patient would benefit from acute skilled PT services to address these deficits and reach maximum level of function.    Recent Surgery: Procedure(s) (LRB):  AMPUTATION, BELOW KNEE (Left) 19 Days Post-Op    Plan:     During this hospitalization, patient to be seen 3 x/week to address the identified rehab impairments via therapeutic exercises, gait training, therapeutic activities, neuromuscular re-education, wheelchair management/training and progress toward the following goals:    Plan of Care Expires:  10/30/23    Subjective     Chief Complaint: no verbalizations  Patient/Family Comments/goals: Pt's daughter agreeable to therex session. Pt's daughter explained she has not eaten  in three days.    Pain/Comfort:  Pain Rating 1:  (no verbalizations of pain during therex; aroused to pain (sternal rub))      Objective:     Communicated with charge CHESTER Stringer prior to session.  Patient found HOB elevated with peripheral IV upon PT entry to room.     General Precautions: Standard, fall, diabetic (new left BKA)  Orthopedic Precautions:  (L BKA)  Braces: N/A  Respiratory Status: Room air  Vital Signs: 104/53, MAP 76, 55 bpm, 96% oxygen saturation on RA     Functional Mobility:  None performed at this time    AM-PAC 6 CLICK MOBILITY  Turning over in bed (including adjusting bedclothes, sheets and blankets)?: 1  Sitting down on and standing up from a chair with arms (e.g., wheelchair, bedside commode, etc.): 1  Moving from lying on back to sitting on the side of the bed?: 1  Moving to and from a bed to a chair (including a wheelchair)?: 1  Need to walk in hospital room?: 1  Climbing 3-5 steps with a railing?: 1  Basic Mobility Total Score: 6       Treatment & Education:  The following PROM performed with patient in supine with HOB elevated:  Knee flexion / extension - 1x10 bilaterally   Hip flexion / extension - 1x10 bilaterally   Ankle dorsiflexion / plantarflexion - 1x10 RLE    PT educated patient's daughter re:   PT plan of care/role of PT  PROM exercises  Pt's daughter verbalized understanding     Patient left HOB elevated with all lines intact, call button in reach, RN notified, and daughter present..    GOALS:   Multidisciplinary Problems       Physical Therapy Goals          Problem: Physical Therapy    Goal Priority Disciplines Outcome Goal Variances Interventions   Physical Therapy Goal     PT, PT/OT Ongoing, Not Progressing     Description: Patient will perform the following to increase strength, improve mobility, and return to prior level of function:    1. Supine<>sit with modA with appropriate use of HB features.   2. Rolling R/L with appropriate us eof HB features and Yen.   3. Sitting  EOB x3 min with SBA without sway.   4. Sit<>stand with modA with RW.   5. Transfer bed<>chair with modA with most appropriate technique and LRAD.   6. Perform BKA therEx including L QS, L hip abd in supine or sidelying, L hip flexor stretch in sidelying, and R single leg bridge without assistance.                        Time Tracking:     PT Received On: 10/18/23  PT Start Time: 1335     PT Stop Time: 1403  PT Total Time (min): 28 min     Billable Minutes: Therapeutic Exercise 10 (unbilled: 18 minutes)    Treatment Type: Treatment  PT/PTA: PT     Number of PTA visits since last PT visit: 0     10/18/2023

## 2023-10-18 NOTE — PLAN OF CARE
ANTONIA spoke to Perla again to inquire about halfway placement. [facility] does have halfway beds, however, daughter, Stephanie, is undecided about long term placement. She wants to try SNF and will likely transition to halfway.     New therapy notes are needed to send with request for new auth as original auth has .    [facility] is attempting to connect with pt. As she has HD today and is usually tired after HD.

## 2023-10-18 NOTE — PROGRESS NOTES
"Nephrology  Progress Note    Admit Date: 9/17/2023   LOS: 31 days     SUBJECTIVE:     Follow-up For:  Encephalopathy    History of Present Illness:  Patient is a 67 y.o. female presents with left foot gangrene, vomiting, feeling ill.  Ext med hx as outlined below including ESRD on HD MWF in [place].  Has been dealing with necrotic left toes for few months and followed by vascular in [place].  Was here visiting daughter and felt bad.  Admitted for eval/treatment.  Consulted for HD needs.  Pt seen and examined on HD.  Consents signed.  Tired from being up all night with pain.  Updated team and daughter at bedside.  W/up noted.     S/P L BKA (9/29)      Interval History:     Discussed with daughter/RN's at bedside.  Very lethargic/somnolent this am.  Soft BP's noted.  Was awake for 24 hrs.  HD this am.  See below.  Still working on transfer to [place].      Review of Systems:    Unable to fully access.     OBJECTIVE:     Vital Signs Range (Last 24H):  BP (!) 113/55 (BP Location: Left arm, Patient Position: Lying)   Pulse (!) 52   Temp 98.3 °F (36.8 °C) (Axillary)   Resp 18   Ht 5' 5" (1.651 m)   Wt 57 kg (125 lb 10.6 oz)   SpO2 95%   BMI 20.91 kg/m²     Temp:  [97.2 °F (36.2 °C)-98.3 °F (36.8 °C)]   Pulse:  [52-76]   Resp:  [16-20]   BP: ()/(41-75)   SpO2:  [95 %-99 %]     I & O (Last 24H):No intake or output data in the 24 hours ending 10/18/23 0806      Physical Exam:  General appearance:  Frail.  Appears older than stated age.   Very drowsy/somnolent.   Eyes:  Conjunctivae/corneas clear. PERRL.  Lungs: Normal respiratory effort,   clear to auscultation bilaterally.  Diminished.   Heart: Regular rate and rhythm, S1, S2 normal, 2/6 murmur, rub or nelly.  Abdomen: Soft, non-tender non-distended; bowel sounds normal; no masses,  no organomegaly  Extremities: No cyanosis or clubbing. No edema.    Skin: Skin color, texture, turgor normal. No rashes or lesions  Neurologic: Normal strength and tone. No focal " numbness or weakness   Right arm AVF+  Left BKA wrapped.       Medications:  Medication list was reviewed and changes noted under Assessment/Plan.    Diagnostic Results:    reviewed    ASSESSMENT/PLAN:     Lethargy  -discussed with attending. May be due to Remeron vs other  -Doubt this is uremic in nature.   -check cortisol level.     ESRD on HD MWF in [place]:  -consulted for HD needs.  -consents signed  -continue HD MWF   -R arm AVF+  -see on HD today.       S/p BKA on 9/29  -defer to pod/vascular/cards/wound care.  -angio noted with severe PAD  -Status post exploration femoral and popliteal arteries 9/22 with Non-reconstructible peripheral vascular disease.      HTN:  -soft over last couple of days.   -hold ARB/CCB for now.   -min UF as not taking much in.       MBD/hypercalcemia  -binders/nephrocaps.   Changed to renvela.  Pt states that she also takes tums at home  -using calcitriol on HD days but hold with mild >calcium  -No longer on calcium supplements and binder switched to non-calcium  -Low calcium bath ordered.  -needs PT/OT/OOB.  -suspect due immobilization and mild 1HPTH.  As above and started low dose sensipar.      Anemia of CKD:  -Received transfusion 1 unit 9/29  -Hgb below goal  -FAREED with HD  -Iron stores low but ^^ferritin precludes use of Venofer  -Vit C.     Suspected CVA:  -defer to neuro.  -mental status labile      Dispo:  -awaiting dynamics to play out.  -defer to palliative care.       Stable for DC/Transfer to facility from Renal.   Will follow for renal needs.

## 2023-10-18 NOTE — PLAN OF CARE
CM spoke to Perla at [facility], [phone], to inquire about auth. Perla states that she needs some therapy notes to send to pt's insurance. Therapy came to see pt this am,but jonathan was in HD. Therapy to follow later.

## 2023-10-19 NOTE — PROGRESS NOTES
"Pharmacokinetic Initial Assessment: IV Vancomycin    Assessment/Plan:    Initiate intravenous vancomycin with loading dose of 1250 mg once with subsequent doses when random concentrations are less than 25 mcg/mL  Desired empiric serum trough concentration is 10 to 25 mcg/mL  Draw vancomycin random level on 10/20/2023 at 04:30.  Pharmacy will continue to follow and monitor vancomycin.      Please contact pharmacy at extension [phone] with any questions regarding this assessment.     Thank you for the consult,   Simin Mauro       Patient brief summary:  Shannan Figueredo is a 67 y.o. female initiated on antimicrobial therapy with IV Vancomycin for treatment of suspected skin & soft tissue infection    Drug Allergies:   Review of patient's allergies indicates:  No Known Allergies    Actual Body Weight:   57 kg    Renal Function:   Estimated Creatinine Clearance: 14.7 mL/min (A) (based on SCr of 3.3 mg/dL (H)).,     Dialysis Method (if applicable):  intermittent HD MWF    CBC (last 72 hours):  Recent Labs   Lab Result Units 10/17/23  0542 10/18/23  0507 10/19/23  0742   WBC K/uL 14.55* 15.55* 33.88*   Hemoglobin g/dL 10.2* 9.7* 11.4*   Hematocrit % 34.5* 33.1* 38.9   Platelets K/uL 388 366 494*   Gran % % 74.0* 72.4 82.9*   Lymph % % 10.7* 11.1* 6.9*   Mono % % 10.2 11.3 6.7   Eosinophil % % 0.7 1.1 0.1   Basophil % % 0.8 0.8 0.3   Differential Method  Automated Automated Automated       Metabolic Panel (last 72 hours):  Recent Labs   Lab Result Units 10/17/23  0541 10/18/23  0507 10/19/23  0528   Sodium mmol/L 146* 144 138   Potassium mmol/L 4.2 4.3 4.6   Chloride mmol/L 102 100 91*   CO2 mmol/L 24 24 16*   Glucose mg/dL 113* 106 87   BUN mg/dL 25* 40* 28*   Creatinine mg/dL 3.5* 5.2* 3.3*   Albumin g/dL 2.2* 2.0* 2.4*   Magnesium mg/dL 2.4 2.7* 2.5   Phosphorus mg/dL 4.2 5.0* 6.6*       Drug levels (last 3 results):  No results for input(s): "VANCOMYCINRA", "VANCORANDOM", "VANCOMYCINPE", "VANCOPEAK", "VANCOMYCINTR", " ""[facility]" in the last 72 hours.    Microbiologic Results:  Microbiology Results (last 7 days)       Procedure Component Value Units Date/Time    Blood culture [[id number]] Collected: 10/19/23 0957    Order Status: Sent Specimen: Blood from Antecubital, Left Arm Updated: 10/19/23 0958    Blood culture [[id number]]     Order Status: Sent Specimen: Blood             "

## 2023-10-19 NOTE — PLAN OF CARE
10/19/23 0940   Medicare Message   Important Message from Medicare regarding Discharge Appeal Rights Given to patient/caregiver;Explained to patient/caregiver;Signed/date by patient/caregiver   Date IMM was signed 10/19/23   Time IMM was signed [id number]

## 2023-10-19 NOTE — PT/OT/SLP DISCHARGE
Occupational Therapy Discharge Summary    Shannan Figueredo  MRN: [id number]   Principal Problem: Encephalopathy      Patient Discharged from acute Occupational Therapy on 10/19/2023 .  Please refer to prior OT note dated 10/18/23 for functional status.    Assessment:       Pt  today.    Objective:     GOALS:   Multidisciplinary Problems       Occupational Therapy Goals          Problem: Occupational Therapy    Goal Priority Disciplines Outcome Interventions   Occupational Therapy Goal     OT, PT/OT Ongoing, Not Progressing    Description: OT evaluation completed s/p LLE BKA with goals updated as appropriate.      Goals to be met by: 10/15/2023     Patient will increase functional independence with ADLs by performing:    UE Dressing with Moderate Assistance.  LE Dressing with Maximum Assistance.  Grooming while EOB with Minimum Assistance.  Toileting from bedside commode with Maximum Assistance for hygiene and clothing management.   Bathing from sitting at sink with Moderate Assistance.  Toilet transfer to bedside commode with Maximum Assistance.                             Reasons for Discontinuation of Therapy Services  Pt  today.       Plan:     N/A    10/19/2023

## 2023-10-19 NOTE — CARE UPDATE
Stephanie Gilmore Daughter     [phone]   Update given to Stephanie (daughter) via phone today given downward trend in blood pressures and no improvement on mental status this morning. I did tell her my full assessment has not been able to be done since she is connected to the EEG machine. Expressed that decision on code status needs to be done today and I will be giving her an update and also talking with the sister. I recommended DNR.    Addendum (10:03am): Attempt to call with update. Left voicemail. Will call again.    Addendum (10:08am): Updated daughter on passing of her mother and that all family member's wishes were DNR and that medically she was appropriate for DNR.     JOSE Colon MD

## 2023-10-19 NOTE — PLAN OF CARE
10/19/23 1141   Final Note   Assessment Type Final Discharge Note   Anticipated Discharge Disposition

## 2023-10-19 NOTE — PROGRESS NOTES
"Nephrology  Progress Note    Admit Date: 9/17/2023   LOS: 32 days     SUBJECTIVE:     Follow-up For:  Encephalopathy    History of Present Illness:  Patient is a 67 y.o. female presents with left foot gangrene, vomiting, feeling ill.  Ext med hx as outlined below including ESRD on HD MWF in [place].  Has been dealing with necrotic left toes for few months and followed by vascular in [place].  Was here visiting daughter and felt bad.  Admitted for eval/treatment.  Consulted for HD needs.  Pt seen and examined on HD.  Consents signed.  Tired from being up all night with pain.  Updated team and daughter at bedside.  W/up noted.     S/P L BKA (9/29)      Interval History:     Events noted.  Minimally responsive, hypotensive, and looks poor.  Discussed with team in detail.  Awaiting family decision about plans going forward.      EEG in process and getting saline bolus.      Review of Systems:    Unable to fully access.     OBJECTIVE:     Vital Signs Range (Last 24H):  BP (!) 74/45 (BP Location: Left arm, Patient Position: Lying)   Pulse 64   Temp 97.6 °F (36.4 °C) (Axillary)   Resp 12   Ht 5' 5" (1.651 m)   Wt 57 kg (125 lb 10.6 oz)   SpO2 98%   BMI 20.91 kg/m²     Temp:  [96.8 °F (36 °C)-97.6 °F (36.4 °C)]   Pulse:  [54-66]   Resp:  [12-23]   BP: ()/(45-95)   SpO2:  [93 %-99 %]     I & O (Last 24H):  Intake/Output Summary (Last 24 hours) at 10/19/2023 0930  Last data filed at 10/18/2023 1315  Gross per 24 hour   Intake 500 ml   Output 1000 ml   Net -500 ml         Physical Exam:  General appearance:  Frail.  Minimally responsive.    Eyes:  Conjunctivae/corneas clear. PERRL.  Lungs: Normal respiratory effort,   clear to auscultation bilaterally.  Diminished.   Heart: Regular rate and rhythm, S1, S2 normal, 2/6 murmur, rub or nelly.  Abdomen: Soft, non-tender non-distended; bowel sounds normal; no masses,  no organomegaly  Extremities: No cyanosis or clubbing. No edema.    Skin: Skin color, texture, turgor " normal. No rashes or lesions  Neurologic: Normal strength and tone. No focal numbness or weakness   Right arm AVF+  Left BKA wrapped.       Medications:  Medication list was reviewed and changes noted under Assessment/Plan.    Diagnostic Results:    reviewed    ASSESSMENT/PLAN:     Lethargy/AMS/Hypotension:  -discussed with attending.   -Not Uremic.  -checked cortisol level and 22.    -looks more like FTT now.  Awaiting family decision but highly recommend DNR.      ESRD on HD MWF in [place]:  -consulted for HD needs.  -consents signed  -continue HD MWF   -R arm AVF+      S/p BKA on 9/29  -defer to pod/vascular/cards/wound care.  -angio noted with severe PAD  -Status post exploration femoral and popliteal arteries 9/22 with Non-reconstructible peripheral vascular disease.      HTN and now hypotensive  -soft over last couple of days.   -holding all BP meds  -min UF as not taking much in.      MBD/hypercalcemia  -binders/nephrocaps.   Changed to renvela.  Pt states that she also takes tums at home  -using calcitriol on HD days but hold with mild >calcium  -No longer on calcium supplements and binder switched to non-calcium  -Low calcium bath ordered.  -needs PT/OT/OOB.  -suspect due immobilization and mild 1HPTH.  As above and started low dose sensipar.      Anemia of CKD:  -Received transfusion 1 unit 9/29  -Hgb below goal  -FAREED with HD  -Iron stores low but ^^ferritin precludes use of Venofer  -Vit C.     Suspected CVA:  -defer to neuro.  -mental status labile    Worsening WBC:  -reactive vs infection  -defer to primary      Dispo:  -awaiting dynamics to play out.  -defer to palliative care.   -HIGHLY RECOMMENDED DNR.         Will follow for renal needs.

## 2023-10-19 NOTE — SIGNIFICANT EVENT
Death Note:    Called to patient's bedside. Patient was found unresponsive, apneic, asystolic with pupils fixed and dilated.  Time of death 9:58am CST on 10/19/2023. Family was notified and condolences were given.    JOSE Colon MD

## 2023-10-19 NOTE — PROCEDURES
Date of service  10/19/2023    Introduction  Electroencephalographic (EEG) recording is performed with electrodes placed according to the International 10-20 placement system. Thirty two (32) channels of digital signal (sampling rate of 512/sec) including T1 and T2 was simultaneously recorded from the scalp and may include EKG, EMG, and/or eye monitors. Recording band pass was 0.1 to 512 Hz. Digital video recording of the patient is simultaneously recorded with the EEG. The patient is instructed to report clinical symptoms which may occur during the recording session. EEG and video recording is stored and archived in digital format. Activation procedures which include photic stimulation, hyperventilation and instructing patients to perform simple tasks are done in selected patients.    The EEG is displayed on a monitor screen and can be reviewed using different montages. Computer assisted analysis is employed to detect spike and electrographic seizure activity. The entire record is submitted for computer analysis. The entire recording is visually reviewed and, the times identified by computer analysis as being spikes or seizures are reviewed again.     Compressed spectral analysis (CSA) is also performed on the activity recorded from each individual channel. This is displayed as a power display of frequencies from 0 to 30 Hz over time. The CSA is reviewed looking for asymmetries in power between homologous areas of the scalp and then compared with the original EEG recording.       Findings  This short-term video EEG recording shows diffuse slowing in the range of 3-5 Hz delta to theta.  The amplitude over the left hemisphere is lower than the right hemisphere.     There was spontaneous variability of the background activity. The patient was unresponsive to sternal rub.     Photic stimulation and hyperventilation were not performed.     The EKG channel showed regular rhythm.    Interpretation  This short-term video  EEG in an unresponsive patient shows a severe degree of diffuse slowing.  These findings are consistent with a diffuse disturbance of cerebral function or encephalopathy secondary to metabolic, toxic, infectious, inflammatory, or other systemic processes.  There is also some asymmetry with suppression over the left hemisphere indicative of focal disturbance of cerebral function. No potentially epileptogenic discharges or seizure activity are present during the recording.

## 2023-10-20 NOTE — CHAPLAIN
10/19/23 1008   Clinical Encounter Type   Visit Type Initial Visit   Visit Category Death   Visited With Patient and family together;Health care provider  (Two female relatives were present during the Spiritual Care  visit.)   Number of Family Visited 2  (Female relatives)   Length of Visit 35 Minutes   Continue Visiting No   Judaism Encounters   Spiritual Resources Requested Prayer   Patient Spiritual Encounters   Care Provided Grief care;Decedent Care  (Spiritual Care provided Empathetic listening and prayer support to the patient and family that was present.)   Patient Coping Non-verbal  (Patient had already expiried.)   Family Spiritual Encounters   Care Provided Compassionate presence;Prayer support;Life review/ reflection;Reflective listening;Explored pt/family concerns;Grief care  (Empathetic listening, Compassionate presence and Supportive prayer)   Family Coping Accepting;Open/discussion;Sadness;Active michelle;Internal strength;Family/ friends resources;Using michelle/ community resources   Comments - Family The family expressed their gratefulness and their thankfulness for the Spiritual Care  visit.  ( provided them with a Spiritual Care business card to contact the office.)

## 2023-10-21 LAB
HBV SURFACE AB SER QL IA: NEGATIVE
HBV SURFACE AB SERPL IA-ACNC: <3 MIU/ML

## 2023-10-22 PROBLEM — I63.9 CVA (CEREBRAL VASCULAR ACCIDENT): Status: ACTIVE | Noted: 2023-10-22

## 2023-10-22 NOTE — DISCHARGE SUMMARY
"[facility] Surg ([address] Medicine  Discharge Summary      Patient Name: Shannan Figueredo  MRN: [id number]  AVELINO: [id number]  Patient Class: IP- Inpatient  Admission Date: 9/17/2023  Hospital Length of Stay: 32 days  Discharge Date and Time: 10/19/2023  9:58 AM  Attending Physician: Brook Colon MD  Discharging Provider: Brook Colon MD  Primary Care Provider: Roxi, Primary Doctor    Primary Care Team: Networked reference to record PCT     HPI:   Ms. Figueredo is a 67/F with PMH HTN, DMII (diet-controlled), PVD, ESRD on HD (M/W/F), anemia who presented to [facility] 09/17 with a two day progressive history of worsening L foot pain, swelling, fatigue, nausea and vomiting. History obtained from patient and daughter Stephanie ([phone]); they are from the [place], [place] area. Patient reports several weeks ago she "stubbed her toe," noting initially some pain, redness and swelling. Gradually symptoms progressed with duskiness to her toe and she informed her daughter of her pain for which she was brought to ER in [place] on 08/25 for further evaluation. With duskiness to 1st toe and surrounding erythema she was treated for cellulitis with a course of ciprofloxacin and metronidazole, but failed to improve. She followed up with her podiatrist 09/12 where she was prescribed hydrocodone-acetaminophen and a ten day course of TMP-SMX which she has been taking. She was referred to vascular surgery with plan for angiography and potential intervention  She and her family came to [place] for the weekend but her pain worsened; had been attempting to minimize use of hydrocodone-acetaminophen but developed nausea, vomiting and progressive fatigue. She subsequently presented to ED for further evaluation. ED workup was notable for L 1st toe dry-appearing gangrene with surrounding erythema and swelling, removed nail of second toe, XR L foot demonstrating diffuse osteopenia, L calcaneal and forefoot soft tissue " "swelling without definitive evidence of osteomyelitis in setting of osteopenia, no leukocytosis, elevated sed rate and CRP, and elevated BUN/Cr in setting of ESRD. She notes she went without dialysis on 09/15 due to her trip. Blood cultures were ordered and she received piperacillin-tazobactam and vancomycin. Hospital medicine was subsequently contacted for admission.      Procedure(s) (LRB):  AMPUTATION, BELOW KNEE (Left)      Hospital Course:   Admitted with gangrene of L 1st toe.  Empirically treated with Zosyn and vancomycin.  Vascular Surgery, nephrology, podiatry consulted; U/S arterial demonstrated L SFA occlusion with distal reconstitution. Cardiology consulted, angiography with no vessels amenable for angioplasty on 9/20.  Underwent exploration of the left lower extremity vessels, femoral and popliteal bypass unable to be performed due to no adequate available vessels for bypass on 9/22. Discussed potential intervention here vs [place], patient and family opted for surgery in [place]. Underwent L BKA 09/29. PT/OT recommended SNF. Patient was hard of hearing at baseline, but reported she could not hear at all one day and she underwent further workup with head CT then followed by MRI of brain concerning for a "very small focus of diffusion signal abnormality deep white matter of the right frontal lobe, a small area of acute infarction cannot be excluded" on 10/7. Also noted on MRI of brain was severe periventricular white matter changes and central involutional changes that were greater than anticipated for her age. She was already on ASA, plavix, and statin. Therapy was already following. Teleneurology had nothing further to add. She was noted to have mental status changes and pain medications adjusted with some improvement. Appetite was poor and concern for depressive symptoms due to general medical condition. Telepsychiatry consulted and started mirtazapine, but she was more somnolent and subsequently it " was discontinued. Surgery noted blistering to surgical site and recommended continued observation of wound, but blister was stable and WBC trended down and she remained AF. Her mental status waxed and waned, and repeat MRI of brain done with no acute findings on 10/18 (findings were consistent from previous MRI on 10/7). Given her failure to thrive and continued clinical decline, updated family on status and overall poor prognosis for meaningful recovery and with recommendation for DNR status and continued goals of care. All family members were inclined for DNR but wanted input from each other but were not able to discuss this issue amongst themselves. Patient continued to decline the next day with drop in blood pressure and family updated, again they wanted DNR separately but did not talk together. Called by nurse with clinical status change and patient was not responsive. Patient was found unresponsive, apneic, asystolic with pupils fixed and dilated. Time of death 9:58am CST on 10/19/2023. Family (daughter) was notified at the bedside and the other daughter in [place] was notified via phone. Condolences were given.       Goals of Care Treatment Preferences:  Code Status: DNR      Consults:   Consults (From admission, onward)        Status Ordering Provider     Inpatient consult to Telemedicine - Psych  Once        Provider:  (Not yet assigned)    Completed JOSE BO     Inpatient consult to Telemedicine-General Neurology  Once        Provider:  Chikis Adams MD    Completed KIMANI BOOGIE     Inpatient consult to Palliative Care  Once        Provider:  Tori Cobb RN    Completed SANIYA ORELLANA     Inpatient consult to Social Work/Case Management  Once        Provider:  (Not yet assigned)    JOSE Woo     Inpatient consult to Cardiology  Once        Provider:  Rao Sosa MD    Completed JOSE BO     Inpatient consult to Registered Dietitian/Nutritionist  Once         Provider:  (Not yet assigned)    JOSE Woo     Inpatient consult to Registered Dietitian/Nutritionist  Once        Provider:  (Not yet assigned)    JOSE Woo     Inpatient consult to Podiatry  Once        Provider:  (Not yet assigned)    JOSE Woo     Inpatient consult to Nephrology-[facility]  Once        Provider:  Edgar Moore MD Completed HEBERT, D. SCOTT        Service: Hospital Medicine    Final Active Diagnoses:    Diagnosis Date Noted POA    PRINCIPAL PROBLEM:  Encephalopathy [G93.40] 10/06/2023 Yes    Gangrene of toe [I96] 2023 Yes    CVA (cerebral vascular accident) [I63.9] 10/22/2023 No    Hearing impaired person, unspecified laterality [H91.90] 10/07/2023 No    Advance care planning [Z71.89] 10/06/2023 Not Applicable    Protein calorie malnutrition [E46] 10/06/2023 Yes    Debility [R53.81] 10/06/2023 Yes    Essential hypertension [I10] 2023 Yes     Chronic    Type 2 diabetes mellitus with chronic kidney disease on chronic dialysis [E11.22, N18.6, Z99.2] 2023 Not Applicable     Chronic    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    Significant Diagnostic Studies: N/A    Pending Diagnostic Studies:     None         Medications:  None    Indwelling Lines/Drains at time of discharge:   Lines/Drains/Airways     Drain  Duration                Hemodialysis AV Fistula 23  Right upper arm 35 days                Time spent on the discharge of patient: 45 minutes         Brook Colon MD  Department of Hospital Medicine  [facility] - Med Surg ([address])

## 2023-10-24 LAB — BACTERIA BLD CULT: NORMAL

## 2024-07-11 NOTE — ASSESSMENT & PLAN NOTE
- Reports diet-controlled; no current medications.  - Check HgbA1c; start low-dose sliding scale insulin aspart 0-5U subQ TIDWM PRN, monitor requirements.   72

## (undated) DEVICE — DRAPE ORTH SPLIT 77X108IN

## (undated) DEVICE — SUT 3-0 VICRYL SH CR/8 18

## (undated) DEVICE — DRAPE EXTREMITY ORTHOMAX

## (undated) DEVICE — HEMOSTAT SURGICEL 4X8IN

## (undated) DEVICE — SYR 10CC LUER LOCK

## (undated) DEVICE — SPONGE LAP 18X18 PREWASHED

## (undated) DEVICE — SOL POVIDONE SCRUB IODINE 4 OZ

## (undated) DEVICE — SPONGE COTTON TRAY 4X4IN

## (undated) DEVICE — STOCKINETTE TUBULAR 2PL 6 X 4

## (undated) DEVICE — Device

## (undated) DEVICE — SUT VICRYL PLUS 0 CT1 18IN

## (undated) DEVICE — DERMABOND PAD PRINEO PATIENT

## (undated) DEVICE — CANNULA VSL W/DUCKBILL

## (undated) DEVICE — BNDG COFLEX FOAM LF2 ST 6X5YD

## (undated) DEVICE — COVER MAYO STAND REINFRCD 30

## (undated) DEVICE — STAPLER SKIN PROXIMATE WIDE

## (undated) DEVICE — DRESSING XEROFORM NONADH 1X8IN

## (undated) DEVICE — GOWN SMART IMP BREATHABLE XXLG

## (undated) DEVICE — BOOT SUTURE AID

## (undated) DEVICE — DRAPE U SPLIT SHEET 54X76IN

## (undated) DEVICE — SOL IRR SOD CHL .9% POUR

## (undated) DEVICE — KIT PROBE COVER WITH GEL

## (undated) DEVICE — PAD CAST SPECIALIST STRL 6

## (undated) DEVICE — DRAPE STERI INSTRUMENT 1018

## (undated) DEVICE — INTRODUCER CATH 5F 11CM

## (undated) DEVICE — DECANTER FLUID TRNSF WHITE 9IN

## (undated) DEVICE — SYR 30CC LUER LOCK

## (undated) DEVICE — BLADE SAG DUAL 18MMX1.27MMX90M

## (undated) DEVICE — ADHESIVE DERMABOND ADVANCED

## (undated) DEVICE — SUT VICRYL PLUS 2-0 CT1 18

## (undated) DEVICE — TIP YANKAUERS BULB NO VENT

## (undated) DEVICE — APPLIER LIGACLIP MED 11IN

## (undated) DEVICE — SOCKINETTE IMPERVIOUS 12X48IN

## (undated) DEVICE — SUT VICRYL PLUS 3-0 18IN

## (undated) DEVICE — SUT VICRYL PLUS 2-0 18IN

## (undated) DEVICE — NDL HYPO STD REG BVL 18GX1.5IN

## (undated) DEVICE — GLOVE SENSICARE PI MICRO 8

## (undated) DEVICE — BLADE SURG STAINLESS STEEL #11

## (undated) DEVICE — TRAY ANGIO BAPTIST

## (undated) DEVICE — SYR MARK 7 ARTERION 150ML

## (undated) DEVICE — APPLIER CLIP LIAGCLIP 9.375IN

## (undated) DEVICE — GUIDEWIRE SAFE T .035IN 180CM

## (undated) DEVICE — CATH OMNI FLUSH 5F X 90CM

## (undated) DEVICE — SET MICPUNC ACC STIFF CANNULA

## (undated) DEVICE — SUT VICRYL 2-0 8-18 CP-2

## (undated) DEVICE — TOWEL OR DISP STRL BLUE 4/PK

## (undated) DEVICE — TRAY CATH FOL SIL URIMTR 16FR

## (undated) DEVICE — CATH FOGARTY ART EMB 5FR 80CM

## (undated) DEVICE — GLOVE SENSICARE PI MICRO 6.5

## (undated) DEVICE — SPONGE BULKEE II ABSRB 6X6.75

## (undated) DEVICE — CONTRAST VISIPAQUE 150ML

## (undated) DEVICE — ELECTRODE REM PLYHSV RETURN 9

## (undated) DEVICE — JELLY SURGILUBE 5GR

## (undated) DEVICE — BANDAGE MATRIX HK LOOP 6IN 5YD

## (undated) DEVICE — APPLICATOR CHLORAPREP ORN 26ML

## (undated) DEVICE — GRAFT VAS PROPATEN 6X80 TWR
Type: IMPLANTABLE DEVICE | Site: LEG | Status: NON-FUNCTIONAL
Removed: 2023-09-22

## (undated) DEVICE — SUT VICRYL PLUS 3-0 SH 18IN

## (undated) DEVICE — DISSECTOR LIGASURE EXACT 21CM

## (undated) DEVICE — TAPE UMBILICAL COTTON 1/16X30

## (undated) DEVICE — SYS CLSR DERMABOND PRINEO 22CM

## (undated) DEVICE — TOWEL PREWASHED STERILE WHITE

## (undated) DEVICE — SUT VICRYL 2-0 36 CT-1

## (undated) DEVICE — DRAPE THREE-QTR REINF 53X77IN

## (undated) DEVICE — GUIDEWIRE LAUREATE .035X260CM

## (undated) DEVICE — CLIPPER BLADE MOD 4406 (CAREF)

## (undated) DEVICE — LINE HIGH PRESSURE TUBING 48

## (undated) DEVICE — DRESSING TRANS 4X4 TEGADERM

## (undated) DEVICE — CATH URETHRAL RED RUBBER 18FR

## (undated) DEVICE — PAD ABDOMINAL STERILE 8X10IN